# Patient Record
Sex: FEMALE | Race: WHITE | NOT HISPANIC OR LATINO
[De-identification: names, ages, dates, MRNs, and addresses within clinical notes are randomized per-mention and may not be internally consistent; named-entity substitution may affect disease eponyms.]

---

## 2019-03-25 ENCOUNTER — TRANSCRIPTION ENCOUNTER (OUTPATIENT)
Age: 54
End: 2019-03-25

## 2019-04-08 ENCOUNTER — LABORATORY RESULT (OUTPATIENT)
Age: 54
End: 2019-04-08

## 2019-04-08 ENCOUNTER — APPOINTMENT (OUTPATIENT)
Dept: CARDIOLOGY | Facility: CLINIC | Age: 54
End: 2019-04-08
Payer: COMMERCIAL

## 2019-04-08 ENCOUNTER — NON-APPOINTMENT (OUTPATIENT)
Age: 54
End: 2019-04-08

## 2019-04-08 VITALS
DIASTOLIC BLOOD PRESSURE: 78 MMHG | HEIGHT: 68 IN | TEMPERATURE: 98.1 F | HEART RATE: 54 BPM | BODY MASS INDEX: 24.55 KG/M2 | OXYGEN SATURATION: 99 % | SYSTOLIC BLOOD PRESSURE: 124 MMHG | WEIGHT: 162 LBS

## 2019-04-08 VITALS — SYSTOLIC BLOOD PRESSURE: 120 MMHG | DIASTOLIC BLOOD PRESSURE: 76 MMHG

## 2019-04-08 PROCEDURE — 93000 ELECTROCARDIOGRAM COMPLETE: CPT

## 2019-04-08 PROCEDURE — 93306 TTE W/DOPPLER COMPLETE: CPT

## 2019-04-08 PROCEDURE — 99204 OFFICE O/P NEW MOD 45 MIN: CPT

## 2019-04-09 ENCOUNTER — APPOINTMENT (OUTPATIENT)
Dept: CARDIOLOGY | Facility: CLINIC | Age: 54
End: 2019-04-09
Payer: COMMERCIAL

## 2019-04-09 ENCOUNTER — INPATIENT (INPATIENT)
Facility: HOSPITAL | Age: 54
LOS: 0 days | Discharge: HOME | DRG: 948 | End: 2019-04-10
Attending: INTERNAL MEDICINE | Admitting: INTERNAL MEDICINE
Payer: COMMERCIAL

## 2019-04-09 VITALS
HEART RATE: 68 BPM | DIASTOLIC BLOOD PRESSURE: 88 MMHG | OXYGEN SATURATION: 95 % | WEIGHT: 162.04 LBS | SYSTOLIC BLOOD PRESSURE: 132 MMHG | RESPIRATION RATE: 17 BRPM | TEMPERATURE: 98 F | HEIGHT: 68 IN

## 2019-04-09 DIAGNOSIS — R94.39 ABNORMAL RESULT OF OTHER CARDIOVASCULAR FUNCTION STUDY: ICD-10-CM

## 2019-04-09 PROCEDURE — 93015 CV STRESS TEST SUPVJ I&R: CPT

## 2019-04-09 PROCEDURE — A9500: CPT

## 2019-04-09 PROCEDURE — 99285 EMERGENCY DEPT VISIT HI MDM: CPT

## 2019-04-09 PROCEDURE — 93454 CORONARY ARTERY ANGIO S&I: CPT | Mod: 26,GC

## 2019-04-09 PROCEDURE — 78452 HT MUSCLE IMAGE SPECT MULT: CPT

## 2019-04-09 RX ORDER — TICAGRELOR 90 MG/1
90 TABLET ORAL
Qty: 0 | Refills: 0 | Status: DISCONTINUED | OUTPATIENT
Start: 2019-04-09 | End: 2019-04-10

## 2019-04-09 RX ORDER — ATORVASTATIN CALCIUM 80 MG/1
40 TABLET, FILM COATED ORAL AT BEDTIME
Qty: 0 | Refills: 0 | Status: DISCONTINUED | OUTPATIENT
Start: 2019-04-09 | End: 2019-04-10

## 2019-04-09 RX ORDER — TOPIRAMATE 25 MG
50 TABLET ORAL DAILY
Qty: 0 | Refills: 0 | Status: DISCONTINUED | OUTPATIENT
Start: 2019-04-09 | End: 2019-04-10

## 2019-04-09 RX ORDER — SODIUM CHLORIDE 9 MG/ML
500 INJECTION INTRAMUSCULAR; INTRAVENOUS; SUBCUTANEOUS
Qty: 0 | Refills: 0 | Status: DISCONTINUED | OUTPATIENT
Start: 2019-04-09 | End: 2019-04-10

## 2019-04-09 RX ORDER — METOPROLOL TARTRATE 50 MG
12.5 TABLET ORAL
Qty: 0 | Refills: 0 | Status: DISCONTINUED | OUTPATIENT
Start: 2019-04-09 | End: 2019-04-10

## 2019-04-09 RX ORDER — ACETAMINOPHEN 500 MG
650 TABLET ORAL ONCE
Qty: 0 | Refills: 0 | Status: COMPLETED | OUTPATIENT
Start: 2019-04-09 | End: 2019-04-09

## 2019-04-09 RX ORDER — ASPIRIN/CALCIUM CARB/MAGNESIUM 324 MG
81 TABLET ORAL DAILY
Qty: 0 | Refills: 0 | Status: DISCONTINUED | OUTPATIENT
Start: 2019-04-09 | End: 2019-04-10

## 2019-04-09 RX ORDER — ESCITALOPRAM OXALATE 10 MG/1
10 TABLET, FILM COATED ORAL DAILY
Qty: 0 | Refills: 0 | Status: DISCONTINUED | OUTPATIENT
Start: 2019-04-09 | End: 2019-04-10

## 2019-04-09 RX ORDER — NITROGLYCERIN 6.5 MG
0.5 CAPSULE, EXTENDED RELEASE ORAL ONCE
Qty: 0 | Refills: 0 | Status: COMPLETED | OUTPATIENT
Start: 2019-04-09 | End: 2019-04-09

## 2019-04-09 RX ADMIN — Medication 0.5 INCH(S): at 22:25

## 2019-04-09 RX ADMIN — TICAGRELOR 90 MILLIGRAM(S): 90 TABLET ORAL at 22:26

## 2019-04-09 RX ADMIN — SODIUM CHLORIDE 75 MILLILITER(S): 9 INJECTION INTRAMUSCULAR; INTRAVENOUS; SUBCUTANEOUS at 21:19

## 2019-04-09 RX ADMIN — Medication 50 MILLIGRAM(S): at 22:26

## 2019-04-09 RX ADMIN — ATORVASTATIN CALCIUM 40 MILLIGRAM(S): 80 TABLET, FILM COATED ORAL at 22:25

## 2019-04-09 RX ADMIN — Medication 30 MILLILITER(S): at 22:05

## 2019-04-09 RX ADMIN — Medication 650 MILLIGRAM(S): at 23:55

## 2019-04-09 NOTE — ED ADULT NURSE NOTE - OBJECTIVE STATEMENT
53 year old female patient presents ambulatory to ED from cardiologists office c/o intermittent CP and dizziness x 2 weeks s/p angioplasty. Patient states she had nuclear stress test today and was told by cardiologist to go to ED for cath. Patient currently reporting dizziness and lightheadedness, and 4/10 substernal chest pain. Denies current palpitations, SOB, abd pain, n/v/d, fever, chills. Cardiology fellow at bedside states patient is to go to cath lab immediately. IV access obtained to [facility]. Patient taken to Cath lab. Attempted to call cath lab but unable to take report at this time. Charge RN made aware.

## 2019-04-09 NOTE — ED ADULT NURSE REASSESSMENT NOTE - NS ED NURSE REASSESS COMMENT FT1
Cardiology fellow at bedside to take patient to cath lab. MD made aware that patient has all valuables and belongings on her and  is on his way. Offered to secure belongings in ED - MD states he needs to take patient to cath lab now. Attempted to call report, but RN unable to receive report at this time- aware that patient is on her way up

## 2019-04-09 NOTE — ED ADULT NURSE NOTE - NSIMPLEMENTINTERV_GEN_ALL_ED
Implemented All Fall with Harm Risk Interventions:  [place] to call system. Call bell, personal items and telephone within reach. Instruct patient to call for assistance. Room bathroom lighting operational. Non-slip footwear when patient is off stretcher. Physically safe environment: no spills, clutter or unnecessary equipment. Stretcher in lowest position, wheels locked, appropriate side rails in place. Provide visual cue, wrist band, yellow gown, etc. Monitor gait and stability. Monitor for mental status changes and reorient to person, place, and time. Review medications for side effects contributing to fall risk. Reinforce activity limits and safety measures with patient and family. Provide visual clues: red socks.

## 2019-04-09 NOTE — ED CLERICAL - NS ED CLERK NOTE PRE-ARRIVAL INFORMATION; ADDITIONAL PRE-ARRIVAL INFORMATION
CC/Reason For referral: Chest pain and requesting Angiogram with MD Chahal  Preferred Consultant(if applicable):  Who admits for you (if needed):  Do you have documents you would like to fax over?  Would you still like to speak to an ED attending? yes

## 2019-04-09 NOTE — ED PROVIDER NOTE - PMH
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

## 2019-04-09 NOTE — H&P CARDIOLOGY - PMH
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

## 2019-04-09 NOTE — ED PROVIDER NOTE - INTERPRETATION
EKG reviewed for rate, rhythm, axis, intervals and segments, including QRS morphology, P wave appearance T wave appearance, OR interval, and QT interval.  I find the EKG to be unremarkable in all of these regards except as follows: sinus bradycardia

## 2019-04-09 NOTE — H&P CARDIOLOGY - HISTORY OF PRESENT ILLNESS
52 yo  female PMH HTN, HLD, CAD with recent stent placement 2 weeks ago in [facility] to LAD and migraine presents to ER today after being evaluated by Dr. Burleson in the office today. Patient reports intermittent midsternal non radiating exertional and nonexertional CP since stent placement. Patient is new to Dr. Burleson. Patient reports she had a NST test today which was normal, however had ST changes. Patient was instructed to go to ER for further cardiac evaluation. Patient arrives to cardiac cath denies chest pain, palpitations, SOB 52 yo  female PMH HTN, HLD, CAD with recent stent placement 2 weeks ago in [facility] to LAD and migraine presents to ER today after being evaluated by Dr. Burleson in the office today. Patient reports intermittent midsternal non radiating exertional and nonexertional CP since stent placement. Patient is new to Dr. Burleson. Patient reports she had a NST test today which was normal, however had ST changes. Patient was instructed to go to ER for further cardiac evaluation. Patient arrives to cardiac cath denies chest pain, palpitations, SOB    post menopausal- LMP 2 years ago

## 2019-04-09 NOTE — ED PROVIDER NOTE - PHYSICAL EXAMINATION
Gen: Well appearing, NAD  Head: NCAT  HEENT: PERRL, MMM, normal conjunctiva, anicteric, neck supple  Lung: CTAB, no adventitious sounds  CV: RRR, no murmurs  Abd: soft, NTND, no rebound or guarding, no CVAT  MSK: No edema, no visible deformities  Neuro: Moving all extremities grossly  Skin: Warm and dry, no evidence of rash  Psych: normal mood and affect

## 2019-04-09 NOTE — ED PROVIDER NOTE - ATTENDING CONTRIBUTION TO CARE
54 yo female p/s substernal chest pressure x 2 weeks.  Recent cath with LAD stent placement per patient.  Some residual pain now.  EKG without STEMI.  Cards @ bedside -- had abnl pharm stress today, plan for urgent cardiac cath.

## 2019-04-09 NOTE — ED ADULT TRIAGE NOTE - CHIEF COMPLAINT QUOTE
pt sent by cardiologist for sternal cp associated with lightheadedness. s/p nuclear stress test today.  pt has LAD stent with angioplasty x 2 weeks

## 2019-04-10 ENCOUNTER — TRANSCRIPTION ENCOUNTER (OUTPATIENT)
Age: 54
End: 2019-04-10

## 2019-04-10 VITALS
TEMPERATURE: 98 F | SYSTOLIC BLOOD PRESSURE: 95 MMHG | DIASTOLIC BLOOD PRESSURE: 60 MMHG | OXYGEN SATURATION: 98 % | HEART RATE: 60 BPM | RESPIRATION RATE: 17 BRPM

## 2019-04-10 LAB
ANION GAP SERPL CALC-SCNC: 16 MMOL/L — SIGNIFICANT CHANGE UP (ref 5–17)
BUN SERPL-MCNC: 16 MG/DL — SIGNIFICANT CHANGE UP (ref 7–23)
CALCIUM SERPL-MCNC: 9.5 MG/DL — SIGNIFICANT CHANGE UP (ref 8.4–10.5)
CHLORIDE SERPL-SCNC: 108 MMOL/L — SIGNIFICANT CHANGE UP (ref 96–108)
CO2 SERPL-SCNC: 21 MMOL/L — LOW (ref 22–31)
CREAT SERPL-MCNC: 0.87 MG/DL — SIGNIFICANT CHANGE UP (ref 0.5–1.3)
GLUCOSE SERPL-MCNC: 110 MG/DL — HIGH (ref 70–99)
HCT VFR BLD CALC: 37.8 % — SIGNIFICANT CHANGE UP (ref 34.5–45)
HGB BLD-MCNC: 13.4 G/DL — SIGNIFICANT CHANGE UP (ref 11.5–15.5)
MCHC RBC-ENTMCNC: 33.7 PG — SIGNIFICANT CHANGE UP (ref 27–34)
MCHC RBC-ENTMCNC: 35.4 GM/DL — SIGNIFICANT CHANGE UP (ref 32–36)
MCV RBC AUTO: 95.1 FL — SIGNIFICANT CHANGE UP (ref 80–100)
PLATELET # BLD AUTO: 230 K/UL — SIGNIFICANT CHANGE UP (ref 150–400)
POTASSIUM SERPL-MCNC: 3.6 MMOL/L — SIGNIFICANT CHANGE UP (ref 3.5–5.3)
POTASSIUM SERPL-SCNC: 3.6 MMOL/L — SIGNIFICANT CHANGE UP (ref 3.5–5.3)
RBC # BLD: 3.97 M/UL — SIGNIFICANT CHANGE UP (ref 3.8–5.2)
RBC # FLD: 12.3 % — SIGNIFICANT CHANGE UP (ref 10.3–14.5)
SODIUM SERPL-SCNC: 145 MMOL/L — SIGNIFICANT CHANGE UP (ref 135–145)
WBC # BLD: 11.1 K/UL — HIGH (ref 3.8–10.5)
WBC # FLD AUTO: 11.1 K/UL — HIGH (ref 3.8–10.5)

## 2019-04-10 PROCEDURE — 85027 COMPLETE CBC AUTOMATED: CPT

## 2019-04-10 PROCEDURE — C1894: CPT

## 2019-04-10 PROCEDURE — C1769: CPT

## 2019-04-10 PROCEDURE — C1887: CPT

## 2019-04-10 PROCEDURE — C1760: CPT

## 2019-04-10 PROCEDURE — 93454 CORONARY ARTERY ANGIO S&I: CPT

## 2019-04-10 PROCEDURE — 99285 EMERGENCY DEPT VISIT HI MDM: CPT

## 2019-04-10 PROCEDURE — 80048 BASIC METABOLIC PNL TOTAL CA: CPT

## 2019-04-10 RX ORDER — PANTOPRAZOLE SODIUM 20 MG/1
20 TABLET, DELAYED RELEASE ORAL
Qty: 0 | Refills: 0 | Status: DISCONTINUED | OUTPATIENT
Start: 2019-04-10 | End: 2019-04-10

## 2019-04-10 RX ORDER — POTASSIUM CHLORIDE 20 MEQ
20 PACKET (EA) ORAL ONCE
Qty: 0 | Refills: 0 | Status: COMPLETED | OUTPATIENT
Start: 2019-04-10 | End: 2019-04-10

## 2019-04-10 RX ORDER — RANOLAZINE 500 MG/1
500 TABLET, FILM COATED, EXTENDED RELEASE ORAL ONCE
Qty: 0 | Refills: 0 | Status: COMPLETED | OUTPATIENT
Start: 2019-04-10 | End: 2019-04-10

## 2019-04-10 RX ORDER — RANOLAZINE 500 MG/1
1 TABLET, FILM COATED, EXTENDED RELEASE ORAL
Qty: 180 | Refills: 0
Start: 2019-04-10 | End: 2019-07-08

## 2019-04-10 RX ADMIN — Medication 0.5 INCH(S): at 01:01

## 2019-04-10 RX ADMIN — Medication 650 MILLIGRAM(S): at 00:25

## 2019-04-10 RX ADMIN — RANOLAZINE 500 MILLIGRAM(S): 500 TABLET, FILM COATED, EXTENDED RELEASE ORAL at 11:59

## 2019-04-10 RX ADMIN — Medication 12.5 MILLIGRAM(S): at 05:36

## 2019-04-10 RX ADMIN — Medication 20 MILLIEQUIVALENT(S): at 10:04

## 2019-04-10 RX ADMIN — TICAGRELOR 90 MILLIGRAM(S): 90 TABLET ORAL at 10:04

## 2019-04-10 RX ADMIN — Medication 50 MILLIGRAM(S): at 10:04

## 2019-04-10 RX ADMIN — Medication 81 MILLIGRAM(S): at 05:36

## 2019-04-10 RX ADMIN — ESCITALOPRAM OXALATE 10 MILLIGRAM(S): 10 TABLET, FILM COATED ORAL at 10:04

## 2019-04-10 RX ADMIN — PANTOPRAZOLE SODIUM 20 MILLIGRAM(S): 20 TABLET, DELAYED RELEASE ORAL at 10:04

## 2019-04-10 NOTE — DISCHARGE NOTE PROVIDER - CARE PROVIDER_API CALL
Smith Lin)  Cardiology; Internal Medicine  [address] [facility] [address], [address]  [place] [address]  Phone: [phone]  Fax: [phone]  Follow Up Time:

## 2019-04-10 NOTE — DISCHARGE NOTE PROVIDER - HOSPITAL COURSE
52 yo  female PMH HTN, HLD, CAD with recent stent placement 2 weeks ago in [facility] to LAD and migraine presents to ER today after being evaluated by Dr. Burleson in the office today. Patient reports intermittent midsternal non radiating exertional and nonexertional CP since stent placement. Patient is new to Dr. Burleson. Patient reports she had a NST test today which was normal, however had ST changes. Patient was instructed to go to ER for further cardiac evaluation. Patient arrives to cardiac cath denies chest pain, palpitations, SOB. Pt is now s/p diagnostic cath LAD stent patent, RCA 20%. Pt tolerated the procedure well, site benign. Post procedure discharge instructions discussed and questions addressed

## 2019-04-10 NOTE — PROGRESS NOTE ADULT - ASSESSMENT
Patient is a 53y old  Female who presents with a chief complaint of chest pain now s/p diagnostic cath via left femoral artery access. Pt tolerated the procedure well, site benign. Post-procedure discharge instructions discussed and questions addressed

## 2019-04-10 NOTE — DISCHARGE NOTE NURSING/CASE MANAGEMENT/SOCIAL WORK - NSDCDPATPORTLINK_GEN_ALL_CORE
You can access the [facility], offered by [facility], by registering with the following website: [email or web address]

## 2019-04-10 NOTE — DISCHARGE NOTE PROVIDER - NSDCCPCAREPLAN_GEN_ALL_CORE_FT
PRINCIPAL DISCHARGE DIAGNOSIS  Diagnosis: Chest pain  Assessment and Plan of Treatment: Pt remains chest pain free and understands post cath discharge instructions   No heavy lifting, strenuous activity, bending, straining or unnecessary stair climbing  for 2 weeks. No sex for 1 week.  No driving for 2 days. You may shower 24 hours following procedure but avoid baths and swimming for 1 week. Check groin site for bleeding and/or swelling daily following procedure. Call your doctor/cardiologist immediately should it occur or if you have increased/persistent pain at the site. Follow up with your cardiologist in 1- 2 weeks. You may call [facility] at [phone] or [phone] after office hours and weekends  with any questions or concerns following your procedure. Take medications as prescribed.

## 2019-04-10 NOTE — PROVIDER CONTACT NOTE (OTHER) - SITUATION
As pt was getting OOB with staff she c/o pain in left inner thigh. pt unable to walk to bathroom at this time. Cath site is benign. Inner thigh is soft and no swelling or bruising noted.

## 2019-04-10 NOTE — PROVIDER CONTACT NOTE (OTHER) - BACKGROUND
s/p diagnostic cath via leftgroin with angioseal.  pt recently received stent to LAD approx 2 weeks ago.

## 2019-04-10 NOTE — DISCHARGE NOTE PROVIDER - NSDCCPTREATMENT_GEN_ALL_CORE_FT
PRINCIPAL PROCEDURE  Procedure: Left heart cardiac catheterization  Findings and Treatment: diagnostic, via left femoral artery access.

## 2019-04-10 NOTE — PROGRESS NOTE ADULT - SUBJECTIVE AND OBJECTIVE BOX
Patient is a 53y old  Female who presents with a chief complaint of chest pain now s/p diagnostic cath via left femoral artery access.         Allergies    Compazine (Unknown)  morphine (Unknown)  PC Pen VK (Unknown)  shellfish (Unknown)  sulfa drugs (Unknown)    Intolerances        Medications:  aluminum hydroxide/magnesium hydroxide/simethicone Suspension 30 milliLiter(s) Oral every 4 hours PRN  aspirin enteric coated 81 milliGRAM(s) Oral daily  atorvastatin 40 milliGRAM(s) Oral at bedtime  escitalopram 10 milliGRAM(s) Oral daily  metoprolol tartrate 12.5 milliGRAM(s) Oral two times a day  sodium chloride 0.9%. 500 milliLiter(s) IV Continuous <Continuous>  ticagrelor 90 milliGRAM(s) Oral two times a day  topiramate 50 milliGRAM(s) Oral daily      Vitals:  T(C): 36.4 (19 @ 20:35), Max: 36.7 (19 @ 18:15)  HR: 51 (04-10-19 @ 00:50) (51 - 68)  BP: 91/52 (04-10-19 @ 00:50) (91/52 - 139/83)  BP(mean): 97 (19 @ 19:28) (97 - 97)  RR: 15 (04-10-19 @ 00:50) (14 - 18)  SpO2: 100% (04-10-19 @ 00:50) (95% - 100%)  Wt(kg): --  Daily Height in cm: 172 (2019 19:28)    Daily Weight in k.5 (2019 19:28)  I&O's Summary    2019 07:01  -  10 Apr 2019 04:46  --------------------------------------------------------  IN: 150 mL / OUT: 200 mL / NET: -50 mL          Physical Exam:  Appearance: Normal  Eyes: PERRL, EOMI  HENT: Normal oral [place], NC/AT  Cardiovascular: S1S2, RRR, No M/R/G, no JVD, No Lower extremity edema  Procedural Access Site: Left femoral artery access.  No hematoma, Non-tender to palpation, 2+ pulse, No bruit, No Ecchymosis  Respiratory: Clear to auscultation bilaterally  Gastrointestinal: Soft, Non tender, Normal Bowel Sounds  Musculoskeletal: No clubbing, No joint deformity   Neurologic: Non-focal  Psychiatry: AAOx3, Mood & affect appropriate  Skin: No rashes, No ecchymoses, No cyanosis    04-10    145  |  108  |  16  ----------------------------<  110<H>  3.6   |  21<L>  |  0.87    Ca    9.5      10 Apr 2019 00:22      Interpretation of Telemetry:

## 2019-04-11 PROBLEM — Z95.5 PRESENCE OF CORONARY ANGIOPLASTY IMPLANT AND GRAFT: Chronic | Status: ACTIVE | Noted: 2019-04-09

## 2019-04-11 PROBLEM — E78.5 HYPERLIPIDEMIA, UNSPECIFIED: Chronic | Status: ACTIVE | Noted: 2019-04-09

## 2019-04-13 ENCOUNTER — RX CHANGE (OUTPATIENT)
Age: 54
End: 2019-04-13

## 2019-04-14 LAB
ALBUMIN SERPL ELPH-MCNC: 4.3 G/DL
ALP BLD-CCNC: 104 U/L
ALT SERPL-CCNC: 15 U/L
ANION GAP SERPL CALC-SCNC: 12 MMOL/L
AST SERPL-CCNC: 18 U/L
BASOPHILS # BLD AUTO: 0.09 K/UL
BASOPHILS NFR BLD AUTO: 0.9 %
BILIRUB SERPL-MCNC: 0.4 MG/DL
BUN SERPL-MCNC: 20 MG/DL
CALCIUM SERPL-MCNC: 9.6 MG/DL
CHLORIDE SERPL-SCNC: 108 MMOL/L
CHOLEST SERPL-MCNC: 136 MG/DL
CHOLEST/HDLC SERPL: 2.6 RATIO
CK SERPL-CCNC: 42 U/L
CO2 SERPL-SCNC: 25 MMOL/L
CREAT SERPL-MCNC: 1.05 MG/DL
EOSINOPHIL # BLD AUTO: 0.4 K/UL
EOSINOPHIL NFR BLD AUTO: 4 %
ESTIMATED AVERAGE GLUCOSE: 97 MG/DL
GLUCOSE SERPL-MCNC: 86 MG/DL
HBA1C MFR BLD HPLC: 5 %
HCT VFR BLD CALC: 40.1 %
HDLC SERPL-MCNC: 52 MG/DL
HGB BLD-MCNC: 12.8 G/DL
IMM GRANULOCYTES NFR BLD AUTO: 0.4 %
LDLC SERPL CALC-MCNC: 60 MG/DL
LDLC SERPL DIRECT ASSAY-MCNC: 64 MG/DL
LYMPHOCYTES # BLD AUTO: 2.46 K/UL
LYMPHOCYTES NFR BLD AUTO: 24.8 %
MAGNESIUM SERPL-MCNC: 2.1 MG/DL
MAN DIFF?: NORMAL
MCHC RBC-ENTMCNC: 31.9 GM/DL
MCHC RBC-ENTMCNC: 32.1 PG
MCV RBC AUTO: 100.5 FL
MONOCYTES # BLD AUTO: 0.46 K/UL
MONOCYTES NFR BLD AUTO: 4.6 %
NEUTROPHILS # BLD AUTO: 6.48 K/UL
NEUTROPHILS NFR BLD AUTO: 65.3 %
PHOSPHATE SERPL-MCNC: 3.9 MG/DL
PLATELET # BLD AUTO: 246 K/UL
POTASSIUM SERPL-SCNC: 4.4 MMOL/L
PROT SERPL-MCNC: 7 G/DL
RBC # BLD: 3.99 M/UL
RBC # FLD: 12.9 %
SODIUM SERPL-SCNC: 145 MMOL/L
T3RU NFR SERPL: 1.2 TBI
T4 FREE SERPL-MCNC: 1 NG/DL
T4 SERPL-MCNC: 6 UG/DL
TRIGL SERPL-MCNC: 121 MG/DL
TSH SERPL-ACNC: 2.97 UIU/ML
URATE SERPL-MCNC: 5.6 MG/DL
WBC # FLD AUTO: 9.93 K/UL

## 2019-04-14 NOTE — PHYSICAL EXAM
[General Appearance - Well Developed] : well developed [Normal Appearance] : normal appearance [Well Groomed] : well groomed [General Appearance - Well Nourished] : well nourished [No Deformities] : no deformities [General Appearance - In No Acute Distress] : no acute distress [Normal Conjunctiva] : the conjunctiva exhibited no abnormalities [Eyelids - No Xanthelasma] : the eyelids demonstrated no xanthelasmas [Normal Oral Mucosa] : normal oral mucosa [No Oral Pallor] : no oral pallor [No Oral Cyanosis] : no oral cyanosis [Normal Jugular Venous A Waves Present] : normal jugular venous A waves present [Normal Jugular Venous V Waves Present] : normal jugular venous V waves present [No Jugular Venous Grimm A Waves] : no jugular venous grimm A waves [Respiration, Rhythm And Depth] : normal respiratory rhythm and effort [Exaggerated Use Of Accessory Muscles For Inspiration] : no accessory muscle use [Auscultation Breath Sounds / Voice Sounds] : lungs were clear to auscultation bilaterally [Heart Rate And Rhythm] : heart rate and rhythm were normal [Heart Sounds] : normal S1 and S2 [Murmurs] : no murmurs present [Abdomen Soft] : soft [Abdomen Tenderness] : non-tender [] : no hepato-splenomegaly [Abdomen Mass (___ Cm)] : no abdominal mass palpated [Abnormal Walk] : normal gait [Gait - Sufficient For Exercise Testing] : the gait was sufficient for exercise testing [Oriented To Time, Place, And Person] : oriented to person, place, and time [Affect] : the affect was normal [Mood] : the mood was normal [No Anxiety] : not feeling anxious [FreeTextEntry1] : no bruit right groin  mild tenderness

## 2019-04-18 ENCOUNTER — APPOINTMENT (OUTPATIENT)
Dept: CARDIOLOGY | Facility: CLINIC | Age: 54
End: 2019-04-18
Payer: COMMERCIAL

## 2019-04-18 ENCOUNTER — NON-APPOINTMENT (OUTPATIENT)
Age: 54
End: 2019-04-18

## 2019-04-18 VITALS
DIASTOLIC BLOOD PRESSURE: 70 MMHG | WEIGHT: 162 LBS | BODY MASS INDEX: 24.55 KG/M2 | HEART RATE: 63 BPM | TEMPERATURE: 97.7 F | OXYGEN SATURATION: 98 % | SYSTOLIC BLOOD PRESSURE: 100 MMHG | HEIGHT: 68 IN

## 2019-04-18 DIAGNOSIS — R07.89 OTHER CHEST PAIN: ICD-10-CM

## 2019-04-18 PROCEDURE — 99214 OFFICE O/P EST MOD 30 MIN: CPT

## 2019-04-18 PROCEDURE — 93000 ELECTROCARDIOGRAM COMPLETE: CPT

## 2019-04-18 RX ORDER — TICAGRELOR 90 MG/1
90 TABLET ORAL
Qty: 90 | Refills: 3 | Status: DISCONTINUED | COMMUNITY
Start: 1900-01-01 | End: 2019-04-18

## 2019-04-18 RX ORDER — METOPROLOL TARTRATE 25 MG/1
25 TABLET, FILM COATED ORAL
Qty: 45 | Refills: 3 | Status: DISCONTINUED | COMMUNITY
End: 2019-04-18

## 2019-04-19 ENCOUNTER — RX RENEWAL (OUTPATIENT)
Age: 54
End: 2019-04-19

## 2019-04-22 LAB
ANION GAP SERPL CALC-SCNC: 12 MMOL/L
BASOPHILS # BLD AUTO: 0.05 K/UL
BASOPHILS NFR BLD AUTO: 0.9 %
BUN SERPL-MCNC: 21 MG/DL
CALCIUM SERPL-MCNC: 9.8 MG/DL
CHLORIDE SERPL-SCNC: 106 MMOL/L
CO2 SERPL-SCNC: 25 MMOL/L
CREAT SERPL-MCNC: 1.28 MG/DL
EOSINOPHIL # BLD AUTO: 0.28 K/UL
EOSINOPHIL NFR BLD AUTO: 4.9 %
GLUCOSE SERPL-MCNC: 87 MG/DL
HCT VFR BLD CALC: 36.6 %
HGB BLD-MCNC: 11.9 G/DL
IMM GRANULOCYTES NFR BLD AUTO: 0.2 %
LYMPHOCYTES # BLD AUTO: 1.64 K/UL
LYMPHOCYTES NFR BLD AUTO: 28.8 %
MAN DIFF?: NORMAL
MCHC RBC-ENTMCNC: 31.4 PG
MCHC RBC-ENTMCNC: 32.5 GM/DL
MCV RBC AUTO: 96.6 FL
MONOCYTES # BLD AUTO: 0.39 K/UL
MONOCYTES NFR BLD AUTO: 6.9 %
NEUTROPHILS # BLD AUTO: 3.32 K/UL
NEUTROPHILS NFR BLD AUTO: 58.3 %
PLATELET # BLD AUTO: 222 K/UL
POTASSIUM SERPL-SCNC: 4.3 MMOL/L
RBC # BLD: 3.79 M/UL
RBC # FLD: 13.2 %
SODIUM SERPL-SCNC: 143 MMOL/L
WBC # FLD AUTO: 5.69 K/UL

## 2019-04-25 ENCOUNTER — APPOINTMENT (OUTPATIENT)
Dept: CARDIOLOGY | Facility: CLINIC | Age: 54
End: 2019-04-25

## 2019-04-26 ENCOUNTER — MEDICATION RENEWAL (OUTPATIENT)
Age: 54
End: 2019-04-26

## 2019-04-28 NOTE — HISTORY OF PRESENT ILLNESS
[FreeTextEntry1] : This is a 53 year old  female who presents today after having a stent placed on 03/26/2019 in the LAD. She had a second Angiogram done on April 9 which came back unremarkable. She was still having pressure after the second angiogram, she was prescribed Renexa 500 mg twice a day. The pain has gotten better, she does have intermittent chest pain if she exerts herself for too long. The pain comes and goes and she does not know a specific amount of time that the pain lasts for. When she takes deep breaths she feels a pressure, which started after she began taking Brilinta. She does admit to feeling lightheaded when she gets off the bed too quickly. Today the patient denies  palpitations, nausea, vomiting, and dizziness.\par

## 2019-04-28 NOTE — PHYSICAL EXAM
[General Appearance - Well Developed] : well developed [Normal Appearance] : normal appearance [Well Groomed] : well groomed [General Appearance - Well Nourished] : well nourished [General Appearance - In No Acute Distress] : no acute distress [No Deformities] : no deformities [Normal Conjunctiva] : the conjunctiva exhibited no abnormalities [Eyelids - No Xanthelasma] : the eyelids demonstrated no xanthelasmas [Normal Oral Mucosa] : normal oral mucosa [No Oral Pallor] : no oral pallor [Normal Jugular Venous A Waves Present] : normal jugular venous A waves present [No Oral Cyanosis] : no oral cyanosis [Normal Jugular Venous V Waves Present] : normal jugular venous V waves present [No Jugular Venous Grimm A Waves] : no jugular venous grimm A waves [Respiration, Rhythm And Depth] : normal respiratory rhythm and effort [Exaggerated Use Of Accessory Muscles For Inspiration] : no accessory muscle use [Auscultation Breath Sounds / Voice Sounds] : lungs were clear to auscultation bilaterally [Heart Rate And Rhythm] : heart rate and rhythm were normal [Heart Sounds] : normal S1 and S2 [Murmurs] : no murmurs present [Abdomen Soft] : soft [Abdomen Tenderness] : non-tender [Abdomen Mass (___ Cm)] : no abdominal mass palpated [Abnormal Walk] : normal gait [Gait - Sufficient For Exercise Testing] : the gait was sufficient for exercise testing [Cyanosis, Localized] : no localized cyanosis [Nail Clubbing] : no clubbing of the fingernails [Petechial Hemorrhages (___cm)] : no petechial hemorrhages [Skin Color & Pigmentation] : normal skin color and pigmentation [] : no rash [No Venous Stasis] : no venous stasis [Skin Lesions] : no skin lesions [No Skin Ulcers] : no skin ulcer [No Xanthoma] : no  xanthoma was observed [Oriented To Time, Place, And Person] : oriented to person, place, and time [Affect] : the affect was normal [Mood] : the mood was normal [No Anxiety] : not feeling anxious [FreeTextEntry1] : Groin- minimal ecchymosis, no bruit, no hematoma

## 2019-04-29 ENCOUNTER — OTHER (OUTPATIENT)
Age: 54
End: 2019-04-29

## 2019-05-16 ENCOUNTER — NON-APPOINTMENT (OUTPATIENT)
Age: 54
End: 2019-05-16

## 2019-05-16 ENCOUNTER — APPOINTMENT (OUTPATIENT)
Dept: CARDIOLOGY | Facility: CLINIC | Age: 54
End: 2019-05-16
Payer: COMMERCIAL

## 2019-05-16 VITALS
HEIGHT: 68 IN | HEART RATE: 69 BPM | WEIGHT: 162 LBS | BODY MASS INDEX: 24.55 KG/M2 | TEMPERATURE: 98.8 F | OXYGEN SATURATION: 99 % | SYSTOLIC BLOOD PRESSURE: 115 MMHG | DIASTOLIC BLOOD PRESSURE: 70 MMHG

## 2019-05-16 PROCEDURE — 93000 ELECTROCARDIOGRAM COMPLETE: CPT

## 2019-05-16 PROCEDURE — 99213 OFFICE O/P EST LOW 20 MIN: CPT

## 2019-05-16 RX ORDER — RANOLAZINE 500 MG/1
500 TABLET, EXTENDED RELEASE ORAL
Qty: 120 | Refills: 1 | Status: DISCONTINUED | COMMUNITY
Start: 2019-04-18 | End: 2019-05-16

## 2019-05-16 NOTE — HISTORY OF PRESENT ILLNESS
[FreeTextEntry1] : pt presents for f/u overall feels better still with pain in chest area associated with movement and touching .pt s/p labs mild anemia with pmd and notices increased bruising .pt stopped ranexa no change in symptoms with or without pt denies any  dizziness ,lightheadedness ,nausea vomiting diaphoresis\par

## 2019-05-16 NOTE — PHYSICAL EXAM
[Well Groomed] : well groomed [General Appearance - Well Developed] : well developed [Normal Appearance] : normal appearance [General Appearance - Well Nourished] : well nourished [No Deformities] : no deformities [General Appearance - In No Acute Distress] : no acute distress [Normal Conjunctiva] : the conjunctiva exhibited no abnormalities [Eyelids - No Xanthelasma] : the eyelids demonstrated no xanthelasmas [Normal Oral Mucosa] : normal oral mucosa [Normal Jugular Venous A Waves Present] : normal jugular venous A waves present [No Oral Cyanosis] : no oral cyanosis [No Oral Pallor] : no oral pallor [No Jugular Venous Grimm A Waves] : no jugular venous grimm A waves [Normal Jugular Venous V Waves Present] : normal jugular venous V waves present [Respiration, Rhythm And Depth] : normal respiratory rhythm and effort [Exaggerated Use Of Accessory Muscles For Inspiration] : no accessory muscle use [Auscultation Breath Sounds / Voice Sounds] : lungs were clear to auscultation bilaterally [Heart Sounds] : normal S1 and S2 [Heart Rate And Rhythm] : heart rate and rhythm were normal [Murmurs] : no murmurs present [Abdomen Soft] : soft [Abdomen Tenderness] : non-tender [Abdomen Mass (___ Cm)] : no abdominal mass palpated [Abnormal Walk] : normal gait [Gait - Sufficient For Exercise Testing] : the gait was sufficient for exercise testing [Cyanosis, Localized] : no localized cyanosis [Nail Clubbing] : no clubbing of the fingernails [Petechial Hemorrhages (___cm)] : no petechial hemorrhages [Skin Color & Pigmentation] : normal skin color and pigmentation [] : no rash [No Skin Ulcers] : no skin ulcer [No Venous Stasis] : no venous stasis [Skin Lesions] : no skin lesions [Oriented To Time, Place, And Person] : oriented to person, place, and time [No Xanthoma] : no  xanthoma was observed [FreeTextEntry1] : ecchymosis left leg no hematoma  [Affect] : the affect was normal [No Anxiety] : not feeling anxious [Mood] : the mood was normal

## 2019-05-18 LAB
25(OH)D3 SERPL-MCNC: 32.6 NG/ML
ANION GAP SERPL CALC-SCNC: 13 MMOL/L
APPEARANCE: CLEAR
APTT BLD: 32.8 SEC
BACTERIA: NEGATIVE
BASOPHILS # BLD AUTO: 0.09 K/UL
BASOPHILS NFR BLD AUTO: 1.4 %
BILIRUBIN URINE: NEGATIVE
BLOOD URINE: ABNORMAL
BUN SERPL-MCNC: 16 MG/DL
CALCIUM SERPL-MCNC: 9.7 MG/DL
CHLORIDE SERPL-SCNC: 108 MMOL/L
CO2 SERPL-SCNC: 25 MMOL/L
COLOR: NORMAL
CREAT SERPL-MCNC: 0.94 MG/DL
EOSINOPHIL # BLD AUTO: 0.25 K/UL
EOSINOPHIL NFR BLD AUTO: 4 %
ERYTHROCYTE [SEDIMENTATION RATE] IN BLOOD BY WESTERGREN METHOD: 4 MM/HR
FERRITIN SERPL-MCNC: 133 NG/ML
FOLATE SERPL-MCNC: 15.7 NG/ML
GLUCOSE QUALITATIVE U: NEGATIVE
GLUCOSE SERPL-MCNC: 90 MG/DL
HAPTOGLOB SERPL-MCNC: 105 MG/DL
HCT VFR BLD CALC: 39 %
HGB BLD-MCNC: 12.3 G/DL
HYALINE CASTS: 2 /LPF
IMM GRANULOCYTES NFR BLD AUTO: 0.2 %
INR PPP: 1.04 RATIO
IRON SERPL-MCNC: 73 UG/DL
KETONES URINE: NEGATIVE
LDH SERPL-CCNC: 181 U/L
LEUKOCYTE ESTERASE URINE: ABNORMAL
LYMPHOCYTES # BLD AUTO: 2.12 K/UL
LYMPHOCYTES NFR BLD AUTO: 34 %
MAN DIFF?: NORMAL
MCHC RBC-ENTMCNC: 31.5 GM/DL
MCHC RBC-ENTMCNC: 31.5 PG
MCV RBC AUTO: 99.7 FL
MICROSCOPIC-UA: NORMAL
MONOCYTES # BLD AUTO: 0.46 K/UL
MONOCYTES NFR BLD AUTO: 7.4 %
NEUTROPHILS # BLD AUTO: 3.31 K/UL
NEUTROPHILS NFR BLD AUTO: 53 %
NITRITE URINE: NEGATIVE
PH URINE: 6
PLATELET # BLD AUTO: 215 K/UL
POTASSIUM SERPL-SCNC: 4.3 MMOL/L
PROTEIN URINE: NEGATIVE
PT BLD: 11.8 SEC
RBC # BLD: 3.91 M/UL
RBC # FLD: 13.2 %
RED BLOOD CELLS URINE: 4 /HPF
SODIUM SERPL-SCNC: 146 MMOL/L
SPECIFIC GRAVITY URINE: 1.02
SQUAMOUS EPITHELIAL CELLS: 7 /HPF
TRANSFERRIN SERPL-MCNC: 215 MG/DL
UROBILINOGEN URINE: NORMAL
VIT B12 SERPL-MCNC: 422 PG/ML
WBC # FLD AUTO: 6.24 K/UL
WHITE BLOOD CELLS URINE: 8 /HPF

## 2019-05-21 ENCOUNTER — APPOINTMENT (OUTPATIENT)
Dept: CARDIOLOGY | Facility: CLINIC | Age: 54
End: 2019-05-21

## 2019-05-23 ENCOUNTER — APPOINTMENT (OUTPATIENT)
Dept: CARDIOLOGY | Facility: CLINIC | Age: 54
End: 2019-05-23

## 2019-05-29 ENCOUNTER — APPOINTMENT (OUTPATIENT)
Dept: CARDIOLOGY | Facility: CLINIC | Age: 54
End: 2019-05-29

## 2019-05-30 ENCOUNTER — APPOINTMENT (OUTPATIENT)
Dept: CARDIOLOGY | Facility: CLINIC | Age: 54
End: 2019-05-30

## 2019-06-03 ENCOUNTER — APPOINTMENT (OUTPATIENT)
Dept: CARDIOLOGY | Facility: CLINIC | Age: 54
End: 2019-06-03

## 2019-06-05 ENCOUNTER — APPOINTMENT (OUTPATIENT)
Dept: CARDIOLOGY | Facility: CLINIC | Age: 54
End: 2019-06-05

## 2019-06-06 ENCOUNTER — APPOINTMENT (OUTPATIENT)
Dept: CARDIOLOGY | Facility: CLINIC | Age: 54
End: 2019-06-06

## 2019-06-10 ENCOUNTER — APPOINTMENT (OUTPATIENT)
Dept: CARDIOLOGY | Facility: CLINIC | Age: 54
End: 2019-06-10

## 2019-06-12 ENCOUNTER — APPOINTMENT (OUTPATIENT)
Dept: CARDIOLOGY | Facility: CLINIC | Age: 54
End: 2019-06-12

## 2019-06-13 ENCOUNTER — APPOINTMENT (OUTPATIENT)
Dept: CARDIOLOGY | Facility: CLINIC | Age: 54
End: 2019-06-13

## 2019-06-14 ENCOUNTER — NON-APPOINTMENT (OUTPATIENT)
Age: 54
End: 2019-06-14

## 2019-06-14 ENCOUNTER — APPOINTMENT (OUTPATIENT)
Dept: CARDIOLOGY | Facility: CLINIC | Age: 54
End: 2019-06-14
Payer: COMMERCIAL

## 2019-06-14 VITALS
HEART RATE: 64 BPM | DIASTOLIC BLOOD PRESSURE: 70 MMHG | HEIGHT: 68 IN | SYSTOLIC BLOOD PRESSURE: 112 MMHG | BODY MASS INDEX: 24.86 KG/M2 | TEMPERATURE: 98 F | WEIGHT: 164 LBS | OXYGEN SATURATION: 99 %

## 2019-06-14 DIAGNOSIS — R42 DIZZINESS AND GIDDINESS: ICD-10-CM

## 2019-06-14 PROCEDURE — 93000 ELECTROCARDIOGRAM COMPLETE: CPT

## 2019-06-14 PROCEDURE — 99213 OFFICE O/P EST LOW 20 MIN: CPT

## 2019-06-14 NOTE — HISTORY OF PRESENT ILLNESS
[FreeTextEntry1] : f/u medical issues  [de-identified] : pt presents for f/u pt overall much improved .pt still with pinpoint tenderness to anterior chest wall .pt with two episodes  1 .pt with episode of light headedness in shower x 1 .pt with resolved symptoms .2.pt with episode of chest pain after lifting heavy objects in heat .pt with resolving echymosis pt denies any  dizziness ,lightheadedness ,nausea vomiting diaphoresis\par

## 2019-06-14 NOTE — PHYSICAL EXAM
[Well Developed] : well developed [No Acute Distress] : no acute distress [Well Nourished] : well nourished [Normal Sclera/Conjunctiva] : normal sclera/conjunctiva [Well-Appearing] : well-appearing [EOMI] : extraocular movements intact [PERRL] : pupils equal round and reactive to light [Normal Outer Ear/Nose] : the outer ears and nose were normal in appearance [Supple] : supple [Normal Oropharynx] : the oropharynx was normal [No JVD] : no jugular venous distention [No Lymphadenopathy] : no lymphadenopathy [Thyroid Normal, No Nodules] : the thyroid was normal and there were no nodules present [Clear to Auscultation] : lungs were clear to auscultation bilaterally [No Accessory Muscle Use] : no accessory muscle use [No Respiratory Distress] : no respiratory distress  [Regular Rhythm] : with a regular rhythm [Normal S1, S2] : normal S1 and S2 [Normal Rate] : normal rate  [No Murmur] : no murmur heard [No Carotid Bruits] : no carotid bruits [No Abdominal Bruit] : a ~M bruit was not heard ~T in the abdomen [No Varicosities] : no varicosities [No Edema] : there was no peripheral edema [Pedal Pulses Present] : the pedal pulses are present [No Extremity Clubbing/Cyanosis] : no extremity clubbing/cyanosis [No Palpable Aorta] : no palpable aorta [Non-distended] : non-distended [Soft] : abdomen soft [Non Tender] : non-tender [No Masses] : no abdominal mass palpated [No HSM] : no HSM [Normal Bowel Sounds] : normal bowel sounds [Normal Anterior Cervical Nodes] : no anterior cervical lymphadenopathy [Normal Posterior Cervical Nodes] : no posterior cervical lymphadenopathy [No CVA Tenderness] : no CVA  tenderness [No Spinal Tenderness] : no spinal tenderness [No Joint Swelling] : no joint swelling [Grossly Normal Strength/Tone] : grossly normal strength/tone [No Rash] : no rash [Normal Gait] : normal gait [Coordination Grossly Intact] : coordination grossly intact [No Focal Deficits] : no focal deficits [Deep Tendon Reflexes (DTR)] : deep tendon reflexes were 2+ and symmetric [Normal Affect] : the affect was normal [Normal Insight/Judgement] : insight and judgment were intact [de-identified] : reproducible pain chest area

## 2019-06-15 LAB
ANION GAP SERPL CALC-SCNC: 14 MMOL/L
APPEARANCE: CLEAR
BACTERIA UR CULT: NORMAL
BACTERIA: NEGATIVE
BASOPHILS # BLD AUTO: 0.06 K/UL
BASOPHILS NFR BLD AUTO: 1.1 %
BILIRUBIN URINE: NEGATIVE
BLOOD URINE: NORMAL
BUN SERPL-MCNC: 13 MG/DL
CALCIUM SERPL-MCNC: 9.5 MG/DL
CHLORIDE SERPL-SCNC: 110 MMOL/L
CO2 SERPL-SCNC: 22 MMOL/L
COLOR: NORMAL
CREAT SERPL-MCNC: 0.95 MG/DL
EOSINOPHIL # BLD AUTO: 0.33 K/UL
EOSINOPHIL NFR BLD AUTO: 6 %
GLUCOSE QUALITATIVE U: NEGATIVE
GLUCOSE SERPL-MCNC: 78 MG/DL
HCT VFR BLD CALC: 38.6 %
HGB BLD-MCNC: 12.5 G/DL
HYALINE CASTS: 1 /LPF
IMM GRANULOCYTES NFR BLD AUTO: 0.2 %
KETONES URINE: NEGATIVE
LEUKOCYTE ESTERASE URINE: NEGATIVE
LYMPHOCYTES # BLD AUTO: 1.76 K/UL
LYMPHOCYTES NFR BLD AUTO: 32 %
MAN DIFF?: NORMAL
MCHC RBC-ENTMCNC: 31.6 PG
MCHC RBC-ENTMCNC: 32.4 GM/DL
MCV RBC AUTO: 97.7 FL
MICROSCOPIC-UA: NORMAL
MONOCYTES # BLD AUTO: 0.39 K/UL
MONOCYTES NFR BLD AUTO: 7.1 %
NEUTROPHILS # BLD AUTO: 2.95 K/UL
NEUTROPHILS NFR BLD AUTO: 53.6 %
NITRITE URINE: NEGATIVE
PH URINE: 7
PLATELET # BLD AUTO: 187 K/UL
POTASSIUM SERPL-SCNC: 4.8 MMOL/L
PROTEIN URINE: NEGATIVE
RBC # BLD: 3.95 M/UL
RBC # FLD: 12.8 %
RED BLOOD CELLS URINE: 5 /HPF
SODIUM SERPL-SCNC: 146 MMOL/L
SPECIFIC GRAVITY URINE: 1.02
SQUAMOUS EPITHELIAL CELLS: 1 /HPF
UROBILINOGEN URINE: NORMAL
WBC # FLD AUTO: 5.5 K/UL
WHITE BLOOD CELLS URINE: 2 /HPF

## 2019-06-17 ENCOUNTER — APPOINTMENT (OUTPATIENT)
Dept: CARDIOLOGY | Facility: CLINIC | Age: 54
End: 2019-06-17

## 2019-06-19 ENCOUNTER — APPOINTMENT (OUTPATIENT)
Dept: CARDIOLOGY | Facility: CLINIC | Age: 54
End: 2019-06-19

## 2019-06-20 ENCOUNTER — APPOINTMENT (OUTPATIENT)
Dept: CARDIOLOGY | Facility: CLINIC | Age: 54
End: 2019-06-20

## 2019-06-24 ENCOUNTER — APPOINTMENT (OUTPATIENT)
Dept: CARDIOLOGY | Facility: CLINIC | Age: 54
End: 2019-06-24

## 2019-06-26 ENCOUNTER — APPOINTMENT (OUTPATIENT)
Dept: CARDIOLOGY | Facility: CLINIC | Age: 54
End: 2019-06-26

## 2019-06-27 ENCOUNTER — APPOINTMENT (OUTPATIENT)
Dept: CARDIOLOGY | Facility: CLINIC | Age: 54
End: 2019-06-27

## 2019-07-01 ENCOUNTER — APPOINTMENT (OUTPATIENT)
Dept: CARDIOLOGY | Facility: CLINIC | Age: 54
End: 2019-07-01

## 2019-07-03 ENCOUNTER — APPOINTMENT (OUTPATIENT)
Dept: CARDIOLOGY | Facility: CLINIC | Age: 54
End: 2019-07-03

## 2019-07-05 ENCOUNTER — APPOINTMENT (OUTPATIENT)
Dept: UROLOGY | Facility: CLINIC | Age: 54
End: 2019-07-05

## 2019-07-08 ENCOUNTER — APPOINTMENT (OUTPATIENT)
Dept: CARDIOLOGY | Facility: CLINIC | Age: 54
End: 2019-07-08

## 2019-07-10 ENCOUNTER — APPOINTMENT (OUTPATIENT)
Dept: CARDIOLOGY | Facility: CLINIC | Age: 54
End: 2019-07-10

## 2019-07-11 ENCOUNTER — APPOINTMENT (OUTPATIENT)
Dept: CARDIOLOGY | Facility: CLINIC | Age: 54
End: 2019-07-11

## 2019-07-12 ENCOUNTER — APPOINTMENT (OUTPATIENT)
Dept: CARDIOLOGY | Facility: CLINIC | Age: 54
End: 2019-07-12
Payer: COMMERCIAL

## 2019-07-12 ENCOUNTER — NON-APPOINTMENT (OUTPATIENT)
Age: 54
End: 2019-07-12

## 2019-07-12 VITALS
OXYGEN SATURATION: 98 % | HEIGHT: 68 IN | DIASTOLIC BLOOD PRESSURE: 65 MMHG | SYSTOLIC BLOOD PRESSURE: 110 MMHG | BODY MASS INDEX: 24.86 KG/M2 | WEIGHT: 164 LBS | TEMPERATURE: 98.4 F | HEART RATE: 65 BPM

## 2019-07-12 DIAGNOSIS — Z87.898 PERSONAL HISTORY OF OTHER SPECIFIED CONDITIONS: ICD-10-CM

## 2019-07-12 DIAGNOSIS — T14.8XXA OTHER INJURY OF UNSPECIFIED BODY REGION, INITIAL ENCOUNTER: ICD-10-CM

## 2019-07-12 PROCEDURE — 99213 OFFICE O/P EST LOW 20 MIN: CPT

## 2019-07-12 PROCEDURE — 93000 ELECTROCARDIOGRAM COMPLETE: CPT | Mod: 59

## 2019-07-12 PROCEDURE — 93224 XTRNL ECG REC UP TO 48 HRS: CPT

## 2019-07-12 NOTE — HISTORY OF PRESENT ILLNESS
[FreeTextEntry1] : pt presents for acute visit pt s/p ptci lad 4/9 pt with persistent chest discomfort .pt s/p repeat cath ok and pt with chest discomfort that occurs randomly with and without exertion .pt denies sob .pt denies n/v diaphoresis pt denies any  dizziness ,lightheadedness ,nausea vomiting diaphoresis\par pt with no bruising on plavix and pt started baby asa

## 2019-07-12 NOTE — PHYSICAL EXAM
[General Appearance - Well Developed] : well developed [Well Groomed] : well groomed [Normal Appearance] : normal appearance [General Appearance - Well Nourished] : well nourished [No Deformities] : no deformities [General Appearance - In No Acute Distress] : no acute distress [Eyelids - No Xanthelasma] : the eyelids demonstrated no xanthelasmas [Normal Conjunctiva] : the conjunctiva exhibited no abnormalities [No Oral Pallor] : no oral pallor [Normal Oral Mucosa] : normal oral mucosa [Normal Jugular Venous A Waves Present] : normal jugular venous A waves present [No Oral Cyanosis] : no oral cyanosis [Normal Jugular Venous V Waves Present] : normal jugular venous V waves present [No Jugular Venous Grimm A Waves] : no jugular venous grimm A waves [Auscultation Breath Sounds / Voice Sounds] : lungs were clear to auscultation bilaterally [Respiration, Rhythm And Depth] : normal respiratory rhythm and effort [Exaggerated Use Of Accessory Muscles For Inspiration] : no accessory muscle use [Murmurs] : no murmurs present [Heart Rate And Rhythm] : heart rate and rhythm were normal [Heart Sounds] : normal S1 and S2 [Abdomen Tenderness] : non-tender [Abdomen Soft] : soft [Abdomen Mass (___ Cm)] : no abdominal mass palpated [Abnormal Walk] : normal gait [Gait - Sufficient For Exercise Testing] : the gait was sufficient for exercise testing [Petechial Hemorrhages (___cm)] : no petechial hemorrhages [Cyanosis, Localized] : no localized cyanosis [Nail Clubbing] : no clubbing of the fingernails [] : no rash [Skin Color & Pigmentation] : normal skin color and pigmentation [Skin Lesions] : no skin lesions [No Skin Ulcers] : no skin ulcer [No Venous Stasis] : no venous stasis [No Xanthoma] : no  xanthoma was observed [Oriented To Time, Place, And Person] : oriented to person, place, and time [Affect] : the affect was normal [Mood] : the mood was normal [No Anxiety] : not feeling anxious

## 2019-07-15 ENCOUNTER — APPOINTMENT (OUTPATIENT)
Dept: CARDIOLOGY | Facility: CLINIC | Age: 54
End: 2019-07-15

## 2019-07-16 ENCOUNTER — NON-APPOINTMENT (OUTPATIENT)
Age: 54
End: 2019-07-16

## 2019-07-16 ENCOUNTER — RX RENEWAL (OUTPATIENT)
Age: 54
End: 2019-07-16

## 2019-07-17 ENCOUNTER — APPOINTMENT (OUTPATIENT)
Dept: CARDIOLOGY | Facility: CLINIC | Age: 54
End: 2019-07-17

## 2019-07-18 ENCOUNTER — APPOINTMENT (OUTPATIENT)
Dept: CARDIOLOGY | Facility: CLINIC | Age: 54
End: 2019-07-18

## 2019-07-22 ENCOUNTER — APPOINTMENT (OUTPATIENT)
Dept: CARDIOLOGY | Facility: CLINIC | Age: 54
End: 2019-07-22

## 2019-07-24 ENCOUNTER — APPOINTMENT (OUTPATIENT)
Dept: CARDIOLOGY | Facility: CLINIC | Age: 54
End: 2019-07-24

## 2019-07-25 ENCOUNTER — APPOINTMENT (OUTPATIENT)
Dept: CARDIOLOGY | Facility: CLINIC | Age: 54
End: 2019-07-25

## 2019-07-29 ENCOUNTER — APPOINTMENT (OUTPATIENT)
Dept: CARDIOLOGY | Facility: CLINIC | Age: 54
End: 2019-07-29

## 2019-07-31 ENCOUNTER — APPOINTMENT (OUTPATIENT)
Dept: CARDIOLOGY | Facility: CLINIC | Age: 54
End: 2019-07-31

## 2019-08-01 ENCOUNTER — APPOINTMENT (OUTPATIENT)
Dept: CARDIOLOGY | Facility: CLINIC | Age: 54
End: 2019-08-01

## 2019-08-02 ENCOUNTER — RX RENEWAL (OUTPATIENT)
Age: 54
End: 2019-08-02

## 2019-08-05 ENCOUNTER — RESULT CHARGE (OUTPATIENT)
Age: 54
End: 2019-08-05

## 2019-08-05 ENCOUNTER — APPOINTMENT (OUTPATIENT)
Dept: CARDIOLOGY | Facility: CLINIC | Age: 54
End: 2019-08-05

## 2019-08-06 ENCOUNTER — NON-APPOINTMENT (OUTPATIENT)
Age: 54
End: 2019-08-06

## 2019-08-06 ENCOUNTER — APPOINTMENT (OUTPATIENT)
Dept: CARDIOLOGY | Facility: CLINIC | Age: 54
End: 2019-08-06
Payer: COMMERCIAL

## 2019-08-06 VITALS
TEMPERATURE: 98.4 F | WEIGHT: 165 LBS | OXYGEN SATURATION: 97 % | HEIGHT: 68 IN | DIASTOLIC BLOOD PRESSURE: 70 MMHG | BODY MASS INDEX: 25.01 KG/M2 | HEART RATE: 62 BPM | SYSTOLIC BLOOD PRESSURE: 118 MMHG

## 2019-08-06 PROCEDURE — 93000 ELECTROCARDIOGRAM COMPLETE: CPT

## 2019-08-06 PROCEDURE — 99213 OFFICE O/P EST LOW 20 MIN: CPT

## 2019-08-06 NOTE — REVIEW OF SYSTEMS
[Palpitations] : palpitations [Negative] : Endocrine [see HPI] : see HPI [Shortness Of Breath] : no shortness of breath [Dyspnea on exertion] : not dyspnea during exertion [Chest  Pressure] : no chest pressure [Chest Pain] : no chest pain [Lower Ext Edema] : no extremity edema [Leg Claudication] : no intermittent leg claudication [FreeTextEntry1] : Right Flank Pain

## 2019-08-06 NOTE — PHYSICAL EXAM
[Normal Appearance] : normal appearance [General Appearance - Well Developed] : well developed [No Deformities] : no deformities [Well Groomed] : well groomed [General Appearance - Well Nourished] : well nourished [Normal Conjunctiva] : the conjunctiva exhibited no abnormalities [General Appearance - In No Acute Distress] : no acute distress [Eyelids - No Xanthelasma] : the eyelids demonstrated no xanthelasmas [Normal Oral Mucosa] : normal oral mucosa [No Oral Cyanosis] : no oral cyanosis [No Oral Pallor] : no oral pallor [Normal Jugular Venous A Waves Present] : normal jugular venous A waves present [Normal Jugular Venous V Waves Present] : normal jugular venous V waves present [No Jugular Venous Grimm A Waves] : no jugular venous grimm A waves [Exaggerated Use Of Accessory Muscles For Inspiration] : no accessory muscle use [Respiration, Rhythm And Depth] : normal respiratory rhythm and effort [Heart Rate And Rhythm] : heart rate and rhythm were normal [Auscultation Breath Sounds / Voice Sounds] : lungs were clear to auscultation bilaterally [Murmurs] : no murmurs present [Heart Sounds] : normal S1 and S2 [Abdomen Soft] : soft [Abdomen Tenderness] : non-tender [Abnormal Walk] : normal gait [Abdomen Mass (___ Cm)] : no abdominal mass palpated [Gait - Sufficient For Exercise Testing] : the gait was sufficient for exercise testing [Nail Clubbing] : no clubbing of the fingernails [Petechial Hemorrhages (___cm)] : no petechial hemorrhages [Cyanosis, Localized] : no localized cyanosis [] : no rash [Skin Color & Pigmentation] : normal skin color and pigmentation [No Venous Stasis] : no venous stasis [Skin Lesions] : no skin lesions [No Xanthoma] : no  xanthoma was observed [No Skin Ulcers] : no skin ulcer [Oriented To Time, Place, And Person] : oriented to person, place, and time [Mood] : the mood was normal [Affect] : the affect was normal [No Anxiety] : not feeling anxious

## 2019-08-06 NOTE — HISTORY OF PRESENT ILLNESS
[FreeTextEntry1] : This is a 53 year old lady with a PMH of CAD and Palpitations presents today for a follow up. Patient states that she had passed a kidney stone a month ago, and was found to have another kidney stone on a CT scan that measured 4 mm. Patient following with Kevin (urology) and she was told to leave the kidney stone alone for now and will monitor in the next 6 months. Patient states that she still has a small amount of Right Flank Pain. Patient was started on the Nitroglycerin patch at the last visit, and states that her chest pain has improved. Palpitations are still present and come and go. Patient taking Toprol 12.5 mg.  Patient denies dyspnea, chest pain, nausea, vomiting, dizziness and lightheadedness.\par

## 2019-08-07 ENCOUNTER — APPOINTMENT (OUTPATIENT)
Dept: CARDIOLOGY | Facility: CLINIC | Age: 54
End: 2019-08-07

## 2019-08-08 ENCOUNTER — APPOINTMENT (OUTPATIENT)
Dept: CARDIOLOGY | Facility: CLINIC | Age: 54
End: 2019-08-08

## 2019-08-11 LAB
APPEARANCE: ABNORMAL
BACTERIA UR CULT: NORMAL
BACTERIA: NEGATIVE
BILIRUBIN URINE: NEGATIVE
BLOOD URINE: ABNORMAL
CALCIUM OXALATE CRYSTALS: ABNORMAL
COLOR: YELLOW
GLUCOSE QUALITATIVE U: NEGATIVE
HYALINE CASTS: 0 /LPF
KETONES URINE: NEGATIVE
LEUKOCYTE ESTERASE URINE: ABNORMAL
MICROSCOPIC-UA: NORMAL
NITRITE URINE: NEGATIVE
PH URINE: 5.5
PROTEIN URINE: NORMAL
RED BLOOD CELLS URINE: 5 /HPF
SPECIFIC GRAVITY URINE: 1.03
SQUAMOUS EPITHELIAL CELLS: 9 /HPF
UROBILINOGEN URINE: NORMAL
WHITE BLOOD CELLS URINE: 32 /HPF

## 2019-08-12 ENCOUNTER — APPOINTMENT (OUTPATIENT)
Dept: CARDIOLOGY | Facility: CLINIC | Age: 54
End: 2019-08-12

## 2019-08-14 ENCOUNTER — APPOINTMENT (OUTPATIENT)
Dept: CARDIOLOGY | Facility: CLINIC | Age: 54
End: 2019-08-14

## 2019-08-15 ENCOUNTER — APPOINTMENT (OUTPATIENT)
Dept: CARDIOLOGY | Facility: CLINIC | Age: 54
End: 2019-08-15

## 2019-09-01 ENCOUNTER — TRANSCRIPTION ENCOUNTER (OUTPATIENT)
Age: 54
End: 2019-09-01

## 2019-10-12 NOTE — PATIENT PROFILE ADULT - NSPRONUTRITIONRISK_GEN_A_NUR
[id number] - IN REPORT:    Verbal report received from [facility] ALETA QUINTANA RN(name) on Asterion.  being received from PACU (unit) for routine progression of care      Report consisted of patients Situation, Background, Assessment and   Recommendations(SBAR). Information from the following report(s) SBAR, Kardex, Intake/Output, MAR and Cardiac Rhythm nsr was reviewed with the receiving nurse. Opportunity for questions and clarification was provided. Assessment completed upon patients arrival to unit and care assumed. 0693- Primary Nurse Sohail Rodriguez RN and CAR Kennedy performed a dual skin assessment on this patient Impairment noted- see wound doc flow sheet  Oswald score is 16    Patient awake, alert, pleasant. N/G tube to low wall suction. Vitals stable. Abdominal honey comb dressing dry and intact with scant amount bloody drainage Patient oriented to room, call bell in reach. Bed alarm on. Family at bedside. Questions answered. Will continue to monitor. 1900-Bedside shift change report given to [address] [place] [address] (oncoming nurse) by César Du RN (offgoing nurse). Report included the following information SBAR, Kardex, Intake/Output and MAR. No indicators present

## 2019-12-05 ENCOUNTER — APPOINTMENT (OUTPATIENT)
Dept: CARDIOLOGY | Facility: CLINIC | Age: 54
End: 2019-12-05
Payer: COMMERCIAL

## 2019-12-05 ENCOUNTER — LABORATORY RESULT (OUTPATIENT)
Age: 54
End: 2019-12-05

## 2019-12-05 ENCOUNTER — NON-APPOINTMENT (OUTPATIENT)
Age: 54
End: 2019-12-05

## 2019-12-05 VITALS
WEIGHT: 165 LBS | BODY MASS INDEX: 25.01 KG/M2 | OXYGEN SATURATION: 99 % | HEART RATE: 71 BPM | TEMPERATURE: 98.2 F | DIASTOLIC BLOOD PRESSURE: 70 MMHG | HEIGHT: 68 IN | SYSTOLIC BLOOD PRESSURE: 114 MMHG

## 2019-12-05 PROCEDURE — 99214 OFFICE O/P EST MOD 30 MIN: CPT

## 2019-12-05 PROCEDURE — 93000 ELECTROCARDIOGRAM COMPLETE: CPT

## 2019-12-05 NOTE — HISTORY OF PRESENT ILLNESS
[FreeTextEntry1] : pt presents for f/u pt s/p ptci .pt had episode of  palpitations 2 weeks ago occurred with eating resolved .pt with c.p associated with palpitations .symptoms resolved .pt then 1 week ago had episode of chest discomfort squeezing occurred randomly .pt with periodic discomfort .pt also with fatigue associated with above pt denies any chest  pain dizziness ,lightheadedness ,nausea vomiting diaphoresis\par pt has not been exercising or doing cardiac rehab

## 2019-12-10 ENCOUNTER — OUTPATIENT (OUTPATIENT)
Dept: OUTPATIENT SERVICES | Facility: HOSPITAL | Age: 54
LOS: 1 days | End: 2019-12-10
Payer: COMMERCIAL

## 2019-12-10 ENCOUNTER — APPOINTMENT (OUTPATIENT)
Dept: CARDIOLOGY | Facility: CLINIC | Age: 54
End: 2019-12-10
Payer: COMMERCIAL

## 2019-12-10 VITALS
HEIGHT: 68 IN | DIASTOLIC BLOOD PRESSURE: 77 MMHG | OXYGEN SATURATION: 99 % | SYSTOLIC BLOOD PRESSURE: 144 MMHG | WEIGHT: 164.91 LBS | HEART RATE: 67 BPM | TEMPERATURE: 98 F

## 2019-12-10 DIAGNOSIS — Z98.890 OTHER SPECIFIED POSTPROCEDURAL STATES: Chronic | ICD-10-CM

## 2019-12-10 DIAGNOSIS — R94.39 ABNORMAL RESULT OF OTHER CARDIOVASCULAR FUNCTION STUDY: ICD-10-CM

## 2019-12-10 LAB
ANION GAP SERPL CALC-SCNC: 12 MMOL/L — SIGNIFICANT CHANGE UP (ref 5–17)
BUN SERPL-MCNC: 14 MG/DL — SIGNIFICANT CHANGE UP (ref 7–23)
CALCIUM SERPL-MCNC: 9.2 MG/DL — SIGNIFICANT CHANGE UP (ref 8.4–10.5)
CHLORIDE SERPL-SCNC: 108 MMOL/L — SIGNIFICANT CHANGE UP (ref 96–108)
CO2 SERPL-SCNC: 20 MMOL/L — LOW (ref 22–31)
CREAT SERPL-MCNC: 0.9 MG/DL — SIGNIFICANT CHANGE UP (ref 0.5–1.3)
D DIMER BLD IA.RAPID-MCNC: <150 NG/ML DDU — SIGNIFICANT CHANGE UP
GLUCOSE SERPL-MCNC: 88 MG/DL — SIGNIFICANT CHANGE UP (ref 70–99)
HCT VFR BLD CALC: 36.5 % — SIGNIFICANT CHANGE UP (ref 34.5–45)
HGB BLD-MCNC: 12.2 G/DL — SIGNIFICANT CHANGE UP (ref 11.5–15.5)
MCHC RBC-ENTMCNC: 31.4 PG — SIGNIFICANT CHANGE UP (ref 27–34)
MCHC RBC-ENTMCNC: 33.4 GM/DL — SIGNIFICANT CHANGE UP (ref 32–36)
MCV RBC AUTO: 93.8 FL — SIGNIFICANT CHANGE UP (ref 80–100)
NRBC # BLD: 0 /100 WBCS — SIGNIFICANT CHANGE UP (ref 0–0)
PLATELET # BLD AUTO: 203 K/UL — SIGNIFICANT CHANGE UP (ref 150–400)
POTASSIUM SERPL-MCNC: 4 MMOL/L — SIGNIFICANT CHANGE UP (ref 3.5–5.3)
POTASSIUM SERPL-SCNC: 4 MMOL/L — SIGNIFICANT CHANGE UP (ref 3.5–5.3)
RBC # BLD: 3.89 M/UL — SIGNIFICANT CHANGE UP (ref 3.8–5.2)
RBC # FLD: 12.7 % — SIGNIFICANT CHANGE UP (ref 10.3–14.5)
SODIUM SERPL-SCNC: 140 MMOL/L — SIGNIFICANT CHANGE UP (ref 135–145)
WBC # BLD: 6.91 K/UL — SIGNIFICANT CHANGE UP (ref 3.8–10.5)
WBC # FLD AUTO: 6.91 K/UL — SIGNIFICANT CHANGE UP (ref 3.8–10.5)

## 2019-12-10 PROCEDURE — 85379 FIBRIN DEGRADATION QUANT: CPT

## 2019-12-10 PROCEDURE — 85027 COMPLETE CBC AUTOMATED: CPT

## 2019-12-10 PROCEDURE — 93454 CORONARY ARTERY ANGIO S&I: CPT | Mod: 26

## 2019-12-10 PROCEDURE — C1894: CPT

## 2019-12-10 PROCEDURE — 93005 ELECTROCARDIOGRAM TRACING: CPT

## 2019-12-10 PROCEDURE — 93015 CV STRESS TEST SUPVJ I&R: CPT

## 2019-12-10 PROCEDURE — 99152 MOD SED SAME PHYS/QHP 5/>YRS: CPT

## 2019-12-10 PROCEDURE — 78452 HT MUSCLE IMAGE SPECT MULT: CPT

## 2019-12-10 PROCEDURE — 93454 CORONARY ARTERY ANGIO S&I: CPT

## 2019-12-10 PROCEDURE — C1769: CPT

## 2019-12-10 PROCEDURE — A9500: CPT

## 2019-12-10 PROCEDURE — C1887: CPT

## 2019-12-10 PROCEDURE — 93010 ELECTROCARDIOGRAM REPORT: CPT

## 2019-12-10 PROCEDURE — 80048 BASIC METABOLIC PNL TOTAL CA: CPT

## 2019-12-10 RX ORDER — ACETAMINOPHEN 500 MG
650 TABLET ORAL ONCE
Refills: 0 | Status: COMPLETED | OUTPATIENT
Start: 2019-12-10 | End: 2019-12-10

## 2019-12-10 RX ORDER — TOPIRAMATE 25 MG
1 TABLET ORAL
Qty: 0 | Refills: 0 | DISCHARGE

## 2019-12-10 RX ORDER — TICAGRELOR 90 MG/1
1 TABLET ORAL
Qty: 0 | Refills: 0 | DISCHARGE

## 2019-12-10 RX ORDER — METOPROLOL TARTRATE 50 MG
0.5 TABLET ORAL
Qty: 0 | Refills: 0 | DISCHARGE

## 2019-12-10 RX ORDER — PANTOPRAZOLE SODIUM 20 MG/1
1 TABLET, DELAYED RELEASE ORAL
Qty: 0 | Refills: 0 | DISCHARGE

## 2019-12-10 RX ORDER — ASPIRIN/CALCIUM CARB/MAGNESIUM 324 MG
1 TABLET ORAL
Qty: 0 | Refills: 0 | DISCHARGE

## 2019-12-10 RX ADMIN — Medication 650 MILLIGRAM(S): at 18:12

## 2019-12-10 RX ADMIN — Medication 650 MILLIGRAM(S): at 17:51

## 2019-12-10 NOTE — H&P CARDIOLOGY - HISTORY OF PRESENT ILLNESS
53 yo  female PMH HTN, HLD, CAD/PCI LAD [place] 3/26/19    and migraine presents today as outpatient for cardiac cath to evaluate chest pain and palpitations.   Pt went for nuclear stress test today in Dr. Lin stated she had chest pain on exercise and was referred to come directly to hospital for cardiac cath.   Pt denies implanted cardiac monitors.       Dr. Lin- cardiologist 55 yo  female PMH HTN, HLD, CAD/PCI LAD [place] 3/26/19   and migraine presents today as outpatient for cardiac cath to evaluate chest pain and palpitations.   Pt went for nuclear stress test today in Dr. Lin stated she had chest pain on exercise and was referred to come directly to hospital for cardiac cath.   Pt denies implanted cardiac monitors.       Dr. Lin- cardiologist

## 2019-12-15 LAB
ALBUMIN SERPL ELPH-MCNC: 4.6 G/DL
ALP BLD-CCNC: 110 U/L
ALT SERPL-CCNC: 17 U/L
ANION GAP SERPL CALC-SCNC: 12 MMOL/L
APPEARANCE: CLEAR
APTT BLD: 33.3 SEC
AST SERPL-CCNC: 20 U/L
BACTERIA UR CULT: NORMAL
BACTERIA: ABNORMAL
BASOPHILS # BLD AUTO: 0.06 K/UL
BASOPHILS NFR BLD AUTO: 1.1 %
BILIRUB SERPL-MCNC: 0.7 MG/DL
BILIRUBIN URINE: NEGATIVE
BLOOD URINE: ABNORMAL
BUN SERPL-MCNC: 17 MG/DL
CALCIUM OXALATE CRYSTALS: ABNORMAL
CALCIUM SERPL-MCNC: 9.9 MG/DL
CHLORIDE SERPL-SCNC: 109 MMOL/L
CHOLEST SERPL-MCNC: 130 MG/DL
CHOLEST/HDLC SERPL: 2.1 RATIO
CK SERPL-CCNC: 116 U/L
CO2 SERPL-SCNC: 24 MMOL/L
COLOR: YELLOW
CREAT SERPL-MCNC: 1.06 MG/DL
EOSINOPHIL # BLD AUTO: 0.21 K/UL
EOSINOPHIL NFR BLD AUTO: 3.8 %
ESTIMATED AVERAGE GLUCOSE: 100 MG/DL
FERRITIN SERPL-MCNC: 121 NG/ML
FOLATE SERPL-MCNC: 16.5 NG/ML
GLUCOSE QUALITATIVE U: NEGATIVE
GLUCOSE SERPL-MCNC: 95 MG/DL
HAPTOGLOB SERPL-MCNC: 90 MG/DL
HBA1C MFR BLD HPLC: 5.1 %
HCT VFR BLD CALC: 39.9 %
HDLC SERPL-MCNC: 61 MG/DL
HGB BLD-MCNC: 12.8 G/DL
HYALINE CASTS: 4 /LPF
IMM GRANULOCYTES NFR BLD AUTO: 0.2 %
INR PPP: 1.02 RATIO
IRON SERPL-MCNC: 95 UG/DL
KETONES URINE: NEGATIVE
LDH SERPL-CCNC: 167 U/L
LDLC SERPL CALC-MCNC: 55 MG/DL
LDLC SERPL DIRECT ASSAY-MCNC: 67 MG/DL
LEUKOCYTE ESTERASE URINE: ABNORMAL
LYMPHOCYTES # BLD AUTO: 1.55 K/UL
LYMPHOCYTES NFR BLD AUTO: 28.3 %
MAN DIFF?: NORMAL
MCHC RBC-ENTMCNC: 31.1 PG
MCHC RBC-ENTMCNC: 32.1 GM/DL
MCV RBC AUTO: 97.1 FL
MICROSCOPIC-UA: NORMAL
MONOCYTES # BLD AUTO: 0.43 K/UL
MONOCYTES NFR BLD AUTO: 7.9 %
NEUTROPHILS # BLD AUTO: 3.21 K/UL
NEUTROPHILS NFR BLD AUTO: 58.7 %
NITRITE URINE: NEGATIVE
PH URINE: 6
PLATELET # BLD AUTO: 217 K/UL
POTASSIUM SERPL-SCNC: 4.1 MMOL/L
PROT SERPL-MCNC: 7.1 G/DL
PROTEIN URINE: NORMAL
PT BLD: 11.6 SEC
RBC # BLD: 4.11 M/UL
RBC # FLD: 12.9 %
RED BLOOD CELLS URINE: 4 /HPF
SODIUM SERPL-SCNC: 145 MMOL/L
SPECIFIC GRAVITY URINE: 1.03
SQUAMOUS EPITHELIAL CELLS: 4 /HPF
TRANSFERRIN SERPL-MCNC: 221 MG/DL
TRIGL SERPL-MCNC: 70 MG/DL
TROPONIN I SERPL-MCNC: 0.02 NG/ML
UROBILINOGEN URINE: NORMAL
VIT B12 SERPL-MCNC: 361 PG/ML
WBC # FLD AUTO: 5.47 K/UL
WHITE BLOOD CELLS URINE: 10 /HPF

## 2019-12-19 ENCOUNTER — CLINICAL ADVICE (OUTPATIENT)
Age: 54
End: 2019-12-19

## 2019-12-24 ENCOUNTER — APPOINTMENT (OUTPATIENT)
Dept: CARDIOLOGY | Facility: CLINIC | Age: 54
End: 2019-12-24

## 2019-12-29 ENCOUNTER — TRANSCRIPTION ENCOUNTER (OUTPATIENT)
Age: 54
End: 2019-12-29

## 2020-01-13 NOTE — ED ADULT NURSE NOTE - NSFALLRSKINDICATORS_ED_ALL_ED
CRITICAL CARE NOTE  There was a high probability of clinical significant / life threatening deterioration in the patient's condition which required my urgent intervention. The patient's course was complicated by hypertension, tachycardia and tremulousness likely due to alcohol withdrawal which required treatment with IV fluids, IV antiemetics and IV Ativan. Total critical care time was at least 32  minutes excluding any separately reported procedures. TRIAGE CHIEF COMPLAINT:   Chief Complaint   Patient presents with    Dizziness       HPI: Jayant Fisher is a 75 y.o. male who presents to the emergency department with complaint of waking up this morning around 10 AM with nausea and dizziness without vertigo. No headache or neck pain. No numbness or weakness. No speech or vision complaint. No diplopia. He took his blood pressure and it was elevated at 177/110. His heart rate was 130. He denies chest pain or shortness of breath. No fever or chills. No UTI symptoms. Denies URI symptoms. No abdominal pain vomiting or diarrhea. He did eat a little toast this morning. Patient states he has not felt like this in the past.  He drinks alcohol daily stating he drinks 4 glasses of whiskey a day along with beer. He last drank around 12 midnight last night. He states he has never had alcohol withdrawal.  He has a history of coronary disease status post stent placement about 10 years ago, hyperlipidemia and hypertension. He does not smoke. REVIEW OF SYSTEMS:   10 systems reviewed. Pertinent positives per HPI. Otherwise noted to be negative. I have reviewed the triage/nursing documentation and agree unless otherwise noted below.     PAST MEDICAL HISTORY:   Past Medical History:   Diagnosis Date    CAD (coronary artery disease)     Coronary stent occlusion     Hyperlipidemia     Hypertension         CURRENT MEDICATIONS:   Patient's Medications   New Prescriptions    No medications on file   Previous Medications    CETIRIZINE (ZYRTEC) 10 MG TABLET    Take 10 mg by mouth daily    DICLOFENAC (VOLTAREN) 75 MG EC TABLET    Take 1 tablet by mouth 2 times daily    FENOFIBRATE (TRICOR) 145 MG TABLET    Take 145 mg by mouth daily    LISINOPRIL (PRINIVIL;ZESTRIL) 5 MG TABLET    Take 5 mg by mouth daily    METOPROLOL (TOPROL-XL) 100 MG XL TABLET    Take 50 mg by mouth daily    PANTOPRAZOLE (PROTONIX) 20 MG TABLET    Take 20 mg by mouth daily    SIMVASTATIN (ZOCOR) 80 MG TABLET    Take 80 mg by mouth nightly   Modified Medications    No medications on file   Discontinued Medications    EZETIMIBE-SIMVASTATIN (VYTORIN) 10-80 MG PER TABLET    Take 1 tablet by mouth nightly        SURGICAL HISTORY:       Procedure Laterality Date    CARDIAC SURGERY      stent placement.  CLEFT LIP REPAIR      HERNIA REPAIR      INNER EAR SURGERY      KNEE SURGERY      right knee    TYMPANOSTOMY TUBE PLACEMENT          FAMILY HISTORY:   History reviewed. No pertinent family history. SOCIAL HISTORY:    reports that he has never smoked. He has never used smokeless tobacco. He reports current alcohol use. He reports that he does not use drugs. ALLERGIES: No Known Allergies    PHYSICAL EXAM:  VITAL SIGNS: BP (!) 178/119   Pulse 133   Temp 98.8 °F (37.1 °C) (Oral)   Resp 20   Wt 85.2 kg (187 lb 14.4 oz)   SpO2 95%   BMI 25.48 kg/m²   Constitutional:  No acute distress, Non-toxic appearance. Not pale or diaphoretic. No respiratory distress. Patient is mildly tremulous. HENT: Normocephalic, Atraumatic Oropharynx moist, No oral exudates. TMs are normal.  Eyes:  PERRL, EOMI, Conjunctiva normal, No discharge. Neck: No tenderness, Supple, No lymphadenopathy, No stridor. Cardiovascular: Giller rhythm with heart rate around 116., No murmurs, No rubs, No gallops.   Pulmonary/Chest:  Normal breath sounds, No respiratory distress, No wheezing,  Abdomen:   Soft, No tenderness, No masses, No pulsatile masses  Back:  No tenderness, No CVA tenderness  Extremities:  Normal range of motion, Intact distal pulses, No edema, No tenderness  Neurologic:  Alert & oriented x 3, Speech is clear and appropriate, No upper extremity drift or lower extremity weakness,  Normal sensory function, No facial asymmetry, no truncal or extremity ataxia. Normal gait. Skin:  Warm, Dry, No erythema, No rash  Psychiatric:  Affect normal, Mood normal      EKG:    EKG interpreted by myself. Sinus tachycardia at a rate of 116. Occasional PACs noted. Axis is -12. There is no ischemia. No ectopy noted. QTC is 433. Radiology:  CT Head WO Contrast   Final Result   1. Mild atrophy. 2.  No evidence of an acute intracranial process. XR CHEST STANDARD (2 VW)   Final Result   1. Mild left base airspace disease. This may reflect some atelectasis or early pneumonic infiltrate. 2. Large retrocardiac hiatal hernia.            LAB  Labs Reviewed   COMPREHENSIVE METABOLIC PANEL W/ REFLEX TO MG FOR LOW K - Abnormal; Notable for the following components:       Result Value    CO2 19 (*)     Anion Gap 21 (*)     Glucose 275 (*)     AST 38 (*)     All other components within normal limits    Narrative:     Performed at:  [facility]  [place]   Phone [phone]   LACTIC ACID, PLASMA - Abnormal; Notable for the following components:    Lactic Acid 3.1 (*)     All other components within normal limits    Narrative:     Performed at:  [facility]  [facility]   Phone [phone] LEVEL - Abnormal; Notable for the following components:    Acetaminophen Level <5 (*)     All other components within normal limits    Narrative:     Performed at:  [facility]  [place]  [place]   Phone [phone]   SALICYLATE LEVEL - Abnormal; Notable for the following components: Salicylate, Serum <3.3 (*)     All other components within normal limits    Narrative:     Performed at:  [address] [place] [address]   Phone [phone] - Abnormal; Notable for the following components:    Glucose, Ur 250 (*)     Ketones, Urine 15 (*)     Protein, UA TRACE (*)     Leukocyte Esterase, Urine TRACE (*)     All other components within normal limits    Narrative:     [place]  [address] [address]   Phone [phone]   MICROSCOPIC URINALYSIS - Abnormal; Notable for the following components:    Casts UA 3-5 Hyaline (*)     Mucus, UA 3+ (*)     Bacteria, UA 1+ (*)     Amorphous, UA 1+ (*)     All other components within normal limits    Narrative:     Performed at:  [place] [address]   Phone [phone]   MAGNESIUM - Abnormal; Notable for the following components:    Magnesium 1.70 (*)     All other components within normal limits    Narrative:     [place]formed [place]:  [address] [place] [address]   Phone [phone]   POCT GLUCOSE - Abnormal; Notable for the following components:    POC Glucose 272 (*)     All other components within normal limits    Narrative:     Performed at:  [address] [place] [address]   Phone [phone]   CBC WITH AUTO DIFFERENTIAL    Narrative:     Performed at:  [place] [address]   Phone [phone]   LIPASE    Narrative:     Performed at:  [facility]  [address] [place] [address]   Phone [phone]   ETHANOL    Narrative:     Performed at:  [facility] tremulous. I suspect alcohol withdrawal.  Repeat lactic acid is pending. The patient is interested in stopping his excessive alcohol use. He requests admission to the hospital and request admission to [facility] since his doctor is at [place] [address]. I paged the transfer center to talk to the on-duty hospitalist at [facility]. The patient's family member and a friend came to the hospital and requested the patient be taken by private car to [facility]. I explained to them that we did not have a bed for him yet and that I was uncomfortable with him going by private car since he has received IV Ativan and has alcohol withdrawal symptoms. They stated that they did not want to wait for an ambulance or a bed and instead wanted to take him directly to the ER at [facility]. I discouraged them against doing this. I explained that the patient would not be taken to an inpatient bed but would have to wait in the ED and go through the ED process again. The patient's friend was adamant that they were going to take him from here by car to [facility] emergency department. I was unable to reason with him. The patient signed out [address] He was competent to refuse my recommended course of treatment. I explained to the patient and his friend that they were putting the patient at risk having received IV Ativan and going by private car which could result in aspiration, respiratory distress, seizure and respiratory arrest.  They stated they understood but were still going to take him.       (Please note that portions of this note may have been completed with a voice recognition program.  Efforts were made to edit the dictation but occasionally words are mis-transcribed)      FINAL IMPRESSION:  1 --alcohol withdrawal  2 --alcohol abuse  3 --dizziness  4 --hypo-magnesemia                Bonny Zuleta MD  01/14/20 6695 yes

## 2020-01-21 ENCOUNTER — APPOINTMENT (OUTPATIENT)
Dept: ORTHOPEDIC SURGERY | Facility: CLINIC | Age: 55
End: 2020-01-21
Payer: COMMERCIAL

## 2020-01-21 VITALS
WEIGHT: 160 LBS | HEART RATE: 69 BPM | SYSTOLIC BLOOD PRESSURE: 125 MMHG | HEIGHT: 68 IN | DIASTOLIC BLOOD PRESSURE: 77 MMHG | BODY MASS INDEX: 24.25 KG/M2

## 2020-01-21 PROCEDURE — 99203 OFFICE O/P NEW LOW 30 MIN: CPT

## 2020-01-21 PROCEDURE — 73610 X-RAY EXAM OF ANKLE: CPT | Mod: LT

## 2020-01-21 NOTE — HISTORY OF PRESENT ILLNESS
[de-identified] : Patient status post twisting injury to her left ankle a few days ago. Patient reports that she felt like her leg went numb and then when she put it down on the ground, it could not support weight, she felt her ankle twist and she was unable to get up. She was seen in an emergency department where she was diagnosed with the ankle fracture and splinted. She presents today for evaluation. Of note patient has history of left-sided migraines and left-sided weakness. She has been under the care of a neurologist previously.

## 2020-01-21 NOTE — DISCUSSION/SUMMARY
[de-identified] : This patient has a stable ankle fracture with history of possible neurologic issues with the left leg. We will treat this fracture conservatively. She'll be allowed weightbearing as tolerated in the boot. Patient is instructed on icing, elevation and to check with her cardiologist regarding anti-inflammatories. She'll followup with her neurologist regarding the numbness to distal part of the left leg. We'll see her back in 3 weeks

## 2020-01-21 NOTE — PHYSICAL EXAM
[Antalgic] : antalgic [UE/LE] : Sensory: Intact in bilateral upper & lower extremities [ALL] : dorsalis pedis, posterior tibial, femoral, popliteal, and radial 2+ and symmetric bilaterally [Poor Appearance] : well-appearing [Acute Distress] : not in acute distress [Normal] : Oriented to person, place, and time, insight and judgement were intact and the affect was normal [de-identified] : Examination of the left leg. Splint is removed. Swelling, ecchymosis and tenderness at the lateral malleolus. Mild swelling medially with no ecchymosis. Mild tenderness posterior medially. Tenderness over the lateral malleolus. No tenderness over the medial malleolus. Mild deltoid tenderness. No proximal leg tenderness. 5° of ankle dorsiflexion and plantarflexion. She reports subjective numbness globally over the foot. Normal strength on straight leg raise and abduction compared to the contralateral side. Mild weakness of toe extension compared to the other side. Ankle strength not tested due to fracture [de-identified] : Use of the left ankle taken today and reviewed by me show a nondisplaced Morrell A. ankle fracture with an anatomically aligned ankle mortise

## 2020-02-04 ENCOUNTER — TRANSCRIPTION ENCOUNTER (OUTPATIENT)
Age: 55
End: 2020-02-04

## 2020-02-12 ENCOUNTER — APPOINTMENT (OUTPATIENT)
Dept: ORTHOPEDIC SURGERY | Facility: CLINIC | Age: 55
End: 2020-02-12
Payer: COMMERCIAL

## 2020-02-12 PROCEDURE — 99213 OFFICE O/P EST LOW 20 MIN: CPT

## 2020-02-12 PROCEDURE — 73610 X-RAY EXAM OF ANKLE: CPT | Mod: LT

## 2020-03-14 ENCOUNTER — TRANSCRIPTION ENCOUNTER (OUTPATIENT)
Age: 55
End: 2020-03-14

## 2020-03-24 ENCOUNTER — APPOINTMENT (OUTPATIENT)
Dept: ORTHOPEDIC SURGERY | Facility: CLINIC | Age: 55
End: 2020-03-24

## 2020-04-17 ENCOUNTER — APPOINTMENT (OUTPATIENT)
Dept: CARDIOLOGY | Facility: CLINIC | Age: 55
End: 2020-04-17
Payer: COMMERCIAL

## 2020-04-17 ENCOUNTER — LABORATORY RESULT (OUTPATIENT)
Age: 55
End: 2020-04-17

## 2020-04-17 ENCOUNTER — NON-APPOINTMENT (OUTPATIENT)
Age: 55
End: 2020-04-17

## 2020-04-17 VITALS
OXYGEN SATURATION: 99 % | TEMPERATURE: 98.1 F | WEIGHT: 161 LBS | HEART RATE: 75 BPM | SYSTOLIC BLOOD PRESSURE: 120 MMHG | HEIGHT: 68 IN | BODY MASS INDEX: 24.4 KG/M2 | DIASTOLIC BLOOD PRESSURE: 70 MMHG

## 2020-04-17 DIAGNOSIS — R06.09 OTHER FORMS OF DYSPNEA: ICD-10-CM

## 2020-04-17 LAB — TROPONIN-T, HIGH SENSITIVITY: <6 NG/L

## 2020-04-17 PROCEDURE — 93970 EXTREMITY STUDY: CPT

## 2020-04-17 PROCEDURE — 99214 OFFICE O/P EST MOD 30 MIN: CPT

## 2020-04-17 PROCEDURE — 93306 TTE W/DOPPLER COMPLETE: CPT

## 2020-04-17 PROCEDURE — 93000 ELECTROCARDIOGRAM COMPLETE: CPT

## 2020-04-17 RX ORDER — PRASUGREL 10 MG/1
10 TABLET, FILM COATED ORAL DAILY
Qty: 60 | Refills: 1 | Status: DISCONTINUED | COMMUNITY
Start: 2019-04-18 | End: 2020-04-17

## 2020-04-17 NOTE — PHYSICAL EXAM
[General Appearance - Well Developed] : well developed [Normal Appearance] : normal appearance [Well Groomed] : well groomed [General Appearance - Well Nourished] : well nourished [No Deformities] : no deformities [General Appearance - In No Acute Distress] : no acute distress [Normal Conjunctiva] : the conjunctiva exhibited no abnormalities [Eyelids - No Xanthelasma] : the eyelids demonstrated no xanthelasmas [Normal Oral Mucosa] : normal oral mucosa [No Oral Pallor] : no oral pallor [No Oral Cyanosis] : no oral cyanosis [Normal Jugular Venous A Waves Present] : normal jugular venous A waves present [Normal Jugular Venous V Waves Present] : normal jugular venous V waves present [No Jugular Venous Grimm A Waves] : no jugular venous grimm A waves [Respiration, Rhythm And Depth] : normal respiratory rhythm and effort [Exaggerated Use Of Accessory Muscles For Inspiration] : no accessory muscle use [Auscultation Breath Sounds / Voice Sounds] : lungs were clear to auscultation bilaterally [Heart Rate And Rhythm] : heart rate and rhythm were normal [Heart Sounds] : normal S1 and S2 [Murmurs] : no murmurs present [Abdomen Soft] : soft [Abdomen Tenderness] : non-tender [Abdomen Mass (___ Cm)] : no abdominal mass palpated [Abnormal Walk] : normal gait [Gait - Sufficient For Exercise Testing] : the gait was sufficient for exercise testing [Nail Clubbing] : no clubbing of the fingernails [Cyanosis, Localized] : no localized cyanosis [Petechial Hemorrhages (___cm)] : no petechial hemorrhages [Skin Color & Pigmentation] : normal skin color and pigmentation [] : no rash [No Venous Stasis] : no venous stasis [Skin Lesions] : no skin lesions [No Skin Ulcers] : no skin ulcer [No Xanthoma] : no  xanthoma was observed [Oriented To Time, Place, And Person] : oriented to person, place, and time [Affect] : the affect was normal [Mood] : the mood was normal [No Anxiety] : not feeling anxious

## 2020-04-17 NOTE — HISTORY OF PRESENT ILLNESS
[FreeTextEntry1] : pt presents for f/u pt s/p presumptive covid infection 1 mth ago 3/10/20 .pt with fevers x 2 weeks .pt with diahhrea .pt with dyspnea at that time pt with ocassional coughing .pt with 1 week hx of chest discomfort with breathing .pt still with fatigue pt denies any chest  pain dizziness ,lightheadedness ,nausea vomiting diaphoresis pt also with fx of left ankle   and casted  and boot also occurred 6 weeks ago \par pt with dyspnea on ocassion

## 2020-04-19 LAB
25(OH)D3 SERPL-MCNC: 30.1 NG/ML
ALBUMIN SERPL ELPH-MCNC: 4.2 G/DL
ALP BLD-CCNC: 92 U/L
ALT SERPL-CCNC: 20 U/L
ANION GAP SERPL CALC-SCNC: 13 MMOL/L
APPEARANCE: CLEAR
AST SERPL-CCNC: 17 U/L
BACTERIA UR CULT: NORMAL
BACTERIA: NEGATIVE
BASOPHILS # BLD AUTO: 0.05 K/UL
BASOPHILS NFR BLD AUTO: 0.6 %
BILIRUB SERPL-MCNC: 0.5 MG/DL
BILIRUBIN URINE: NEGATIVE
BLOOD URINE: ABNORMAL
BUN SERPL-MCNC: 14 MG/DL
CALCIUM SERPL-MCNC: 9.4 MG/DL
CHLORIDE SERPL-SCNC: 109 MMOL/L
CHOLEST SERPL-MCNC: 144 MG/DL
CHOLEST/HDLC SERPL: 2.7 RATIO
CK SERPL-CCNC: 36 U/L
CO2 SERPL-SCNC: 23 MMOL/L
COLOR: YELLOW
CREAT SERPL-MCNC: 1.05 MG/DL
EOSINOPHIL # BLD AUTO: 0.43 K/UL
EOSINOPHIL NFR BLD AUTO: 5.5 %
ESTIMATED AVERAGE GLUCOSE: 103 MG/DL
FERRITIN SERPL-MCNC: 194 NG/ML
FOLATE SERPL-MCNC: 5.9 NG/ML
GLUCOSE QUALITATIVE U: NEGATIVE
GLUCOSE SERPL-MCNC: 81 MG/DL
HAPTOGLOB SERPL-MCNC: 119 MG/DL
HBA1C MFR BLD HPLC: 5.2 %
HCT VFR BLD CALC: 39.5 %
HDLC SERPL-MCNC: 54 MG/DL
HGB BLD-MCNC: 12.4 G/DL
HYALINE CASTS: 3 /LPF
IMM GRANULOCYTES NFR BLD AUTO: 0.8 %
IRON SERPL-MCNC: 108 UG/DL
KETONES URINE: NEGATIVE
LDH SERPL-CCNC: 165 U/L
LDLC SERPL CALC-MCNC: 64 MG/DL
LDLC SERPL DIRECT ASSAY-MCNC: 72 MG/DL
LEUKOCYTE ESTERASE URINE: ABNORMAL
LYMPHOCYTES # BLD AUTO: 2.49 K/UL
LYMPHOCYTES NFR BLD AUTO: 32.1 %
MAN DIFF?: NORMAL
MCHC RBC-ENTMCNC: 31.4 GM/DL
MCHC RBC-ENTMCNC: 31.6 PG
MCV RBC AUTO: 100.8 FL
MICROSCOPIC-UA: NORMAL
MONOCYTES # BLD AUTO: 0.63 K/UL
MONOCYTES NFR BLD AUTO: 8.1 %
NEUTROPHILS # BLD AUTO: 4.1 K/UL
NEUTROPHILS NFR BLD AUTO: 52.9 %
NITRITE URINE: NEGATIVE
PH URINE: 5.5
PLATELET # BLD AUTO: 244 K/UL
POTASSIUM SERPL-SCNC: 4.5 MMOL/L
PROT SERPL-MCNC: 6.9 G/DL
PROTEIN URINE: NORMAL
RBC # BLD: 3.92 M/UL
RBC # FLD: 14.1 %
RED BLOOD CELLS URINE: 89 /HPF
SODIUM SERPL-SCNC: 145 MMOL/L
SPECIFIC GRAVITY URINE: 1.03
SQUAMOUS EPITHELIAL CELLS: 6 /HPF
T4 FREE SERPL-MCNC: 1.1 NG/DL
TRANSFERRIN SERPL-MCNC: 193 MG/DL
TRIGL SERPL-MCNC: 129 MG/DL
TSH SERPL-ACNC: 2.77 UIU/ML
UROBILINOGEN URINE: NORMAL
VIT B12 SERPL-MCNC: 378 PG/ML
WBC # FLD AUTO: 7.76 K/UL
WHITE BLOOD CELLS URINE: 8 /HPF

## 2020-04-19 RX ORDER — CLOPIDOGREL BISULFATE 75 MG/1
75 TABLET, FILM COATED ORAL DAILY
Qty: 90 | Refills: 2 | Status: DISCONTINUED | COMMUNITY
Start: 2019-06-25 | End: 2020-04-19

## 2020-05-01 ENCOUNTER — APPOINTMENT (OUTPATIENT)
Dept: CARDIOLOGY | Facility: CLINIC | Age: 55
End: 2020-05-01
Payer: COMMERCIAL

## 2020-05-01 PROCEDURE — 99214 OFFICE O/P EST MOD 30 MIN: CPT | Mod: 95

## 2020-05-02 NOTE — HISTORY OF PRESENT ILLNESS
[Home] : at home, [unfilled] , at the time of the visit. [Medical Office: ([facility])___] : at the medical office located in  [Patient] : the patient [FreeTextEntry2] : Brigette Rutherford [FreeTextEntry1] : This is a 54 year old lady with a PMH of CAD and Palpitations presents today for follow up re: CTCA results and COVID+ resolution of symptoms. Patient states that she has spoken to her urologist Dr. Brown and is going to have sonnogram ordered by Urologist to evaluate the Kidney Stone. Patient has stopped taking the Plavix at this time as it has been greater than one year since she had the stents placed. Patient to make an appointment for US Thyroid and Neck. Patient to follow up with Dr. Blackwell regarding pulmonary nodules seen on CTCA. Patient states that she is feeling much better at this time.

## 2020-05-02 NOTE — REVIEW OF SYSTEMS
[Shortness Of Breath] : shortness of breath [Chest Pain] : chest pain [Palpitations] : palpitations [Negative] : Heme/Lymph [Dyspnea on exertion] : not dyspnea during exertion [Chest  Pressure] : no chest pressure [Lower Ext Edema] : no extremity edema [Leg Claudication] : no intermittent leg claudication

## 2020-05-26 DIAGNOSIS — Z01.818 ENCOUNTER FOR OTHER PREPROCEDURAL EXAMINATION: ICD-10-CM

## 2020-05-27 ENCOUNTER — RX RENEWAL (OUTPATIENT)
Age: 55
End: 2020-05-27

## 2020-05-31 ENCOUNTER — APPOINTMENT (OUTPATIENT)
Dept: DISASTER EMERGENCY | Facility: CLINIC | Age: 55
End: 2020-05-31

## 2020-05-31 LAB — SARS-COV-2 N GENE NPH QL NAA+PROBE: NOT DETECTED

## 2020-06-02 ENCOUNTER — APPOINTMENT (OUTPATIENT)
Dept: PULMONOLOGY | Facility: CLINIC | Age: 55
End: 2020-06-02
Payer: COMMERCIAL

## 2020-06-02 VITALS
HEIGHT: 68 IN | DIASTOLIC BLOOD PRESSURE: 85 MMHG | WEIGHT: 161 LBS | TEMPERATURE: 97.7 F | SYSTOLIC BLOOD PRESSURE: 127 MMHG | BODY MASS INDEX: 24.4 KG/M2 | OXYGEN SATURATION: 99 % | HEART RATE: 62 BPM

## 2020-06-02 DIAGNOSIS — R06.02 SHORTNESS OF BREATH: ICD-10-CM

## 2020-06-02 LAB — POCT - HEMOGLOBIN (HGB), QUANTITATIVE, TRANSCUTANEOUS: 11.5

## 2020-06-02 PROCEDURE — ZZZZZ: CPT

## 2020-06-02 PROCEDURE — 99204 OFFICE O/P NEW MOD 45 MIN: CPT | Mod: 25

## 2020-06-02 PROCEDURE — 94727 GAS DIL/WSHOT DETER LNG VOL: CPT

## 2020-06-02 PROCEDURE — 94729 DIFFUSING CAPACITY: CPT

## 2020-06-02 PROCEDURE — 94060 EVALUATION OF WHEEZING: CPT

## 2020-06-02 PROCEDURE — 88738 HGB QUANT TRANSCUTANEOUS: CPT

## 2020-06-02 RX ORDER — METHYLPREDNISOLONE 4 MG/1
4 TABLET ORAL
Qty: 1 | Refills: 0 | Status: DISCONTINUED | COMMUNITY
Start: 2019-05-16 | End: 2020-06-02

## 2020-06-02 NOTE — PROCEDURE
[FreeTextEntry1] : 06/02/2020\par Pulmonary function testing\par These data demonstrate a very mild obstructive ventilatory deficit. There is no significant bronchodilator response. There is a mild reduction in lung volume. There is a mild diffusion impairment. \par \par CT reviewed.

## 2020-06-02 NOTE — PHYSICAL EXAM
[No Acute Distress] : no acute distress [Normal Oropharynx] : normal oropharynx [Normal Appearance] : normal appearance [Normal S1, S2] : normal s1, s2 [Clear to Auscultation Bilaterally] : clear to auscultation bilaterally

## 2020-06-02 NOTE — HISTORY OF PRESENT ILLNESS
[TextBox_4] : ASHWIN JACOB is a 54 year old  F referred for pulmonary evaluation for COVID and abnormal CT.\par \par Dx COVID 3/9 clinical. Subsequently has antibodies. \par \par In bed for 5-6 weeks. Fever 102-104, No cough but some chest pain and SOB. Never went to hospital.\par ASHWIN JACOB is a 54 year old  F referred for pulmonary evaluation for\par Significant GI involvement. Loss 18 pounds. Very weak. Some confusion.\par Much better but still some mild CNS issues.\par Family had COVID but not as ill.\par \par Had CXR then CT told nodules.\par Had US thyroid. For endocrine evaluation.\par \par Presently feels abnormal sensation in chest.\par ? tightness.\par Feels difficulty with deep breaths.\par \par O2 sats lowest 94.\par \par Past pulmonary history. ? mild asthma\par Occupational Exposure. N\par Family history of pulmonary disease. Mother significant asthma\par Recent travel [place]\par Pets Gerbils

## 2020-06-02 NOTE — ASSESSMENT
[FreeTextEntry1] : CT 3-4 months\par Continue Arnuity until resolution of symptoms.\par PRN beta agonist.\par Follow-up after CAT scan or sooner on a as needed basis.\par \par \par Thank you for allowing me to partake in her care.

## 2020-06-02 NOTE — DISCUSSION/SUMMARY
[FreeTextEntry1] : Status post significant infection with COVID virus.\par Chest symptoms predominantly chest tightness likely related to above.\par Not exclude component of airways disease secondary to COVID.\par Component of the medication may be related to deconditioning also from COVID virus infection.\par \par Abnormalities on CAT scan are likely related to COVID infection.

## 2020-06-09 ENCOUNTER — APPOINTMENT (OUTPATIENT)
Dept: ENDOCRINOLOGY | Facility: CLINIC | Age: 55
End: 2020-06-09
Payer: COMMERCIAL

## 2020-06-12 ENCOUNTER — APPOINTMENT (OUTPATIENT)
Dept: CARDIOLOGY | Facility: CLINIC | Age: 55
End: 2020-06-12
Payer: COMMERCIAL

## 2020-06-12 ENCOUNTER — APPOINTMENT (OUTPATIENT)
Dept: ENDOCRINOLOGY | Facility: CLINIC | Age: 55
End: 2020-06-12
Payer: COMMERCIAL

## 2020-06-12 ENCOUNTER — NON-APPOINTMENT (OUTPATIENT)
Age: 55
End: 2020-06-12

## 2020-06-12 VITALS
BODY MASS INDEX: 24.4 KG/M2 | HEIGHT: 68 IN | HEART RATE: 77 BPM | DIASTOLIC BLOOD PRESSURE: 66 MMHG | SYSTOLIC BLOOD PRESSURE: 110 MMHG | TEMPERATURE: 98.5 F | WEIGHT: 161 LBS | OXYGEN SATURATION: 97 %

## 2020-06-12 VITALS
WEIGHT: 161 LBS | HEART RATE: 77 BPM | OXYGEN SATURATION: 97 % | SYSTOLIC BLOOD PRESSURE: 108 MMHG | TEMPERATURE: 98.5 F | BODY MASS INDEX: 24.4 KG/M2 | HEIGHT: 68 IN | DIASTOLIC BLOOD PRESSURE: 70 MMHG

## 2020-06-12 DIAGNOSIS — E04.1 NONTOXIC SINGLE THYROID NODULE: ICD-10-CM

## 2020-06-12 PROCEDURE — 93000 ELECTROCARDIOGRAM COMPLETE: CPT

## 2020-06-12 PROCEDURE — 99204 OFFICE O/P NEW MOD 45 MIN: CPT

## 2020-06-12 PROCEDURE — 99214 OFFICE O/P EST MOD 30 MIN: CPT

## 2020-06-12 NOTE — PHYSICAL EXAM
[Normal Appearance] : normal appearance [General Appearance - Well Developed] : well developed [Well Groomed] : well groomed [General Appearance - Well Nourished] : well nourished [General Appearance - In No Acute Distress] : no acute distress [No Deformities] : no deformities [Normal Conjunctiva] : the conjunctiva exhibited no abnormalities [Normal Oral Mucosa] : normal oral mucosa [Eyelids - No Xanthelasma] : the eyelids demonstrated no xanthelasmas [No Oral Pallor] : no oral pallor [Normal Jugular Venous A Waves Present] : normal jugular venous A waves present [No Oral Cyanosis] : no oral cyanosis [Normal Jugular Venous V Waves Present] : normal jugular venous V waves present [No Jugular Venous Grimm A Waves] : no jugular venous grimm A waves [Respiration, Rhythm And Depth] : normal respiratory rhythm and effort [Exaggerated Use Of Accessory Muscles For Inspiration] : no accessory muscle use [Auscultation Breath Sounds / Voice Sounds] : lungs were clear to auscultation bilaterally [Heart Rate And Rhythm] : heart rate and rhythm were normal [Heart Sounds] : normal S1 and S2 [Murmurs] : no murmurs present [Abdomen Soft] : soft [Abdomen Tenderness] : non-tender [Abdomen Mass (___ Cm)] : no abdominal mass palpated [Abnormal Walk] : normal gait [Gait - Sufficient For Exercise Testing] : the gait was sufficient for exercise testing [Nail Clubbing] : no clubbing of the fingernails [Cyanosis, Localized] : no localized cyanosis [Petechial Hemorrhages (___cm)] : no petechial hemorrhages [Skin Color & Pigmentation] : normal skin color and pigmentation [No Venous Stasis] : no venous stasis [] : no rash [Skin Lesions] : no skin lesions [No Skin Ulcers] : no skin ulcer [No Xanthoma] : no  xanthoma was observed [Oriented To Time, Place, And Person] : oriented to person, place, and time [Affect] : the affect was normal [Mood] : the mood was normal [No Anxiety] : not feeling anxious

## 2020-06-12 NOTE — HISTORY OF PRESENT ILLNESS
[Preoperative Visit] : for a medical evaluation prior to surgery [Scheduled Procedure ___] : a [unfilled] [Date of Surgery ___] : on [unfilled] [Surgeon Name ___] : surgeon: [unfilled] [FreeTextEntry1] : Brigette is a 53yo female with PMH of CAD, Covid-19 infection, anemia and kidney stones who presents today for follow-up and pre-op clearance. Patient reports that this Monday she had an emergent right urethral stent placed. She reports that next week she will have removal of kidney stone and replacement of the right urethral stent. Patient reports that she is in pain currently after having the stent placed, she has burning and pain on urination. She reports that she is still have bloody in urine as well. She has stopped her ASA due to this. Patient also reporting she needs to f/u with Dr. Graff in regards to thyroid nodule and she has also seen Dr. Reed for pulm nodules. patient is also very concerned as she has begun to lose copious amounts of hair in the past few months but this has become increasingly worse in the last month.

## 2020-06-30 NOTE — PHYSICAL EXAM
[Well Nourished] : well nourished [Alert] : alert [No Acute Distress] : no acute distress [Well Developed] : well developed [Normal Sclera/Conjunctiva] : normal sclera/conjunctiva [No Proptosis] : no proptosis [EOMI] : extra ocular movement intact [Normal Oropharynx] : the oropharynx was normal [Thyroid Not Enlarged] : the thyroid was not enlarged [No Thyroid Nodules] : no palpable thyroid nodules [No Accessory Muscle Use] : no accessory muscle use [No Respiratory Distress] : no respiratory distress [Normal S1, S2] : normal S1 and S2 [Clear to Auscultation] : lungs were clear to auscultation bilaterally [Normal Rate] : heart rate was normal [Regular Rhythm] : with a regular rhythm [No Edema] : no peripheral edema [Pedal Pulses Normal] : the pedal pulses are present [Normal Bowel Sounds] : normal bowel sounds [Soft] : abdomen soft [Not Tender] : non-tender [Not Distended] : not distended [Normal Anterior Cervical Nodes] : no anterior cervical lymphadenopathy [Normal Posterior Cervical Nodes] : no posterior cervical lymphadenopathy [No Spinal Tenderness] : no spinal tenderness [Spine Straight] : spine straight [Normal Gait] : normal gait [No Stigmata of Cushings Syndrome] : no stigmata of Cushings Syndrome [Normal Strength/Tone] : muscle strength and tone were normal [Acanthosis Nigricans] : no acanthosis nigricans [Normal Reflexes] : deep tendon reflexes were 2+ and symmetric [No Rash] : no rash [Oriented x3] : oriented to person, place, and time [No Tremors] : no tremors

## 2020-06-30 NOTE — HISTORY OF PRESENT ILLNESS
[FreeTextEntry1] : Ms. JACOB  is a 54 year year old female  who presents for endocrine consultation. She presents via the kind courtesy of Dr. Lin  . She  presents with regard to a history of  recently noted thyroid nodularity on Ct of chest. Does have hjx of cad with hx of coronary stent along with hx of kidney stones and recent covid-19 infection Now feeling well.\par \par Son with hyperthyroidism and father with hypothyroidism\par Too, has notd significant hair thinning over past few months\par Vit d at 29-was on d3 2,000 iu off x few weeks.\par Has 3 kidney stones-This week had emergency stent placed on left. to have procedure next week to remove the stones and put another stent in place. \par Stones? calc oxalate from u/a.\par had coronary stent one year ago.\par +FH of  cad. \par Too hx of migraines on  Topamax down to 50 mg from 100 mg.

## 2020-08-15 LAB
ALBUMIN SERPL ELPH-MCNC: 4.7 G/DL
ALP BLD-CCNC: 94 U/L
ALT SERPL-CCNC: 12 U/L
ANA SER IF-ACNC: NEGATIVE
ANION GAP SERPL CALC-SCNC: 14 MMOL/L
ANION GAP SERPL CALC-SCNC: 15 MMOL/L
APPEARANCE: CLEAR
APTT BLD: 33.5 SEC
AST SERPL-CCNC: 14 U/L
BACTERIA UR CULT: NORMAL
BACTERIA: ABNORMAL
BASOPHILS # BLD AUTO: 0.05 K/UL
BASOPHILS NFR BLD AUTO: 0.7 %
BILIRUB SERPL-MCNC: 0.7 MG/DL
BILIRUBIN URINE: ABNORMAL
BLOOD URINE: ABNORMAL
BUN SERPL-MCNC: 14 MG/DL
BUN SERPL-MCNC: 14 MG/DL
CALCIUM SERPL-MCNC: 9.4 MG/DL
CALCIUM SERPL-MCNC: 9.6 MG/DL
CCP AB SER IA-ACNC: <8 UNITS
CHLORIDE SERPL-SCNC: 107 MMOL/L
CHLORIDE SERPL-SCNC: 107 MMOL/L
CO2 SERPL-SCNC: 21 MMOL/L
CO2 SERPL-SCNC: 23 MMOL/L
COLOR: ABNORMAL
CREAT SERPL-MCNC: 0.96 MG/DL
CREAT SERPL-MCNC: 1.07 MG/DL
CRP SERPL-MCNC: 0.19 MG/DL
DSDNA AB SER-ACNC: <12 IU/ML
EOSINOPHIL # BLD AUTO: 0.18 K/UL
EOSINOPHIL NFR BLD AUTO: 2.5 %
ERYTHROCYTE [SEDIMENTATION RATE] IN BLOOD BY WESTERGREN METHOD: 12 MM/HR
FERRITIN SERPL-MCNC: 124 NG/ML
FOLATE SERPL-MCNC: 10.8 NG/ML
GLUCOSE QUALITATIVE U: NEGATIVE
GLUCOSE SERPL-MCNC: 110 MG/DL
GLUCOSE SERPL-MCNC: 98 MG/DL
HAPTOGLOB SERPL-MCNC: 127 MG/DL
HCG SERPL-MCNC: 2 MIU/ML
HCT VFR BLD CALC: 37.6 %
HGB BLD-MCNC: 12.2 G/DL
HYALINE CASTS: 2 /LPF
IMM GRANULOCYTES NFR BLD AUTO: 0.3 %
INR PPP: 1.06 RATIO
IRON SERPL-MCNC: 54 UG/DL
KETONES URINE: NORMAL
LDH SERPL-CCNC: 169 U/L
LEUKOCYTE ESTERASE URINE: ABNORMAL
LYMPHOCYTES # BLD AUTO: 1.29 K/UL
LYMPHOCYTES NFR BLD AUTO: 18.1 %
MAN DIFF?: NORMAL
MCHC RBC-ENTMCNC: 31.7 PG
MCHC RBC-ENTMCNC: 32.4 GM/DL
MCV RBC AUTO: 97.7 FL
MICROSCOPIC-UA: NORMAL
MONOCYTES # BLD AUTO: 0.52 K/UL
MONOCYTES NFR BLD AUTO: 7.3 %
NEUTROPHILS # BLD AUTO: 5.06 K/UL
NEUTROPHILS NFR BLD AUTO: 71.1 %
NITRITE URINE: POSITIVE
PH URINE: 6.5
PLATELET # BLD AUTO: 192 K/UL
POTASSIUM SERPL-SCNC: 4 MMOL/L
POTASSIUM SERPL-SCNC: 4.4 MMOL/L
PROT SERPL-MCNC: 6.8 G/DL
PROTEIN URINE: ABNORMAL
PT BLD: 12 SEC
RBC # BLD: 3.85 M/UL
RBC # FLD: 12.8 %
RED BLOOD CELLS URINE: >720 /HPF
RF+CCP IGG SER-IMP: NEGATIVE
RHEUMATOID FACT SER QL: <10 IU/ML
SODIUM SERPL-SCNC: 143 MMOL/L
SODIUM SERPL-SCNC: 144 MMOL/L
SPECIFIC GRAVITY URINE: 1.01
SQUAMOUS EPITHELIAL CELLS: 4 /HPF
T4 FREE SERPL-MCNC: 1.1 NG/DL
TRANSFERRIN SERPL-MCNC: 204 MG/DL
TSH SERPL-ACNC: 1.87 UIU/ML
UROBILINOGEN URINE: NORMAL
VIT B12 SERPL-MCNC: 306 PG/ML
WBC # FLD AUTO: 7.12 K/UL
WHITE BLOOD CELLS URINE: 26 /HPF

## 2020-08-20 ENCOUNTER — RX RENEWAL (OUTPATIENT)
Age: 55
End: 2020-08-20

## 2020-08-27 ENCOUNTER — TRANSCRIPTION ENCOUNTER (OUTPATIENT)
Age: 55
End: 2020-08-27

## 2020-09-15 ENCOUNTER — NON-APPOINTMENT (OUTPATIENT)
Age: 55
End: 2020-09-15

## 2020-09-15 ENCOUNTER — APPOINTMENT (OUTPATIENT)
Dept: CARDIOLOGY | Facility: CLINIC | Age: 55
End: 2020-09-15
Payer: COMMERCIAL

## 2020-09-15 VITALS
HEART RATE: 59 BPM | WEIGHT: 170 LBS | SYSTOLIC BLOOD PRESSURE: 120 MMHG | DIASTOLIC BLOOD PRESSURE: 76 MMHG | TEMPERATURE: 98.2 F | BODY MASS INDEX: 25.76 KG/M2 | OXYGEN SATURATION: 99 % | HEIGHT: 68 IN

## 2020-09-15 DIAGNOSIS — L98.9 DISORDER OF THE SKIN AND SUBCUTANEOUS TISSUE, UNSPECIFIED: ICD-10-CM

## 2020-09-15 DIAGNOSIS — Z87.09 PERSONAL HISTORY OF OTHER DISEASES OF THE RESPIRATORY SYSTEM: ICD-10-CM

## 2020-09-15 DIAGNOSIS — L65.9 NONSCARRING HAIR LOSS, UNSPECIFIED: ICD-10-CM

## 2020-09-15 DIAGNOSIS — R51 HEADACHE: ICD-10-CM

## 2020-09-15 PROCEDURE — 99214 OFFICE O/P EST MOD 30 MIN: CPT

## 2020-09-15 PROCEDURE — 93000 ELECTROCARDIOGRAM COMPLETE: CPT

## 2020-09-15 RX ORDER — CEFUROXIME AXETIL 500 MG/1
500 TABLET ORAL
Qty: 20 | Refills: 0 | Status: DISCONTINUED | COMMUNITY
Start: 2020-08-30 | End: 2020-09-15

## 2020-09-15 NOTE — HISTORY OF PRESENT ILLNESS
[FreeTextEntry1] : Brigette is a 53yo female who presents today for evaluation of palpitations. She has PMH of kidney stones, anemia, CAD s/p PCI, and thyroid nodule. Patient reports that approx 1 month ago she started developing palpitations. Patient was initially on Toprol Xl 12.5mg daily but once she started developing palpitations she called the office and was advised to increase to 25mg at bedtime. patient continued to feel palpitations and was then advised to increase to 12.5mg in the a.m and 25mg qhs and she has been feeling less palpitations on this dose regimen. She also recently saw Dr. Aguilar for hair loss and biopsy was done for 2 suspicious moles. No other issues or concerns for today.

## 2020-09-15 NOTE — PHYSICAL EXAM
[Normal Appearance] : normal appearance [General Appearance - Well Developed] : well developed [Well Groomed] : well groomed [General Appearance - Well Nourished] : well nourished [No Deformities] : no deformities [General Appearance - In No Acute Distress] : no acute distress [Normal Conjunctiva] : the conjunctiva exhibited no abnormalities [Eyelids - No Xanthelasma] : the eyelids demonstrated no xanthelasmas [Normal Oral Mucosa] : normal oral mucosa [No Oral Pallor] : no oral pallor [No Oral Cyanosis] : no oral cyanosis [Normal Jugular Venous A Waves Present] : normal jugular venous A waves present [Normal Jugular Venous V Waves Present] : normal jugular venous V waves present [No Jugular Venous Grimm A Waves] : no jugular venous grimm A waves [Exaggerated Use Of Accessory Muscles For Inspiration] : no accessory muscle use [Respiration, Rhythm And Depth] : normal respiratory rhythm and effort [Heart Rate And Rhythm] : heart rate and rhythm were normal [Auscultation Breath Sounds / Voice Sounds] : lungs were clear to auscultation bilaterally [Murmurs] : no murmurs present [Heart Sounds] : normal S1 and S2 [Abdomen Soft] : soft [Abdomen Tenderness] : non-tender [Abnormal Walk] : normal gait [Abdomen Mass (___ Cm)] : no abdominal mass palpated [Gait - Sufficient For Exercise Testing] : the gait was sufficient for exercise testing [Petechial Hemorrhages (___cm)] : no petechial hemorrhages [Nail Clubbing] : no clubbing of the fingernails [Cyanosis, Localized] : no localized cyanosis [Skin Color & Pigmentation] : normal skin color and pigmentation [] : no rash [No Venous Stasis] : no venous stasis [Skin Lesions] : no skin lesions [No Skin Ulcers] : no skin ulcer [No Xanthoma] : no  xanthoma was observed [FreeTextEntry1] : skin biopsy site left leg and face noted  [Oriented To Time, Place, And Person] : oriented to person, place, and time [Affect] : the affect was normal [No Anxiety] : not feeling anxious [Mood] : the mood was normal

## 2020-10-01 ENCOUNTER — NON-APPOINTMENT (OUTPATIENT)
Age: 55
End: 2020-10-01

## 2020-10-01 PROCEDURE — 93224 XTRNL ECG REC UP TO 48 HRS: CPT

## 2020-10-11 ENCOUNTER — RX RENEWAL (OUTPATIENT)
Age: 55
End: 2020-10-11

## 2020-10-14 ENCOUNTER — EMERGENCY (EMERGENCY)
Facility: HOSPITAL | Age: 55
LOS: 1 days | Discharge: HOME | End: 2020-10-14
Attending: EMERGENCY MEDICINE
Payer: COMMERCIAL

## 2020-10-14 VITALS
TEMPERATURE: 98 F | OXYGEN SATURATION: 100 % | DIASTOLIC BLOOD PRESSURE: 81 MMHG | HEIGHT: 68 IN | RESPIRATION RATE: 18 BRPM | SYSTOLIC BLOOD PRESSURE: 154 MMHG | HEART RATE: 61 BPM | WEIGHT: 169.98 LBS

## 2020-10-14 VITALS
HEART RATE: 54 BPM | OXYGEN SATURATION: 100 % | DIASTOLIC BLOOD PRESSURE: 67 MMHG | RESPIRATION RATE: 15 BRPM | TEMPERATURE: 98 F | SYSTOLIC BLOOD PRESSURE: 102 MMHG

## 2020-10-14 DIAGNOSIS — Z98.890 OTHER SPECIFIED POSTPROCEDURAL STATES: Chronic | ICD-10-CM

## 2020-10-14 LAB
ALBUMIN SERPL ELPH-MCNC: 4.7 G/DL — SIGNIFICANT CHANGE UP (ref 3.3–5)
ALP SERPL-CCNC: 114 U/L — SIGNIFICANT CHANGE UP (ref 40–120)
ALT FLD-CCNC: 21 U/L — SIGNIFICANT CHANGE UP (ref 10–45)
ANION GAP SERPL CALC-SCNC: 11 MMOL/L — SIGNIFICANT CHANGE UP (ref 5–17)
APPEARANCE UR: ABNORMAL
APTT BLD: 27.7 SEC — SIGNIFICANT CHANGE UP (ref 27.5–35.5)
AST SERPL-CCNC: 34 U/L — SIGNIFICANT CHANGE UP (ref 10–40)
BACTERIA # UR AUTO: ABNORMAL
BASOPHILS # BLD AUTO: 0.06 K/UL — SIGNIFICANT CHANGE UP (ref 0–0.2)
BASOPHILS NFR BLD AUTO: 0.8 % — SIGNIFICANT CHANGE UP (ref 0–2)
BILIRUB SERPL-MCNC: 0.6 MG/DL — SIGNIFICANT CHANGE UP (ref 0.2–1.2)
BILIRUB UR-MCNC: NEGATIVE — SIGNIFICANT CHANGE UP
BUN SERPL-MCNC: 16 MG/DL — SIGNIFICANT CHANGE UP (ref 7–23)
CALCIUM SERPL-MCNC: 9.8 MG/DL — SIGNIFICANT CHANGE UP (ref 8.4–10.5)
CHLORIDE SERPL-SCNC: 107 MMOL/L — SIGNIFICANT CHANGE UP (ref 96–108)
CO2 SERPL-SCNC: 24 MMOL/L — SIGNIFICANT CHANGE UP (ref 22–31)
COLOR SPEC: COLORLESS — SIGNIFICANT CHANGE UP
CREAT SERPL-MCNC: 0.9 MG/DL — SIGNIFICANT CHANGE UP (ref 0.5–1.3)
DIFF PNL FLD: ABNORMAL
EOSINOPHIL # BLD AUTO: 0.37 K/UL — SIGNIFICANT CHANGE UP (ref 0–0.5)
EOSINOPHIL NFR BLD AUTO: 4.8 % — SIGNIFICANT CHANGE UP (ref 0–6)
EPI CELLS # UR: 11 /HPF — HIGH
GLUCOSE SERPL-MCNC: 90 MG/DL — SIGNIFICANT CHANGE UP (ref 70–99)
GLUCOSE UR QL: NEGATIVE — SIGNIFICANT CHANGE UP
HCT VFR BLD CALC: 40.9 % — SIGNIFICANT CHANGE UP (ref 34.5–45)
HGB BLD-MCNC: 14.1 G/DL — SIGNIFICANT CHANGE UP (ref 11.5–15.5)
HYALINE CASTS # UR AUTO: 1 /LPF — SIGNIFICANT CHANGE UP (ref 0–2)
IMM GRANULOCYTES NFR BLD AUTO: 0.3 % — SIGNIFICANT CHANGE UP (ref 0–1.5)
INR BLD: 0.99 RATIO — SIGNIFICANT CHANGE UP (ref 0.88–1.16)
KETONES UR-MCNC: NEGATIVE — SIGNIFICANT CHANGE UP
LEUKOCYTE ESTERASE UR-ACNC: ABNORMAL
LYMPHOCYTES # BLD AUTO: 2.49 K/UL — SIGNIFICANT CHANGE UP (ref 1–3.3)
LYMPHOCYTES # BLD AUTO: 32 % — SIGNIFICANT CHANGE UP (ref 13–44)
MCHC RBC-ENTMCNC: 32 PG — SIGNIFICANT CHANGE UP (ref 27–34)
MCHC RBC-ENTMCNC: 34.5 GM/DL — SIGNIFICANT CHANGE UP (ref 32–36)
MCV RBC AUTO: 92.7 FL — SIGNIFICANT CHANGE UP (ref 80–100)
MONOCYTES # BLD AUTO: 0.59 K/UL — SIGNIFICANT CHANGE UP (ref 0–0.9)
MONOCYTES NFR BLD AUTO: 7.6 % — SIGNIFICANT CHANGE UP (ref 2–14)
NEUTROPHILS # BLD AUTO: 4.24 K/UL — SIGNIFICANT CHANGE UP (ref 1.8–7.4)
NEUTROPHILS NFR BLD AUTO: 54.5 % — SIGNIFICANT CHANGE UP (ref 43–77)
NITRITE UR-MCNC: NEGATIVE — SIGNIFICANT CHANGE UP
NRBC # BLD: 0 /100 WBCS — SIGNIFICANT CHANGE UP (ref 0–0)
PH UR: 7 — SIGNIFICANT CHANGE UP (ref 5–8)
PLATELET # BLD AUTO: 217 K/UL — SIGNIFICANT CHANGE UP (ref 150–400)
POTASSIUM SERPL-MCNC: 4.2 MMOL/L — SIGNIFICANT CHANGE UP (ref 3.5–5.3)
POTASSIUM SERPL-SCNC: 4.2 MMOL/L — SIGNIFICANT CHANGE UP (ref 3.5–5.3)
PROT SERPL-MCNC: 7.5 G/DL — SIGNIFICANT CHANGE UP (ref 6–8.3)
PROT UR-MCNC: NEGATIVE — SIGNIFICANT CHANGE UP
PROTHROM AB SERPL-ACNC: 11.9 SEC — SIGNIFICANT CHANGE UP (ref 10.6–13.6)
RBC # BLD: 4.41 M/UL — SIGNIFICANT CHANGE UP (ref 3.8–5.2)
RBC # FLD: 12.6 % — SIGNIFICANT CHANGE UP (ref 10.3–14.5)
RBC CASTS # UR COMP ASSIST: 4 /HPF — SIGNIFICANT CHANGE UP (ref 0–4)
SODIUM SERPL-SCNC: 142 MMOL/L — SIGNIFICANT CHANGE UP (ref 135–145)
SP GR SPEC: 1.01 — LOW (ref 1.01–1.02)
UROBILINOGEN FLD QL: NEGATIVE — SIGNIFICANT CHANGE UP
WBC # BLD: 7.77 K/UL — SIGNIFICANT CHANGE UP (ref 3.8–10.5)
WBC # FLD AUTO: 7.77 K/UL — SIGNIFICANT CHANGE UP (ref 3.8–10.5)
WBC UR QL: 14 /HPF — HIGH (ref 0–5)

## 2020-10-14 PROCEDURE — 93010 ELECTROCARDIOGRAM REPORT: CPT

## 2020-10-14 PROCEDURE — 87086 URINE CULTURE/COLONY COUNT: CPT

## 2020-10-14 PROCEDURE — 70450 CT HEAD/BRAIN W/O DYE: CPT | Mod: 26

## 2020-10-14 PROCEDURE — 70450 CT HEAD/BRAIN W/O DYE: CPT

## 2020-10-14 PROCEDURE — 96374 THER/PROPH/DIAG INJ IV PUSH: CPT

## 2020-10-14 PROCEDURE — 85025 COMPLETE CBC W/AUTO DIFF WBC: CPT

## 2020-10-14 PROCEDURE — 80053 COMPREHEN METABOLIC PANEL: CPT

## 2020-10-14 PROCEDURE — 99285 EMERGENCY DEPT VISIT HI MDM: CPT | Mod: 25

## 2020-10-14 PROCEDURE — 96375 TX/PRO/DX INJ NEW DRUG ADDON: CPT

## 2020-10-14 PROCEDURE — 81001 URINALYSIS AUTO W/SCOPE: CPT

## 2020-10-14 PROCEDURE — 99285 EMERGENCY DEPT VISIT HI MDM: CPT

## 2020-10-14 PROCEDURE — 85730 THROMBOPLASTIN TIME PARTIAL: CPT

## 2020-10-14 PROCEDURE — 85610 PROTHROMBIN TIME: CPT

## 2020-10-14 PROCEDURE — 93005 ELECTROCARDIOGRAM TRACING: CPT

## 2020-10-14 RX ORDER — KETOROLAC TROMETHAMINE 30 MG/ML
15 SYRINGE (ML) INJECTION ONCE
Refills: 0 | Status: DISCONTINUED | OUTPATIENT
Start: 2020-10-14 | End: 2020-10-14

## 2020-10-14 RX ORDER — MAGNESIUM SULFATE 500 MG/ML
2 VIAL (ML) INJECTION ONCE
Refills: 0 | Status: COMPLETED | OUTPATIENT
Start: 2020-10-14 | End: 2020-10-14

## 2020-10-14 RX ORDER — SODIUM CHLORIDE 9 MG/ML
1000 INJECTION INTRAMUSCULAR; INTRAVENOUS; SUBCUTANEOUS ONCE
Refills: 0 | Status: COMPLETED | OUTPATIENT
Start: 2020-10-14 | End: 2020-10-14

## 2020-10-14 RX ORDER — ACETAMINOPHEN 500 MG
975 TABLET ORAL ONCE
Refills: 0 | Status: DISCONTINUED | OUTPATIENT
Start: 2020-10-14 | End: 2020-10-18

## 2020-10-14 RX ORDER — SODIUM CHLORIDE 9 MG/ML
500 INJECTION INTRAMUSCULAR; INTRAVENOUS; SUBCUTANEOUS ONCE
Refills: 0 | Status: DISCONTINUED | OUTPATIENT
Start: 2020-10-14 | End: 2020-10-14

## 2020-10-14 RX ORDER — METOCLOPRAMIDE HCL 10 MG
10 TABLET ORAL ONCE
Refills: 0 | Status: COMPLETED | OUTPATIENT
Start: 2020-10-14 | End: 2020-10-14

## 2020-10-14 RX ORDER — DEXAMETHASONE 0.5 MG/5ML
6 ELIXIR ORAL ONCE
Refills: 0 | Status: COMPLETED | OUTPATIENT
Start: 2020-10-14 | End: 2020-10-14

## 2020-10-14 RX ORDER — DIPHENHYDRAMINE HCL 50 MG
25 CAPSULE ORAL ONCE
Refills: 0 | Status: COMPLETED | OUTPATIENT
Start: 2020-10-14 | End: 2020-10-14

## 2020-10-14 RX ORDER — SODIUM CHLORIDE 9 MG/ML
500 INJECTION INTRAMUSCULAR; INTRAVENOUS; SUBCUTANEOUS ONCE
Refills: 0 | Status: COMPLETED | OUTPATIENT
Start: 2020-10-14 | End: 2020-10-14

## 2020-10-14 RX ORDER — METOCLOPRAMIDE HCL 10 MG
1 TABLET ORAL
Qty: 21 | Refills: 0
Start: 2020-10-14 | End: 2020-10-20

## 2020-10-14 RX ADMIN — Medication 6 MILLIGRAM(S): at 21:59

## 2020-10-14 RX ADMIN — SODIUM CHLORIDE 1000 MILLILITER(S): 9 INJECTION INTRAMUSCULAR; INTRAVENOUS; SUBCUTANEOUS at 22:15

## 2020-10-14 RX ADMIN — Medication 10 MILLIGRAM(S): at 18:56

## 2020-10-14 RX ADMIN — SODIUM CHLORIDE 500 MILLILITER(S): 9 INJECTION INTRAMUSCULAR; INTRAVENOUS; SUBCUTANEOUS at 18:56

## 2020-10-14 RX ADMIN — Medication 25 MILLIGRAM(S): at 18:56

## 2020-10-14 RX ADMIN — Medication 15 MILLIGRAM(S): at 20:58

## 2020-10-14 RX ADMIN — Medication 100 GRAM(S): at 21:59

## 2020-10-14 NOTE — ED PROVIDER NOTE - NSFOLLOWUPCLINICS_GEN_ALL_ED_FT
[facility]  Neurology  [address] [place] [address] - [address] [place] [address]  Phone: [phone]  Fax:   Follow Up Time: 4-6 Days

## 2020-10-14 NOTE — ED ADULT NURSE NOTE - OBJECTIVE STATEMENT
54 y/o female coming in from home complaining of migraines. AOx4, ambulatory, PMH migraines, HTN, HLD, cardiac stents. Pt. states her last migraine flare up was approx. 3 years ago. Pt. takes medications for this but was unable to control this new migraine that began yesterday. Also reports her blood pressure is elevated for her in the 150s systolic, taken at home. Denies blurry vision, but reports the whole day feels blurry. Denies weakness, reports some tingling in her mouth over the last few days. took 1000 mg Tylenol at around 10:00 this morning. Denies any falls. Reports Plavix stopped at the beginning of the summer. Denies chest pain, SOB, recent fever, chills, cough, N/V/D. Also states she had been gettting palpations intermittently over the last month and her metoprolol was increased by her cardiologist. Reports some palpitations yesterday. VSS. NSR on cardiac monitor. Will continue to reassess.

## 2020-10-14 NOTE — ED PROVIDER NOTE - NSFOLLOWUPINSTRUCTIONS_ED_ALL_ED_FT
Thank you for visiting our Emergency Department, it has been a pleasure taking part in your healthcare.    Please follow up with your Primary Doctor in 2-3 days.      General Headache:  A headache is pain or discomfort felt around the head or neck area. The specific cause of a headache may not be found. There are many causes and types of headaches. A few common ones are:  Tension headaches. Migraine headaches. Cluster headaches. Chronic daily headaches. Sinus headaches.     Follow these instructions at home: Watch your condition for any changes. Let your health care provider know about them. Take these steps to help with your condition:  Managing pain: Take over-the-counter and prescription medicines only as told by your health care provider. Lie down in a dark, quiet room when you have a headache. If directed, put ice on your head and neck area: Put ice in a plastic bag. Place a towel between your skin and the bag. Leave the ice on for 20 minutes, 2–3 times per day. If directed, apply heat to the affected area. Use the heat source that your health care provider recommends, such as a moist heat pack or a heating pad. Place a towel between your skin and the heat source. Leave the heat on for 20–30 minutes. Remove the heat if your skin turns bright red. This is especially important if you are unable to feel pain, heat, or cold. You may have a greater risk of getting burned. Keep lights dim if bright lights bother you or make your headaches worse. Eat meals on a regular schedule. If you drink alcohol: Limit how much you use to: 0–1 drink a day for women. 0–2 drinks a day for men. Be aware of how much alcohol is in your drink. In the [place] one drink equals one 12 oz bottle of beer (355 mL), one 5 oz glass of wine (148 mL), or one 1½ oz glass of hard liquor (44 mL).     General instructions:   Keep a headache journal to help find out what may trigger your headaches. For example, write down:  What you eat and drink. How much sleep you get. Any change to your diet or medicines. Try massage or other relaxation techniques. Limit stress. Sit up straight, and do not tense your muscles. Do not use any products that contain nicotine or tobacco, such as cigarettes, e-cigarettes, and chewing tobacco. If you need help quitting, ask your health care provider. Exercise regularly as told by your health care provider. Sleep on a regular schedule. Get 7–9 hours of sleep each night, or the amount recommended by your health care provider. Keep all follow-up visits as told by your health care provider. This is important.     Contact a health care provider if:  Your symptoms are not helped by medicine. You have a headache that is different from the usual headache. You have nausea or you vomit. You have a fever.  Get help right away if:  Your headache becomes severe quickly. Your headache gets worse after moderate to intense physical activity. You have repeated vomiting. You have a stiff neck. You have a loss of vision. You have problems with speech. You have pain in the eye or ear. You have muscular weakness or loss of muscle control. You lose your balance or have trouble walking. You feel faint or pass out. You have confusion. You have a seizure.    Summary  A headache is pain or discomfort felt around the head or neck area. There are many causes and types of headaches. In some cases, the cause may not be found. Keep a headache journal to help find out what may trigger your headaches. Watch your condition for any changes. Let your health care provider know about them. Contact a health care provider if you have a headache that is different from the usual headache, or if your symptoms are not helped by medicine. Get help right away if your headache becomes severe, you vomit, you have a loss of vision, you lose your balance, or you have a seizure.    This information is not intended to replace advice given to you by your health care provider. Make sure you discuss any questions you have with your health care provider Thank you for visiting our Emergency Department, it has been a pleasure taking part in your healthcare.    Please follow up with your Primary Doctor and neurology in 2-3 days.  Information for the neurology clinic is above.      General Headache:  A headache is pain or discomfort felt around the head or neck area. The specific cause of a headache may not be found. There are many causes and types of headaches. A few common ones are:  Tension headaches. Migraine headaches. Cluster headaches. Chronic daily headaches. Sinus headaches.     Follow these instructions at home: Watch your condition for any changes. Let your health care provider know about them. Take these steps to help with your condition:  Managing pain: Take over-the-counter and prescription medicines only as told by your health care provider. Lie down in a dark, quiet room when you have a headache. If directed, put ice on your head and neck area: Put ice in a plastic bag. Place a towel between your skin and the bag. Leave the ice on for 20 minutes, 2–3 times per day. If directed, apply heat to the affected area. Use the heat source that your health care provider recommends, such as a moist heat pack or a heating pad. Place a towel between your skin and the heat source. Leave the heat on for 20–30 minutes. Remove the heat if your skin turns bright red. This is especially important if you are unable to feel pain, heat, or cold. You may have a greater risk of getting burned. Keep lights dim if bright lights bother you or make your headaches worse. Eat meals on a regular schedule. If you drink alcohol: Limit how much you use to: 0–1 drink a day for women. 0–2 drinks a day for men. Be aware of how much alcohol is in your drink. In the [place] one drink equals one 12 oz bottle of beer (355 mL), one 5 oz glass of wine (148 mL), or one 1½ oz glass of hard liquor (44 mL).     General instructions:   Keep a headache journal to help find out what may trigger your headaches. For example, write down:  What you eat and drink. How much sleep you get. Any change to your diet or medicines. Try massage or other relaxation techniques. Limit stress. Sit up straight, and do not tense your muscles. Do not use any products that contain nicotine or tobacco, such as cigarettes, e-cigarettes, and chewing tobacco. If you need help quitting, ask your health care provider. Exercise regularly as told by your health care provider. Sleep on a regular schedule. Get 7–9 hours of sleep each night, or the amount recommended by your health care provider. Keep all follow-up visits as told by your health care provider. This is important.     Contact a health care provider if:  Your symptoms are not helped by medicine. You have a headache that is different from the usual headache. You have nausea or you vomit. You have a fever.  Get help right away if:  Your headache becomes severe quickly. Your headache gets worse after moderate to intense physical activity. You have repeated vomiting. You have a stiff neck. You have a loss of vision. You have problems with speech. You have pain in the eye or ear. You have muscular weakness or loss of muscle control. You lose your balance or have trouble walking. You feel faint or pass out. You have confusion. You have a seizure.    Summary  A headache is pain or discomfort felt around the head or neck area. There are many causes and types of headaches. In some cases, the cause may not be found. Keep a headache journal to help find out what may trigger your headaches. Watch your condition for any changes. Let your health care provider know about them. Contact a health care provider if you have a headache that is different from the usual headache, or if your symptoms are not helped by medicine. Get help right away if your headache becomes severe, you vomit, you have a loss of vision, you lose your balance, or you have a seizure.    This information is not intended to replace advice given to you by your health care provider. Make sure you discuss any questions you have with your health care provider

## 2020-10-14 NOTE — ED PROVIDER NOTE - CLINICAL SUMMARY MEDICAL DECISION MAKING FREE TEXT BOX
Maylin Castro MD - Attending Physician: Pt here with headache since yesterday, similar to prior migraines (last 3 yrs ago) but location atypical. Neuro intact, no concerning exam findings. C/w likely migraine - CT given PMD preference, pain control

## 2020-10-14 NOTE — ED PROVIDER NOTE - PATIENT PORTAL LINK FT
You can access the FollowMyHealth Patient Portal offered by [facility] by registering at the following website: [email or web address]. By joining [employer]’s FollowMyHealth portal, you will also be able to view your health information using other applications (apps) compatible with our system.

## 2020-10-14 NOTE — ED PROVIDER NOTE - OBJECTIVE STATEMENT
56 yo female with a pmh migraines on topamax, hx CAD s/p stent 2019 presenting for concern of left sided headache x 2 days. Pt states HA started yesterday suddenly while she was working, was sharp, left occipital/parietal, non radiating and associated with nausea. Pt went to bed and awoke with worsening same HA, took tylenol 1g today without relief and was concerned so came to the ED. Pt states this feels somewhat similar to her previous migraines however her usual migraines are left frontal and preceded by an aura which this one was not. Pt endorses photophobia, no phonophobia, no changes in vision, dizziness, lightheadedness, no change sin hearing, ringing in the ears or ear discharge, no recent fevers, chills, no vomiting. Pt also c/o heart palpitations x 1 month for which she had a holter monitor for and revealed occasional PVCs, has noticed heart palps were more  frequent while she has been having her headaches. Pt denies chest pain, shortness of breath, difficulty breathing. No numbness, tingling, paresthesias, weakness, difficulty ambulating. 54 yo female with a pmh migraines on topamax, hx CAD s/p stent 2019 presenting for concern of left sided headache x 2 days. Pt states HA started yesterday suddenly while she was working, was sharp, left occipital/parietal, non radiating and associated with nausea. Pt went to bed and awoke with worsening same HA, took tylenol 1g today without relief and was concerned so came to the ED. Pt states this feels somewhat similar to her previous migraines however her usual migraines are left frontal and preceded by an aura which this one was not. Pt endorses photophobia, no phonophobia, no changes in vision, dizziness, lightheadedness, no change sin hearing, ringing in the ears or ear discharge, no recent fevers, chills, no vomiting. Pt also c/o heart palpitations x 1 month for which she had a holter monitor for and revealed occasional PVCs, has noticed heart palps were more  frequent while she has been having her headaches. Pt denies chest pain, shortness of breath, difficulty breathing. No numbness, tingling, paresthesias, weakness, difficulty ambulating. pt also c.o frequent urination x 2 days, no dysuria or hematuria or abdominal pain.

## 2020-10-14 NOTE — ED ADULT TRIAGE NOTE - CHIEF COMPLAINT QUOTE
palpitations, migraine that began yesterday intermittently. Was seen by PMD and had halter monitor placed.

## 2020-10-14 NOTE — ED PROVIDER NOTE - CARE PROVIDER_API CALL
Smith Lin)  Cardiology; Internal Medicine  [address] [facility] [address]  [place] [address]  Phone: [phone]  Fax: [phone]  Follow Up Time:

## 2020-10-14 NOTE — ED PROVIDER NOTE - PHYSICAL EXAMINATION
A&Ox3, NAD. NCAT. PERRL, EOMI. Neck supple, no LAD. Lungs CTAB. +S1S2, RRR, No m/r/g. Abd soft, NT/ND, +BS, no rebound or guarding. Extremities: cap refill <2, pulses in distal extremities 4+, no edema. Skin without rash. CN II-XII intact. Strength 5/5 UE/LE. Sensations intact throughout. Gait steady. no pronator drift.

## 2020-10-14 NOTE — ED PROVIDER NOTE - PROGRESS NOTE DETAILS
Zhen PGY2 - Pt. signed out to me by CIRO Lamar  PtIsak here for migraines and palpitations for 1 month.  Pending labs, CT, dispo. Spoke with Dr Lin. Requesting CT head. Will eval patient and will callback with results Feeling better. Aware of neg results. Still with headache but improved. Will give Toradol and re-assess for likely dc Zhen PGY2 - Pt. feels improved w/ headache.  Will d/c w/ reglan and neuro f/u.  Pt. aware and agrees w/ plan.  All other questions and concerns have been addressed.

## 2020-10-16 LAB
CULTURE RESULTS: SIGNIFICANT CHANGE UP
SPECIMEN SOURCE: SIGNIFICANT CHANGE UP

## 2020-10-25 ENCOUNTER — OUTPATIENT (OUTPATIENT)
Dept: OUTPATIENT SERVICES | Facility: HOSPITAL | Age: 55
LOS: 1 days | End: 2020-10-25
Payer: COMMERCIAL

## 2020-10-25 ENCOUNTER — APPOINTMENT (OUTPATIENT)
Dept: MRI IMAGING | Facility: CLINIC | Age: 55
End: 2020-10-25
Payer: COMMERCIAL

## 2020-10-25 ENCOUNTER — RESULT REVIEW (OUTPATIENT)
Age: 55
End: 2020-10-25

## 2020-10-25 DIAGNOSIS — Z98.890 OTHER SPECIFIED POSTPROCEDURAL STATES: Chronic | ICD-10-CM

## 2020-10-25 DIAGNOSIS — Z00.8 ENCOUNTER FOR OTHER GENERAL EXAMINATION: ICD-10-CM

## 2020-10-25 PROCEDURE — 70551 MRI BRAIN STEM W/O DYE: CPT

## 2020-10-25 PROCEDURE — 70551 MRI BRAIN STEM W/O DYE: CPT | Mod: 26

## 2020-10-28 ENCOUNTER — NON-APPOINTMENT (OUTPATIENT)
Age: 55
End: 2020-10-28

## 2020-10-28 ENCOUNTER — APPOINTMENT (OUTPATIENT)
Dept: CARDIOLOGY | Facility: CLINIC | Age: 55
End: 2020-10-28
Payer: COMMERCIAL

## 2020-10-28 VITALS
HEART RATE: 56 BPM | SYSTOLIC BLOOD PRESSURE: 108 MMHG | BODY MASS INDEX: 25.76 KG/M2 | TEMPERATURE: 98.3 F | OXYGEN SATURATION: 99 % | HEIGHT: 68 IN | DIASTOLIC BLOOD PRESSURE: 68 MMHG | WEIGHT: 170 LBS

## 2020-10-28 DIAGNOSIS — Z87.898 PERSONAL HISTORY OF OTHER SPECIFIED CONDITIONS: ICD-10-CM

## 2020-10-28 DIAGNOSIS — R51.9 HEADACHE, UNSPECIFIED: ICD-10-CM

## 2020-10-28 PROCEDURE — 99072 ADDL SUPL MATRL&STAF TM PHE: CPT

## 2020-10-28 PROCEDURE — 99214 OFFICE O/P EST MOD 30 MIN: CPT

## 2020-10-28 PROCEDURE — 93000 ELECTROCARDIOGRAM COMPLETE: CPT

## 2020-10-28 RX ORDER — METOPROLOL SUCCINATE 25 MG/1
25 TABLET, EXTENDED RELEASE ORAL
Qty: 135 | Refills: 3 | Status: DISCONTINUED | COMMUNITY
Start: 2019-05-02 | End: 2020-10-28

## 2020-10-28 NOTE — HISTORY OF PRESENT ILLNESS
[FreeTextEntry1] : pt presents for acute visit secondary to palpittaions .pt with sob only with above .pt denies c.p no sob independent indepent of above .pt also s/p reecnt eval with neurology for headaches s/p mri brain ok pt denies any chest  pain dizziness ,lightheadedness ,nausea vomiting diaphoresis pt headaches improved \par

## 2020-11-15 LAB
ALBUMIN SERPL ELPH-MCNC: 4.6 G/DL
ALP BLD-CCNC: 107 U/L
ALT SERPL-CCNC: 15 U/L
ANION GAP SERPL CALC-SCNC: 11 MMOL/L
AST SERPL-CCNC: 18 U/L
BASOPHILS # BLD AUTO: 0.05 K/UL
BASOPHILS NFR BLD AUTO: 0.5 %
BILIRUB SERPL-MCNC: 0.6 MG/DL
BUN SERPL-MCNC: 17 MG/DL
CALCIUM SERPL-MCNC: 9.8 MG/DL
CHLORIDE SERPL-SCNC: 107 MMOL/L
CO2 SERPL-SCNC: 25 MMOL/L
CREAT SERPL-MCNC: 0.97 MG/DL
EOSINOPHIL # BLD AUTO: 0.05 K/UL
EOSINOPHIL NFR BLD AUTO: 0.5 %
GLUCOSE SERPL-MCNC: 99 MG/DL
HCT VFR BLD CALC: 42.3 %
HGB BLD-MCNC: 13.3 G/DL
IMM GRANULOCYTES NFR BLD AUTO: 0.2 %
LYMPHOCYTES # BLD AUTO: 1.46 K/UL
LYMPHOCYTES NFR BLD AUTO: 14.5 %
MAN DIFF?: NORMAL
MCHC RBC-ENTMCNC: 31.4 GM/DL
MCHC RBC-ENTMCNC: 31.5 PG
MCV RBC AUTO: 100.2 FL
MONOCYTES # BLD AUTO: 0.48 K/UL
MONOCYTES NFR BLD AUTO: 4.8 %
NEUTROPHILS # BLD AUTO: 8 K/UL
NEUTROPHILS NFR BLD AUTO: 79.5 %
PLATELET # BLD AUTO: 229 K/UL
POTASSIUM SERPL-SCNC: 4.7 MMOL/L
PROT SERPL-MCNC: 7.2 G/DL
RBC # BLD: 4.22 M/UL
RBC # FLD: 12.9 %
SODIUM SERPL-SCNC: 143 MMOL/L
TSH SERPL-ACNC: 1.23 UIU/ML
WBC # FLD AUTO: 10.06 K/UL

## 2020-11-22 ENCOUNTER — RX RENEWAL (OUTPATIENT)
Age: 55
End: 2020-11-22

## 2020-12-01 ENCOUNTER — NON-APPOINTMENT (OUTPATIENT)
Age: 55
End: 2020-12-01

## 2020-12-04 ENCOUNTER — NON-APPOINTMENT (OUTPATIENT)
Age: 55
End: 2020-12-04

## 2020-12-18 NOTE — PROVIDER CONTACT NOTE (OTHER) - ASSESSMENT
Requested Prescriptions     Pending Prescriptions Disp Refills   • PANTOPRAZOLE SODIUM 40 MG Oral Tab EC [Pharmacy Med Name: Pantoprazole Sodium 40 MG Oral Tablet Delayed Release] 90 tablet 0     Sig: TAKE 1 TABLET BY MOUTH IN THE MORNING BEFORE BREAKFAST
Axox4. VSS. Left groin post procedural site clean dry and intact with dressing in place. No bleeding or hematoma noted. +pedal pulses via palpation. Feet warm, toes cool but pink + mobile. Denies numbness/tingling in b/l legs/feet. Pt still c/o pressure in chest that was relieved by nitro then "came back." 1/2 inch of nitropaste still intact. Pt complains of 8/10 sudden pain in inner left thigh: cath site is soft no swelling or bleeding. pt unable to stand and put weight on left leg d/t discomfort described by pt as a "sharp pulling."

## 2020-12-23 PROBLEM — Z87.09 HISTORY OF SORE THROAT: Status: RESOLVED | Noted: 2020-08-30 | Resolved: 2020-12-23

## 2020-12-31 ENCOUNTER — APPOINTMENT (OUTPATIENT)
Dept: PAIN MANAGEMENT | Facility: CLINIC | Age: 55
End: 2020-12-31

## 2021-01-14 ENCOUNTER — NON-APPOINTMENT (OUTPATIENT)
Age: 56
End: 2021-01-14

## 2021-01-14 DIAGNOSIS — G43.909 MIGRAINE, UNSPECIFIED, NOT INTRACTABLE, W/OUT STATUS MIGRAINOSUS: ICD-10-CM

## 2021-01-15 ENCOUNTER — APPOINTMENT (OUTPATIENT)
Dept: CARDIOLOGY | Facility: CLINIC | Age: 56
End: 2021-01-15

## 2021-02-19 NOTE — ED ADULT TRIAGE NOTE - ESI TRIAGE ACUITY LEVEL, MLM
ED Time Seen By Provider Entered On:  8/3/2017 11:48     Performed On:  8/3/2017 11:48  by Indu Angel      Time Seen By Provider   Time Seen by Provider :   8/3/2017 11:48    Indu Angel - 8/3/2017 11:48            2

## 2021-03-05 ENCOUNTER — LABORATORY RESULT (OUTPATIENT)
Age: 56
End: 2021-03-05

## 2021-03-05 ENCOUNTER — APPOINTMENT (OUTPATIENT)
Dept: CARDIOLOGY | Facility: CLINIC | Age: 56
End: 2021-03-05
Payer: COMMERCIAL

## 2021-03-05 ENCOUNTER — NON-APPOINTMENT (OUTPATIENT)
Age: 56
End: 2021-03-05

## 2021-03-05 VITALS
TEMPERATURE: 98.2 F | SYSTOLIC BLOOD PRESSURE: 110 MMHG | HEART RATE: 60 BPM | DIASTOLIC BLOOD PRESSURE: 70 MMHG | BODY MASS INDEX: 26.83 KG/M2 | HEIGHT: 68 IN | OXYGEN SATURATION: 99 % | WEIGHT: 177 LBS

## 2021-03-05 PROCEDURE — 99072 ADDL SUPL MATRL&STAF TM PHE: CPT

## 2021-03-05 PROCEDURE — 93000 ELECTROCARDIOGRAM COMPLETE: CPT

## 2021-03-05 PROCEDURE — 99214 OFFICE O/P EST MOD 30 MIN: CPT

## 2021-03-05 NOTE — HISTORY OF PRESENT ILLNESS
[FreeTextEntry1] : pt presents for f/u .pt with hx of cad .pt with intermittent of chest discomfort occurs randomly .pt with rare palpitations pt denies any   dizziness ,lightheadedness ,nausea vomiting diaphoresis\par

## 2021-03-11 ENCOUNTER — APPOINTMENT (OUTPATIENT)
Dept: CARDIOLOGY | Facility: CLINIC | Age: 56
End: 2021-03-11

## 2021-03-24 ENCOUNTER — APPOINTMENT (OUTPATIENT)
Dept: PULMONOLOGY | Facility: CLINIC | Age: 56
End: 2021-03-24
Payer: COMMERCIAL

## 2021-03-24 DIAGNOSIS — U07.1 COVID-19: ICD-10-CM

## 2021-03-24 DIAGNOSIS — J45.909 UNSPECIFIED ASTHMA, UNCOMPLICATED: ICD-10-CM

## 2021-03-24 PROCEDURE — 99214 OFFICE O/P EST MOD 30 MIN: CPT | Mod: 95

## 2021-03-24 RX ORDER — ALBUTEROL SULFATE 90 UG/1
108 (90 BASE) INHALANT RESPIRATORY (INHALATION)
Qty: 1 | Refills: 5 | Status: ACTIVE | COMMUNITY
Start: 2020-06-02 | End: 1900-01-01

## 2021-03-24 RX ORDER — FLUTICASONE FUROATE 200 UG/1
200 POWDER RESPIRATORY (INHALATION)
Qty: 1 | Refills: 3 | Status: ACTIVE | COMMUNITY
Start: 2020-06-02 | End: 1900-01-01

## 2021-03-25 NOTE — HISTORY OF PRESENT ILLNESS
[Never] : never [Home] : at home, [unfilled] , at the time of the visit. [Medical Office: ([facility])___] : at the medical office located in  [TextBox_4] : This visit was provided via telehealth using real-time 2-way audio visual technology. The patient, ASHWIN JACOB , was located at home, [address] [place] [address]\[place] [address]  at the time of the visit.\par The provider, Johnny Reed, was located at office [address] [place] [address] at the time of the visit. \par \par  The patient, Ms. ASHWIN JACOB  and Physician Johnny Mancia participated in the telehealth encounter.\par \par Verbal consent obtained by  from patient\par \par Doing relatively well\par Some increase in asthma symptoms in spring. \par Feeling generally well. \par Occasional cough and wheeze.  Mild decrease in beta agonist use.\par Had follow-up CAT scan of the chest.\par \par Concerned with getting vaccine.

## 2021-03-25 NOTE — ASSESSMENT
[FreeTextEntry1] : No indication for f/u CT unless there is a change in clinical status.\par Restart Arnuity\par PRN Beta Agonist.\par Discussed and recommend Covid vaccine.

## 2021-03-25 NOTE — DISCUSSION/SUMMARY
[FreeTextEntry1] : Status post significant infection improved.\par \par Pulmonary nodules stable to decreased.\par Low risk patient.

## 2021-03-26 LAB
ALBUMIN SERPL ELPH-MCNC: 4 G/DL
ALP BLD-CCNC: 102 U/L
ALT SERPL-CCNC: 13 U/L
ANION GAP SERPL CALC-SCNC: 7 MMOL/L
AST SERPL-CCNC: 21 U/L
BASOPHILS # BLD AUTO: 0.08 K/UL
BASOPHILS NFR BLD AUTO: 1 %
BILIRUB SERPL-MCNC: 0.5 MG/DL
BUN SERPL-MCNC: 17 MG/DL
CALCIUM SERPL-MCNC: 9.6 MG/DL
CHLORIDE SERPL-SCNC: 109 MMOL/L
CHOLEST SERPL-MCNC: 114 MG/DL
CK SERPL-CCNC: 50 U/L
CO2 SERPL-SCNC: 28 MMOL/L
CREAT SERPL-MCNC: 1.13 MG/DL
EOSINOPHIL # BLD AUTO: 0.27 K/UL
EOSINOPHIL NFR BLD AUTO: 3.5 %
ESTIMATED AVERAGE GLUCOSE: 94 MG/DL
GLUCOSE SERPL-MCNC: 83 MG/DL
HBA1C MFR BLD HPLC: 4.9 %
HCT VFR BLD CALC: 39.2 %
HDLC SERPL-MCNC: 53 MG/DL
HGB BLD-MCNC: 12.5 G/DL
IMM GRANULOCYTES NFR BLD AUTO: 0.3 %
LDLC SERPL CALC-MCNC: 53 MG/DL
LDLC SERPL DIRECT ASSAY-MCNC: 57 MG/DL
LYMPHOCYTES # BLD AUTO: 2.42 K/UL
LYMPHOCYTES NFR BLD AUTO: 31.1 %
MAN DIFF?: NORMAL
MCHC RBC-ENTMCNC: 31.9 GM/DL
MCHC RBC-ENTMCNC: 32.4 PG
MCV RBC AUTO: 101.6 FL
MONOCYTES # BLD AUTO: 0.6 K/UL
MONOCYTES NFR BLD AUTO: 7.7 %
NEUTROPHILS # BLD AUTO: 4.38 K/UL
NEUTROPHILS NFR BLD AUTO: 56.4 %
NONHDLC SERPL-MCNC: 61 MG/DL
PLATELET # BLD AUTO: 223 K/UL
POTASSIUM SERPL-SCNC: 4.3 MMOL/L
PROT SERPL-MCNC: 6.8 G/DL
RBC # BLD: 3.86 M/UL
RBC # FLD: 13 %
SODIUM SERPL-SCNC: 144 MMOL/L
T4 FREE SERPL-MCNC: 1 NG/DL
TRIGL SERPL-MCNC: 39 MG/DL
TSH SERPL-ACNC: 2.94 UIU/ML
WBC # FLD AUTO: 7.77 K/UL

## 2021-04-08 ENCOUNTER — APPOINTMENT (OUTPATIENT)
Dept: CARDIOLOGY | Facility: CLINIC | Age: 56
End: 2021-04-08

## 2021-04-23 ENCOUNTER — NON-APPOINTMENT (OUTPATIENT)
Age: 56
End: 2021-04-23

## 2021-04-26 ENCOUNTER — APPOINTMENT (OUTPATIENT)
Dept: CARDIOLOGY | Facility: CLINIC | Age: 56
End: 2021-04-26

## 2021-04-27 ENCOUNTER — NON-APPOINTMENT (OUTPATIENT)
Age: 56
End: 2021-04-27

## 2021-04-28 ENCOUNTER — INPATIENT (INPATIENT)
Facility: HOSPITAL | Age: 56
LOS: 14 days | Discharge: NURSING, REHAB OR LONG-TERM CARE | DRG: 516 | End: 2021-05-13
Attending: INTERNAL MEDICINE | Admitting: ORTHOPAEDIC SURGERY
Payer: COMMERCIAL

## 2021-04-28 ENCOUNTER — APPOINTMENT (OUTPATIENT)
Dept: ORTHOPEDIC SURGERY | Facility: CLINIC | Age: 56
End: 2021-04-28
Payer: COMMERCIAL

## 2021-04-28 VITALS
WEIGHT: 175.05 LBS | HEIGHT: 68 IN | DIASTOLIC BLOOD PRESSURE: 60 MMHG | TEMPERATURE: 98 F | HEART RATE: 100 BPM | OXYGEN SATURATION: 97 % | RESPIRATION RATE: 18 BRPM | SYSTOLIC BLOOD PRESSURE: 110 MMHG

## 2021-04-28 VITALS — BODY MASS INDEX: 25.76 KG/M2 | WEIGHT: 170 LBS | HEIGHT: 68 IN

## 2021-04-28 DIAGNOSIS — S82.009A UNSPECIFIED FRACTURE OF UNSPECIFIED PATELLA, INITIAL ENCOUNTER FOR CLOSED FRACTURE: ICD-10-CM

## 2021-04-28 DIAGNOSIS — N20.0 CALCULUS OF KIDNEY: ICD-10-CM

## 2021-04-28 DIAGNOSIS — E78.5 HYPERLIPIDEMIA, UNSPECIFIED: ICD-10-CM

## 2021-04-28 DIAGNOSIS — R33.9 RETENTION OF URINE, UNSPECIFIED: ICD-10-CM

## 2021-04-28 DIAGNOSIS — I25.10 ATHEROSCLEROTIC HEART DISEASE OF NATIVE CORONARY ARTERY WITHOUT ANGINA PECTORIS: ICD-10-CM

## 2021-04-28 DIAGNOSIS — Z98.890 OTHER SPECIFIED POSTPROCEDURAL STATES: Chronic | ICD-10-CM

## 2021-04-28 DIAGNOSIS — Z29.9 ENCOUNTER FOR PROPHYLACTIC MEASURES, UNSPECIFIED: ICD-10-CM

## 2021-04-28 LAB
ALBUMIN SERPL ELPH-MCNC: 3.8 G/DL — SIGNIFICANT CHANGE UP (ref 3.3–5)
ALP SERPL-CCNC: 116 U/L — SIGNIFICANT CHANGE UP (ref 40–120)
ALT FLD-CCNC: 7 U/L — LOW (ref 10–45)
ANION GAP SERPL CALC-SCNC: 15 MMOL/L — SIGNIFICANT CHANGE UP (ref 5–17)
APTT BLD: 31.8 SEC — SIGNIFICANT CHANGE UP (ref 27.5–35.5)
AST SERPL-CCNC: 14 U/L — SIGNIFICANT CHANGE UP (ref 10–40)
BASOPHILS # BLD AUTO: 0.06 K/UL — SIGNIFICANT CHANGE UP (ref 0–0.2)
BASOPHILS NFR BLD AUTO: 0.7 % — SIGNIFICANT CHANGE UP (ref 0–2)
BILIRUB SERPL-MCNC: 0.9 MG/DL — SIGNIFICANT CHANGE UP (ref 0.2–1.2)
BLD GP AB SCN SERPL QL: NEGATIVE — SIGNIFICANT CHANGE UP
BUN SERPL-MCNC: 17 MG/DL — SIGNIFICANT CHANGE UP (ref 7–23)
CALCIUM SERPL-MCNC: 9.5 MG/DL — SIGNIFICANT CHANGE UP (ref 8.4–10.5)
CHLORIDE SERPL-SCNC: 104 MMOL/L — SIGNIFICANT CHANGE UP (ref 96–108)
CO2 SERPL-SCNC: 22 MMOL/L — SIGNIFICANT CHANGE UP (ref 22–31)
CREAT SERPL-MCNC: 0.9 MG/DL — SIGNIFICANT CHANGE UP (ref 0.5–1.3)
EOSINOPHIL # BLD AUTO: 0.27 K/UL — SIGNIFICANT CHANGE UP (ref 0–0.5)
EOSINOPHIL NFR BLD AUTO: 3.3 % — SIGNIFICANT CHANGE UP (ref 0–6)
GLUCOSE SERPL-MCNC: 95 MG/DL — SIGNIFICANT CHANGE UP (ref 70–99)
HCG SERPL-ACNC: <2 MIU/ML — SIGNIFICANT CHANGE UP
HCT VFR BLD CALC: 34.2 % — LOW (ref 34.5–45)
HCV AB S/CO SERPL IA: 0.11 S/CO — SIGNIFICANT CHANGE UP (ref 0–0.99)
HCV AB SERPL-IMP: SIGNIFICANT CHANGE UP
HGB BLD-MCNC: 11.2 G/DL — LOW (ref 11.5–15.5)
IMM GRANULOCYTES NFR BLD AUTO: 0.4 % — SIGNIFICANT CHANGE UP (ref 0–1.5)
INR BLD: 1.17 RATIO — HIGH (ref 0.88–1.16)
LYMPHOCYTES # BLD AUTO: 1.94 K/UL — SIGNIFICANT CHANGE UP (ref 1–3.3)
LYMPHOCYTES # BLD AUTO: 24 % — SIGNIFICANT CHANGE UP (ref 13–44)
MCHC RBC-ENTMCNC: 31.7 PG — SIGNIFICANT CHANGE UP (ref 27–34)
MCHC RBC-ENTMCNC: 32.7 GM/DL — SIGNIFICANT CHANGE UP (ref 32–36)
MCV RBC AUTO: 96.9 FL — SIGNIFICANT CHANGE UP (ref 80–100)
MONOCYTES # BLD AUTO: 0.63 K/UL — SIGNIFICANT CHANGE UP (ref 0–0.9)
MONOCYTES NFR BLD AUTO: 7.8 % — SIGNIFICANT CHANGE UP (ref 2–14)
NEUTROPHILS # BLD AUTO: 5.17 K/UL — SIGNIFICANT CHANGE UP (ref 1.8–7.4)
NEUTROPHILS NFR BLD AUTO: 63.8 % — SIGNIFICANT CHANGE UP (ref 43–77)
NRBC # BLD: 0 /100 WBCS — SIGNIFICANT CHANGE UP (ref 0–0)
PLATELET # BLD AUTO: 281 K/UL — SIGNIFICANT CHANGE UP (ref 150–400)
POTASSIUM SERPL-MCNC: 4.2 MMOL/L — SIGNIFICANT CHANGE UP (ref 3.5–5.3)
POTASSIUM SERPL-SCNC: 4.2 MMOL/L — SIGNIFICANT CHANGE UP (ref 3.5–5.3)
PROT SERPL-MCNC: 7.3 G/DL — SIGNIFICANT CHANGE UP (ref 6–8.3)
PROTHROM AB SERPL-ACNC: 13.9 SEC — HIGH (ref 10.6–13.6)
RBC # BLD: 3.53 M/UL — LOW (ref 3.8–5.2)
RBC # FLD: 12.8 % — SIGNIFICANT CHANGE UP (ref 10.3–14.5)
RH IG SCN BLD-IMP: POSITIVE — SIGNIFICANT CHANGE UP
SARS-COV-2 RNA SPEC QL NAA+PROBE: SIGNIFICANT CHANGE UP
SODIUM SERPL-SCNC: 141 MMOL/L — SIGNIFICANT CHANGE UP (ref 135–145)
WBC # BLD: 8.1 K/UL — SIGNIFICANT CHANGE UP (ref 3.8–10.5)
WBC # FLD AUTO: 8.1 K/UL — SIGNIFICANT CHANGE UP (ref 3.8–10.5)

## 2021-04-28 PROCEDURE — 93971 EXTREMITY STUDY: CPT | Mod: 26,LT

## 2021-04-28 PROCEDURE — 93010 ELECTROCARDIOGRAM REPORT: CPT

## 2021-04-28 PROCEDURE — 71250 CT THORAX DX C-: CPT | Mod: 26,MG

## 2021-04-28 PROCEDURE — 99072 ADDL SUPL MATRL&STAF TM PHE: CPT

## 2021-04-28 PROCEDURE — 99214 OFFICE O/P EST MOD 30 MIN: CPT

## 2021-04-28 PROCEDURE — 73564 X-RAY EXAM KNEE 4 OR MORE: CPT | Mod: 26,LT

## 2021-04-28 PROCEDURE — G1004: CPT

## 2021-04-28 PROCEDURE — ZZZZZ: CPT

## 2021-04-28 PROCEDURE — 73700 CT LOWER EXTREMITY W/O DYE: CPT | Mod: 26,LT,MG

## 2021-04-28 PROCEDURE — 71045 X-RAY EXAM CHEST 1 VIEW: CPT | Mod: 26

## 2021-04-28 PROCEDURE — 99285 EMERGENCY DEPT VISIT HI MDM: CPT

## 2021-04-28 PROCEDURE — 76377 3D RENDER W/INTRP POSTPROCES: CPT | Mod: 26

## 2021-04-28 RX ORDER — ASPIRIN/CALCIUM CARB/MAGNESIUM 324 MG
81 TABLET ORAL DAILY
Refills: 0 | Status: DISCONTINUED | OUTPATIENT
Start: 2021-04-28 | End: 2021-05-03

## 2021-04-28 RX ORDER — ASPIRIN/CALCIUM CARB/MAGNESIUM 324 MG
1 TABLET ORAL
Qty: 0 | Refills: 0 | DISCHARGE

## 2021-04-28 RX ORDER — ACETAMINOPHEN 500 MG
650 TABLET ORAL EVERY 6 HOURS
Refills: 0 | Status: DISCONTINUED | OUTPATIENT
Start: 2021-04-28 | End: 2021-05-03

## 2021-04-28 RX ORDER — METOPROLOL TARTRATE 50 MG
25 TABLET ORAL AT BEDTIME
Refills: 0 | Status: DISCONTINUED | OUTPATIENT
Start: 2021-04-28 | End: 2021-05-03

## 2021-04-28 RX ORDER — ATORVASTATIN CALCIUM 80 MG/1
40 TABLET, FILM COATED ORAL AT BEDTIME
Refills: 0 | Status: DISCONTINUED | OUTPATIENT
Start: 2021-04-28 | End: 2021-05-03

## 2021-04-28 RX ORDER — MAGNESIUM HYDROXIDE 400 MG/1
30 TABLET, CHEWABLE ORAL DAILY
Refills: 0 | Status: DISCONTINUED | OUTPATIENT
Start: 2021-04-28 | End: 2021-05-03

## 2021-04-28 RX ORDER — OXYCODONE HYDROCHLORIDE 5 MG/1
5 TABLET ORAL ONCE
Refills: 0 | Status: DISCONTINUED | OUTPATIENT
Start: 2021-04-28 | End: 2021-04-28

## 2021-04-28 RX ORDER — ACETAMINOPHEN 500 MG
650 TABLET ORAL ONCE
Refills: 0 | Status: COMPLETED | OUTPATIENT
Start: 2021-04-28 | End: 2021-04-28

## 2021-04-28 RX ORDER — TOPIRAMATE 25 MG
25 TABLET ORAL DAILY
Refills: 0 | Status: DISCONTINUED | OUTPATIENT
Start: 2021-04-28 | End: 2021-05-03

## 2021-04-28 RX ORDER — ATORVASTATIN CALCIUM 80 MG/1
40 TABLET, FILM COATED ORAL AT BEDTIME
Refills: 0 | Status: DISCONTINUED | OUTPATIENT
Start: 2021-04-28 | End: 2021-04-28

## 2021-04-28 RX ORDER — METOCLOPRAMIDE HCL 10 MG
10 TABLET ORAL EVERY 6 HOURS
Refills: 0 | Status: DISCONTINUED | OUTPATIENT
Start: 2021-04-28 | End: 2021-05-03

## 2021-04-28 RX ORDER — METOPROLOL TARTRATE 50 MG
0.5 TABLET ORAL
Qty: 0 | Refills: 0 | DISCHARGE

## 2021-04-28 RX ORDER — PANTOPRAZOLE SODIUM 20 MG/1
40 TABLET, DELAYED RELEASE ORAL
Refills: 0 | Status: DISCONTINUED | OUTPATIENT
Start: 2021-04-28 | End: 2021-05-03

## 2021-04-28 RX ORDER — CLOPIDOGREL BISULFATE 75 MG/1
1 TABLET, FILM COATED ORAL
Qty: 0 | Refills: 0 | DISCHARGE

## 2021-04-28 RX ORDER — ESCITALOPRAM OXALATE 10 MG/1
10 TABLET, FILM COATED ORAL AT BEDTIME
Refills: 0 | Status: DISCONTINUED | OUTPATIENT
Start: 2021-04-28 | End: 2021-05-03

## 2021-04-28 RX ORDER — OXYCODONE HYDROCHLORIDE 5 MG/1
10 TABLET ORAL EVERY 4 HOURS
Refills: 0 | Status: DISCONTINUED | OUTPATIENT
Start: 2021-04-28 | End: 2021-05-03

## 2021-04-28 RX ORDER — ENOXAPARIN SODIUM 100 MG/ML
40 INJECTION SUBCUTANEOUS DAILY
Refills: 0 | Status: DISCONTINUED | OUTPATIENT
Start: 2021-04-28 | End: 2021-04-30

## 2021-04-28 RX ORDER — ONDANSETRON 8 MG/1
4 TABLET, FILM COATED ORAL ONCE
Refills: 0 | Status: COMPLETED | OUTPATIENT
Start: 2021-04-28 | End: 2021-04-28

## 2021-04-28 RX ORDER — LIDOCAINE 4 G/100G
1 CREAM TOPICAL ONCE
Refills: 0 | Status: COMPLETED | OUTPATIENT
Start: 2021-04-28 | End: 2021-04-28

## 2021-04-28 RX ORDER — OXYCODONE HYDROCHLORIDE 5 MG/1
5 TABLET ORAL EVERY 4 HOURS
Refills: 0 | Status: DISCONTINUED | OUTPATIENT
Start: 2021-04-28 | End: 2021-05-03

## 2021-04-28 RX ORDER — TOPIRAMATE 25 MG
1 TABLET ORAL
Qty: 0 | Refills: 0 | DISCHARGE

## 2021-04-28 RX ADMIN — LIDOCAINE 1 PATCH: 4 CREAM TOPICAL at 20:35

## 2021-04-28 RX ADMIN — OXYCODONE HYDROCHLORIDE 5 MILLIGRAM(S): 5 TABLET ORAL at 14:08

## 2021-04-28 RX ADMIN — Medication 25 MILLIGRAM(S): at 21:12

## 2021-04-28 RX ADMIN — ENOXAPARIN SODIUM 40 MILLIGRAM(S): 100 INJECTION SUBCUTANEOUS at 19:15

## 2021-04-28 RX ADMIN — Medication 650 MILLIGRAM(S): at 21:42

## 2021-04-28 RX ADMIN — Medication 650 MILLIGRAM(S): at 21:12

## 2021-04-28 RX ADMIN — Medication 650 MILLIGRAM(S): at 14:08

## 2021-04-28 RX ADMIN — ESCITALOPRAM OXALATE 10 MILLIGRAM(S): 10 TABLET, FILM COATED ORAL at 23:28

## 2021-04-28 RX ADMIN — LIDOCAINE 1 PATCH: 4 CREAM TOPICAL at 12:39

## 2021-04-28 RX ADMIN — ATORVASTATIN CALCIUM 40 MILLIGRAM(S): 80 TABLET, FILM COATED ORAL at 21:12

## 2021-04-28 RX ADMIN — OXYCODONE HYDROCHLORIDE 5 MILLIGRAM(S): 5 TABLET ORAL at 12:35

## 2021-04-28 RX ADMIN — Medication 10 MILLIGRAM(S): at 16:25

## 2021-04-28 RX ADMIN — ONDANSETRON 4 MILLIGRAM(S): 8 TABLET, FILM COATED ORAL at 12:54

## 2021-04-28 NOTE — H&P ADULT - ATTENDING COMMENTS
Pt w L comminuted patella fracture and multiple rib fractures.  Unable to take care of herself at home.  Sent to ED due to inability to take care of herself as well as the likely need for cardiac w/u for pre op optimization as per her cardiologist, Dr. Powers.  Pt is too immobile to attend multiple appointments as an outpt to obtain cardiac clearance.  Therefore, she was admitted prior to her surgery for clearance and comfort care Pt w L comminuted patella fracture and multiple rib fractures.  Unable to take care of herself at home.  Sent to ED due to inability to take care of herself as well as the likely need for cardiac w/u for pre op optimization as per her cardiologist, Dr. Lin.  Pt is too immobile to attend multiple appointments as an outpt to obtain cardiac clearance.  Therefore, she was admitted prior to her surgery for clearance and comfort care

## 2021-04-28 NOTE — PATIENT PROFILE ADULT - NSTRANSFERBELONGINGSDISPO_GEN_A_NUR
Subjective:       Patient ID: Kolton Nielsen is a 33 y.o. male.    Chief Complaint: Gastroesophageal Reflux    This is a 33-year-old male who presents for evaluation of suspected reflux symptoms.  His a history of intermittent heartburn which has been present since college occurring intermittently and at least several times a month.  This has always been relieved by over-the-counter remedies such as Tums.  Recently, over the past few months, he has noted some changes in his voice and weakness of his voice.  This prompted a appropriate visit to the otolaryngologist and a nasopharyngoscopy was done revealing a vocal cord hemorrhage and evidence of LPR per patient report.  This was suspected as a result of LPR and he was placed on PPI therapy for which she has been on omeprazole for the past 4 weeks.  He has noted some improvement, though incomplete, in omeprazole twice daily.  He is very busy with 2 young children under the age of 3 and a busy travel schedule working for a [employer] company.  His lifestyle overalls very healthy including diet and activity.  He does not eat within several hours of lying down.  His family history is significant for pancreatic cancer.  A CT scan done within the last 2 years for abdominal pain was unremarkable.  No unintentional weight loss, night sweats, dysphagia, melena or odynophagia is noted. He has been on prilosec and nexium.    The following portions of the patient's history were reviewed and updated as appropriate: allergies, current medications, past family history, past medical history, past social history, past surgical history and problem list.    (Portions of this note were dictated using voice recognition software and may contain dictation related errors in spelling/grammar/syntax not found on text review)    Gastroesophageal Reflux   He reports no abdominal pain, no chest pain, no choking, no coughing or no nausea.     Review of Systems   Constitutional: Negative for  chills and fever.   HENT: Positive for voice change. Negative for postnasal drip and trouble swallowing.    Eyes: Negative for pain and visual disturbance.   Respiratory: Negative for cough, choking and shortness of breath.    Cardiovascular: Negative for chest pain and leg swelling.   Gastrointestinal: Negative for abdominal distention, abdominal pain, anal bleeding, blood in stool, constipation, diarrhea, nausea, rectal pain and vomiting.        +gerd   Endocrine: Negative for cold intolerance and heat intolerance.   Genitourinary: Negative for difficulty urinating and hematuria.   Neurological: Negative for dizziness and numbness.   Hematological: Negative for adenopathy. Does not bruise/bleed easily.   Psychiatric/Behavioral: Negative for agitation and confusion.       Objective:      Physical Exam   Constitutional: He is oriented to person, place, and time. He appears well-developed and well-nourished. No distress.   HENT:   Head: Normocephalic.   Eyes: Conjunctivae are normal. No scleral icterus.   Neck: No tracheal deviation present. No thyromegaly present.   Cardiovascular: Normal rate, regular rhythm and normal heart sounds.  Exam reveals no gallop and no friction rub.    No murmur heard.  Pulmonary/Chest: Effort normal and breath sounds normal. He has no wheezes. He has no rales.   Abdominal: Soft. Bowel sounds are normal. He exhibits no distension. There is no tenderness. There is no rebound and no guarding.   Musculoskeletal: Normal range of motion. He exhibits no edema or tenderness.   Neurological: He is alert and oriented to person, place, and time.   Skin: He is not diaphoretic.   Psychiatric: He has a normal mood and affect. His behavior is normal.   Nursing note and vitals reviewed.      Imaging: CT images/report reviewed, no acute abn  Labs: hct normal 47  Assessment:       1. LPRD (laryngopharyngeal reflux disease)    2. Heartburn        Plan:   1. Trial of changing to daily Dexilant  2. He is  doing well with reflux lifestyle modifications  3. Discussed warning signs in which we would directly proceed with endoscopy  4. He will continue PPI for the next one month, then d/c and monitor symptoms  5. RTC in 2 months   with patient

## 2021-04-28 NOTE — H&P ADULT - NSICDXPASTMEDICALHX_GEN_ALL_CORE_FT
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

## 2021-04-28 NOTE — ED ADULT TRIAGE NOTE - CHIEF COMPLAINT QUOTE
"I fell last Monday and I have a fracture, I am felling a lot of pain - the doctor told me I need surgery"

## 2021-04-28 NOTE — CONSULT NOTE ADULT - PROBLEM SELECTOR RECOMMENDATION 9
Ortho follow up   plan for OR on monday  card and med optimization   pain control   PT eval as tolerated, fall precautions

## 2021-04-28 NOTE — ED PROVIDER NOTE - PMH
2019 novel coronavirus disease (COVID-19)  March 2020  Cyst of Pineal Gland  incidental on CT head after concussion years ago  History of coronary artery stent placement  LAD  HLD (hyperlipidemia)    HTN (hypertension)  started 4 months ago  Nephrolithiasis    Vertigo

## 2021-04-28 NOTE — ED ADULT NURSE NOTE - OBJECTIVE STATEMENT
Pt brought in by ambulance from orthopedist's office TBA for knee surgery.  Pt had mechanical slip and fall 4/19 in lobby of [facility] with +L patellar fracture, has been trying to manage symptoms at home but is unable to maintain ADLs due to pain.  Pt arrives with left knee brace in place, brace removed by ortho and to be replaced after repeat scans.  L knee with pain, ecchymosis and swelling Pt brought in by ambulance from orthopedist's office TBA for knee surgery.  Pt had mechanical slip and fall 4/19 in lobby of [facility] with +L patellar fracture, has been trying to manage symptoms at home but is unable to maintain ADLs due to pain.  Pt arrives with left knee brace in place, brace removed by ortho and to be replaced after repeat scans.  L knee with pain, ecchymosis and swelling  Fitted with primafit at her request. Pt brought in by ambulance from orthopedist's office TBA for knee surgery.  Pt had mechanical slip and fall 4/19 in lobby of [facility] with +L patellar fracture, has been trying to manage symptoms at home but is unable to maintain ADLs due to pain.  Pt arrives with left knee brace in place, brace removed by ortho and to be replaced after repeat scans.  L knee with pain, ecchymosis and swelling, also with complaint of severe left rib pain under breast, worse with palpation, deep breath and movement.  Pt conversive, abdomen soft/non-distended/non-tender, denies chest pain, shortness of breath, cough, abdominal pain, nausea, vomiting, diarrhea, fevers, chills, urinary symptoms.  Fitted with primafit at her request.

## 2021-04-28 NOTE — CONSULT NOTE ADULT - PROBLEM SELECTOR RECOMMENDATION 3
S/P stent x 2 2 years ago   cont ASA and lipitor 40 daily   toprol 25 daily   plan for stress test for card optimization

## 2021-04-28 NOTE — CONSULT NOTE ADULT - SUBJECTIVE AND OBJECTIVE BOX
55y Female presents with LEFT comminuted patella fracture s/p mechanical fall on . Pt seen by Dr. Trejo in office. Instructed to come to [facility] ED for med/cards clearance and plan for ORIF of LEFT patella fx. Denies numbness/tingling in the affected extremity. Patient has been minimally ambulating with walker.    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 650 milliGRAM(s) Oral every 6 hours  aspirin  chewable 81 milliGRAM(s) Oral daily  atorvastatin 40 milliGRAM(s) Oral at bedtime  enoxaparin Injectable 40 milliGRAM(s) SubCutaneous daily  escitalopram 10 milliGRAM(s) Oral at bedtime  metoprolol tartrate 25 milliGRAM(s) Oral at bedtime  pantoprazole    Tablet 40 milliGRAM(s) Oral before breakfast  topiramate 25 milliGRAM(s) Oral daily    MEDICATIONS  (PRN):  magnesium hydroxide Suspension 30 milliLiter(s) Oral daily PRN Constipation  metoclopramide Injectable 10 milliGRAM(s) IV Push every 6 hours PRN Nausea and/or Vomiting  oxyCODONE    IR 10 milliGRAM(s) Oral every 4 hours PRN Severe Pain (7 - 10)  oxyCODONE    IR 5 milliGRAM(s) Oral every 4 hours PRN Moderate Pain (4 - 6)      Home Medications:  aspirin 81 mg oral tablet: 1 tab(s) orally once a day (2021 15:46)  atorvastatin 40 mg oral tablet: 1 tab(s) orally once a day (10 Dec 2019 13:40)  CoQ10: 200 milligram(s) orally 2 times a day (2021 15:46)  Lexapro 10 mg oral tablet: 1 tab(s) orally once a day (10 Dec 2019 13:40)  Metoprolol Tartrate 25 mg oral tablet: 1 tab(s) orally once a day (at bedtime) (2021 15:46)  Protonix 40 mg oral delayed release tablet: 1 tab(s) orally 2 times a day (10 Dec 2019 13:40)  Topamax 25 mg oral tablet: 1 tab(s) orally once a day (2021 15:46)      Allergies    apple (Hives)  Compazine (Unknown)  morphine (Unknown)  PC Pen VK (Unknown)  shellfish (Unknown)  sulfa drugs (Unknown)    Intolerances      Vital Signs Last 24 Hrs  T(C): 37.1 (2021 18:45), Max: 37.1 (2021 18:45)  T(F): 98.8 (2021 18:45), Max: 98.8 (2021 18:45)  HR: 66 (2021 18:45) (66 - 100)  BP: 109/74 (2021 18:45) (107/70 - 134/74)  BP(mean): --  RR: 18 (2021 18:45) (16 - 18)  SpO2: 98% (2021 18:45) (97% - 100%) Patient is a 54 Y/O Female with PMhx of CAD S/P PCI, HLD, kidney stone, migraine presents with LEFT comminuted patella fracture s/p mechanical fall on . Pt seen by Dr. Trejo in office. Instructed to come to [facility] ED for med/cards clearance and plan for ORIF of LEFT patella fx. Denies numbness/tingling in the affected extremity. Patient has been minimally ambulating with walker. in the ED she started having bladder pressure with decrease urine output.     REVIEW OF SYSTEMS:    CONSTITUTIONAL: No weakness, fevers or chills  EYES/ENT: No visual changes;  No vertigo or throat pain   NECK: No pain or stiffness  RESPIRATORY: No cough, wheezing, hemoptysis; No shortness of breath  CARDIOVASCULAR: No chest pain or palpitations  GASTROINTESTINAL: No abdominal or epigastric pain. No nausea,  GENITOURINARY: bladder pressure   NEUROLOGICAL: No numbness or weakness  SKIN: No itching, burning, rashes, or lesions   All other review of systems is negative unless indicated above.    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 650 milliGRAM(s) Oral every 6 hours  aspirin  chewable 81 milliGRAM(s) Oral daily  atorvastatin 40 milliGRAM(s) Oral at bedtime  enoxaparin Injectable 40 milliGRAM(s) SubCutaneous daily  escitalopram 10 milliGRAM(s) Oral at bedtime  metoprolol tartrate 25 milliGRAM(s) Oral at bedtime  pantoprazole    Tablet 40 milliGRAM(s) Oral before breakfast  topiramate 25 milliGRAM(s) Oral daily    MEDICATIONS  (PRN):  magnesium hydroxide Suspension 30 milliLiter(s) Oral daily PRN Constipation  metoclopramide Injectable 10 milliGRAM(s) IV Push every 6 hours PRN Nausea and/or Vomiting  oxyCODONE    IR 10 milliGRAM(s) Oral every 4 hours PRN Severe Pain (7 - 10)  oxyCODONE    IR 5 milliGRAM(s) Oral every 4 hours PRN Moderate Pain (4 - 6)      Home Medications:  aspirin 81 mg oral tablet: 1 tab(s) orally once a day (2021 15:46)  atorvastatin 40 mg oral tablet: 1 tab(s) orally once a day (10 Dec 2019 13:40)  CoQ10: 200 milligram(s) orally 2 times a day (2021 15:46)  Lexapro 10 mg oral tablet: 1 tab(s) orally once a day (10 Dec 2019 13:40)  Metoprolol Tartrate 25 mg oral tablet: 1 tab(s) orally once a day (at bedtime) (2021 15:46)  Protonix 40 mg oral delayed release tablet: 1 tab(s) orally 2 times a day (10 Dec 2019 13:40)  Topamax 25 mg oral tablet: 1 tab(s) orally once a day (2021 15:46)      Allergies  apple (Hives)  Compazine (Unknown)  morphine (Unknown)  PC Pen VK (Unknown)  shellfish (Unknown)  sulfa drugs (Unknown)    Intolerances      Vital Signs Last 24 Hrs  T(C): 37.1 (2021 18:45), Max: 37.1 (2021 18:45)  T(F): 98.8 (2021 18:45), Max: 98.8 (2021 18:45)  HR: 66 (2021 18:45) (66 - 100)  BP: 109/74 (2021 18:45) (107/70 - 134/74)  BP(mean): --  RR: 18 (2021 18:45) (16 - 18)  SpO2: 98% (2021 18:45) (97% - 100%)    Appearance: Normal	  HEENT:   Normal oral mucosa, PERRL, EOMI	  Lymphatic: No lymphadenopathy , no edema  Cardiovascular: Normal S1 S2, No JVD, No murmurs , Peripheral pulses palpable 2+ bilaterally  Respiratory: Lungs clear to auscultation, normal effort 	  Gastrointestinal:  Soft, lower abdomen pain on palpation   Skin: No rashes, No ecchymoses, No cyanosis, warm to touch  Musculoskeletal: L knee pain ,   Psychiatry:  Mood & affect appropriate  Ext: No edema                          11.2   8.10  )-----------( 281      ( 2021 12:50 )             34.2               04-28    141  |  104  |  17  ----------------------------<  95  4.2   |  22  |  0.90    Ca    9.5      2021 12:50    TPro  7.3  /  Alb  3.8  /  TBili  0.9  /  DBili  x   /  AST  14  /  ALT  7<L>  /  AlkPhos  116      PT/INR - ( 2021 12:50 )   PT: 13.9 sec;   INR: 1.17 ratio         PTT - ( 2021 12:50 )  PTT:31.8 sec                     < from: CT 3D Reconstruct w/ Workstation (21 @ 14:33) >  IMPRESSION:    Comminuted and distracted midpatellar fracture  Moderate hemorrhage in the prepatellar region and surrounding the fracture fragments of the patella      < from: CT Chest No Cont (21 @ 14:33) >  IMPRESSION:  Coronary artery calcification.    No rib fracture.

## 2021-04-28 NOTE — H&P ADULT - NSHPPHYSICALEXAM_GEN_ALL_CORE
PHYSICAL EXAM  General: NAD, Awake and Alert    RIGHT Lower Extremity:   Skin intact  + Ecchymosis of the knee  + Swelling of the knee   TTP over the knee  + Muscular soreness with mild ecchymosis ~2cm over the lateral hip  NTTP over the bony prominences of the hip/ankle/foot/toes  L2-S1 SILT  +EHL/FHL/TA/GSC  +DP pulses  Calf nontender  Compartments soft and compressible    Secondary Exam: Benign, Skin intact, SILT throughout, motor grossly intact throughout, no other orthopedic injuries at this time, compartments soft and compressible Detail Level: Zone Hide Additional Notes?: No

## 2021-04-28 NOTE — CONSULT NOTE ADULT - ASSESSMENT
Patient is a 56 Y/O Female with PMhx of CAD S/P PCI, HLD, kidney stone, migraine presents with LEFT comminuted patella fracture s/p mechanical fall on 4/19. Pt seen by Dr. Trejo in office. Instructed to come to [facility] ED for med/cards clearance and plan for ORIF of LEFT patella fx. Denies numbness/tingling in the affected extremity. Patient has been minimally ambulating with walker. in the ED she started having bladder pressure with decrease urine output.

## 2021-04-28 NOTE — ED PROVIDER NOTE - CLINICAL SUMMARY MEDICAL DECISION MAKING FREE TEXT BOX
L patella fx 10 days ago with worsening L anterior chest pain a/w mechanical fall from standing height.  Known comminuted patella fx requiring ORIF.  Sent from Dr. Trejo's (ortho) office for admission.  Pre-op labs, CXR rpt, ortho c/s, pain Rx, admit.  --BMM

## 2021-04-28 NOTE — CONSULT NOTE ADULT - SUBJECTIVE AND OBJECTIVE BOX
Date of Service: 21      · Subjective and Objective:   Patient is a 54 Y/O Female with PMhx of CAD S/P PCI, HLD, kidney stone, migraine presents with LEFT comminuted patella fracture s/p mechanical fall on . Pt seen by Dr. Trejo in office. Instructed to come to [facility] ED for med/cards clearance and plan for ORIF of LEFT patella fx. Denies numbness/tingling in the affected extremity. Patient has been minimally ambulating with walker. in the ED she started having bladder pressure with decrease urine output.     REVIEW OF SYSTEMS:    CONSTITUTIONAL: No weakness, fevers or chills  EYES/ENT: No visual changes;  No vertigo or throat pain   NECK: No pain or stiffness  RESPIRATORY: No cough, wheezing, hemoptysis; No shortness of breath  CARDIOVASCULAR: No chest pain or palpitations  GASTROINTESTINAL: No abdominal or epigastric pain. No nausea,  GENITOURINARY: bladder pressure   NEUROLOGICAL: No numbness or weakness  SKIN: No itching, burning, rashes, or lesions   All other review of systems is negative unless indicated above.    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 650 milliGRAM(s) Oral every 6 hours  aspirin  chewable 81 milliGRAM(s) Oral daily  atorvastatin 40 milliGRAM(s) Oral at bedtime  enoxaparin Injectable 40 milliGRAM(s) SubCutaneous daily  escitalopram 10 milliGRAM(s) Oral at bedtime  metoprolol tartrate 25 milliGRAM(s) Oral at bedtime  pantoprazole    Tablet 40 milliGRAM(s) Oral before breakfast  topiramate 25 milliGRAM(s) Oral daily    MEDICATIONS  (PRN):  magnesium hydroxide Suspension 30 milliLiter(s) Oral daily PRN Constipation  metoclopramide Injectable 10 milliGRAM(s) IV Push every 6 hours PRN Nausea and/or Vomiting  oxyCODONE    IR 10 milliGRAM(s) Oral every 4 hours PRN Severe Pain (7 - 10)  oxyCODONE    IR 5 milliGRAM(s) Oral every 4 hours PRN Moderate Pain (4 - 6)      Home Medications:  aspirin 81 mg oral tablet: 1 tab(s) orally once a day (2021 15:46)  atorvastatin 40 mg oral tablet: 1 tab(s) orally once a day (10 Dec 2019 13:40)  CoQ10: 200 milligram(s) orally 2 times a day (2021 15:46)  Lexapro 10 mg oral tablet: 1 tab(s) orally once a day (10 Dec 2019 13:40)  Metoprolol Tartrate 25 mg oral tablet: 1 tab(s) orally once a day (at bedtime) (2021 15:46)  Protonix 40 mg oral delayed release tablet: 1 tab(s) orally 2 times a day (10 Dec 2019 13:40)  Topamax 25 mg oral tablet: 1 tab(s) orally once a day (2021 15:46)      Allergies  apple (Hives)  Compazine (Unknown)  morphine (Unknown)  PC Pen VK (Unknown)  shellfish (Unknown)  sulfa drugs (Unknown)    Intolerances      Vital Signs Last 24 Hrs  T(C): 37.1 (2021 18:45), Max: 37.1 (2021 18:45)  T(F): 98.8 (2021 18:45), Max: 98.8 (2021 18:45)  HR: 66 (2021 18:45) (66 - 100)  BP: 109/74 (2021 18:45) (107/70 - 134/74)  BP(mean): --  RR: 18 (2021 18:45) (16 - 18)  SpO2: 98% (2021 18:45) (97% - 100%)    Appearance: Normal	  HEENT:   Normal oral mucosa, PERRL, EOMI	  Lymphatic: No lymphadenopathy , no edema  Cardiovascular: Normal S1 S2, No JVD, No murmurs , Peripheral pulses palpable 2+ bilaterally  Respiratory: Lungs clear to auscultation, normal effort 	  Gastrointestinal:  Soft, lower abdomen pain on palpation   Skin: No rashes, No ecchymoses, No cyanosis, warm to touch  Musculoskeletal: L knee pain ,   Psychiatry:  Mood & affect appropriate  Ext: No edema                          11.2   8.10  )-----------( 281      ( 2021 12:50 )             34.2                   141  |  104  |  17  ----------------------------<  95  4.2   |  22  |  0.90    Ca    9.5      2021 12:50    TPro  7.3  /  Alb  3.8  /  TBili  0.9  /  DBili  x   /  AST  14  /  ALT  7<L>  /  AlkPhos  116      PT/INR - ( 2021 12:50 )   PT: 13.9 sec;   INR: 1.17 ratio         PTT - ( 2021 12:50 )  PTT:31.8 sec                     < from: CT 3D Reconstruct w/ Workstation (21 @ 14:33) >  IMPRESSION:    Comminuted and distracted midpatellar fracture  Moderate hemorrhage in the prepatellar region and surrounding the fracture fragments of the patella       CT Chest No Cont personally reviewed  IMPRESSION:  Coronary artery calcification.  No rib fracture.  Clear lungs    EKG: NSR with nonspecific changes          Assessment and Recommendation:   · Assessment	  Patient is a 54 Y/O Female with PMhx of CAD S/P PCI, HLD, kidney stone, migraine presents with LEFT comminuted patella fracture s/p mechanical fall on . Pt seen by Dr. Trejo in office. Instructed to come to [facility] ED for med/cards clearance and plan for ORIF of LEFT patella fx. Denies numbness/tingling in the affected extremity. Patient has been minimally ambulating with walker. in the ED she started having bladder pressure with decrease urine output.       Assessment and Recommendation:   · Assessment	  Patient is a 54 Y/O Female with PMhx of CAD S/P PCI, HLD, kidney stone, migraine presents with LEFT comminuted patella fracture s/p mechanical fall on . Pt seen by Dr. Trejo in office. Instructed to come to [facility] ED for med/cards clearance and plan for ORIF of LEFT patella fx. Denies numbness/tingling in the affected extremity. Patient has been minimally ambulating with walker. in the ED she started having bladder pressure with decrease urine output.       Problem/Recommendation - 1:  Problem: Patella fracture. Recommendation: Ortho follow up   Plan for OR on Monday if cardiac optimized       Problem/Recommendation - 2:  ·  Problem: Palps/Exertional SOB  Recommendation: NM stress test done today with large areas of infarct with leonor-infarct ischemia   Risks and benefits of cardiac NM stress test discussed with pt. Discussed with Outpt cardio Dr Lin as well  Agreeable to proceed with NM stress in AM    Problem/Recommendation - 3:  ·  Problem: CAD (coronary artery disease).  Recommendation: S/P stent x 2 to LAD about two years ago   cont ASA and lipitor 40 daily   toprol 25 daily   NM stress as noted above     Problem/Recommendation - 4:  ·  Problem: HLD (hyperlipidemia).  Recommendation: statin for secondary prevention       Problem/Recommendation - 5:  ·  Problem: Urinary retention.  Recommendation: acute onset o admission  Was retaining about 300cc of urine with suprapubic pain, will straight cath    Problem/Recommendation - 6:  Problem: Prophylactic measure. Recommendation: DVT and gI PPX       I had a  conversation with the patient regarding hospital course and Plan of care. Patient expresses clear understanding and satisfaction with the plan of care. One hundred ten minutes spent on total encounter, of which more than fifty percent of the encounter was spent on counseling and/or coordinating care by the attending physician.

## 2021-04-28 NOTE — H&P ADULT - HISTORY OF PRESENT ILLNESS
55y Female presents with LEFT comminuted patella fracture s/p mechanical fall on 4/19. Pt seen by Dr. Trejo in office. Instructed to come to [facility] ED for med/cards clearance and plan for ORIF of LEFT patella fx. Denies numbness/tingling in the affected extremity. Patient has been minimally ambulating with walker. Sees cardiologist Dr. Lin outpatient.

## 2021-04-28 NOTE — H&P ADULT - ASSESSMENT
Assessment/Plan:  55y Female with LEFT comminuted patella fracture    -Pain control as needed  -WBAT LEFT lower extremity in bulky zamora knee immobilizer  -No ROM of the left knee  -Plan for ORIF with possible partial patellectomy of LEFT patella   -FU preop workup   -FU L Knee XR, L Knee CT  -FU CT Chest  -FU LLE doppler  -DVT ppx: A81  -Needs medical optimization for OR  -Needs documentation of medical clearance  -Medical management appreciated   -FU Cardiology workup  -Will discuss with attending, and advise if plan changes

## 2021-04-28 NOTE — H&P ADULT - NSICDXFAMILYHX_GEN_ALL_CORE_FT
FAMILY HISTORY:  Father  Still living? Yes, Estimated age: Age Unknown  Family history of early CAD, Age at diagnosis: Age Unknown    Mother  Still living? Unknown  Family history of early CAD, Age at diagnosis: Age Unknown

## 2021-04-28 NOTE — ED PROVIDER NOTE - MUSCULOSKELETAL, MLM
Left rib TTP. Left knee TTP, ecchymosis and swelling at patella. Extension mechanism not intact. 2+ DP/PT pulses. Sensation normal.

## 2021-04-28 NOTE — ED ADULT NURSE NOTE - NSIMPLEMENTINTERV_GEN_ALL_ED
Implemented All Fall Risk Interventions:  [place] to call system. Call bell, personal items and telephone within reach. Instruct patient to call for assistance. Room bathroom lighting operational. Non-slip footwear when patient is off stretcher. Physically safe environment: no spills, clutter or unnecessary equipment. Stretcher in lowest position, wheels locked, appropriate side rails in place. Provide visual cue, wrist band, yellow gown, etc. Monitor gait and stability. Monitor for mental status changes and reorient to person, place, and time. Review medications for side effects contributing to fall risk. Reinforce activity limits and safety measures with patient and family.

## 2021-04-29 LAB
APPEARANCE UR: CLEAR — SIGNIFICANT CHANGE UP
BILIRUB UR-MCNC: NEGATIVE — SIGNIFICANT CHANGE UP
COLOR SPEC: SIGNIFICANT CHANGE UP
COVID-19 SPIKE DOMAIN AB INTERP: POSITIVE
COVID-19 SPIKE DOMAIN ANTIBODY RESULT: >250 U/ML — HIGH
DIFF PNL FLD: ABNORMAL
GLUCOSE UR QL: NEGATIVE — SIGNIFICANT CHANGE UP
KETONES UR-MCNC: NEGATIVE — SIGNIFICANT CHANGE UP
LEUKOCYTE ESTERASE UR-ACNC: NEGATIVE — SIGNIFICANT CHANGE UP
NITRITE UR-MCNC: NEGATIVE — SIGNIFICANT CHANGE UP
PH UR: 6 — SIGNIFICANT CHANGE UP (ref 5–8)
PROT UR-MCNC: NEGATIVE — SIGNIFICANT CHANGE UP
SARS-COV-2 IGG+IGM SERPL QL IA: >250 U/ML — HIGH
SARS-COV-2 IGG+IGM SERPL QL IA: POSITIVE
SP GR SPEC: 1.02 — SIGNIFICANT CHANGE UP (ref 1.01–1.02)
UROBILINOGEN FLD QL: NEGATIVE — SIGNIFICANT CHANGE UP

## 2021-04-29 PROCEDURE — 99231 SBSQ HOSP IP/OBS SF/LOW 25: CPT | Mod: GC

## 2021-04-29 PROCEDURE — 93016 CV STRESS TEST SUPVJ ONLY: CPT

## 2021-04-29 PROCEDURE — 78452 HT MUSCLE IMAGE SPECT MULT: CPT | Mod: 26

## 2021-04-29 PROCEDURE — 93018 CV STRESS TEST I&R ONLY: CPT

## 2021-04-29 PROCEDURE — 93306 TTE W/DOPPLER COMPLETE: CPT | Mod: 26

## 2021-04-29 RX ORDER — ONDANSETRON 8 MG/1
4 TABLET, FILM COATED ORAL ONCE
Refills: 0 | Status: COMPLETED | OUTPATIENT
Start: 2021-04-29 | End: 2021-04-29

## 2021-04-29 RX ORDER — TRAMADOL HYDROCHLORIDE 50 MG/1
25 TABLET ORAL EVERY 6 HOURS
Refills: 0 | Status: DISCONTINUED | OUTPATIENT
Start: 2021-04-29 | End: 2021-05-03

## 2021-04-29 RX ORDER — POLYETHYLENE GLYCOL 3350 17 G/17G
17 POWDER, FOR SOLUTION ORAL DAILY
Refills: 0 | Status: DISCONTINUED | OUTPATIENT
Start: 2021-04-29 | End: 2021-05-03

## 2021-04-29 RX ORDER — CYCLOBENZAPRINE HYDROCHLORIDE 10 MG/1
5 TABLET, FILM COATED ORAL THREE TIMES A DAY
Refills: 0 | Status: DISCONTINUED | OUTPATIENT
Start: 2021-04-29 | End: 2021-05-03

## 2021-04-29 RX ORDER — KETOROLAC TROMETHAMINE 30 MG/ML
15 SYRINGE (ML) INJECTION EVERY 6 HOURS
Refills: 0 | Status: DISCONTINUED | OUTPATIENT
Start: 2021-04-29 | End: 2021-05-01

## 2021-04-29 RX ADMIN — Medication 650 MILLIGRAM(S): at 06:30

## 2021-04-29 RX ADMIN — TRAMADOL HYDROCHLORIDE 25 MILLIGRAM(S): 50 TABLET ORAL at 17:00

## 2021-04-29 RX ADMIN — ESCITALOPRAM OXALATE 10 MILLIGRAM(S): 10 TABLET, FILM COATED ORAL at 21:21

## 2021-04-29 RX ADMIN — POLYETHYLENE GLYCOL 3350 17 GRAM(S): 17 POWDER, FOR SOLUTION ORAL at 21:32

## 2021-04-29 RX ADMIN — Medication 15 MILLIGRAM(S): at 12:53

## 2021-04-29 RX ADMIN — PANTOPRAZOLE SODIUM 40 MILLIGRAM(S): 20 TABLET, DELAYED RELEASE ORAL at 07:01

## 2021-04-29 RX ADMIN — Medication 15 MILLIGRAM(S): at 13:20

## 2021-04-29 RX ADMIN — ENOXAPARIN SODIUM 40 MILLIGRAM(S): 100 INJECTION SUBCUTANEOUS at 14:28

## 2021-04-29 RX ADMIN — Medication 25 MILLIGRAM(S): at 21:21

## 2021-04-29 RX ADMIN — OXYCODONE HYDROCHLORIDE 5 MILLIGRAM(S): 5 TABLET ORAL at 21:22

## 2021-04-29 RX ADMIN — Medication 25 MILLIGRAM(S): at 12:53

## 2021-04-29 RX ADMIN — ATORVASTATIN CALCIUM 40 MILLIGRAM(S): 80 TABLET, FILM COATED ORAL at 21:21

## 2021-04-29 RX ADMIN — Medication 81 MILLIGRAM(S): at 12:53

## 2021-04-29 RX ADMIN — ONDANSETRON 4 MILLIGRAM(S): 8 TABLET, FILM COATED ORAL at 21:17

## 2021-04-29 RX ADMIN — LIDOCAINE 1 PATCH: 4 CREAM TOPICAL at 00:38

## 2021-04-29 RX ADMIN — Medication 650 MILLIGRAM(S): at 17:39

## 2021-04-29 RX ADMIN — OXYCODONE HYDROCHLORIDE 5 MILLIGRAM(S): 5 TABLET ORAL at 21:52

## 2021-04-29 RX ADMIN — Medication 650 MILLIGRAM(S): at 18:10

## 2021-04-29 RX ADMIN — Medication 15 MILLIGRAM(S): at 18:47

## 2021-04-29 RX ADMIN — Medication 650 MILLIGRAM(S): at 05:57

## 2021-04-29 RX ADMIN — Medication 25 MILLIGRAM(S): at 07:01

## 2021-04-29 RX ADMIN — TRAMADOL HYDROCHLORIDE 25 MILLIGRAM(S): 50 TABLET ORAL at 16:22

## 2021-04-29 NOTE — PROGRESS NOTE ADULT - ASSESSMENT
Patient is a 56 Y/O Female with PMhx of CAD S/P PCI, HLD, kidney stone, migraine presents with LEFT comminuted patella fracture s/p mechanical fall on 4/19. Pt seen by Dr. Trejo in office. Instructed to come to [facility] ED for med/cards clearance and plan for ORIF of LEFT patella fx. Denies numbness/tingling in the affected extremity. Patient has been minimally ambulating with walker. in the ED she started having bladder pressure with decrease urine output.       Problem/Recommendation - 1:  Problem: Patella fracture. Recommendation: Ortho follow up   plan for OR on monday  card and med optimization   pain control   PT eval as tolerated, fall precautions.  plan for stress test today     Problem/Recommendation - 2:  ·  Problem: Urinary retention.  Recommendation: acute onset  resolved, had urine output, no retention   send UA and urine culture  monitor BUN/Cr  bladder US. PRN     Problem/Recommendation - 3:  ·  Problem: CAD (coronary artery disease).  Recommendation: S/P stent x 2 2 years ago   cont ASA and lipitor 40 daily   toprol 25 daily   plan for stress test for card optimization.     Problem/Recommendation - 4:  ·  Problem: HLD (hyperlipidemia).  Recommendation: statin  lipdi panel  diet control.     Problem/Recommendation - 5:  ·  Problem: Nephrolithiasis.     Problem/Recommendation - 6:  Problem: Prophylactic measure. Recommendation: DVT and gI PPX     Differential diagnosis and plan of care discussed with patient after the evaluation.   Advanced care planning/advanced directives discussed with patient/family. DNR status including forceful chest compressions to attempt to restart the heart, ventilator support/artificial breathing, electric shock, artificial nutrition, health care proxy, Molst form all discussed with pt. Pt wishes to consider.   OMT on six regions for acute somatic dysfunctions done at the bedside   Importance of Fall prevention discussed.

## 2021-04-29 NOTE — PROGRESS NOTE ADULT - SUBJECTIVE AND OBJECTIVE BOX
Name of Patient : ASHWIN JACOB  MRN: [id number]  DATE OF SERVICE: 21     Subjective: Patient seen and examined. No new events except as noted.   doing okay  had aurine output  bladder scan noted, no retention after emptying     REVIEW OF SYSTEMS:    CONSTITUTIONAL: No weakness, fevers or chills  EYES/ENT: No visual changes;  No vertigo or throat pain   NECK: No pain or stiffness  RESPIRATORY: No cough, wheezing, hemoptysis; No shortness of breath  CARDIOVASCULAR: No chest pain or palpitations  GASTROINTESTINAL: No abdominal or epigastric pain. No nausea, vomiting, or hematemesis; No diarrhea or constipation. No melena or hematochezia.  GENITOURINARY: No dysuria, frequency or hematuria  NEUROLOGICAL: No numbness or weakness  SKIN: No itching, burning, rashes, or lesions   All other review of systems is negative unless indicated above.    MEDICATIONS:  MEDICATIONS  (STANDING):  acetaminophen   Tablet .. 650 milliGRAM(s) Oral every 6 hours  aspirin  chewable 81 milliGRAM(s) Oral daily  atorvastatin 40 milliGRAM(s) Oral at bedtime  enoxaparin Injectable 40 milliGRAM(s) SubCutaneous daily  escitalopram 10 milliGRAM(s) Oral at bedtime  ketorolac   Injectable 15 milliGRAM(s) IV Push every 6 hours  metoprolol tartrate 25 milliGRAM(s) Oral at bedtime  pantoprazole    Tablet 40 milliGRAM(s) Oral before breakfast  polyethylene glycol 3350 17 Gram(s) Oral daily  topiramate 25 milliGRAM(s) Oral daily  traMADol 25 milliGRAM(s) Oral every 6 hours      PHYSICAL EXAM:  T(C): 37.1 (21 @ 20:05), Max: 37.1 (21 @ 20:05)  HR: 79 (21 @ 20:05) (62 - 79)  BP: 110/65 (21 @ 20:05) (92/58 - 126/72)  RR: 18 (21 @ 20:05) (18 - 18)  SpO2: 96% (21 @ 20:05) (96% - 98%)  Wt(kg): --  I&O's Summary    2021 07:01  -  2021 07:00  --------------------------------------------------------  IN: 300 mL / OUT: 750 mL / NET: -450 mL    2021 07:  -  2021 22:00  --------------------------------------------------------  IN: 1200 mL / OUT: 800 mL / NET: 400 mL          Appearance: Normal	  HEENT:  PERRLA   Lymphatic: No lymphadenopathy   Cardiovascular: Normal S1 S2, no JVD  Respiratory: normal effort , clear  Gastrointestinal:  Soft, Non-tender  Skin: No rashes,  warm to touch  Psychiatry:  Mood & affect appropriate  Musculuskeletal: No edema      All labs, Imaging and EKGs personally reviewed       21 @ 07:  -  21 @ 07:00  --------------------------------------------------------  IN: 300 mL / OUT: 750 mL / NET: -450 mL    21 @ 07:01  -  21 @ 22:00  --------------------------------------------------------  IN: 1200 mL / OUT: 800 mL / NET: 400 mL                              11.2   8.10  )-----------( 281      ( 2021 12:50 )             34.2                   141  |  104  |  17  ----------------------------<  95  4.2   |  22  |  0.90    Ca    9.5      2021 12:50    TPro  7.3  /  Alb  3.8  /  TBili  0.9  /  DBili  x   /  AST  14  /  ALT  7<L>  /  AlkPhos  116  04-28    PT/INR - ( 2021 12:50 )   PT: 13.9 sec;   INR: 1.17 ratio         PTT - ( 2021 12:50 )  PTT:31.8 sec                   Urinalysis Basic - ( 2021 23:54 )    Color: Light Yellow / Appearance: Clear / S.020 / pH: x  Gluc: x / Ketone: Negative  / Bili: Negative / Urobili: Negative   Blood: x / Protein: Negative / Nitrite: Negative   Leuk Esterase: Negative / RBC: 6 /hpf / WBC 1 /HPF   Sq Epi: x / Non Sq Epi: 1 /hpf / Bacteria: Negative

## 2021-04-29 NOTE — PROGRESS NOTE ADULT - SUBJECTIVE AND OBJECTIVE BOX
Ortho Progress Note    S: Patient seen and examined. No acute events overnight. Pain well controlled with current regimen. Denies lightheadedness/dizziness, CP/SOB. Tolerating diet.       O:  Physical Exam:  Gen: Laying in bed, NAD, alert and oriented.   Resp: Unlabored breathing  Ext: EHL/FHL/TA/Sol intact          + SILT DP/SP/MOREL/Sa/Tib, complains of change of sensation globally in the left foot compared to right which is stable. Compartments soft.           +DP, extremity WWP      Vital Signs Last 24 Hrs  T(C): 36.6 (29 Apr 2021 05:37), Max: 37.1 (28 Apr 2021 18:45)  T(F): 97.9 (29 Apr 2021 05:37), Max: 98.8 (28 Apr 2021 18:45)  HR: 67 (29 Apr 2021 05:37) (62 - 100)  BP: 101/70 (29 Apr 2021 05:37) (101/70 - 134/74)  BP(mean): --  RR: 18 (29 Apr 2021 05:37) (16 - 18)  SpO2: 98% (29 Apr 2021 05:37) (97% - 100%)                          11.2   8.10  )-----------( 281      ( 28 Apr 2021 12:50 )             34.2       04-28    141  |  104  |  17  ----------------------------<  95  4.2   |  22  |  0.90        PT/INR - ( 28 Apr 2021 12:50 )   PT: 13.9 sec;   INR: 1.17 ratio         PTT - ( 28 Apr 2021 12:50 )  PTT:31.8 sec

## 2021-04-29 NOTE — PROGRESS NOTE ADULT - SUBJECTIVE AND OBJECTIVE BOX
DATE OF SERVICE: 04-29-21      Patient is a 55y old  Female who presents with a chief complaint of L patella fracture, for surgery (29 Apr 2021 10:59)      INTERVAL HISTORY: feels ok    TELEMETRY Personally reviewed:  	  MEDICATIONS:  metoprolol tartrate 25 milliGRAM(s) Oral at bedtime        PHYSICAL EXAM:  T(C): 37.1 (04-29-21 @ 20:05), Max: 37.1 (04-29-21 @ 20:05)  HR: 79 (04-29-21 @ 20:05) (62 - 79)  BP: 110/65 (04-29-21 @ 20:05) (92/58 - 126/72)  RR: 18 (04-29-21 @ 20:05) (18 - 18)  SpO2: 96% (04-29-21 @ 20:05) (96% - 98%)  Wt(kg): --  I&O's Summary    28 Apr 2021 07:01  -  29 Apr 2021 07:00  --------------------------------------------------------  IN: 300 mL / OUT: 750 mL / NET: -450 mL    29 Apr 2021 07:01  -  29 Apr 2021 23:02  --------------------------------------------------------  IN: 1200 mL / OUT: 800 mL / NET: 400 mL          Appearance: In no distress	  HEENT:    PERRL, EOMI	  Cardiovascular:  S1 S2, No JVD  Respiratory: Lungs clear to auscultation	  Gastrointestinal:  Soft, Non-tender, + BS	  Vascularature:  No edema of LE  Psychiatric: Appropriate affect   Neuro: no acute focal deficits                               11.2   8.10  )-----------( 281      ( 28 Apr 2021 12:50 )             34.2     04-28    141  |  104  |  17  ----------------------------<  95  4.2   |  22  |  0.90    Ca    9.5      28 Apr 2021 12:50    TPro  7.3  /  Alb  3.8  /  TBili  0.9  /  DBili  x   /  AST  14  /  ALT  7<L>  /  AlkPhos  116  04-28        Labs personally reviewed    < from: Nuclear Stress Test-Pharmacologic (Nuclear Stress Test-Pharmacologic .) (04.29.21 @ 12:12) >  IMPRESSIONS:Abnormal Study  * Chest Pain: Patient complained of 8/10 chest pain  starting at 3:46 min following regadenoson infusion at 100  bpm and persisting 9:31 min in recovery.  * Symptom: SOB, nausea, abd discomfort, left arm pain,  chest pain, epigastric pain.  * HR Response: Appropriate.  * BP Response: Hypotensive Response, excessive decrease in  systolic/diastolic BP.  * Heart Rhythm: Sinus Rhythm - 66 BPM.  * Baseline ECG: Nonspecific ST-T wave abnormality.  * ECG Changes: There were 1 mm horizontal ST segment  depressions and T wave inversions in leads II, III, aVF,  and V3-V6 starting at 1:08 min following regadenoson  infusion at 113 bpm and persisting 8:00 min in recovery.  * Arrhythmia: None.  * The left ventricle was small in size.  * There are large, moderate defects in the inferior,  distal inferolateral, and basal septal walls that are  partially reversible suggestive of infarct with  significant leonor-infarct ischemia.  * There are large, mild defects in the anterior, distal  anterolateral, and apical lateral walls that are  reversible suggestive of ischemia.  * The patient was unable to perform prone imaging.  * Post-stress gated wall motion analysis was performed  (LVEF = 64 %;LVEDV = 48 ml.) revealing hypokinesis of the  basal septal wall and reduced systolic thickening of the  inferior and distal lateral walls.  *** No previous Nuclear/Stress exam.         Assessment and Recommendation:   · Assessment	  Patient is a 56 Y/O Female with PMhx of CAD S/P PCI, HLD, kidney stone, migraine presents with LEFT comminuted patella fracture s/p mechanical fall on 4/19. Pt seen by Dr. Trejo in office. Instructed to come to [facility] ED for med/cards clearance and plan for ORIF of LEFT patella fx. Denies numbness/tingling in the affected extremity. Patient has been minimally ambulating with walker. in the ED she started having bladder pressure with decrease urine output.       Problem/Recommendation - 1:  Problem: Patella fracture. Recommendation: Ortho follow up   Plan for OR on Monday if cardiac optimized       Problem/Recommendation - 2:  ·  Problem: Palps/Exertional SOB  Recommendation: NM stress test done today with large areas of infarct with leonor-infarct ischemia   Risks and benefits of cardiac cath discussed with pt. Discussed with Outpt cardio Dr Lin as well  Agreeable to proceed     Problem/Recommendation - 3:  ·  Problem: CAD (coronary artery disease).  Recommendation: S/P stent x 2 to LAD about two years ago   cont ASA and lipitor 40 daily   toprol 25 daily   Cardiac cath as noted above    Problem/Recommendation - 4:  ·  Problem: HLD (hyperlipidemia).  Recommendation: statin for secondary prevention       Problem/Recommendation - 5:  ·  Problem: Urinary retention.  Recommendation: acute onset o admission  Was retaining about 300cc of urine s/p straight cath  Improved today    Problem/Recommendation - 6:  Problem: Prophylactic measure. Recommendation: DVT and gI PPX       I had a prolonged conversation with the patient during 2 separate visits today, one at 11am and one at 7pm. We discussed her hospital course, differential diagnosis and results of diagnostic tests.   Plan of care discussed with patient and her outpt cardio Dr Lin after the evaluation. Patient expresses clear understanding and satisfaction with the plan of care. Sixty five minutes spent on total encounter, of which more than fifty percent of the encounter was spent on counseling and/or coordinating care by the attending physician.  Plan also discussed with orthopedics team        Maximo Main DO [facility]  Cardiovascular Medicine  [address] [place] [address], [address]  Office: [phone]  Cell: [phone]

## 2021-04-29 NOTE — PROGRESS NOTE ADULT - ASSESSMENT
55F p/w L patella fracture for OR monday    Neuro: Pain control  Resp: IS  CV: Follow up cardiology re clearance  GI: Regular diet, bowel reg  MSK: NWB in BJKI, PT/OT  Heme: DVT PPX    Ortho [id number]

## 2021-04-30 LAB
ALBUMIN SERPL ELPH-MCNC: 3.3 G/DL — SIGNIFICANT CHANGE UP (ref 3.3–5)
ALP SERPL-CCNC: 106 U/L — SIGNIFICANT CHANGE UP (ref 40–120)
ALT FLD-CCNC: 8 U/L — LOW (ref 10–45)
ANION GAP SERPL CALC-SCNC: 11 MMOL/L — SIGNIFICANT CHANGE UP (ref 5–17)
AST SERPL-CCNC: 12 U/L — SIGNIFICANT CHANGE UP (ref 10–40)
BASOPHILS # BLD AUTO: 0.05 K/UL — SIGNIFICANT CHANGE UP (ref 0–0.2)
BASOPHILS NFR BLD AUTO: 0.9 % — SIGNIFICANT CHANGE UP (ref 0–2)
BILIRUB SERPL-MCNC: 0.7 MG/DL — SIGNIFICANT CHANGE UP (ref 0.2–1.2)
BUN SERPL-MCNC: 23 MG/DL — SIGNIFICANT CHANGE UP (ref 7–23)
CALCIUM SERPL-MCNC: 9.1 MG/DL — SIGNIFICANT CHANGE UP (ref 8.4–10.5)
CHLORIDE SERPL-SCNC: 108 MMOL/L — SIGNIFICANT CHANGE UP (ref 96–108)
CO2 SERPL-SCNC: 22 MMOL/L — SIGNIFICANT CHANGE UP (ref 22–31)
CREAT SERPL-MCNC: 1.03 MG/DL — SIGNIFICANT CHANGE UP (ref 0.5–1.3)
CULTURE RESULTS: SIGNIFICANT CHANGE UP
EOSINOPHIL # BLD AUTO: 0.31 K/UL — SIGNIFICANT CHANGE UP (ref 0–0.5)
EOSINOPHIL NFR BLD AUTO: 5.4 % — SIGNIFICANT CHANGE UP (ref 0–6)
GLUCOSE SERPL-MCNC: 90 MG/DL — SIGNIFICANT CHANGE UP (ref 70–99)
HCT VFR BLD CALC: 29.9 % — LOW (ref 34.5–45)
HGB BLD-MCNC: 9.7 G/DL — LOW (ref 11.5–15.5)
IMM GRANULOCYTES NFR BLD AUTO: 0.2 % — SIGNIFICANT CHANGE UP (ref 0–1.5)
LYMPHOCYTES # BLD AUTO: 1.65 K/UL — SIGNIFICANT CHANGE UP (ref 1–3.3)
LYMPHOCYTES # BLD AUTO: 28.5 % — SIGNIFICANT CHANGE UP (ref 13–44)
MCHC RBC-ENTMCNC: 30.9 PG — SIGNIFICANT CHANGE UP (ref 27–34)
MCHC RBC-ENTMCNC: 32.4 GM/DL — SIGNIFICANT CHANGE UP (ref 32–36)
MCV RBC AUTO: 95.2 FL — SIGNIFICANT CHANGE UP (ref 80–100)
MONOCYTES # BLD AUTO: 0.39 K/UL — SIGNIFICANT CHANGE UP (ref 0–0.9)
MONOCYTES NFR BLD AUTO: 6.7 % — SIGNIFICANT CHANGE UP (ref 2–14)
NEUTROPHILS # BLD AUTO: 3.38 K/UL — SIGNIFICANT CHANGE UP (ref 1.8–7.4)
NEUTROPHILS NFR BLD AUTO: 58.3 % — SIGNIFICANT CHANGE UP (ref 43–77)
NRBC # BLD: 0 /100 WBCS — SIGNIFICANT CHANGE UP (ref 0–0)
PLATELET # BLD AUTO: 242 K/UL — SIGNIFICANT CHANGE UP (ref 150–400)
POTASSIUM SERPL-MCNC: 4.4 MMOL/L — SIGNIFICANT CHANGE UP (ref 3.5–5.3)
POTASSIUM SERPL-SCNC: 4.4 MMOL/L — SIGNIFICANT CHANGE UP (ref 3.5–5.3)
PROT SERPL-MCNC: 6.4 G/DL — SIGNIFICANT CHANGE UP (ref 6–8.3)
RBC # BLD: 3.14 M/UL — LOW (ref 3.8–5.2)
RBC # FLD: 12.6 % — SIGNIFICANT CHANGE UP (ref 10.3–14.5)
SODIUM SERPL-SCNC: 141 MMOL/L — SIGNIFICANT CHANGE UP (ref 135–145)
SPECIMEN SOURCE: SIGNIFICANT CHANGE UP
WBC # BLD: 5.79 K/UL — SIGNIFICANT CHANGE UP (ref 3.8–10.5)
WBC # FLD AUTO: 5.79 K/UL — SIGNIFICANT CHANGE UP (ref 3.8–10.5)

## 2021-04-30 PROCEDURE — 93458 L HRT ARTERY/VENTRICLE ANGIO: CPT | Mod: 26

## 2021-04-30 PROCEDURE — 99231 SBSQ HOSP IP/OBS SF/LOW 25: CPT

## 2021-04-30 RX ORDER — LIDOCAINE 4 G/100G
1 CREAM TOPICAL ONCE
Refills: 0 | Status: COMPLETED | OUTPATIENT
Start: 2021-04-30 | End: 2021-04-30

## 2021-04-30 RX ADMIN — Medication 650 MILLIGRAM(S): at 12:13

## 2021-04-30 RX ADMIN — Medication 650 MILLIGRAM(S): at 13:00

## 2021-04-30 RX ADMIN — Medication 15 MILLIGRAM(S): at 06:55

## 2021-04-30 RX ADMIN — Medication 15 MILLIGRAM(S): at 22:21

## 2021-04-30 RX ADMIN — ESCITALOPRAM OXALATE 10 MILLIGRAM(S): 10 TABLET, FILM COATED ORAL at 21:52

## 2021-04-30 RX ADMIN — TRAMADOL HYDROCHLORIDE 25 MILLIGRAM(S): 50 TABLET ORAL at 21:53

## 2021-04-30 RX ADMIN — Medication 15 MILLIGRAM(S): at 00:17

## 2021-04-30 RX ADMIN — Medication 650 MILLIGRAM(S): at 00:47

## 2021-04-30 RX ADMIN — Medication 25 MILLIGRAM(S): at 21:53

## 2021-04-30 RX ADMIN — TRAMADOL HYDROCHLORIDE 25 MILLIGRAM(S): 50 TABLET ORAL at 22:21

## 2021-04-30 RX ADMIN — TRAMADOL HYDROCHLORIDE 25 MILLIGRAM(S): 50 TABLET ORAL at 07:23

## 2021-04-30 RX ADMIN — Medication 650 MILLIGRAM(S): at 00:17

## 2021-04-30 RX ADMIN — TRAMADOL HYDROCHLORIDE 25 MILLIGRAM(S): 50 TABLET ORAL at 13:00

## 2021-04-30 RX ADMIN — ATORVASTATIN CALCIUM 40 MILLIGRAM(S): 80 TABLET, FILM COATED ORAL at 21:52

## 2021-04-30 RX ADMIN — TRAMADOL HYDROCHLORIDE 25 MILLIGRAM(S): 50 TABLET ORAL at 12:13

## 2021-04-30 RX ADMIN — TRAMADOL HYDROCHLORIDE 25 MILLIGRAM(S): 50 TABLET ORAL at 00:17

## 2021-04-30 RX ADMIN — Medication 650 MILLIGRAM(S): at 07:23

## 2021-04-30 RX ADMIN — Medication 15 MILLIGRAM(S): at 21:50

## 2021-04-30 RX ADMIN — TRAMADOL HYDROCHLORIDE 25 MILLIGRAM(S): 50 TABLET ORAL at 06:54

## 2021-04-30 RX ADMIN — Medication 15 MILLIGRAM(S): at 07:23

## 2021-04-30 RX ADMIN — Medication 15 MILLIGRAM(S): at 00:47

## 2021-04-30 RX ADMIN — POLYETHYLENE GLYCOL 3350 17 GRAM(S): 17 POWDER, FOR SOLUTION ORAL at 21:54

## 2021-04-30 RX ADMIN — Medication 650 MILLIGRAM(S): at 21:51

## 2021-04-30 RX ADMIN — TRAMADOL HYDROCHLORIDE 25 MILLIGRAM(S): 50 TABLET ORAL at 00:47

## 2021-04-30 RX ADMIN — Medication 650 MILLIGRAM(S): at 22:21

## 2021-04-30 RX ADMIN — Medication 25 MILLIGRAM(S): at 13:24

## 2021-04-30 RX ADMIN — PANTOPRAZOLE SODIUM 40 MILLIGRAM(S): 20 TABLET, DELAYED RELEASE ORAL at 06:53

## 2021-04-30 RX ADMIN — Medication 650 MILLIGRAM(S): at 06:53

## 2021-04-30 NOTE — PROGRESS NOTE ADULT - SUBJECTIVE AND OBJECTIVE BOX
Patient resting without complaints.  No chest pain, SOB, N/V.    T(C): 36.3 (04-30-21 @ 05:46), Max: 37.1 (04-29-21 @ 20:05)  HR: 61 (04-30-21 @ 05:46) (61 - 79)  BP: 107/71 (04-30-21 @ 05:46) (92/58 - 110/65)  RR: 18 (04-30-21 @ 05:46) (18 - 18)  SpO2: 99% (04-30-21 @ 05:46) (93% - 99%)  Wt(kg): --    Exam:  Alert and Oriented, No Acute Distress  LLE Knee Immobilizer brace adjusted/leg repositioned, BJKI now intact.   Soft/compressible compartments  Calves Soft, Non-tender bilaterally  +PF/DF/EHL/FHL  Patient reports some dull sensation compared to R otherwise SILT  +DP Pulse  Reports some mild R ankle discomfort from fall.                          11.2   8.10  )-----------( 281      ( 28 Apr 2021 12:50 )             34.2    04-28    141  |  104  |  17  ----------------------------<  95  4.2   |  22  |  0.90    Ca    9.5      28 Apr 2021 12:50    TPro  7.3  /  Alb  3.8  /  TBili  0.9  /  DBili  x   /  AST  14  /  ALT  7<L>  /  AlkPhos  116  04-28

## 2021-04-30 NOTE — PROGRESS NOTE ADULT - ASSESSMENT
Patient is a 56 Y/O Female with PMhx of CAD S/P PCI, HLD, kidney stone, migraine presents with LEFT comminuted patella fracture s/p mechanical fall on 4/19. Pt seen by Dr. Trejo in office. Instructed to come to [facility] ED for med/cards clearance and plan for ORIF of LEFT patella fx. Denies numbness/tingling in the affected extremity. Patient has been minimally ambulating with walker. in the ED she started having bladder pressure with decrease urine output.       Problem/Recommendation - 1:  Problem: Patella fracture. Recommendation: Ortho follow up   plan for OR on monday  card and med optimization   pain control   PT eval as tolerated, fall precautions.  S/P stress test   cath today     Problem/Recommendation - 2:  ·  Problem: Urinary retention.  Recommendation: acute onset  resolved, had urine output, no retention   send UA and urine culture  monitor BUN/Cr  bladder US. PRN     Problem/Recommendation - 3:  ·  Problem: CAD (coronary artery disease).  Recommendation: S/P stent x 2 2 years ago   cont ASA and lipitor 40 daily   toprol 25 daily   ischemic W/U per cardiology     Problem/Recommendation - 4:  ·  Problem: HLD (hyperlipidemia).  Recommendation: statin  lipdi panel  diet control.     Problem/Recommendation - 5:  ·  Problem: Nephrolithiasis.     Problem/Recommendation - 6:  Problem: Prophylactic measure. Recommendation: DVT and gI PPX     Differential diagnosis and plan of care discussed with patient after the evaluation.   Advanced care planning/advanced directives discussed with patient/family. DNR status including forceful chest compressions to attempt to restart the heart, ventilator support/artificial breathing, electric shock, artificial nutrition, health care proxy, Molst form all discussed with pt. Pt wishes to consider.   OMT on six regions for acute somatic dysfunctions done at the bedside   Importance of Fall prevention discussed.

## 2021-04-30 NOTE — PROGRESS NOTE ADULT - ASSESSMENT
A/p: 55yFemale s/p L fractured patella, awaiting OR 5/3 for ORIF.  +Stress test, awaiting cath today.  VSS. NAD.

## 2021-04-30 NOTE — PROGRESS NOTE ADULT - SUBJECTIVE AND OBJECTIVE BOX
Name of Patient : ASHWIN JACOB  MRN: [id number]  DATE OF SERVICE: 04-30-21     Subjective: Patient seen and examined. No new events except as noted.   feeling okay  positive stress test   plan for cath prior to surg next week     REVIEW OF SYSTEMS:    CONSTITUTIONAL: No weakness, fevers or chills  EYES/ENT: No visual changes;  No vertigo or throat pain   NECK: No pain or stiffness  RESPIRATORY: No cough, wheezing, hemoptysis; No shortness of breath  CARDIOVASCULAR: No chest pain or palpitations  GASTROINTESTINAL: No abdominal or epigastric pain. No nausea, vomiting, or hematemesis; No diarrhea or constipation. No melena or hematochezia.  GENITOURINARY: No dysuria, frequency or hematuria  NEUROLOGICAL: No numbness or weakness  SKIN: No itching, burning, rashes, or lesions   All other review of systems is negative unless indicated above.    MEDICATIONS:  MEDICATIONS  (STANDING):  acetaminophen   Tablet .. 650 milliGRAM(s) Oral every 6 hours  aspirin  chewable 81 milliGRAM(s) Oral daily  atorvastatin 40 milliGRAM(s) Oral at bedtime  escitalopram 10 milliGRAM(s) Oral at bedtime  ketorolac   Injectable 15 milliGRAM(s) IV Push every 6 hours  lidocaine   Patch 1 Patch Transdermal Once  metoprolol tartrate 25 milliGRAM(s) Oral at bedtime  pantoprazole    Tablet 40 milliGRAM(s) Oral before breakfast  polyethylene glycol 3350 17 Gram(s) Oral daily  topiramate 25 milliGRAM(s) Oral daily  traMADol 25 milliGRAM(s) Oral every 6 hours      PHYSICAL EXAM:  T(C): 36.7 (04-30-21 @ 22:45), Max: 36.8 (04-30-21 @ 09:27)  HR: 65 (04-30-21 @ 22:45) (60 - 72)  BP: 99/54 (04-30-21 @ 22:45) (88/54 - 121/71)  RR: 18 (04-30-21 @ 22:45) (16 - 18)  SpO2: 95% (04-30-21 @ 22:45) (93% - 100%)  Wt(kg): --  I&O's Summary    29 Apr 2021 07:01  -  30 Apr 2021 07:00  --------------------------------------------------------  IN: 1200 mL / OUT: 1000 mL / NET: 200 mL    30 Apr 2021 07:01  -  30 Apr 2021 23:58  --------------------------------------------------------  IN: 480 mL / OUT: 350 mL / NET: 130 mL          Appearance: Normal	  HEENT:  PERRLA   Lymphatic: No lymphadenopathy   Cardiovascular: Normal S1 S2, no JVD  Respiratory: normal effort , clear  Gastrointestinal:  Soft, Non-tender  Skin: No rashes,  warm to touch  Psychiatry:  Mood & affect appropriate  Musculuskeletal: No edema      All labs, Imaging and EKGs personally reviewed         04-29-21 @ 07:01  -  04-30-21 @ 07:00  --------------------------------------------------------  IN: 1200 mL / OUT: 1000 mL / NET: 200 mL    04-30-21 @ 07:01  -  04-30-21 @ 23:58  --------------------------------------------------------  IN: 480 mL / OUT: 350 mL / NET: 130 mL                          9.7    5.79  )-----------( 242      ( 30 Apr 2021 12:50 )             29.9               04-30    141  |  108  |  23  ----------------------------<  90  4.4   |  22  |  1.03    Ca    9.1      30 Apr 2021 12:50    TPro  6.4  /  Alb  3.3  /  TBili  0.7  /  DBili  x   /  AST  12  /  ALT  8<L>  /  AlkPhos  106  04-30           < from: Nuclear Stress Test-Pharmacologic (Nuclear Stress Test-Pharmacologic .) (04.29.21 @ 12:12) >  IMPRESSIONS:Abnormal Study  * Chest Pain: Patient complained of 8/10 chest pain  starting at 3:46 min following regadenoson infusion at 100  bpm and persisting 9:31 min in recovery.  * Symptom: SOB, nausea, abd discomfort, left arm pain,  chest pain, epigastric pain.  * HR Response: Appropriate.  * BP Response: Hypotensive Response, excessive decrease in  systolic/diastolic BP.  * Heart Rhythm: Sinus Rhythm - 66 BPM.  * Baseline ECG: Nonspecific ST-T wave abnormality.  * ECG Changes: There were 1 mm horizontal ST segment  depressions and T wave inversions in leads II, III, aVF,  and V3-V6 starting at 1:08 min following regadenoson  infusion at 113 bpm and persisting 8:00 min in recovery.  * Arrhythmia: None.  * The left ventricle was small in size.  * There are large, moderate defects in the inferior,  distal inferolateral, and basal septal walls that are  partially reversible suggestive of infarct with  significant leonor-infarct ischemia.  * There are large, mild defects in the anterior, distal  anterolateral, and apical lateral walls that are  reversible suggestive of ischemia.  * The patient was unable to perform prone imaging.  * Post-stress gated wall motion analysis was performed  (LVEF = 64 %;LVEDV = 48 ml.) revealing hypokinesis of the  basal septal wall and reduced systolic thickening of the  inferior and distal lateral walls.                  < from: Cardiac Cath Lab - Adult (04.30.21 @ 17:10) >  CORONARY VESSELS: The coronary circulation is right dominant.  LM:   --  LM: Normal.  LAD:   --  Mid LAD: There was a diffuse 10 % stenosis at the site of a  prior angioplasty with stent placement.  CX:   --  Circumflex: Angiography showed minor luminal irregularities with  no flow limiting lesions.  RCA:   --  Mid RCA: There was a 30 % stenosis.  COMPLICATIONS: There were no complications.    < end of copied text >

## 2021-04-30 NOTE — PROGRESS NOTE ADULT - SUBJECTIVE AND OBJECTIVE BOX
DATE OF SERVICE: 04-30-21 @ 22:43    Patient is a 55y old  Female who presents with a chief complaint of L patella fracture, for surgery (30 Apr 2021 06:33)      INTERVAL HISTORY: feels ok, anxious about results of cath later today    REVIEW OF SYSTEMS:  CONSTITUTIONAL: No weakness  EYES/ENT: No visual changes;  No throat pain   NECK: No pain or stiffness  RESPIRATORY: No cough, wheezing; No shortness of breath  CARDIOVASCULAR: No chest pain or palpitations  GASTROINTESTINAL: No abdominal  pain. No nausea, vomiting, or hematemesis  GENITOURINARY: No dysuria, frequency or hematuria  NEUROLOGICAL: No stroke like symptoms  SKIN: No rashes    	  MEDICATIONS:  metoprolol tartrate 25 milliGRAM(s) Oral at bedtime        PHYSICAL EXAM:  T(C): 36.8 (04-30-21 @ 21:42), Max: 36.8 (04-30-21 @ 09:27)  HR: 67 (04-30-21 @ 21:42) (60 - 72)  BP: 118/66 (04-30-21 @ 21:42) (88/54 - 121/71)  RR: 18 (04-30-21 @ 21:42) (16 - 18)  SpO2: 99% (04-30-21 @ 21:42) (93% - 100%)  Wt(kg): --  I&O's Summary    29 Apr 2021 07:01  -  30 Apr 2021 07:00  --------------------------------------------------------  IN: 1200 mL / OUT: 1000 mL / NET: 200 mL    30 Apr 2021 07:01  -  30 Apr 2021 22:43  --------------------------------------------------------  IN: 480 mL / OUT: 350 mL / NET: 130 mL          Appearance: In no distress	  HEENT:    PERRL, EOMI	  Cardiovascular:  S1 S2, No JVD  Respiratory: Lungs clear to auscultation	  Gastrointestinal:  Soft, Non-tender, + BS	  Vascularature:  No edema of LE  Psychiatric: Appropriate affect   Neuro: no acute focal deficits                               9.7    5.79  )-----------( 242      ( 30 Apr 2021 12:50 )             29.9     04-30    141  |  108  |  23  ----------------------------<  90  4.4   |  22  |  1.03    Ca    9.1      30 Apr 2021 12:50    TPro  6.4  /  Alb  3.3  /  TBili  0.7  /  DBili  x   /  AST  12  /  ALT  8<L>  /  AlkPhos  106  04-30        Labs personally reviewed      Assessment and Recommendation:   · Assessment	  Patient is a 54 Y/O Female with PMhx of CAD S/P PCI, HLD, kidney stone, migraine presents with LEFT comminuted patella fracture s/p mechanical fall on 4/19. Pt seen by Dr. Trejo in office. Instructed to come to [facility] ED for med/cards clearance and plan for ORIF of LEFT patella fx. Denies numbness/tingling in the affected extremity. Patient has been minimally ambulating with walker. in the ED she started having bladder pressure with decrease urine output.       Problem/Recommendation - 1:  Problem: Patella fracture. Recommendation: Ortho follow up   Plan for OR on Monday if cardiac optimized       Problem/Recommendation - 2:  ·  Problem: Palps/Exertional SOB  Recommendation: NM stress test done today with large areas of infarct with leonor-infarct ischemia   Risks and benefits of cardiac cath discussed with pt. Discussed with Outpt cardio Dr Lin as well  Agreeable to proceed with cath today    Problem/Recommendation - 3:  ·  Problem: CAD (coronary artery disease).  Recommendation: S/P stent x 2 to LAD about two years ago   cont ASA and lipitor 40 daily   toprol 25 daily   Cardiac cath as noted above    Problem/Recommendation - 4:  ·  Problem: HLD (hyperlipidemia).  Recommendation: statin for secondary prevention       Problem/Recommendation - 5:  ·  Problem: Urinary retention.  Recommendation: acute onset o admission  Was retaining about 300cc of urine s/p straight cath  Improved today    Problem/Recommendation - 6:  Problem: Prophylactic measure. Recommendation: DVT and gI PPX       Thirty five minutes spent on total encounter, of which more than fifty percent of the encounter was spent on counseling and/or coordinating care by the attending physician.    Maximo Main DO [facility]  Cardiovascular Medicine  [address], [address]  Office: [phone]  Cell: [phone]

## 2021-05-01 PROCEDURE — 99231 SBSQ HOSP IP/OBS SF/LOW 25: CPT

## 2021-05-01 RX ADMIN — OXYCODONE HYDROCHLORIDE 5 MILLIGRAM(S): 5 TABLET ORAL at 09:17

## 2021-05-01 RX ADMIN — Medication 650 MILLIGRAM(S): at 12:19

## 2021-05-01 RX ADMIN — Medication 15 MILLIGRAM(S): at 06:27

## 2021-05-01 RX ADMIN — TRAMADOL HYDROCHLORIDE 25 MILLIGRAM(S): 50 TABLET ORAL at 06:27

## 2021-05-01 RX ADMIN — Medication 81 MILLIGRAM(S): at 12:20

## 2021-05-01 RX ADMIN — Medication 25 MILLIGRAM(S): at 12:22

## 2021-05-01 RX ADMIN — CYCLOBENZAPRINE HYDROCHLORIDE 5 MILLIGRAM(S): 10 TABLET, FILM COATED ORAL at 14:22

## 2021-05-01 RX ADMIN — TRAMADOL HYDROCHLORIDE 25 MILLIGRAM(S): 50 TABLET ORAL at 06:33

## 2021-05-01 RX ADMIN — PANTOPRAZOLE SODIUM 40 MILLIGRAM(S): 20 TABLET, DELAYED RELEASE ORAL at 06:27

## 2021-05-01 RX ADMIN — TRAMADOL HYDROCHLORIDE 25 MILLIGRAM(S): 50 TABLET ORAL at 12:50

## 2021-05-01 RX ADMIN — TRAMADOL HYDROCHLORIDE 25 MILLIGRAM(S): 50 TABLET ORAL at 12:19

## 2021-05-01 RX ADMIN — TRAMADOL HYDROCHLORIDE 25 MILLIGRAM(S): 50 TABLET ORAL at 17:55

## 2021-05-01 RX ADMIN — ATORVASTATIN CALCIUM 40 MILLIGRAM(S): 80 TABLET, FILM COATED ORAL at 21:49

## 2021-05-01 RX ADMIN — ESCITALOPRAM OXALATE 10 MILLIGRAM(S): 10 TABLET, FILM COATED ORAL at 21:49

## 2021-05-01 RX ADMIN — Medication 650 MILLIGRAM(S): at 06:27

## 2021-05-01 RX ADMIN — Medication 650 MILLIGRAM(S): at 18:25

## 2021-05-01 RX ADMIN — OXYCODONE HYDROCHLORIDE 5 MILLIGRAM(S): 5 TABLET ORAL at 09:47

## 2021-05-01 RX ADMIN — Medication 650 MILLIGRAM(S): at 06:33

## 2021-05-01 RX ADMIN — Medication 650 MILLIGRAM(S): at 12:50

## 2021-05-01 RX ADMIN — POLYETHYLENE GLYCOL 3350 17 GRAM(S): 17 POWDER, FOR SOLUTION ORAL at 12:20

## 2021-05-01 RX ADMIN — LIDOCAINE 1 PATCH: 4 CREAM TOPICAL at 07:28

## 2021-05-01 RX ADMIN — Medication 650 MILLIGRAM(S): at 17:55

## 2021-05-01 RX ADMIN — Medication 15 MILLIGRAM(S): at 06:33

## 2021-05-01 RX ADMIN — OXYCODONE HYDROCHLORIDE 5 MILLIGRAM(S): 5 TABLET ORAL at 14:47

## 2021-05-01 RX ADMIN — Medication 25 MILLIGRAM(S): at 22:09

## 2021-05-01 RX ADMIN — LIDOCAINE 1 PATCH: 4 CREAM TOPICAL at 12:00

## 2021-05-01 RX ADMIN — LIDOCAINE 1 PATCH: 4 CREAM TOPICAL at 00:00

## 2021-05-01 RX ADMIN — OXYCODONE HYDROCHLORIDE 5 MILLIGRAM(S): 5 TABLET ORAL at 14:17

## 2021-05-01 RX ADMIN — Medication 10 MILLIGRAM(S): at 18:33

## 2021-05-01 NOTE — PROGRESS NOTE ADULT - ASSESSMENT
Patient is a 56 Y/O Female with PMhx of CAD S/P PCI, HLD, kidney stone, migraine presents with LEFT comminuted patella fracture s/p mechanical fall on 4/19. Pt seen by Dr. Trejo in office. Instructed to come to [facility] ED for med/cards clearance and plan for ORIF of LEFT patella fx. Denies numbness/tingling in the affected extremity. Patient has been minimally ambulating with walker. in the ED she started having bladder pressure with decrease urine output.       Problem/Recommendation - 1:  Problem: Patella fracture. Recommendation: Ortho follow up   plan for OR on monday  card and med optimization   pain control   PT eval as tolerated, fall precautions.  S/P stress test   S/P cath, no stent placed medical management     Problem/Recommendation - 2:  ·  Problem: Urinary retention.  Recommendation: acute onset  resolved, had urine output, no retention   send UA and urine culture  monitor BUN/Cr  bladder US. PRN     Problem/Recommendation - 3:  ·  Problem: CAD (coronary artery disease).  Recommendation: S/P stent x 2 2 years ago   cont ASA and lipitor 40 daily   toprol 25 daily   ischemic W/U per cardiology     Problem/Recommendation - 4:  ·  Problem: HLD (hyperlipidemia).  Recommendation: statin  lipdi panel  diet control.     Problem/Recommendation - 5:  ·  Problem: Nephrolithiasis.     Problem/Recommendation - 6:  Problem: Prophylactic measure. Recommendation: DVT and gI PPX     Differential diagnosis and plan of care discussed with patient after the evaluation.   Advanced care planning/advanced directives discussed with patient/family. DNR status including forceful chest compressions to attempt to restart the heart, ventilator support/artificial breathing, electric shock, artificial nutrition, health care proxy, Molst form all discussed with pt. Pt wishes to consider.   OMT on six regions for acute somatic dysfunctions done at the bedside   Importance of Fall prevention discussed.

## 2021-05-01 NOTE — PROGRESS NOTE ADULT - SUBJECTIVE AND OBJECTIVE BOX
Name of Patient : ASHWIN JACOB  MRN: [id number]  DATE OF SERVICE: 05-01-21 @ 17:34    Subjective: Patient seen and examined. No new events except as noted.   doing okay     REVIEW OF SYSTEMS:    CONSTITUTIONAL: No weakness, fevers or chills  EYES/ENT: No visual changes;  No vertigo or throat pain   NECK: No pain or stiffness  RESPIRATORY: No cough, wheezing, hemoptysis; No shortness of breath  CARDIOVASCULAR: No chest pain or palpitations  GASTROINTESTINAL: No abdominal or epigastric pain.  GENITOURINARY: No dysuria, frequency or hematuria  NEUROLOGICAL: No numbness or weakness  SKIN: No itching, burning, rashes, or lesions   All other review of systems is negative unless indicated above.    MEDICATIONS:  MEDICATIONS  (STANDING):  acetaminophen   Tablet .. 650 milliGRAM(s) Oral every 6 hours  aspirin  chewable 81 milliGRAM(s) Oral daily  atorvastatin 40 milliGRAM(s) Oral at bedtime  escitalopram 10 milliGRAM(s) Oral at bedtime  metoprolol tartrate 25 milliGRAM(s) Oral at bedtime  pantoprazole    Tablet 40 milliGRAM(s) Oral before breakfast  polyethylene glycol 3350 17 Gram(s) Oral daily  topiramate 25 milliGRAM(s) Oral daily  traMADol 25 milliGRAM(s) Oral every 6 hours      PHYSICAL EXAM:  T(C): 36.8 (05-01-21 @ 16:29), Max: 37.1 (05-01-21 @ 09:04)  HR: 71 (05-01-21 @ 16:29) (58 - 72)  BP: 95/57 (05-01-21 @ 16:29) (91/56 - 121/71)  RR: 18 (05-01-21 @ 16:29) (16 - 18)  SpO2: 96% (05-01-21 @ 16:29) (95% - 100%)  Wt(kg): --  I&O's Summary    30 Apr 2021 07:01  -  01 May 2021 07:00  --------------------------------------------------------  IN: 480 mL / OUT: 650 mL / NET: -170 mL    01 May 2021 07:01  -  01 May 2021 17:34  --------------------------------------------------------  IN: 240 mL / OUT: 250 mL / NET: -10 mL          Appearance: Normal	  HEENT:  PERRLA   Lymphatic: No lymphadenopathy   Cardiovascular: Normal S1 S2, no JVD  Respiratory: normal effort , clear  Gastrointestinal:  Soft, Non-tender  Skin: No rashes,  warm to touch  Psychiatry:  Mood & affect appropriate  Musculuskeletal: No edema      All labs, Imaging and EKGs personally reviewed         04-30-21 @ 07:01  -  05-01-21 @ 07:00  --------------------------------------------------------  IN: 480 mL / OUT: 650 mL / NET: -170 mL    05-01-21 @ 07:01  -  05-01-21 @ 17:34  --------------------------------------------------------  IN: 240 mL / OUT: 250 mL / NET: -10 mL                            9.7    5.79  )-----------( 242      ( 30 Apr 2021 12:50 )             29.9               04-30    141  |  108  |  23  ----------------------------<  90  4.4   |  22  |  1.03    Ca    9.1      30 Apr 2021 12:50    TPro  6.4  /  Alb  3.3  /  TBili  0.7  /  DBili  x   /  AST  12  /  ALT  8<L>  /  AlkPhos  106  04-30

## 2021-05-01 NOTE — PROGRESS NOTE ADULT - SUBJECTIVE AND OBJECTIVE BOX
Patient is a 55y old  Female who presents with a chief complaint of L patella fracture, for surgery (30 Apr 2021 12:58)       Patient comfortable       T(C): 36.6 (05-01-21 @ 05:28), Max: 36.8 (04-30-21 @ 09:27)  HR: 71 (05-01-21 @ 05:28) (60 - 72)  BP: 106/64 (05-01-21 @ 06:30) (88/54 - 121/71)  RR: 18 (05-01-21 @ 05:28) (16 - 18)  SpO2: 98% (05-01-21 @ 05:28) (95% - 100%)  Wt(kg): --    PHYSICAL EXAM:  NAD, Alert  EXT:  LLE: Dressing and knee immobilizer on  (+) DF/PF;  EHL FHL:  No gross Sensation deficits noted   (+) Distal Pulses;       LABS:                        9.7<L>  5.79  )-----------( 242      ( 30 Apr 2021 12:50 )             29.9<L>    04-30    141  |  108  |  23  ----------------------------<  90  4.4   |  22  |  1.03            RADIOLOGY & ADDITIONAL TESTS:

## 2021-05-01 NOTE — PROGRESS NOTE ADULT - ASSESSMENT
Dx: Left patella fracture    Plan    Elevation LLE  Bedrest  Awaiting med/cardiac clearance  Anticipate surgery Monday 5/3       Mary Kate Calzada PA-C   Beeper    [id number]

## 2021-05-01 NOTE — PROGRESS NOTE ADULT - SUBJECTIVE AND OBJECTIVE BOX
DATE OF SERVICE: 05-01-21 @ 22:37    Patient is a 55y old  Female who presents with a chief complaint of L patella fracture, for surgery (01 May 2021 17:33)      INTERVAL HISTORY: feels ok    REVIEW OF SYSTEMS:  CONSTITUTIONAL: No weakness  EYES/ENT: No visual changes;  No throat pain   NECK: No pain or stiffness  RESPIRATORY: No cough, wheezing; No shortness of breath  CARDIOVASCULAR: No chest pain or palpitations  GASTROINTESTINAL: No abdominal  pain. No nausea, vomiting, or hematemesis  GENITOURINARY: No dysuria, frequency or hematuria  NEUROLOGICAL: No stroke like symptoms  SKIN: No rashes      TELEMETRY Personally reviewed:  	  MEDICATIONS:  metoprolol tartrate 25 milliGRAM(s) Oral at bedtime        PHYSICAL EXAM:  T(C): 36.7 (05-01-21 @ 21:50), Max: 37.1 (05-01-21 @ 09:04)  HR: 71 (05-01-21 @ 21:50) (58 - 72)  BP: 104/68 (05-01-21 @ 22:07) (91/56 - 112/71)  RR: 18 (05-01-21 @ 21:50) (18 - 18)  SpO2: 95% (05-01-21 @ 21:50) (95% - 98%)  Wt(kg): --  I&O's Summary    30 Apr 2021 07:01  -  01 May 2021 07:00  --------------------------------------------------------  IN: 480 mL / OUT: 650 mL / NET: -170 mL    01 May 2021 07:01  -  01 May 2021 22:37  --------------------------------------------------------  IN: 240 mL / OUT: 250 mL / NET: -10 mL          Appearance: In no distress	  HEENT:    PERRL, EOMI	  Cardiovascular:  S1 S2, No JVD  Respiratory: Lungs clear to auscultation	  Gastrointestinal:  Soft, Non-tender, + BS	  Vascularature:  No edema of LE  Psychiatric: Appropriate affect   Neuro: no acute focal deficits                               9.7    5.79  )-----------( 242      ( 30 Apr 2021 12:50 )             29.9     04-30    141  |  108  |  23  ----------------------------<  90  4.4   |  22  |  1.03    Ca    9.1      30 Apr 2021 12:50    TPro  6.4  /  Alb  3.3  /  TBili  0.7  /  DBili  x   /  AST  12  /  ALT  8<L>  /  AlkPhos  106  04-30        Labs personally reviewed      Assessment and Recommendation:   · Assessment	  Patient is a 56 Y/O Female with PMhx of CAD S/P PCI, HLD, kidney stone, migraine presents with LEFT comminuted patella fracture s/p mechanical fall on 4/19. Pt seen by Dr. Trejo in office. Instructed to come to [facility] ED for med/cards clearance and plan for ORIF of LEFT patella fx. Denies numbness/tingling in the affected extremity. Patient has been minimally ambulating with walker. in the ED she started having bladder pressure with decrease urine output.       Problem/Recommendation - 1:  Problem: Patella fracture. Recommendation: Ortho follow up   Plan for OR on Monday  Mod risk for mod risk surgery  No contraindication to proceed, optimized from cardiac standpoint       Problem/Recommendation - 2:  ·  Problem: Palps/Exertional SOB  Recommendation: NM stress test done today with large areas of infarct with leonor-infarct ischemia   Risks and benefits of cardiac cath discussed with pt. Discussed with Outpt cardio Dr Lin as well  Cath with no obstructive CAD, LAD 10% LAD, RCA 30% stenosis     Problem/Recommendation - 3:  ·  Problem: CAD (coronary artery disease).  Recommendation: S/P stent x 2 to LAD about two years ago   cont ASA and lipitor 40 daily   Toprol 25mg daily   Cath with no obstructive CAD, LAD 10% LAD, RCA 30% stenosis     Problem/Recommendation - 4:  ·  Problem: HLD (hyperlipidemia).  Recommendation: statin for secondary prevention       Problem/Recommendation - 5:  ·  Problem: Urinary retention.  Recommendation: acute onset o admission  Was retaining about 300cc of urine s/p straight cath  Improved/resolved    Problem/Recommendation - 6:  Problem: Constipation  Recommendation: Miralax and suppository, yet to have a bowel movement        Thirty five minutes spent on total encounter, of which more than fifty percent of the encounter was spent on counseling and/or coordinating care by the attending physician.        Maximo Main DO [facility]  Cardiovascular Medicine  [address] [place] [address], [address]  Office: [phone]  Cell: [phone]

## 2021-05-02 ENCOUNTER — TRANSCRIPTION ENCOUNTER (OUTPATIENT)
Age: 56
End: 2021-05-02

## 2021-05-02 LAB
BLD GP AB SCN SERPL QL: NEGATIVE — SIGNIFICANT CHANGE UP
RAPID RVP RESULT: SIGNIFICANT CHANGE UP
RH IG SCN BLD-IMP: POSITIVE — SIGNIFICANT CHANGE UP
SARS-COV-2 RNA SPEC QL NAA+PROBE: SIGNIFICANT CHANGE UP

## 2021-05-02 PROCEDURE — 74018 RADEX ABDOMEN 1 VIEW: CPT | Mod: 26

## 2021-05-02 RX ORDER — MINERAL OIL
133 OIL (ML) MISCELLANEOUS ONCE
Refills: 0 | Status: COMPLETED | OUTPATIENT
Start: 2021-05-03 | End: 2021-05-03

## 2021-05-02 RX ORDER — SODIUM CHLORIDE 9 MG/ML
1000 INJECTION INTRAMUSCULAR; INTRAVENOUS; SUBCUTANEOUS
Refills: 0 | Status: DISCONTINUED | OUTPATIENT
Start: 2021-05-02 | End: 2021-05-03

## 2021-05-02 RX ADMIN — TRAMADOL HYDROCHLORIDE 25 MILLIGRAM(S): 50 TABLET ORAL at 07:10

## 2021-05-02 RX ADMIN — OXYCODONE HYDROCHLORIDE 10 MILLIGRAM(S): 5 TABLET ORAL at 03:57

## 2021-05-02 RX ADMIN — ESCITALOPRAM OXALATE 10 MILLIGRAM(S): 10 TABLET, FILM COATED ORAL at 21:34

## 2021-05-02 RX ADMIN — TRAMADOL HYDROCHLORIDE 25 MILLIGRAM(S): 50 TABLET ORAL at 11:10

## 2021-05-02 RX ADMIN — ATORVASTATIN CALCIUM 40 MILLIGRAM(S): 80 TABLET, FILM COATED ORAL at 21:34

## 2021-05-02 RX ADMIN — TRAMADOL HYDROCHLORIDE 25 MILLIGRAM(S): 50 TABLET ORAL at 11:35

## 2021-05-02 RX ADMIN — OXYCODONE HYDROCHLORIDE 10 MILLIGRAM(S): 5 TABLET ORAL at 11:35

## 2021-05-02 RX ADMIN — PANTOPRAZOLE SODIUM 40 MILLIGRAM(S): 20 TABLET, DELAYED RELEASE ORAL at 06:40

## 2021-05-02 RX ADMIN — Medication 25 MILLIGRAM(S): at 21:34

## 2021-05-02 RX ADMIN — Medication 650 MILLIGRAM(S): at 07:10

## 2021-05-02 RX ADMIN — Medication 650 MILLIGRAM(S): at 11:35

## 2021-05-02 RX ADMIN — Medication 650 MILLIGRAM(S): at 18:33

## 2021-05-02 RX ADMIN — OXYCODONE HYDROCHLORIDE 10 MILLIGRAM(S): 5 TABLET ORAL at 03:17

## 2021-05-02 RX ADMIN — Medication 10 MILLIGRAM(S): at 03:18

## 2021-05-02 RX ADMIN — OXYCODONE HYDROCHLORIDE 10 MILLIGRAM(S): 5 TABLET ORAL at 11:06

## 2021-05-02 RX ADMIN — TRAMADOL HYDROCHLORIDE 25 MILLIGRAM(S): 50 TABLET ORAL at 00:27

## 2021-05-02 RX ADMIN — Medication 650 MILLIGRAM(S): at 00:27

## 2021-05-02 RX ADMIN — Medication 650 MILLIGRAM(S): at 01:30

## 2021-05-02 RX ADMIN — TRAMADOL HYDROCHLORIDE 25 MILLIGRAM(S): 50 TABLET ORAL at 18:33

## 2021-05-02 RX ADMIN — POLYETHYLENE GLYCOL 3350 17 GRAM(S): 17 POWDER, FOR SOLUTION ORAL at 11:12

## 2021-05-02 RX ADMIN — Medication 25 MILLIGRAM(S): at 11:12

## 2021-05-02 RX ADMIN — Medication 10 MILLIGRAM(S): at 11:05

## 2021-05-02 RX ADMIN — TRAMADOL HYDROCHLORIDE 25 MILLIGRAM(S): 50 TABLET ORAL at 01:30

## 2021-05-02 RX ADMIN — TRAMADOL HYDROCHLORIDE 25 MILLIGRAM(S): 50 TABLET ORAL at 18:03

## 2021-05-02 RX ADMIN — Medication 81 MILLIGRAM(S): at 11:05

## 2021-05-02 RX ADMIN — TRAMADOL HYDROCHLORIDE 25 MILLIGRAM(S): 50 TABLET ORAL at 06:41

## 2021-05-02 RX ADMIN — SODIUM CHLORIDE 75 MILLILITER(S): 9 INJECTION INTRAMUSCULAR; INTRAVENOUS; SUBCUTANEOUS at 21:33

## 2021-05-02 RX ADMIN — Medication 650 MILLIGRAM(S): at 06:40

## 2021-05-02 RX ADMIN — Medication 650 MILLIGRAM(S): at 11:05

## 2021-05-02 RX ADMIN — Medication 650 MILLIGRAM(S): at 18:03

## 2021-05-02 NOTE — CONSULT NOTE ADULT - SUBJECTIVE AND OBJECTIVE BOX
55yFemale with pmhx of nephrolithiasis requiring litho in the past (states she has 2 stones remaining in left kidney/ureter) admitted with a LEFT comminuted patella fracture s/p mechanical fall on . Pt instructed to come to [facility] ED for med/cards clearance and plan for ORIF of LEFT patella fx scheduled for tomorrow at 4pm. Patient has been minimally ambulating with walker. Consulted for urinary retention, requiring 2 straight catheterizations with PVR's >200 and patient drained out 500cc and 700cc. Pt states she started having some difficulty urinating at home last week tuesday, endorsed feeling of incomplete emptying and no urge to urinate with an episode of dysuria which resolved with hydration. Patient also endorses constipation since tuesday, was given a suppository here yesterday and has been having loose bowel movements since. Pt states she only took tylenol and advil at home but since shes been here has been receiving oxycodone, toradol, tramadol and reglan. States she had retention once before from anesthesia when she was in labor. Follows a urologist for kidney stones last intervention was 1 year ago in office, cant recall urologist name. Denies fever, chills, flank pain, hematuria.     Cr 1.03, UA with small blood, UCx with Aerococcus susceptible to penicillin, pt states she had hives and difficulty breathing with penicillin, does not recall if she has taken doxy in past, states she avoids doxy since her daughter got minimal change disease with question of doxy being a trigger.     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 650 milliGRAM(s) Oral every 6 hours  aspirin  chewable 81 milliGRAM(s) Oral daily  atorvastatin 40 milliGRAM(s) Oral at bedtime  escitalopram 10 milliGRAM(s) Oral at bedtime  metoprolol tartrate 25 milliGRAM(s) Oral at bedtime  pantoprazole    Tablet 40 milliGRAM(s) Oral before breakfast  polyethylene glycol 3350 17 Gram(s) Oral daily  topiramate 25 milliGRAM(s) Oral daily  traMADol 25 milliGRAM(s) Oral every 6 hours    MEDICATIONS  (PRN):  bisacodyl Suppository 10 milliGRAM(s) Rectal daily PRN If no bowel movement by POD#2  cyclobenzaprine 5 milliGRAM(s) Oral three times a day PRN Muscle Spasm  magnesium hydroxide Suspension 30 milliLiter(s) Oral daily PRN Constipation  metoclopramide Injectable 10 milliGRAM(s) IV Push every 6 hours PRN Nausea and/or Vomiting  oxyCODONE    IR 10 milliGRAM(s) Oral every 4 hours PRN Severe Pain (7 - 10)  oxyCODONE    IR 5 milliGRAM(s) Oral every 4 hours PRN Moderate Pain (4 - 6)      FAMILY HISTORY:  Family history of early CAD (Father, Mother)        Allergies    apple (Hives)  Compazine (Unknown)  morphine (Unknown)  PC Pen VK (Unknown)  shellfish (Unknown)  sulfa drugs (Unknown)    Intolerances        SOCIAL HISTORY:    REVIEW OF SYSTEMS: Otherwise negative as stated in HPI    Physical Exam  Vital signs  T(C): 36.8 (21 @ 16:12), Max: 36.8 (21 @ 00:41)  HR: 86 (21 @ 16:12)  BP: 92/58 (21 @ 16:12)  SpO2: 97% (21 @ 16:12)  Wt(kg): --    Gen:  AWAKE ALERT NAD AXOX3    Pulm:  No respiratory distress   	  GI:  SOFT, mild TTP in mid abdomen, ND    Back: left sided tenderness     :  Primafit in place                          	      LABS: none               Culture - Urine (21 @ 04:33)    Specimen Source: .Urine Catheterized    Culture Results:   50,000 - 99,000 CFU/mL Aerococcus species  "Aerococcus spp. are predictably susceptible to penicillin,  ampicillin, tetracycline, and vancomycin.  All isolates are  resistant to sulfonamides"

## 2021-05-02 NOTE — PROGRESS NOTE ADULT - SUBJECTIVE AND OBJECTIVE BOX
DATE OF SERVICE: 05-02-21 @ 21:42    Patient is a 55y old  Female who presents with a chief complaint of L patella fracture, for surgery (02 May 2021 18:49)      INTERVAL HISTORY: feels ok, distended bladder, trouble urinating, still constipated    REVIEW OF SYSTEMS:  CONSTITUTIONAL: No weakness  EYES/ENT: No visual changes;  No throat pain   NECK: No pain or stiffness  RESPIRATORY: No cough, wheezing; No shortness of breath  CARDIOVASCULAR: No chest pain or palpitations  GASTROINTESTINAL: + abdominal pain, constipation. No nausea, vomiting, or hematemesis  GENITOURINARY: No dysuria, frequency or hematuria, +bladder distension, trouble urinating  NEUROLOGICAL: No stroke like symptoms  SKIN: No rashes    	  MEDICATIONS:  metoprolol tartrate 25 milliGRAM(s) Oral at bedtime        PHYSICAL EXAM:  T(C): 36.7 (05-02-21 @ 20:51), Max: 36.8 (05-02-21 @ 00:41)  HR: 81 (05-02-21 @ 20:51) (62 - 91)  BP: 101/61 (05-02-21 @ 20:51) (91/60 - 104/68)  RR: 18 (05-02-21 @ 20:51) (17 - 18)  SpO2: 97% (05-02-21 @ 20:51) (93% - 99%)  Wt(kg): --  I&O's Summary    01 May 2021 07:01  -  02 May 2021 07:00  --------------------------------------------------------  IN: 540 mL / OUT: 250 mL / NET: 290 mL    02 May 2021 07:01  -  02 May 2021 21:42  --------------------------------------------------------  IN: 240 mL / OUT: 1200 mL / NET: -960 mL          Appearance: In no distress	  HEENT:    PERRL, EOMI	  Cardiovascular:  S1 S2, No JVD  Respiratory: Lungs clear to auscultation	  Gastrointestinal:  Soft, Non-tender, + BS	  Vascularature:  No edema of LE  Psychiatric: Appropriate affect   Neuro: no acute focal deficits         Labs personally reviewed      Assessment and Recommendation:   · Assessment	  Patient is a 54 Y/O Female with PMhx of CAD S/P PCI, HLD, kidney stone, migraine presents with LEFT comminuted patella fracture s/p mechanical fall on 4/19. Pt seen by Dr. Trejo in office. Instructed to come to [facility] ED for med/cards clearance and plan for ORIF of LEFT patella fx. Denies numbness/tingling in the affected extremity. Patient has been minimally ambulating with walker. in the ED she started having bladder pressure with decrease urine output.       Problem/Recommendation - 1:  Problem: Patella fracture. Recommendation: Ortho follow up   Plan for OR on Monday  Mod risk for mod risk surgery  No contraindication to proceed, optimized from cardiac standpoint       Problem/Recommendation - 2:  ·  Problem: Palps/Exertional SOB  Recommendation: NM stress test done today with large areas of infarct with leonor-infarct ischemia   Risks and benefits of cardiac cath discussed with pt. Discussed with Outpt cardio Dr Lin as well  Cath with no obstructive CAD, LAD 10% LAD, RCA 30% stenosis     Problem/Recommendation - 3:  ·  Problem: CAD (coronary artery disease).  Recommendation: S/P stent x 2 to LAD about two years ago   cont ASA and lipitor 40 daily   Toprol 25mg daily   Cath with no obstructive CAD, LAD 10% LAD, RCA 30% stenosis     Problem/Recommendation - 4:  ·  Problem: HLD (hyperlipidemia).  Recommendation: statin for secondary prevention       Problem/Recommendation - 5:  ·  Problem: Urinary retention.  Recommendation: acute onset o admission  Was retaining about 300cc of urine s/p straight cath  Again 5/2 with urinary retention, straight cath with >700cc of urine  Urology consult called, rec johansen placement  Will consult ID for ?Tx of urine culture    Problem/Recommendation - 6:  Problem: Constipation  Recommendation: Miralax and suppository, yet to have a bowel movement  -?impacted, will monitor        I had a prolonged conversation with the patient regarding hospital course, differential diagnosis and results of diagnostic tests thus far.  Plan of care discussed with patient after the evaluation. Patient expresses clear understanding and satisfaction with the plan of care. Sixty five minutes spent on total encounter, of which more than fifty percent of the encounter was spent on counseling and/or coordinating care by the attending physician.      Maximo Main DO [facility]  Cardiovascular Medicine  [address] [place] [address], [address]  Office: [phone]  Cell: [phone]

## 2021-05-02 NOTE — PROGRESS NOTE ADULT - SUBJECTIVE AND OBJECTIVE BOX
Name of Patient : ASHWIN JACOB  MRN: [id number]  DATE OF SERVICE: 05-02-21     Subjective: Patient seen and examined. No new events except as noted.   discomfort in abdomen       REVIEW OF SYSTEMS:    CONSTITUTIONAL: No weakness, fevers or chills  EYES/ENT: No visual changes;  No vertigo or throat pain   NECK: No pain or stiffness  RESPIRATORY: No cough, wheezing, hemoptysis; No shortness of breath  CARDIOVASCULAR: No chest pain or palpitations  GASTROINTESTINAL: abdominal discomfort   GENITOURINARY: No dysuria, frequency or hematuria  NEUROLOGICAL: No numbness or weakness  SKIN: No itching, burning, rashes, or lesions   All other review of systems is negative unless indicated above.    MEDICATIONS:  MEDICATIONS  (STANDING):  acetaminophen   Tablet .. 650 milliGRAM(s) Oral every 6 hours  aspirin  chewable 81 milliGRAM(s) Oral daily  atorvastatin 40 milliGRAM(s) Oral at bedtime  escitalopram 10 milliGRAM(s) Oral at bedtime  metoprolol tartrate 25 milliGRAM(s) Oral at bedtime  pantoprazole    Tablet 40 milliGRAM(s) Oral before breakfast  polyethylene glycol 3350 17 Gram(s) Oral daily  sodium chloride 0.9%. 1000 milliLiter(s) (75 mL/Hr) IV Continuous <Continuous>  topiramate 25 milliGRAM(s) Oral daily  traMADol 25 milliGRAM(s) Oral every 6 hours      PHYSICAL EXAM:  T(C): 36.7 (05-02-21 @ 20:51), Max: 36.8 (05-02-21 @ 00:41)  HR: 81 (05-02-21 @ 20:51) (62 - 91)  BP: 101/61 (05-02-21 @ 20:51) (91/60 - 101/61)  RR: 18 (05-02-21 @ 20:51) (17 - 18)  SpO2: 97% (05-02-21 @ 20:51) (93% - 99%)  Wt(kg): --  I&O's Summary    01 May 2021 07:01  -  02 May 2021 07:00  --------------------------------------------------------  IN: 540 mL / OUT: 250 mL / NET: 290 mL    02 May 2021 07:01  -  02 May 2021 22:27  --------------------------------------------------------  IN: 240 mL / OUT: 1200 mL / NET: -960 mL          Appearance: Normal	  HEENT:  PERRLA   Lymphatic: No lymphadenopathy   Cardiovascular: Normal S1 S2, no JVD  Respiratory: normal effort , clear  Gastrointestinal:  Soft, Non-tender  Skin: No rashes,  warm to touch  Psychiatry:  Mood & affect appropriate  Musculuskeletal: No edema      All labs, Imaging and EKGs personally reviewed           05-01-21 @ 07:01  -  05-02-21 @ 07:00  --------------------------------------------------------  IN: 540 mL / OUT: 250 mL / NET: 290 mL    05-02-21 @ 07:01  -  05-02-21 @ 22:27  --------------------------------------------------------  IN: 240 mL / OUT: 1200 mL / NET: -960 mL

## 2021-05-02 NOTE — PROGRESS NOTE ADULT - ASSESSMENT
Assessment: s/p L Patella Fx    Plan:   Bedrest  Anticipate OR 5/3  NPO p midnight  Med note appreciated Assessment: s/p L Patella Fx    Plan:   Bedrest  Anticipate OR 5/3  NPO p midnight  Med/Cards  notes appreciated

## 2021-05-02 NOTE — CHART NOTE - NSCHARTNOTEFT_GEN_A_CORE
notified by RN patient bladder scan 273 and straight 700   Urology to consult   continue to monitor   Discussed with  notified by RN patient bladder scan 273 and straight 700   Urology to consult CALLED   continue to monitor   Discussed with

## 2021-05-02 NOTE — CONSULT NOTE ADULT - ASSESSMENT
55yFemale with pmhx of nephrolithiasis requiring litho in the past (states she has 2 stones remaining in left kidney/ureter) admitted with a LEFT comminuted patella fracture s/p mechanical fall on 4/19. Consulted for urinary retention, requiring 2 straight catheterizations with PVR's >200 and patient drained out 500cc and 700cc. Pt endorses constipation, admits to receiving increased pain medications and has been immobile since tuesday.     Reccs:  -Bowel regimen, senna, colace, miralax  -Hydration  -Place Guo  -May benefit from treating + urine culture   -Follow up with own urologist or Dr. Smalls in clinic [phone]    case discussed with on call resident anna ervin  55yFemale with pmhx of nephrolithiasis requiring litho in the past (states she has 2 stones remaining in left kidney/ureter) admitted with a LEFT comminuted patella fracture s/p mechanical fall on 4/19. Consulted for urinary retention, requiring 2 straight catheterizations with PVR's >200 and patient drained out 500cc and 700cc. Pt endorses constipation, admits to receiving increased pain medications and has been immobile since tuesday.     Reccs:  -Bowel regimen, senna, colace, miralax  -Hydration  -minimize narcotics and antihistimines  -Place Guo  -trial of void 1-3 days post-op with post void residual  -Given retention would treat + urine culture   -Follow up with own urologist or Dr. Smalls in clinic [phone]    discussed with Dr. Smalls 55yFemale with pmhx of nephrolithiasis requiring litho in the past (states she has 2 stones remaining in left kidney/ureter) admitted with a LEFT comminuted patella fracture s/p mechanical fall on 4/19. Consulted for urinary retention, requiring 2 straight catheterizations with PVR's >200 and patient drained out 500cc and 700cc. Pt endorses constipation, admits to receiving increased pain medications and has been immobile since tuesday.     Reccs:  -Bowel regimen, senna, colace, miralax  -Hydration  -minimize narcotics and antihistimines  -Place Guo  -trial of void 1-3 days post-op with post void residual  -Given retention would treat + urine culture   -Follow up with own urologist or the [facility] for urology    discussed with Dr. Smalls

## 2021-05-02 NOTE — PROGRESS NOTE ADULT - SUBJECTIVE AND OBJECTIVE BOX
Ortho HD #5    Patient is a 55y old  Female who presents with a chief complaint of L patella fracture, for surgery (30 Apr 2021 12:58)       Patient comfortable, resting     ICU Vital Signs Last 24 Hrs  T(C): 36.6 (02 May 2021 04:40), Max: 37.1 (01 May 2021 09:04)  T(F): 97.8 (02 May 2021 04:40), Max: 98.7 (01 May 2021 09:04)  HR: 71 (02 May 2021 04:40) (58 - 71)  BP: 91/60 (02 May 2021 04:40) (91/56 - 104/68)  BP(mean): --  ABP: --  ABP(mean): --  RR: 18 (02 May 2021 04:40) (18 - 18)  SpO2: 93% (02 May 2021 04:40) (93% - 98%)      PHYSICAL EXAM:  NAD, Alert  EXT:  LLE: Dressing and knee immobilizer on  (+) DF/PF;  EHL FHL: 5/5  No gross Sensation deficits noted   2 (+) Distal Pulses;       LABS:                          9.7    5.79  )-----------( 242      ( 30 Apr 2021 12:50 )             29.9

## 2021-05-02 NOTE — PROGRESS NOTE ADULT - ASSESSMENT
Patient is a 54 Y/O Female with PMhx of CAD S/P PCI, HLD, kidney stone, migraine presents with LEFT comminuted patella fracture s/p mechanical fall on 4/19. Pt seen by Dr. Trejo in office. Instructed to come to [facility] ED for med/cards clearance and plan for ORIF of LEFT patella fx. Denies numbness/tingling in the affected extremity. Patient has been minimally ambulating with walker. in the ED she started having bladder pressure with decrease urine output.       Problem/Recommendation - 1:  Problem: Patella fracture. Recommendation: Ortho follow up   plan for OR on monday, patient is medically optimized for OR tomorrow   card and med optimization   pain control   PT eval as tolerated, fall precautions.  S/P stress test   S/P cath, no stent placed medical management   constipation, XRay abdomen,  rectal enema tomorrow AM   IV hydration , NP after midnight     Problem/Recommendation - 2:  ·  Problem: Urinary retention.  Recommendation: acute onset  resolved, had urine output, no retention   send UA and urine culture  monitor BUN/Cr  bladder US. PRN     Problem/Recommendation - 3:  ·  Problem: CAD (coronary artery disease).  Recommendation: S/P stent x 2 2 years ago   cont ASA and lipitor 40 daily   toprol 25 daily   ischemic W/U per cardiology     Problem/Recommendation - 4:  ·  Problem: HLD (hyperlipidemia).  Recommendation: statin  lipdi panel  diet control.     Problem/Recommendation - 5:  ·  Problem: Nephrolithiasis.     Problem/Recommendation - 6:  Problem: Prophylactic measure. Recommendation: DVT and gI PPX     Differential diagnosis and plan of care discussed with patient after the evaluation.   Advanced care planning/advanced directives discussed with patient/family. DNR status including forceful chest compressions to attempt to restart the heart, ventilator support/artificial breathing, electric shock, artificial nutrition, health care proxy, Molst form all discussed with pt. Pt wishes to consider.   OMT on six regions for acute somatic dysfunctions done at the bedside   Importance of Fall prevention discussed.

## 2021-05-03 DIAGNOSIS — Z88.9 ALLERGY STATUS TO UNSPECIFIED DRUGS, MEDICAMENTS AND BIOLOGICAL SUBSTANCES: ICD-10-CM

## 2021-05-03 DIAGNOSIS — N39.0 URINARY TRACT INFECTION, SITE NOT SPECIFIED: ICD-10-CM

## 2021-05-03 LAB
ANION GAP SERPL CALC-SCNC: 10 MMOL/L — SIGNIFICANT CHANGE UP (ref 5–17)
APTT BLD: 31.1 SEC — SIGNIFICANT CHANGE UP (ref 27.5–35.5)
BUN SERPL-MCNC: 14 MG/DL — SIGNIFICANT CHANGE UP (ref 7–23)
CALCIUM SERPL-MCNC: 9 MG/DL — SIGNIFICANT CHANGE UP (ref 8.4–10.5)
CHLORIDE SERPL-SCNC: 108 MMOL/L — SIGNIFICANT CHANGE UP (ref 96–108)
CO2 SERPL-SCNC: 23 MMOL/L — SIGNIFICANT CHANGE UP (ref 22–31)
CREAT SERPL-MCNC: 1.01 MG/DL — SIGNIFICANT CHANGE UP (ref 0.5–1.3)
GLUCOSE SERPL-MCNC: 113 MG/DL — HIGH (ref 70–99)
HCG SERPL-ACNC: <2 MIU/ML — SIGNIFICANT CHANGE UP
HCT VFR BLD CALC: 29.4 % — LOW (ref 34.5–45)
HGB BLD-MCNC: 9.4 G/DL — LOW (ref 11.5–15.5)
INR BLD: 1.17 RATIO — HIGH (ref 0.88–1.16)
MCHC RBC-ENTMCNC: 31.1 PG — SIGNIFICANT CHANGE UP (ref 27–34)
MCHC RBC-ENTMCNC: 32 GM/DL — SIGNIFICANT CHANGE UP (ref 32–36)
MCV RBC AUTO: 97.4 FL — SIGNIFICANT CHANGE UP (ref 80–100)
NRBC # BLD: 0 /100 WBCS — SIGNIFICANT CHANGE UP (ref 0–0)
PLATELET # BLD AUTO: 247 K/UL — SIGNIFICANT CHANGE UP (ref 150–400)
POTASSIUM SERPL-MCNC: 4.1 MMOL/L — SIGNIFICANT CHANGE UP (ref 3.5–5.3)
POTASSIUM SERPL-SCNC: 4.1 MMOL/L — SIGNIFICANT CHANGE UP (ref 3.5–5.3)
PROTHROM AB SERPL-ACNC: 13.9 SEC — HIGH (ref 10.6–13.6)
RBC # BLD: 3.02 M/UL — LOW (ref 3.8–5.2)
RBC # FLD: 12.5 % — SIGNIFICANT CHANGE UP (ref 10.3–14.5)
SODIUM SERPL-SCNC: 141 MMOL/L — SIGNIFICANT CHANGE UP (ref 135–145)
WBC # BLD: 8.34 K/UL — SIGNIFICANT CHANGE UP (ref 3.8–10.5)
WBC # FLD AUTO: 8.34 K/UL — SIGNIFICANT CHANGE UP (ref 3.8–10.5)

## 2021-05-03 PROCEDURE — 27524 TREAT KNEECAP FRACTURE: CPT | Mod: LT

## 2021-05-03 PROCEDURE — 99221 1ST HOSP IP/OBS SF/LOW 40: CPT

## 2021-05-03 RX ORDER — SODIUM CHLORIDE 9 MG/ML
1000 INJECTION INTRAMUSCULAR; INTRAVENOUS; SUBCUTANEOUS
Refills: 0 | Status: DISCONTINUED | OUTPATIENT
Start: 2021-05-03 | End: 2021-05-03

## 2021-05-03 RX ORDER — PANTOPRAZOLE SODIUM 20 MG/1
40 TABLET, DELAYED RELEASE ORAL
Refills: 0 | Status: DISCONTINUED | OUTPATIENT
Start: 2021-05-03 | End: 2021-05-13

## 2021-05-03 RX ORDER — VANCOMYCIN HCL 1 G
1000 VIAL (EA) INTRAVENOUS EVERY 12 HOURS
Refills: 0 | Status: COMPLETED | OUTPATIENT
Start: 2021-05-04 | End: 2021-05-06

## 2021-05-03 RX ORDER — OXYCODONE HYDROCHLORIDE 5 MG/1
5 TABLET ORAL EVERY 4 HOURS
Refills: 0 | Status: DISCONTINUED | OUTPATIENT
Start: 2021-05-03 | End: 2021-05-10

## 2021-05-03 RX ORDER — ACETAMINOPHEN 500 MG
975 TABLET ORAL EVERY 8 HOURS
Refills: 0 | Status: DISCONTINUED | OUTPATIENT
Start: 2021-05-03 | End: 2021-05-03

## 2021-05-03 RX ORDER — SODIUM CHLORIDE 9 MG/ML
500 INJECTION INTRAMUSCULAR; INTRAVENOUS; SUBCUTANEOUS ONCE
Refills: 0 | Status: COMPLETED | OUTPATIENT
Start: 2021-05-03 | End: 2021-05-03

## 2021-05-03 RX ORDER — MAGNESIUM HYDROXIDE 400 MG/1
30 TABLET, CHEWABLE ORAL DAILY
Refills: 0 | Status: DISCONTINUED | OUTPATIENT
Start: 2021-05-03 | End: 2021-05-13

## 2021-05-03 RX ORDER — KETOROLAC TROMETHAMINE 30 MG/ML
30 SYRINGE (ML) INJECTION ONCE
Refills: 0 | Status: DISCONTINUED | OUTPATIENT
Start: 2021-05-03 | End: 2021-05-03

## 2021-05-03 RX ORDER — SODIUM CHLORIDE 9 MG/ML
500 INJECTION INTRAMUSCULAR; INTRAVENOUS; SUBCUTANEOUS ONCE
Refills: 0 | Status: COMPLETED | OUTPATIENT
Start: 2021-05-04 | End: 2021-05-04

## 2021-05-03 RX ORDER — TOPIRAMATE 25 MG
25 TABLET ORAL DAILY
Refills: 0 | Status: DISCONTINUED | OUTPATIENT
Start: 2021-05-03 | End: 2021-05-13

## 2021-05-03 RX ORDER — ESCITALOPRAM OXALATE 10 MG/1
10 TABLET, FILM COATED ORAL AT BEDTIME
Refills: 0 | Status: DISCONTINUED | OUTPATIENT
Start: 2021-05-03 | End: 2021-05-13

## 2021-05-03 RX ORDER — ACETAMINOPHEN 500 MG
1000 TABLET ORAL ONCE
Refills: 0 | Status: COMPLETED | OUTPATIENT
Start: 2021-05-03 | End: 2021-05-03

## 2021-05-03 RX ORDER — OXYCODONE HYDROCHLORIDE 5 MG/1
10 TABLET ORAL EVERY 4 HOURS
Refills: 0 | Status: DISCONTINUED | OUTPATIENT
Start: 2021-05-03 | End: 2021-05-10

## 2021-05-03 RX ORDER — VANCOMYCIN HCL 1 G
1000 VIAL (EA) INTRAVENOUS EVERY 12 HOURS
Refills: 0 | Status: DISCONTINUED | OUTPATIENT
Start: 2021-05-03 | End: 2021-05-03

## 2021-05-03 RX ORDER — SODIUM CHLORIDE 9 MG/ML
1000 INJECTION, SOLUTION INTRAVENOUS
Refills: 0 | Status: DISCONTINUED | OUTPATIENT
Start: 2021-05-03 | End: 2021-05-03

## 2021-05-03 RX ORDER — POLYETHYLENE GLYCOL 3350 17 G/17G
17 POWDER, FOR SOLUTION ORAL DAILY
Refills: 0 | Status: DISCONTINUED | OUTPATIENT
Start: 2021-05-03 | End: 2021-05-10

## 2021-05-03 RX ORDER — ASPIRIN/CALCIUM CARB/MAGNESIUM 324 MG
325 TABLET ORAL
Refills: 0 | Status: DISCONTINUED | OUTPATIENT
Start: 2021-05-04 | End: 2021-05-13

## 2021-05-03 RX ORDER — SODIUM CHLORIDE 9 MG/ML
1000 INJECTION INTRAMUSCULAR; INTRAVENOUS; SUBCUTANEOUS
Refills: 0 | Status: DISCONTINUED | OUTPATIENT
Start: 2021-05-03 | End: 2021-05-06

## 2021-05-03 RX ORDER — ONDANSETRON 8 MG/1
4 TABLET, FILM COATED ORAL ONCE
Refills: 0 | Status: COMPLETED | OUTPATIENT
Start: 2021-05-03 | End: 2021-05-03

## 2021-05-03 RX ORDER — SENNA PLUS 8.6 MG/1
2 TABLET ORAL AT BEDTIME
Refills: 0 | Status: DISCONTINUED | OUTPATIENT
Start: 2021-05-03 | End: 2021-05-10

## 2021-05-03 RX ORDER — FENTANYL CITRATE 50 UG/ML
25 INJECTION INTRAVENOUS
Refills: 0 | Status: DISCONTINUED | OUTPATIENT
Start: 2021-05-03 | End: 2021-05-03

## 2021-05-03 RX ORDER — SIMETHICONE 80 MG/1
80 TABLET, CHEWABLE ORAL ONCE
Refills: 0 | Status: COMPLETED | OUTPATIENT
Start: 2021-05-03 | End: 2021-05-03

## 2021-05-03 RX ORDER — CYCLOBENZAPRINE HYDROCHLORIDE 10 MG/1
5 TABLET, FILM COATED ORAL THREE TIMES A DAY
Refills: 0 | Status: DISCONTINUED | OUTPATIENT
Start: 2021-05-03 | End: 2021-05-13

## 2021-05-03 RX ORDER — ATORVASTATIN CALCIUM 80 MG/1
40 TABLET, FILM COATED ORAL AT BEDTIME
Refills: 0 | Status: DISCONTINUED | OUTPATIENT
Start: 2021-05-03 | End: 2021-05-13

## 2021-05-03 RX ORDER — ACETAMINOPHEN 500 MG
975 TABLET ORAL EVERY 8 HOURS
Refills: 0 | Status: COMPLETED | OUTPATIENT
Start: 2021-05-04 | End: 2021-05-06

## 2021-05-03 RX ADMIN — SODIUM CHLORIDE 75 MILLILITER(S): 9 INJECTION INTRAMUSCULAR; INTRAVENOUS; SUBCUTANEOUS at 05:51

## 2021-05-03 RX ADMIN — ONDANSETRON 4 MILLIGRAM(S): 8 TABLET, FILM COATED ORAL at 20:19

## 2021-05-03 RX ADMIN — SODIUM CHLORIDE 125 MILLILITER(S): 9 INJECTION INTRAMUSCULAR; INTRAVENOUS; SUBCUTANEOUS at 23:39

## 2021-05-03 RX ADMIN — SODIUM CHLORIDE 125 MILLILITER(S): 9 INJECTION INTRAMUSCULAR; INTRAVENOUS; SUBCUTANEOUS at 20:31

## 2021-05-03 RX ADMIN — Medication 650 MILLIGRAM(S): at 00:55

## 2021-05-03 RX ADMIN — FENTANYL CITRATE 25 MICROGRAM(S): 50 INJECTION INTRAVENOUS at 19:35

## 2021-05-03 RX ADMIN — PANTOPRAZOLE SODIUM 40 MILLIGRAM(S): 20 TABLET, DELAYED RELEASE ORAL at 05:41

## 2021-05-03 RX ADMIN — Medication 30 MILLIGRAM(S): at 20:45

## 2021-05-03 RX ADMIN — FENTANYL CITRATE 25 MICROGRAM(S): 50 INJECTION INTRAVENOUS at 19:42

## 2021-05-03 RX ADMIN — SODIUM CHLORIDE 1000 MILLILITER(S): 9 INJECTION INTRAMUSCULAR; INTRAVENOUS; SUBCUTANEOUS at 20:45

## 2021-05-03 RX ADMIN — FENTANYL CITRATE 25 MICROGRAM(S): 50 INJECTION INTRAVENOUS at 19:55

## 2021-05-03 RX ADMIN — SODIUM CHLORIDE 1000 MILLILITER(S): 9 INJECTION INTRAMUSCULAR; INTRAVENOUS; SUBCUTANEOUS at 23:39

## 2021-05-03 RX ADMIN — Medication 25 MILLIGRAM(S): at 12:13

## 2021-05-03 RX ADMIN — OXYCODONE HYDROCHLORIDE 10 MILLIGRAM(S): 5 TABLET ORAL at 21:09

## 2021-05-03 RX ADMIN — SIMETHICONE 80 MILLIGRAM(S): 80 TABLET, CHEWABLE ORAL at 06:31

## 2021-05-03 RX ADMIN — Medication 650 MILLIGRAM(S): at 12:13

## 2021-05-03 RX ADMIN — SODIUM CHLORIDE 500 MILLILITER(S): 9 INJECTION INTRAMUSCULAR; INTRAVENOUS; SUBCUTANEOUS at 05:53

## 2021-05-03 RX ADMIN — FENTANYL CITRATE 25 MICROGRAM(S): 50 INJECTION INTRAVENOUS at 19:45

## 2021-05-03 RX ADMIN — TRAMADOL HYDROCHLORIDE 25 MILLIGRAM(S): 50 TABLET ORAL at 00:55

## 2021-05-03 RX ADMIN — SENNA PLUS 2 TABLET(S): 8.6 TABLET ORAL at 21:59

## 2021-05-03 RX ADMIN — SODIUM CHLORIDE 100 MILLILITER(S): 9 INJECTION INTRAMUSCULAR; INTRAVENOUS; SUBCUTANEOUS at 10:26

## 2021-05-03 RX ADMIN — ATORVASTATIN CALCIUM 40 MILLIGRAM(S): 80 TABLET, FILM COATED ORAL at 21:59

## 2021-05-03 RX ADMIN — TRAMADOL HYDROCHLORIDE 25 MILLIGRAM(S): 50 TABLET ORAL at 00:25

## 2021-05-03 RX ADMIN — Medication 1000 MILLIGRAM(S): at 21:00

## 2021-05-03 RX ADMIN — TRAMADOL HYDROCHLORIDE 25 MILLIGRAM(S): 50 TABLET ORAL at 12:52

## 2021-05-03 RX ADMIN — FENTANYL CITRATE 25 MICROGRAM(S): 50 INJECTION INTRAVENOUS at 19:50

## 2021-05-03 RX ADMIN — Medication 650 MILLIGRAM(S): at 12:52

## 2021-05-03 RX ADMIN — Medication 400 MILLIGRAM(S): at 20:20

## 2021-05-03 RX ADMIN — SODIUM CHLORIDE 125 MILLILITER(S): 9 INJECTION INTRAMUSCULAR; INTRAVENOUS; SUBCUTANEOUS at 19:30

## 2021-05-03 RX ADMIN — FENTANYL CITRATE 25 MICROGRAM(S): 50 INJECTION INTRAVENOUS at 19:29

## 2021-05-03 RX ADMIN — FENTANYL CITRATE 25 MICROGRAM(S): 50 INJECTION INTRAVENOUS at 20:00

## 2021-05-03 RX ADMIN — Medication 30 MILLIGRAM(S): at 21:00

## 2021-05-03 RX ADMIN — TRAMADOL HYDROCHLORIDE 25 MILLIGRAM(S): 50 TABLET ORAL at 12:13

## 2021-05-03 RX ADMIN — SODIUM CHLORIDE 125 MILLILITER(S): 9 INJECTION INTRAMUSCULAR; INTRAVENOUS; SUBCUTANEOUS at 23:33

## 2021-05-03 RX ADMIN — Medication 650 MILLIGRAM(S): at 00:25

## 2021-05-03 RX ADMIN — Medication 650 MILLIGRAM(S): at 05:40

## 2021-05-03 RX ADMIN — OXYCODONE HYDROCHLORIDE 10 MILLIGRAM(S): 5 TABLET ORAL at 22:00

## 2021-05-03 RX ADMIN — Medication 133 MILLILITER(S): at 10:56

## 2021-05-03 RX ADMIN — ESCITALOPRAM OXALATE 10 MILLIGRAM(S): 10 TABLET, FILM COATED ORAL at 23:33

## 2021-05-03 RX ADMIN — FENTANYL CITRATE 25 MICROGRAM(S): 50 INJECTION INTRAVENOUS at 19:37

## 2021-05-03 NOTE — PROGRESS NOTE ADULT - ASSESSMENT
55yFemale with pmhx of nephrolithiasis requiring litho in the past (states she has 2 stones remaining in left kidney/ureter) admitted with a LEFT comminuted patella fracture s/p mechanical fall on 4/19. Consulted for urinary retention, requiring 2 straight catheterizations with PVR's >200 and patient drained out 500cc and 700cc. Pt endorses constipation, admits to receiving increased pain medications and has been immobile since tuesday.     Reccs:  -Bowel regimen, senna, colace, miralax  -Hydration  -minimize narcotics and antihistimines  -continue Guo  -trial of void 1-2 days post-op with post void residual  -Given retention would treat + urine culture   -Follow up with own urologist or the [facility] for urology

## 2021-05-03 NOTE — PROGRESS NOTE ADULT - SUBJECTIVE AND OBJECTIVE BOX
Ortho Progress Note    S: Patient seen and examined. No acute events overnight. Pain well controlled with current regimen. Denies lightheadedness/dizziness, CP/SOB.       O:  Physical Exam:  Gen: Laying in bed, NAD, alert and oriented.   Resp: Unlabored breathing  Ext: EHL/FHL/TA/Sol intact          + SILT DP/SP/MOREL/Sa/Tib          +DP, extremity WWP  In bulky zamora KI    Vital Signs Last 24 Hrs  T(C): 36.8 (03 May 2021 05:33), Max: 36.9 (03 May 2021 00:52)  T(F): 98.2 (03 May 2021 05:33), Max: 98.5 (03 May 2021 00:52)  HR: 76 (03 May 2021 05:33) (76 - 91)  BP: 82/52 (03 May 2021 05:54) (82/52 - 101/61)  BP(mean): --  RR: 18 (03 May 2021 05:33) (17 - 18)  SpO2: 97% (03 May 2021 05:33) (95% - 99%)                          9.4    8.34  )-----------( 247      ( 03 May 2021 04:35 )             29.4       05-03    141  |  108  |  14  ----------------------------<  113<H>  4.1   |  23  |  1.01        PT/INR - ( 03 May 2021 04:35 )   PT: 13.9 sec;   INR: 1.17 ratio         PTT - ( 03 May 2021 04:35 )  PTT:31.1 sec

## 2021-05-03 NOTE — PROGRESS NOTE ADULT - ASSESSMENT
Patient is a 56 Y/O Female with PMhx of CAD S/P PCI, HLD, kidney stone, migraine presents with LEFT comminuted patella fracture s/p mechanical fall on 4/19. Pt seen by Dr. Trejo in office. Instructed to come to [facility] ED for med/cards clearance and plan for ORIF of LEFT patella fx. Denies numbness/tingling in the affected extremity. Patient has been minimally ambulating with walker. in the ED she started having bladder pressure with decrease urine output.       Problem/Recommendation - 1:  Problem: Patella fracture. Recommendation: Ortho follow up   plan for OR on monday, patient is medically optimized for OR tomorrow   card and med optimization   pain control   PT eval as tolerated, fall precautions.  S/P stress test   S/P cath, no stent placed medical management   constipation, XRay abdomen noted, obstructive diarrhea   rectal enema for constipation today   IV hydration     Problem/Recommendation - 2:  ·  Problem: Urinary retention.  Recommendation: acute onset  resolved, had urine output, no retention   send UA and urine culture  monitor BUN/Cr  bladder US. PRN   ID eval for Urine Cx, patient has multiple allergies to different antibiotics     Problem/Recommendation - 3:  ·  Problem: CAD (coronary artery disease).  Recommendation: S/P stent x 2 2 years ago   cont ASA and lipitor 40 daily   toprol 25 daily   ischemic W/U per cardiology     Problem/Recommendation - 4:  ·  Problem: HLD (hyperlipidemia).  Recommendation: statin  lipdi panel  diet control.     Problem/Recommendation - 5:  ·  Problem: Nephrolithiasis.     Problem/Recommendation - 6:  Problem: Prophylactic measure. Recommendation: DVT and gI PPX     Differential diagnosis and plan of care discussed with patient after the evaluation.   Advanced care planning/advanced directives discussed with patient/family. DNR status including forceful chest compressions to attempt to restart the heart, ventilator support/artificial breathing, electric shock, artificial nutrition, health care proxy, Molst form all discussed with pt. Pt wishes to consider.   OMT on six regions for acute somatic dysfunctions done at the bedside   Importance of Fall prevention discussed.

## 2021-05-03 NOTE — CONSULT NOTE ADULT - ASSESSMENT
55y Female presents with LEFT comminuted patella fracture s/p mechanical fall on 4/19 with dysuria, urine retention, Aerococcus uti, multiple drug allergies     Arash Duarte  Attending Physician   Division of Infectious Disease  Pager #[phone]  Available on Microsoft Teams also  After 5pm/weekend or no response, call #[phone]    D/w Dr. Duckworth  D/w covering RN

## 2021-05-03 NOTE — CONSULT NOTE ADULT - PROBLEM SELECTOR RECOMMENDATION 4
-PCN - SOB  -sulfa- rash  -cipro - GI issues  -nitrofurantoin - GI issues   -will try vanco as above - pt agreeable

## 2021-05-03 NOTE — CHART NOTE - NSCHARTNOTEFT_GEN_A_CORE
Pt was hypotensive overnight s/p IVF bolus   Seen and evaluated ,  stated Dizziness better , R groin  site no hematoma ,  repeat BP 98/50( post IV bolus )  IVF increased to 100cc/hr   will follow up with DR Main  and orthopedics   Leida quiñones NP-C [id number]

## 2021-05-03 NOTE — PROGRESS NOTE ADULT - SUBJECTIVE AND OBJECTIVE BOX
Name of Patient : ASHWIN JACOB  MRN: [id number]  DATE OF SERVICE: 05-03-21 @ 09:56    Subjective: Patient seen and examined. No new events except as noted.   hypotensive overnight, IV fluid       REVIEW OF SYSTEMS:    CONSTITUTIONAL: No weakness, fevers or chills  EYES/ENT: No visual changes;  No vertigo or throat pain   NECK: No pain or stiffness  RESPIRATORY: No cough, wheezing, hemoptysis; No shortness of breath  CARDIOVASCULAR: No chest pain or palpitations  GASTROINTESTINAL: + abdominal discomfort   GENITOURINARY: No dysuria, frequency or hematuria  NEUROLOGICAL: No numbness or weakness  SKIN: No itching, burning, rashes, or lesions   All other review of systems is negative unless indicated above.    MEDICATIONS:  MEDICATIONS  (STANDING):  acetaminophen   Tablet .. 650 milliGRAM(s) Oral every 6 hours  aspirin  chewable 81 milliGRAM(s) Oral daily  atorvastatin 40 milliGRAM(s) Oral at bedtime  escitalopram 10 milliGRAM(s) Oral at bedtime  metoprolol tartrate 25 milliGRAM(s) Oral at bedtime  mineral oil enema 133 milliLiter(s) Rectal once  pantoprazole    Tablet 40 milliGRAM(s) Oral before breakfast  polyethylene glycol 3350 17 Gram(s) Oral daily  sodium chloride 0.9%. 1000 milliLiter(s) (100 mL/Hr) IV Continuous <Continuous>  topiramate 25 milliGRAM(s) Oral daily  traMADol 25 milliGRAM(s) Oral every 6 hours      PHYSICAL EXAM:  T(C): 36.8 (05-03-21 @ 05:33), Max: 36.9 (05-03-21 @ 00:52)  HR: 74 (05-03-21 @ 08:06) (74 - 91)  BP: 98/60 (05-03-21 @ 08:06) (82/52 - 101/61)  RR: 18 (05-03-21 @ 08:06) (17 - 18)  SpO2: 97% (05-03-21 @ 08:06) (95% - 99%)  Wt(kg): --  I&O's Summary    02 May 2021 07:01  -  03 May 2021 07:00  --------------------------------------------------------  IN: 440 mL / OUT: 1900 mL / NET: -1460 mL          Appearance: Normal	  HEENT:  PERRLA   Lymphatic: No lymphadenopathy   Cardiovascular: Normal S1 S2, no JVD  Respiratory: normal effort , clear  Gastrointestinal:  Soft, Non-tender  Skin: No rashes,  warm to touch  Psychiatry:  Mood & affect appropriate  Musculuskeletal: No edema      All labs, Imaging and EKGs personally reviewed                           9.4    8.34  )-----------( 247      ( 03 May 2021 04:35 )             29.4               05-03    141  |  108  |  14  ----------------------------<  113<H>  4.1   |  23  |  1.01    Ca    9.0      03 May 2021 04:35      PT/INR - ( 03 May 2021 04:35 )   PT: 13.9 sec;   INR: 1.17 ratio         PTT - ( 03 May 2021 04:35 )  PTT:31.1 sec                                 05-02-21 @ 07:01  -  05-03-21 @ 07:00  --------------------------------------------------------  IN: 440 mL / OUT: 1900 mL / NET: -1460 mL

## 2021-05-03 NOTE — PROGRESS NOTE ADULT - SUBJECTIVE AND OBJECTIVE BOX
ORTHO ATTENDING POST OP    s/p ORIF  L  patella   Frances Dressing  Knee immobilizer locked in extension  WBAT  L  LE  elevation  ice  ASA  BID  percocet to  f/u 2 weeks  keep brace and dressing, on, clean and dry  until office visit ORTHO ATTENDING POST OP    s/p ORIF  L  patella   Frances Dressing  Knee immobilizer locked in extension  WBAT  L  LE in immobilizer  elevation  ice  ASA  BID  venodynes  PT- OOB in AM  Vanco 1g x 24h  f/u 2 weeks  keep brace and dressing, on, clean and dry  until office visit

## 2021-05-03 NOTE — PROGRESS NOTE ADULT - SUBJECTIVE AND OBJECTIVE BOX
Patient is a 55y old  Female who presents with a chief complaint of L patella fracture, for surgery (03 May 2021 11:25)      INTERVAL HISTORY: feels ok    REVIEW OF SYSTEMS:   CONSTITUTIONAL: No weakness  EYES/ENT: No visual changes; No throat pain  Neck: No pain or stiffness  Respiratory: No cough, wheezing, No shortness of breath  CARDIOVASCULAR: no chest pain or palpitations  GASTROINTESTINAL: No abdominal pain, no nausea, vomiting or hematemesis  GENITOURINARY: No dysuria, frequency or hematuria  NEUROLOGICAL: No stroke like symptoms  SKIN: No rashes    	  MEDICATIONS:  metoprolol tartrate 25 milliGRAM(s) Oral at bedtime        PHYSICAL EXAM:  T(C): 37 (05-03-21 @ 15:34), Max: 37.4 (05-03-21 @ 13:15)  HR: 76 (05-03-21 @ 15:34) (74 - 81)  BP: 117/75 (05-03-21 @ 15:34) (82/52 - 117/75)  RR: 14 (05-03-21 @ 15:34) (14 - 18)  SpO2: 98% (05-03-21 @ 15:34) (97% - 99%)  Wt(kg): --  I&O's Summary    02 May 2021 07:01  -  03 May 2021 07:00  --------------------------------------------------------  IN: 440 mL / OUT: 1900 mL / NET: -1460 mL    03 May 2021 07:01  -  03 May 2021 16:39  --------------------------------------------------------  IN: 0 mL / OUT: 700 mL / NET: -700 mL      Height (cm): 172.7 (05-03 @ 15:34)  Weight (kg): 77.1 (05-03 @ 15:34)  BMI (kg/m2): 25.9 (05-03 @ 15:34)  BSA (m2): 1.91 (05-03 @ 15:34)    Appearance: In no distress	  HEENT:    PERRL, EOMI	  Cardiovascular:  S1 S2, No JVD  Respiratory: Lungs clear to auscultation	  Gastrointestinal:  Soft, Non-tender, + BS	  Vascularature:  No edema of LE  Psychiatric: Appropriate affect   Neuro: no acute focal deficits                         9.4    8.34  )-----------( 247      ( 03 May 2021 04:35 )             29.4     05-03    141  |  108  |  14  ----------------------------<  113<H>  4.1   |  23  |  1.01    Ca    9.0      03 May 2021 04:35    Labs personally reviewed    Patient is a 54 Y/O Female with PMhx of CAD S/P PCI, HLD, kidney stone, migraine presents with LEFT comminuted patella fracture s/p mechanical fall on 4/19. Pt seen by Dr. Trejo in office. Instructed to come to [facility] ED for med/cards clearance and plan for ORIF of LEFT patella fx. Denies numbness/tingling in the affected extremity. Patient has been minimally ambulating with walker. in the ED she started having bladder pressure with decrease urine output.       Problem/Recommendation - 1:  Problem: Patella fracture. Recommendation: Ortho follow up   Plan for OR on Monday  Mod risk for mod risk surgery  No contraindication to proceed, optimized from cardiac standpoint       Problem/Recommendation - 2:  ·  Problem: Palps/Exertional SOB  Recommendation: NM stress test done today with large areas of infarct with leonor-infarct ischemia   Risks and benefits of cardiac cath discussed with pt. Discussed with Outpt cardio Dr Lin as well  Cath with no obstructive CAD, LAD 10% LAD, RCA 30% stenosis     Problem/Recommendation - 3:  ·  Problem: CAD (coronary artery disease).  Recommendation: S/P stent x 2 to LAD about two years ago   cont ASA and lipitor 40 daily   Toprol 25mg daily   Cath with no obstructive CAD, LAD 10% LAD, RCA 30% stenosis     Problem/Recommendation - 4:  ·  Problem: HLD (hyperlipidemia).  Recommendation: statin for secondary prevention       Problem/Recommendation - 5:  ·  Problem: Urinary retention.  Recommendation: acute onset o admission  Was retaining about 300cc of urine s/p straight cath  Again 5/2 with urinary retention, straight cath with >700cc of urine  Urology consult called, rec johansen placement  Will consult ID for ?Tx of urine culture    Problem/Recommendation - 6:  Problem: Constipation  Recommendation: Miralax and suppository, yet to have a bowel movement  -?impacted, will monitor        OR today      Patient is a 55y old  Female who presents with a chief complaint of L patella fracture, for surgery (03 May 2021 11:25)      INTERVAL HISTORY: feels ok    REVIEW OF SYSTEMS:   CONSTITUTIONAL: No weakness  EYES/ENT: No visual changes; No throat pain  Neck: No pain or stiffness  Respiratory: No cough, wheezing, No shortness of breath  CARDIOVASCULAR: no chest pain or palpitations  GASTROINTESTINAL: No abdominal pain, no nausea, vomiting or hematemesis  GENITOURINARY: No dysuria, frequency or hematuria  NEUROLOGICAL: No stroke like symptoms  SKIN: No rashes    	  MEDICATIONS:  metoprolol tartrate 25 milliGRAM(s) Oral at bedtime        PHYSICAL EXAM:  T(C): 37 (05-03-21 @ 15:34), Max: 37.4 (05-03-21 @ 13:15)  HR: 76 (05-03-21 @ 15:34) (74 - 81)  BP: 117/75 (05-03-21 @ 15:34) (82/52 - 117/75)  RR: 14 (05-03-21 @ 15:34) (14 - 18)  SpO2: 98% (05-03-21 @ 15:34) (97% - 99%)  Wt(kg): --  I&O's Summary    02 May 2021 07:01  -  03 May 2021 07:00  --------------------------------------------------------  IN: 440 mL / OUT: 1900 mL / NET: -1460 mL    03 May 2021 07:01  -  03 May 2021 16:39  --------------------------------------------------------  IN: 0 mL / OUT: 700 mL / NET: -700 mL      Height (cm): 172.7 (05-03 @ 15:34)  Weight (kg): 77.1 (05-03 @ 15:34)  BMI (kg/m2): 25.9 (05-03 @ 15:34)  BSA (m2): 1.91 (05-03 @ 15:34)    Appearance: In no distress	  HEENT:    PERRL, EOMI	  Cardiovascular:  S1 S2, No JVD  Respiratory: Lungs clear to auscultation	  Gastrointestinal:  Soft, Non-tender, + BS	  Vascularature:  No edema of LE  Psychiatric: Appropriate affect   Neuro: no acute focal deficits                         9.4    8.34  )-----------( 247      ( 03 May 2021 04:35 )             29.4     05-03    141  |  108  |  14  ----------------------------<  113<H>  4.1   |  23  |  1.01    Ca    9.0      03 May 2021 04:35    Labs personally reviewed    Patient is a 56 Y/O Female with PMhx of CAD S/P PCI, HLD, kidney stone, migraine presents with LEFT comminuted patella fracture s/p mechanical fall on 4/19. Pt seen by Dr. Trejo in office. Instructed to come to [facility] ED for med/cards clearance and plan for ORIF of LEFT patella fx. Denies numbness/tingling in the affected extremity. Patient has been minimally ambulating with walker. in the ED she started having bladder pressure with decrease urine output.       Problem/Recommendation - 1:  Problem: Patella fracture. Recommendation: Ortho follow up   Plan for OR today   Mod risk for mod risk surgery  No contraindication to proceed, optimized from cardiac standpoint       Problem/Recommendation - 2:  ·  Problem: Palps/Exertional SOB  Recommendation: NM stress test done today with large areas of infarct with leonor-infarct ischemia   Risks and benefits of cardiac cath discussed with pt. Discussed with Outpt cardio Dr Lin as well  Cath with no obstructive CAD, LAD 10% LAD, RCA 30% stenosis     Problem/Recommendation - 3:  ·  Problem: CAD (coronary artery disease).  Recommendation: S/P stent x 2 to LAD about two years ago   cont ASA and lipitor 40 daily   Toprol 25mg daily   Cath with no obstructive CAD, LAD 10% LAD, RCA 30% stenosis     Problem/Recommendation - 4:  ·  Problem: HLD (hyperlipidemia).  Recommendation: statin for secondary prevention     Problem/Recommendation - 5:  ·  Problem: Urinary retention.  Recommendation: acute onset o admission  Was retaining about 300cc of urine s/p straight cath  Again 5/2 with urinary retention, straight cath with >700cc of urine  Urology consult called, rec johansen placement  Will consult ID for ?Tx of urine culture    Problem/Recommendation - 6:  Problem: Constipation  Recommendation: Miralax and suppository, yet to have a bowel movement  -?impacted, will monitor        OR today

## 2021-05-03 NOTE — PROGRESS NOTE ADULT - ASSESSMENT
55F presents with left patella fracture for ORIF today    - Pain control  - NPO except medications/IVF  - Hold anticoagulation for OR today  - CBC/BMP/Coags/UA/T+S x2  - Appreciate med/cards cleared  - Plan for OR    Ortho [id number]

## 2021-05-03 NOTE — CONSULT NOTE ADULT - SUBJECTIVE AND OBJECTIVE BOX
ASHWIN JACOB 55y Female  MRN-[id number]    Patient is a 55y old  Female who presents with a chief complaint of L patella fracture, for surgery (03 May 2021 09:56)      HPI:  55y Female presents with LEFT comminuted patella fracture s/p mechanical fall on . Pt seen by Dr. Trejo in office. Instructed to come to [facility] ED for med/cards clearance and plan for ORIF of LEFT patella fx. Denies numbness/tingling in the affected extremity. Patient has been minimally ambulating with walker. Sees cardiologist Dr. Lin outpatient.    She had some dysuria and rentention on admission. Urine cx positive. Guo in place.     No fever. Planned for left patella surgery today.     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 650 milliGRAM(s) Oral every 6 hours  aspirin  chewable 81 milliGRAM(s) Oral daily  atorvastatin 40 milliGRAM(s) Oral at bedtime  escitalopram 10 milliGRAM(s) Oral at bedtime  metoprolol tartrate 25 milliGRAM(s) Oral at bedtime  pantoprazole    Tablet 40 milliGRAM(s) Oral before breakfast  polyethylene glycol 3350 17 Gram(s) Oral daily  sodium chloride 0.9%. 1000 milliLiter(s) (100 mL/Hr) IV Continuous <Continuous>  topiramate 25 milliGRAM(s) Oral daily  traMADol 25 milliGRAM(s) Oral every 6 hours  vancomycin  IVPB 1000 milliGRAM(s) IV Intermittent every 12 hours      Social History  Smoking: no  Etoh: no  Drug use: no      FAMILY HISTORY:  Family history of early CAD (Father, Mother)        Vital Signs Last 24 Hrs  T(C): 36.8 (03 May 2021 05:33), Max: 36.9 (03 May 2021 00:52)  T(F): 98.2 (03 May 2021 05:33), Max: 98.5 (03 May 2021 00:52)  HR: 74 (03 May 2021 08:06) (74 - 91)  BP: 98/60 (03 May 2021 08:06) (82/52 - 101/61)  BP(mean): --  RR: 18 (03 May 2021 08:06) (17 - 18)  SpO2: 97% (03 May 2021 08:06) (95% - 97%)    CBC Full  -  ( 03 May 2021 04:35 )  WBC Count : 8.34 K/uL  RBC Count : 3.02 M/uL  Hemoglobin : 9.4 g/dL  Hematocrit : 29.4 %  Platelet Count - Automated : 247 K/uL  Mean Cell Volume : 97.4 fl  Mean Cell Hemoglobin : 31.1 pg  Mean Cell Hemoglobin Concentration : 32.0 gm/dL  Auto Neutrophil # : x  Auto Lymphocyte # : x  Auto Monocyte # : x  Auto Eosinophil # : x  Auto Basophil # : x  Auto Neutrophil % : x  Auto Lymphocyte % : x  Auto Monocyte % : x  Auto Eosinophil % : x  Auto Basophil % : x        141  |  108  |  14  ----------------------------<  113<H>  4.1   |  23  |  1.01    Ca    9.0      03 May 2021 04:35            MICROBIOLOGY:  .Urine Catheterized  21   50,000 - 99,000 CFU/mL Aerococcus species  "Aerococcus spp. are predictably susceptible to penicillin,  ampicillin, tetracycline, and vancomycin.  All isolates are  resistant to sulfonamides"  --  --        Rapid RVP Result: NotDetec ( @ 01:38)        RADIOLOGY  < from: Xray Abdomen 1 View PORTABLE -Urgent (Xray Abdomen 1 View PORTABLE -Urgent .) (21 @ 19:44) >  Mildly distended bowel loops with moderate stool burden.    < end of copied text >  < from: [facility] Duplex Lower Ext Vein Scan, Left (21 @ 15:14) >  No evidence of left lower extremity deep venous thrombosis.    < end of copied text >  < from: CT 3D Reconstruct w/ Workstation (21 @ 14:33) >  Comminuted and distracted midpatellar fracture  Moderate hemorrhage in the prepatellar region and surrounding the fracture fragments of the patella        < from: CT Chest No Cont (21 @ 14:33) >  Coronary artery calcification.    No rib fracture.

## 2021-05-03 NOTE — PROGRESS NOTE ADULT - SUBJECTIVE AND OBJECTIVE BOX
asleep, awaiting procedure today    T(F): 98.2, Max: 98.5 (05-03-21 @ 00:52)  HR: 76  BP: 82/52  SpO2: 97%    OUT:    Indwelling Catheter - Urethral (mL): 700 mL    Intermittent Catheterization - Urethral (mL): 700 mL    Voided (mL): 500 mL  Total OUT: 1900 mL       johansen clear yellow      05-03 @ 04:35    WBC 8.34  / Hct 29.4  / SCr 1.01     .Urine Catheterized  04-29  50,000 - 99,000 CFU/mL Aerococcus species  "Aerococcus spp. are predictably susceptible to penicillin,  ampicillin, tetracycline, and vancomycin.  All isolates are  resistant to sulfonamides"

## 2021-05-03 NOTE — CHART NOTE - NSCHARTNOTEFT_GEN_A_CORE
POC    Seen in RR c/o severe incisional pain. block may be partially effective now  No Chest Pain, SOB, N/V.    T(C): 36.4 (05-03-21 @ 19:45), Max: 37.4 (05-03-21 @ 13:15)  HR: 75 (05-03-21 @ 20:00) (73 - 84)  BP: 100/55 (05-03-21 @ 20:00) (82/52 - 117/75)  RR: 15 (05-03-21 @ 20:00) (14 - 18)  SpO2: 100% (05-03-21 @ 20:00) (94% - 100%)    Culture - Urine (04.29.21 @ 04:33)    Specimen Source: .Urine Catheterized    Culture Results:   50,000 - 99,000 CFU/mL Aerococcus species      Exam:  Alert and [place], No Acute Distress  Pulm: CTAB  Abdomen soft / benign  Guo  [y ]   EXT   LLE       Immobilizer in place        BJ Dressing (s) C/D  [ x]            Calves soft       (+) DF  PF;  EHL/FHL  5/5       mildly decreased sensation most likely 2/2 block        2+ pulses    A/P: S/p ORIF, patella, left    - IV Tylenol & Toradol x 1  - VANCO q 12hrs x 3 days 2/2 UTI [as per ID]  -PT/OT-WBAT- in Immobilizer      NO ROM L Knee  -Chk AM Labs  -DVT PPx: Ecotrin BID  -Pain Control PO/IV Pain Rx  -Continue Current Tx       Laxatives reordered  -Dispo planning: anticipate home      ***See Above  Ramez HOWE  Orthopedics  B: [phone]

## 2021-05-04 LAB
ANION GAP SERPL CALC-SCNC: 13 MMOL/L — SIGNIFICANT CHANGE UP (ref 5–17)
BASOPHILS # BLD AUTO: 0.02 K/UL — SIGNIFICANT CHANGE UP (ref 0–0.2)
BASOPHILS NFR BLD AUTO: 0.2 % — SIGNIFICANT CHANGE UP (ref 0–2)
BUN SERPL-MCNC: 12 MG/DL — SIGNIFICANT CHANGE UP (ref 7–23)
CALCIUM SERPL-MCNC: 8.9 MG/DL — SIGNIFICANT CHANGE UP (ref 8.4–10.5)
CHLORIDE SERPL-SCNC: 108 MMOL/L — SIGNIFICANT CHANGE UP (ref 96–108)
CK MB CFR SERPL CALC: 1 NG/ML — SIGNIFICANT CHANGE UP (ref 0–3.8)
CK MB CFR SERPL CALC: 1 NG/ML — SIGNIFICANT CHANGE UP (ref 0–3.8)
CK SERPL-CCNC: 24 U/L — LOW (ref 25–170)
CK SERPL-CCNC: 34 U/L — SIGNIFICANT CHANGE UP (ref 25–170)
CO2 SERPL-SCNC: 19 MMOL/L — LOW (ref 22–31)
CREAT SERPL-MCNC: 0.8 MG/DL — SIGNIFICANT CHANGE UP (ref 0.5–1.3)
EOSINOPHIL # BLD AUTO: 0.09 K/UL — SIGNIFICANT CHANGE UP (ref 0–0.5)
EOSINOPHIL NFR BLD AUTO: 1 % — SIGNIFICANT CHANGE UP (ref 0–6)
GLUCOSE SERPL-MCNC: 124 MG/DL — HIGH (ref 70–99)
HCT VFR BLD CALC: 26.3 % — LOW (ref 34.5–45)
HGB BLD-MCNC: 8.5 G/DL — LOW (ref 11.5–15.5)
IMM GRANULOCYTES NFR BLD AUTO: 0.7 % — SIGNIFICANT CHANGE UP (ref 0–1.5)
LYMPHOCYTES # BLD AUTO: 0.81 K/UL — LOW (ref 1–3.3)
LYMPHOCYTES # BLD AUTO: 9 % — LOW (ref 13–44)
MCHC RBC-ENTMCNC: 30.9 PG — SIGNIFICANT CHANGE UP (ref 27–34)
MCHC RBC-ENTMCNC: 32.3 GM/DL — SIGNIFICANT CHANGE UP (ref 32–36)
MCV RBC AUTO: 95.6 FL — SIGNIFICANT CHANGE UP (ref 80–100)
MONOCYTES # BLD AUTO: 0.57 K/UL — SIGNIFICANT CHANGE UP (ref 0–0.9)
MONOCYTES NFR BLD AUTO: 6.3 % — SIGNIFICANT CHANGE UP (ref 2–14)
NEUTROPHILS # BLD AUTO: 7.5 K/UL — HIGH (ref 1.8–7.4)
NEUTROPHILS NFR BLD AUTO: 82.8 % — HIGH (ref 43–77)
NRBC # BLD: 0 /100 WBCS — SIGNIFICANT CHANGE UP (ref 0–0)
PLATELET # BLD AUTO: 253 K/UL — SIGNIFICANT CHANGE UP (ref 150–400)
POTASSIUM SERPL-MCNC: 4.6 MMOL/L — SIGNIFICANT CHANGE UP (ref 3.5–5.3)
POTASSIUM SERPL-SCNC: 4.6 MMOL/L — SIGNIFICANT CHANGE UP (ref 3.5–5.3)
RBC # BLD: 2.75 M/UL — LOW (ref 3.8–5.2)
RBC # FLD: 12.6 % — SIGNIFICANT CHANGE UP (ref 10.3–14.5)
SODIUM SERPL-SCNC: 140 MMOL/L — SIGNIFICANT CHANGE UP (ref 135–145)
TROPONIN T, HIGH SENSITIVITY RESULT: <6 NG/L — SIGNIFICANT CHANGE UP (ref 0–51)
TROPONIN T, HIGH SENSITIVITY RESULT: <6 NG/L — SIGNIFICANT CHANGE UP (ref 0–51)
WBC # BLD: 9.05 K/UL — SIGNIFICANT CHANGE UP (ref 3.8–10.5)
WBC # FLD AUTO: 9.05 K/UL — SIGNIFICANT CHANGE UP (ref 3.8–10.5)

## 2021-05-04 PROCEDURE — 93010 ELECTROCARDIOGRAM REPORT: CPT | Mod: 76

## 2021-05-04 PROCEDURE — 99232 SBSQ HOSP IP/OBS MODERATE 35: CPT

## 2021-05-04 RX ORDER — ONDANSETRON 8 MG/1
4 TABLET, FILM COATED ORAL EVERY 6 HOURS
Refills: 0 | Status: DISCONTINUED | OUTPATIENT
Start: 2021-05-04 | End: 2021-05-13

## 2021-05-04 RX ORDER — ONDANSETRON 8 MG/1
4 TABLET, FILM COATED ORAL ONCE
Refills: 0 | Status: COMPLETED | OUTPATIENT
Start: 2021-05-04 | End: 2021-05-04

## 2021-05-04 RX ORDER — MINERAL OIL
133 OIL (ML) MISCELLANEOUS ONCE
Refills: 0 | Status: COMPLETED | OUTPATIENT
Start: 2021-05-04 | End: 2021-05-04

## 2021-05-04 RX ORDER — LACTULOSE 10 G/15ML
20 SOLUTION ORAL ONCE
Refills: 0 | Status: COMPLETED | OUTPATIENT
Start: 2021-05-04 | End: 2021-05-04

## 2021-05-04 RX ORDER — ACETAMINOPHEN 500 MG
1000 TABLET ORAL ONCE
Refills: 0 | Status: COMPLETED | OUTPATIENT
Start: 2021-05-04 | End: 2021-05-04

## 2021-05-04 RX ADMIN — CYCLOBENZAPRINE HYDROCHLORIDE 5 MILLIGRAM(S): 10 TABLET, FILM COATED ORAL at 13:21

## 2021-05-04 RX ADMIN — Medication 250 MILLIGRAM(S): at 01:26

## 2021-05-04 RX ADMIN — Medication 400 MILLIGRAM(S): at 23:40

## 2021-05-04 RX ADMIN — Medication 975 MILLIGRAM(S): at 06:45

## 2021-05-04 RX ADMIN — OXYCODONE HYDROCHLORIDE 10 MILLIGRAM(S): 5 TABLET ORAL at 17:56

## 2021-05-04 RX ADMIN — Medication 133 MILLILITER(S): at 11:22

## 2021-05-04 RX ADMIN — LACTULOSE 20 GRAM(S): 10 SOLUTION ORAL at 17:25

## 2021-05-04 RX ADMIN — OXYCODONE HYDROCHLORIDE 10 MILLIGRAM(S): 5 TABLET ORAL at 06:45

## 2021-05-04 RX ADMIN — Medication 1 TABLET(S): at 11:27

## 2021-05-04 RX ADMIN — ONDANSETRON 4 MILLIGRAM(S): 8 TABLET, FILM COATED ORAL at 13:30

## 2021-05-04 RX ADMIN — Medication 325 MILLIGRAM(S): at 17:25

## 2021-05-04 RX ADMIN — OXYCODONE HYDROCHLORIDE 10 MILLIGRAM(S): 5 TABLET ORAL at 10:26

## 2021-05-04 RX ADMIN — Medication 975 MILLIGRAM(S): at 13:23

## 2021-05-04 RX ADMIN — Medication 25 MILLIGRAM(S): at 11:27

## 2021-05-04 RX ADMIN — ONDANSETRON 4 MILLIGRAM(S): 8 TABLET, FILM COATED ORAL at 23:47

## 2021-05-04 RX ADMIN — CYCLOBENZAPRINE HYDROCHLORIDE 5 MILLIGRAM(S): 10 TABLET, FILM COATED ORAL at 17:54

## 2021-05-04 RX ADMIN — OXYCODONE HYDROCHLORIDE 10 MILLIGRAM(S): 5 TABLET ORAL at 06:09

## 2021-05-04 RX ADMIN — POLYETHYLENE GLYCOL 3350 17 GRAM(S): 17 POWDER, FOR SOLUTION ORAL at 11:27

## 2021-05-04 RX ADMIN — SENNA PLUS 2 TABLET(S): 8.6 TABLET ORAL at 21:43

## 2021-05-04 RX ADMIN — PANTOPRAZOLE SODIUM 40 MILLIGRAM(S): 20 TABLET, DELAYED RELEASE ORAL at 06:09

## 2021-05-04 RX ADMIN — CYCLOBENZAPRINE HYDROCHLORIDE 5 MILLIGRAM(S): 10 TABLET, FILM COATED ORAL at 03:29

## 2021-05-04 RX ADMIN — Medication 975 MILLIGRAM(S): at 13:53

## 2021-05-04 RX ADMIN — Medication 975 MILLIGRAM(S): at 06:09

## 2021-05-04 RX ADMIN — OXYCODONE HYDROCHLORIDE 10 MILLIGRAM(S): 5 TABLET ORAL at 10:53

## 2021-05-04 RX ADMIN — OXYCODONE HYDROCHLORIDE 10 MILLIGRAM(S): 5 TABLET ORAL at 02:00

## 2021-05-04 RX ADMIN — Medication 325 MILLIGRAM(S): at 06:09

## 2021-05-04 RX ADMIN — ESCITALOPRAM OXALATE 10 MILLIGRAM(S): 10 TABLET, FILM COATED ORAL at 21:44

## 2021-05-04 RX ADMIN — SODIUM CHLORIDE 1000 MILLILITER(S): 9 INJECTION INTRAMUSCULAR; INTRAVENOUS; SUBCUTANEOUS at 08:04

## 2021-05-04 RX ADMIN — Medication 250 MILLIGRAM(S): at 12:26

## 2021-05-04 RX ADMIN — ATORVASTATIN CALCIUM 40 MILLIGRAM(S): 80 TABLET, FILM COATED ORAL at 21:42

## 2021-05-04 RX ADMIN — OXYCODONE HYDROCHLORIDE 10 MILLIGRAM(S): 5 TABLET ORAL at 17:26

## 2021-05-04 RX ADMIN — OXYCODONE HYDROCHLORIDE 10 MILLIGRAM(S): 5 TABLET ORAL at 01:27

## 2021-05-04 RX ADMIN — OXYCODONE HYDROCHLORIDE 10 MILLIGRAM(S): 5 TABLET ORAL at 21:43

## 2021-05-04 RX ADMIN — OXYCODONE HYDROCHLORIDE 10 MILLIGRAM(S): 5 TABLET ORAL at 22:15

## 2021-05-04 NOTE — PROGRESS NOTE ADULT - SUBJECTIVE AND OBJECTIVE BOX
Name of Patient : ASHWIN JACOB  MRN: [id number]  DATE OF SERVICE: 05-04-21 @ 16:41    Subjective: Patient seen and examined. No new events except as noted.   constipated with no bowel movement  S/P ORIF on L patella     REVIEW OF SYSTEMS:    CONSTITUTIONAL: No weakness, fevers or chills  EYES/ENT: No visual changes;  No vertigo or throat pain   NECK: No pain or stiffness  RESPIRATORY: No cough, wheezing, hemoptysis; No shortness of breath  CARDIOVASCULAR: No chest pain or palpitations  GASTROINTESTINAL: + fullnes, constipated   GENITOURINARY: No dysuria, frequency or hematuria  NEUROLOGICAL: No numbness or weakness  SKIN: No itching, burning, rashes, or lesions   All other review of systems is negative unless indicated above.    MEDICATIONS:  MEDICATIONS  (STANDING):  acetaminophen   Tablet .. 975 milliGRAM(s) Oral every 8 hours  aspirin enteric coated 325 milliGRAM(s) Oral two times a day  atorvastatin 40 milliGRAM(s) Oral at bedtime  escitalopram 10 milliGRAM(s) Oral at bedtime  multivitamin 1 Tablet(s) Oral daily  pantoprazole    Tablet 40 milliGRAM(s) Oral before breakfast  polyethylene glycol 3350 17 Gram(s) Oral daily  senna 2 Tablet(s) Oral at bedtime  sodium chloride 0.9%. 1000 milliLiter(s) (125 mL/Hr) IV Continuous <Continuous>  topiramate 25 milliGRAM(s) Oral daily  vancomycin  IVPB 1000 milliGRAM(s) IV Intermittent every 12 hours      PHYSICAL EXAM:  T(C): 37.3 (05-04-21 @ 13:26), Max: 37.3 (05-04-21 @ 13:26)  HR: 77 (05-04-21 @ 13:26) (70 - 89)  BP: 115/77 (05-04-21 @ 13:26) (92/58 - 122/61)  RR: 18 (05-04-21 @ 13:26) (14 - 18)  SpO2: 98% (05-04-21 @ 13:26) (94% - 100%)  Wt(kg): --  I&O's Summary    03 May 2021 07:01  -  04 May 2021 07:00  --------------------------------------------------------  IN: 3040 mL / OUT: 1805 mL / NET: 1235 mL    04 May 2021 07:01  -  04 May 2021 16:41  --------------------------------------------------------  IN: 480 mL / OUT: 1000 mL / NET: -520 mL      Height (cm): 172.7 (05-03 @ 17:37)  Weight (kg): 77.1 (05-03 @ 17:37)  BMI (kg/m2): 25.9 (05-03 @ 17:37)  BSA (m2): 1.91 (05-03 @ 17:37)    Appearance: Normal	  HEENT:  PERRLA   Lymphatic: No lymphadenopathy   Cardiovascular: Normal S1 S2, no JVD  Respiratory: normal effort , clear  Gastrointestinal:  Soft, Non-tender  Skin: No rashes,  warm to touch  Psychiatry:  Mood & affect appropriate  Musculuskeletal: L LE dressing       All labs, Imaging and EKGs personally reviewed                             8.5    9.05  )-----------( 253      ( 04 May 2021 07:15 )             26.3               05-04    140  |  108  |  12  ----------------------------<  124<H>  4.6   |  19<L>  |  0.80    Ca    8.9      04 May 2021 07:12      PT/INR - ( 03 May 2021 04:35 )   PT: 13.9 sec;   INR: 1.17 ratio         PTT - ( 03 May 2021 04:35 )  PTT:31.1 sec       CARDIAC MARKERS ( 04 May 2021 10:12 )  x     / x     / 34 U/L / x     / 1.0 ng/mL  CARDIAC MARKERS ( 03 May 2021 04:35 )  x     / x     / 24 U/L / x     / 1.0 ng/mL                          05-03-21 @ 07:01  -  05-04-21 @ 07:00  --------------------------------------------------------  IN: 3040 mL / OUT: 1805 mL / NET: 1235 mL    05-04-21 @ 07:01  -  05-04-21 @ 16:41  --------------------------------------------------------  IN: 480 mL / OUT: 1000 mL / NET: -520 mL

## 2021-05-04 NOTE — CHART NOTE - NSCHARTNOTEFT_GEN_A_CORE
Medicine PA Note     ASHWIN JACOB  MRN-[id number]       Notified by RN for patient c/o CP. Patient seen and examined at bedside A&Ox3, lying in bed, patient stating she awoke an hour ago with midsternal CP, radiating to the left chest wall, initially 8/10, stabbing in nature, precipitated by palpation. with no further symptoms or radiation, patient informed hospital staff later, at 6:40AM. Patient states she has felt this pain before, and has had treatment with nitro patch / ranexa outpatient with Dr. Lin though these medications were discontinued due to her blood pressures. At time of interview patient states pain has essentially subsided. No worsening pain with inspiration. Patient denies any SOB/N/V/D/headache/dizziness/weakness/abdominal pain/diaphoresis.     Vital Signs Last 24 Hrs  T(C): 36.7 (05-04-21 @ 05:30), Max: 37.4 (05-03-21 @ 13:15)  T(F): 98.1 (05-04-21 @ 05:30), Max: 99.4 (05-03-21 @ 13:15)  HR: 70 (05-04-21 @ 05:30) (70 - 89)  BP: 101/63 (05-04-21 @ 05:30) (92/58 - 122/61)  BP(mean): 78 (05-03-21 @ 22:53) (71 - 85)  RR: 18 (05-04-21 @ 05:30) (14 - 18)  SpO2: 96% (05-04-21 @ 05:30) (94% - 100%)  Daily Height in cm: 172.72 (03 May 2021 17:37)    Daily   I&O's Summary    02 May 2021 07:01  -  03 May 2021 07:00  --------------------------------------------------------  IN: 440 mL / OUT: 1900 mL / NET: -1460 mL    03 May 2021 07:01  -  04 May 2021 06:42  --------------------------------------------------------  IN: 2165 mL / OUT: 1805 mL / NET: 360 mL      CAPILLARY BLOOD GLUCOSE                          9.4    8.34  )-----------( 247      ( 03 May 2021 04:35 )             29.4     05-03    141  |  108  |  14  ----------------------------<  113<H>  4.1   |  23  |  1.01    Ca    9.0      03 May 2021 04:35      PT/INR - ( 03 May 2021 04:35 )   PT: 13.9 sec;   INR: 1.17 ratio         PTT - ( 03 May 2021 04:35 )  PTT:31.1 sec            PHYSICAL EXAM:  GENERAL: NAD,   CHEST/LUNG: Clear to auscultation bilaterally; + pain to palpation   HEART: Regular rate and rhythm;   ABDOMEN: Soft, Nontender, Nondistended; Bowel sounds present  PSYCH: AAOx3  NEUROLOGY: non-focal    Assessment/Plan: HPI:  55y Female presents with LEFT comminuted patella fracture s/p mechanical fall on 4/19. Pt seen by Dr. Trejo in office. Instructed to come to [facility] ED for med/cards clearance and plan for ORIF of LEFT patella fx. Denies numbness/tingling in the affected extremity. Patient has been minimally ambulating with walker. Sees cardiologist Dr. Lin outpatient. Now with midsternal CP     - Midsternal CP R/O ACS + pain to palpation / ? costochondritis / ? post cath spasms   > VSS, patient stating pain has subsided   > EKG stat   > CE with 3hr f/u   > patient with recent cardiac cath 4/30 : Cath with no obstructive CAD, LAD 10% LAD, RCA 30% stenosis   > will monitor patient closely   > VS q 4hr   > Dr. Main called, awaiting call back, will follow recommendations   > f/u cardiology   > will endorse to AM team        Ross Diaz PA-C,   Department of Medicine Medicine PA Note     ASHWIN JACOB  MRN-[id number]       Notified by RN for patient c/o CP. Patient seen and examined at bedside A&Ox3, lying in bed, patient stating she awoke an hour ago with midsternal CP, radiating to the left chest wall, initially 8/10, stabbing in nature, precipitated by palpation. with no further symptoms or radiation, patient informed hospital staff later, at 6:40AM. Patient states she has felt this pain before, and has had treatment with nitro patch / ranexa outpatient with Dr. Lin though these medications were discontinued due to her blood pressures. At time of interview patient states pain has essentially subsided. No worsening pain with inspiration. Patient denies any SOB/N/V/D/headache/dizziness/weakness/abdominal pain/diaphoresis.     Vital Signs Last 24 Hrs  T(C): 36.7 (05-04-21 @ 05:30), Max: 37.4 (05-03-21 @ 13:15)  T(F): 98.1 (05-04-21 @ 05:30), Max: 99.4 (05-03-21 @ 13:15)  HR: 70 (05-04-21 @ 05:30) (70 - 89)  BP: 101/63 (05-04-21 @ 05:30) (92/58 - 122/61)  BP(mean): 78 (05-03-21 @ 22:53) (71 - 85)  RR: 18 (05-04-21 @ 05:30) (14 - 18)  SpO2: 96% (05-04-21 @ 05:30) (94% - 100%)  Daily Height in cm: 172.72 (03 May 2021 17:37)    Daily   I&O's Summary    02 May 2021 07:01  -  03 May 2021 07:00  --------------------------------------------------------  IN: 440 mL / OUT: 1900 mL / NET: -1460 mL    03 May 2021 07:01  -  04 May 2021 06:42  --------------------------------------------------------  IN: 2165 mL / OUT: 1805 mL / NET: 360 mL      CAPILLARY BLOOD GLUCOSE                          9.4    8.34  )-----------( 247      ( 03 May 2021 04:35 )             29.4     05-03    141  |  108  |  14  ----------------------------<  113<H>  4.1   |  23  |  1.01    Ca    9.0      03 May 2021 04:35      PT/INR - ( 03 May 2021 04:35 )   PT: 13.9 sec;   INR: 1.17 ratio         PTT - ( 03 May 2021 04:35 )  PTT:31.1 sec            PHYSICAL EXAM:  GENERAL: NAD,   CHEST/LUNG: Clear to auscultation bilaterally; + pain to palpation   HEART: Regular rate and rhythm;   ABDOMEN: Soft, Nontender, Nondistended; Bowel sounds present  PSYCH: AAOx3  NEUROLOGY: non-focal    Assessment/Plan: HPI:  55y Female presents with LEFT comminuted patella fracture s/p mechanical fall on 4/19. Pt seen by Dr. Trejo in office. Instructed to come to [facility] ED for med/cards clearance and plan for ORIF of LEFT patella fx. Denies numbness/tingling in the affected extremity. Patient has been minimally ambulating with walker. Sees cardiologist Dr. Lin outpatient. Now with midsternal CP     - Midsternal CP R/O ACS + pain to palpation / ? costochondritis / ? post cath spasms   > VSS, patient stating pain has subsided ( patient without pain, will hold off given patient endorsing hx of hypotension with treatment)   > EKG stat   > CE with 3hr f/u   > patient with recent cardiac cath 4/30 : Cath with no obstructive CAD, LAD 10% LAD, RCA 30% stenosis   > will monitor patient closely   > VS q 4hr   > Dr. Main called, awaiting call back, will follow recommendations ( ? tele transfer, based on findings, will d/w attending)  > f/u cardiology   > will endorse to AM team        Ross Diaz PA-C,   Department of Medicine Medicine PA Note     ASHWIN JACOB  MRN-[id number]       Notified by RN for patient c/o CP. Patient seen and examined at bedside A&Ox3, lying in bed, patient stating she awoke an hour ago with midsternal CP, radiating to the left chest wall, initially 8/10, stabbing in nature, precipitated by palpation. with no further symptoms or radiation, patient informed hospital staff later, at 6:40AM. Patient states she has felt this pain before, and has had treatment with nitro patch / ranexa outpatient with Dr. Lin though these medications were discontinued due to her blood pressures. At time of interview patient states pain has essentially subsided. No worsening pain with inspiration. Patient denies any SOB/N/V/D/headache/dizziness/weakness/abdominal pain/diaphoresis.     Vital Signs Last 24 Hrs  T(C): 36.7 (05-04-21 @ 05:30), Max: 37.4 (05-03-21 @ 13:15)  T(F): 98.1 (05-04-21 @ 05:30), Max: 99.4 (05-03-21 @ 13:15)  HR: 70 (05-04-21 @ 05:30) (70 - 89)  BP: 101/63 (05-04-21 @ 05:30) (92/58 - 122/61)  BP(mean): 78 (05-03-21 @ 22:53) (71 - 85)  RR: 18 (05-04-21 @ 05:30) (14 - 18)  SpO2: 96% (05-04-21 @ 05:30) (94% - 100%)  Daily Height in cm: 172.72 (03 May 2021 17:37)    Daily   I&O's Summary    02 May 2021 07:01  -  03 May 2021 07:00  --------------------------------------------------------  IN: 440 mL / OUT: 1900 mL / NET: -1460 mL    03 May 2021 07:01  -  04 May 2021 06:42  --------------------------------------------------------  IN: 2165 mL / OUT: 1805 mL / NET: 360 mL      CAPILLARY BLOOD GLUCOSE                          9.4    8.34  )-----------( 247      ( 03 May 2021 04:35 )             29.4     05-03    141  |  108  |  14  ----------------------------<  113<H>  4.1   |  23  |  1.01    Ca    9.0      03 May 2021 04:35      PT/INR - ( 03 May 2021 04:35 )   PT: 13.9 sec;   INR: 1.17 ratio         PTT - ( 03 May 2021 04:35 )  PTT:31.1 sec            PHYSICAL EXAM:  GENERAL: NAD,   CHEST/LUNG: Clear to auscultation bilaterally; + pain to palpation   HEART: Regular rate and rhythm;   ABDOMEN: Soft, Nontender, Nondistended; Bowel sounds present  PSYCH: AAOx3  NEUROLOGY: non-focal    Assessment/Plan: HPI:  55y Female presents with LEFT comminuted patella fracture s/p mechanical fall on 4/19. Pt seen by Dr. Trejo in office. Instructed to come to [facility] ED for med/cards clearance and plan for ORIF of LEFT patella fx. Denies numbness/tingling in the affected extremity. Patient has been minimally ambulating with walker. Sees cardiologist Dr. Lin outpatient. Now with midsternal CP     - Midsternal CP R/O ACS + pain to palpation / ? costochondritis / ? post cath spasms   > VSS, patient stating pain has subsided ( patient without pain, will hold off given patient endorsing hx of hypotension with treatment)   > EKG stat showing NSR 72bpm, unchanged from EKG done on 4/28, no acute ST-T wave changes   > CE with 3hr f/u   > patient with recent cardiac cath 4/30 : Cath with no obstructive CAD, LAD 10% LAD, RCA 30% stenosis   > will monitor patient closely   > VS q 4hr   > Dr. Main called, awaiting call back, will follow recommendations ( ? tele transfer, based on findings, will d/w attending)  > f/u cardiology   > will endorse to AM team        Ross Diaz PA-C,   Department of Medicine

## 2021-05-04 NOTE — PHYSICAL THERAPY INITIAL EVALUATION ADULT - ADDITIONAL COMMENTS
Pt lives with family in private home 6 steps to enter, full flight inside home. Prior to admission pt was independent with all functional mobility and used a RW to ambulate after fall. Pt owns commode.

## 2021-05-04 NOTE — PHYSICAL THERAPY INITIAL EVALUATION ADULT - PLANNED THERAPY INTERVENTIONS, PT EVAL
GOAL: Pt will independently negotiate stairs with 1 handrail with pattern in 2wks./balance training/bed mobility training/gait training/strengthening/transfer training

## 2021-05-04 NOTE — PROGRESS NOTE ADULT - SUBJECTIVE AND OBJECTIVE BOX
Ortho Progress Note    S: Patient seen and examined. No acute events overnight. Pain well controlled with current regimen. Denies lightheadedness/dizziness, CP/SOB. Tolerating diet.       O:  Physical Exam:  Gen: Laying in bed, NAD, alert and oriented.   Resp: Unlabored breathing  Ext: TA/Sol intact, weak EHL/FHL          + SILT DP/SP/MOREL/Sa/Tib complains of decreased sensation from the contarlateral side          +DP, extremity WWP    Vital Signs Last 24 Hrs  T(C): 36.7 (04 May 2021 05:30), Max: 37.4 (03 May 2021 13:15)  T(F): 98.1 (04 May 2021 05:30), Max: 99.4 (03 May 2021 13:15)  HR: 70 (04 May 2021 05:30) (70 - 89)  BP: 101/63 (04 May 2021 05:30) (92/58 - 122/61)  BP(mean): 78 (03 May 2021 22:53) (71 - 85)  RR: 18 (04 May 2021 05:30) (14 - 18)  SpO2: 96% (04 May 2021 05:30) (94% - 100%)                          9.4    8.34  )-----------( 247      ( 03 May 2021 04:35 )             29.4       05-03    141  |  108  |  14  ----------------------------<  113<H>  4.1   |  23  |  1.01        PT/INR - ( 03 May 2021 04:35 )   PT: 13.9 sec;   INR: 1.17 ratio         PTT - ( 03 May 2021 04:35 )  PTT:31.1 sec

## 2021-05-04 NOTE — OCCUPATIONAL THERAPY INITIAL EVALUATION ADULT - LIVES WITH, PROFILE
Pt lives with family in private home, 6 steps to enter, 1st floor setup available. Pt owns RW and commode 2* fall, I prior to fall

## 2021-05-04 NOTE — PROGRESS NOTE ADULT - ASSESSMENT
55F sp L patella ORIF    Neuro: Pain control  Resp: IS  GI: Regular diet, bowel reg  MSK: WBAT in KI, PT/OT  Heme: DVT PPX    Ortho 1337

## 2021-05-04 NOTE — OCCUPATIONAL THERAPY INITIAL EVALUATION ADULT - PRECAUTIONS/LIMITATIONS, REHAB EVAL
Denies numbness/tingling in the affected extremity. Patient has been minimally ambulating with walker. Sees cardiologist Dr. Lin outpatient.

## 2021-05-04 NOTE — PHYSICAL THERAPY INITIAL EVALUATION ADULT - PRECAUTIONS/LIMITATIONS, REHAB EVAL
Denies numbness/tingling in the affected extremity. Patient has been minimally ambulating with walker. Sees cardiologist Dr. Lin outpatient./fall precautions/surgical precautions

## 2021-05-04 NOTE — PROGRESS NOTE ADULT - SUBJECTIVE AND OBJECTIVE BOX
DATE OF SERVICE: 05-04-21     Patient is a 55y old  Female who presents with a chief complaint of L patella fracture, for surgery (04 May 2021 16:40)      INTERVAL HISTORY: chest pain this am - now resolved, still constipated, pain better controlled     REVIEW OF SYSTEMS:  CONSTITUTIONAL: No weakness  EYES/ENT: No visual changes;  No throat pain   NECK: No pain or stiffness  RESPIRATORY: No cough, wheezing; No shortness of breath  CARDIOVASCULAR: No chest pain or palpitations  GASTROINTESTINAL: No abdominal  pain. No nausea, vomiting, or hematemesis  GENITOURINARY: No dysuria, frequency or hematuria  NEUROLOGICAL: No stroke like symptoms  SKIN: No rashes      PHYSICAL EXAM:  T(C): 37.1 (05-04-21 @ 21:40), Max: 37.3 (05-04-21 @ 13:26)  HR: 80 (05-04-21 @ 21:40) (70 - 89)  BP: 142/83 (05-04-21 @ 21:40) (98/58 - 142/83)  RR: 18 (05-04-21 @ 21:40) (18 - 18)  SpO2: 94% (05-04-21 @ 21:40) (94% - 99%)  Wt(kg): --  I&O's Summary    03 May 2021 07:01  -  04 May 2021 07:00  --------------------------------------------------------  IN: 3040 mL / OUT: 1805 mL / NET: 1235 mL    04 May 2021 07:01  -  05 May 2021 00:21  --------------------------------------------------------  IN: 480 mL / OUT: 1850 mL / NET: -1370 mL          Appearance: In no distress	  HEENT:    PERRL, EOMI	  Cardiovascular:  S1 S2, No JVD  Respiratory: Lungs clear to auscultation	  Gastrointestinal:  Soft, Non-tender, + BS	  Vascularature:  No edema of LE  Psychiatric: Appropriate affect   Neuro: no acute focal deficits                               8.5    9.05  )-----------( 253      ( 04 May 2021 07:15 )             26.3     05-04    140  |  108  |  12  ----------------------------<  124<H>  4.6   |  19<L>  |  0.80    Ca    8.9      04 May 2021 07:12          Labs personally reviewed      Patient is a 54 Y/O Female with PMhx of CAD S/P PCI, HLD, kidney stone, migraine presents with LEFT comminuted patella fracture s/p mechanical fall on 4/19. Pt seen by Dr. Trejo in office. Instructed to come to [facility] ED for med/cards clearance and plan for ORIF of LEFT patella fx. Denies numbness/tingling in the affected extremity. Patient has been minimally ambulating with walker. in the ED she started having bladder pressure with decrease urine output.       Problem/Recommendation - 1:  Problem: Patella fracture. Recommendation: Ortho follow up   s/p OR, repair  Tolerated surgery well    Problem/Recommendation - 2:  ·  Problem: Palps/Exertional SOB  Recommendation: NM stress test done t with large areas of infarct with leonor-infarct ischemia   Risks and benefits of cardiac cath discussed with pt. Discussed with Outpt cardio Dr Lin as well  Cath with no obstructive CAD, LAD 10% LAD, RCA 30% stenosis     Problem/Recommendation - 3:  ·  Problem: CAD (coronary artery disease).  Recommendation: S/P stent x 2 to LAD about two years ago   cont ASA and lipitor 40 daily   Toprol 25mg daily   Cath with no obstructive CAD, LAD 10% LAD, RCA 30% stenosis   Chest pain today 5/4 - non cardiac in nature, reproducible to palpation. Prolonged discussion with pt - reassurance provided     Problem/Recommendation - 4:  ·  Problem: HLD (hyperlipidemia).  Recommendation: statin for secondary prevention     Problem/Recommendation - 5:  ·  Problem: Urinary retention.  Recommendation: acute onset o admission  Was retaining about 300cc of urine s/p straight cath  Again 5/2 with urinary retention, straight cath with >700cc of urine  Urology consult called, rec johansen placement  ID recs for UTI appreciated. Continue with Vanco    Problem/Recommendation - 6:  Problem: Constipation  Recommendation: Miralax, suppository, multiple enemas with no bowel movement  - likely impacted, will consult GI for disimpaction             Maximo Main DO [facility]  Cardiovascular Medicine  [address], [address]  Office: [phone]  Cell: [phone]

## 2021-05-04 NOTE — PHYSICAL THERAPY INITIAL EVALUATION ADULT - MANUAL MUSCLE TESTING RESULTS, REHAB EVAL
strength grossly at least 3/5 throughout; LLE not formally tested secondary to pain and placement of KI

## 2021-05-04 NOTE — OCCUPATIONAL THERAPY INITIAL EVALUATION ADULT - PERTINENT HX OF CURRENT PROBLEM, REHAB EVAL
56 yo F p/w L comminuted patella fracture s/p mechanical fall on 4/19. Pt seen by Dr. Trejo in office. Instructed to come to [facility] ED for med/cards clearance and plan for ORIF of LEFT patella fx. See below

## 2021-05-04 NOTE — PROGRESS NOTE ADULT - ASSESSMENT
Patient is a 54 Y/O Female with PMhx of CAD S/P PCI, HLD, kidney stone, migraine presents with LEFT comminuted patella fracture s/p mechanical fall on 4/19. Pt seen by Dr. Trejo in office. Instructed to come to [facility] ED for med/cards clearance and plan for ORIF of LEFT patella fx. Denies numbness/tingling in the affected extremity. Patient has been minimally ambulating with walker. in the ED she started having bladder pressure with decrease urine output.       Problem/Recommendation - 1:  Problem: Patella fracture. Recommendation: Ortho follow up   S/P OR , ORIF done , ortho follow up   card and med optimization   pain control   PT eval as tolerated, fall precautions.  S/P stress test   S/P cath, no stent placed medical management   constipation, XRay abdomen noted, obstructive diarrhea   rectal enema for constipation today , second dose,  IV hydration cont     Problem/Recommendation - 2:  ·  Problem: Urinary retention.  Recommendation: acute onset  resolved, had urine output, no retention   send UA and urine culture  monitor BUN/Cr  bladder US. PRN   ID eval for Urine Cx  cont vanco per ID recs     Problem/Recommendation - 3:  ·  Problem: CAD (coronary artery disease).  Recommendation: S/P stent x 2 2 years ago   cont ASA and lipitor 40 daily   toprol 25 daily   ischemic W/U per cardiolog, done y     Problem/Recommendation - 4:  ·  Problem: HLD (hyperlipidemia).  Recommendation: statin  lipdi panel  diet control.     Problem/Recommendation - 5:  ·  Problem: Nephrolithiasis.     Problem/Recommendation - 6:  Problem: Prophylactic measure. Recommendation: DVT and gI PPX     Differential diagnosis and plan of care discussed with patient after the evaluation.   Advanced care planning/advanced directives discussed with patient/family. DNR status including forceful chest compressions to attempt to restart the heart, ventilator support/artificial breathing, electric shock, artificial nutrition, health care proxy, Molst form all discussed with pt. Pt wishes to consider.   OMT on six regions for acute somatic dysfunctions done at the bedside   Importance of Fall prevention discussed.

## 2021-05-04 NOTE — PHYSICAL THERAPY INITIAL EVALUATION ADULT - PERTINENT HX OF CURRENT PROBLEM, REHAB EVAL
56 yo F p/w L comminuted patella fracture s/p mechanical fall on 4/19. Pt seen by Dr. Trejo in office. Instructed to come to [facility] ED for med/cards clearance and plan for ORIF of LEFT patella fx. See below

## 2021-05-04 NOTE — PROGRESS NOTE ADULT - ASSESSMENT
55y Female presents with LEFT comminuted patella fracture s/p mechanical fall on 4/19 with dysuria, urine retention, Aerococcus uti, multiple drug allergies     Arash Duarte  Attending Physician   Division of Infectious Disease  Pager #[phone]  Available on Microsoft Teams also  After 5pm/weekend or no response, call #[phone]

## 2021-05-04 NOTE — PHYSICAL THERAPY INITIAL EVALUATION ADULT - ACTIVE RANGE OF MOTION EXAMINATION, REHAB EVAL
L ankle WFL; L hip/knee not formally tested/bilateral upper extremity Active ROM was WFL (within functional limits)/Right LE Active ROM was WFL (within functional limits)

## 2021-05-05 LAB
ANION GAP SERPL CALC-SCNC: 11 MMOL/L — SIGNIFICANT CHANGE UP (ref 5–17)
BUN SERPL-MCNC: 10 MG/DL — SIGNIFICANT CHANGE UP (ref 7–23)
CALCIUM SERPL-MCNC: 8.5 MG/DL — SIGNIFICANT CHANGE UP (ref 8.4–10.5)
CHLORIDE SERPL-SCNC: 110 MMOL/L — HIGH (ref 96–108)
CO2 SERPL-SCNC: 22 MMOL/L — SIGNIFICANT CHANGE UP (ref 22–31)
CREAT SERPL-MCNC: 0.97 MG/DL — SIGNIFICANT CHANGE UP (ref 0.5–1.3)
GLUCOSE SERPL-MCNC: 114 MG/DL — HIGH (ref 70–99)
HCT VFR BLD CALC: 29.1 % — LOW (ref 34.5–45)
HGB BLD-MCNC: 9.2 G/DL — LOW (ref 11.5–15.5)
MCHC RBC-ENTMCNC: 31.3 PG — SIGNIFICANT CHANGE UP (ref 27–34)
MCHC RBC-ENTMCNC: 31.6 GM/DL — LOW (ref 32–36)
MCV RBC AUTO: 99 FL — SIGNIFICANT CHANGE UP (ref 80–100)
NRBC # BLD: 0 /100 WBCS — SIGNIFICANT CHANGE UP (ref 0–0)
PLATELET # BLD AUTO: 253 K/UL — SIGNIFICANT CHANGE UP (ref 150–400)
POTASSIUM SERPL-MCNC: 3.9 MMOL/L — SIGNIFICANT CHANGE UP (ref 3.5–5.3)
POTASSIUM SERPL-SCNC: 3.9 MMOL/L — SIGNIFICANT CHANGE UP (ref 3.5–5.3)
RBC # BLD: 2.94 M/UL — LOW (ref 3.8–5.2)
RBC # FLD: 12.8 % — SIGNIFICANT CHANGE UP (ref 10.3–14.5)
SODIUM SERPL-SCNC: 143 MMOL/L — SIGNIFICANT CHANGE UP (ref 135–145)
VANCOMYCIN TROUGH SERPL-MCNC: 11 UG/ML — SIGNIFICANT CHANGE UP (ref 10–20)
WBC # BLD: 7.22 K/UL — SIGNIFICANT CHANGE UP (ref 3.8–10.5)
WBC # FLD AUTO: 7.22 K/UL — SIGNIFICANT CHANGE UP (ref 3.8–10.5)

## 2021-05-05 PROCEDURE — 99232 SBSQ HOSP IP/OBS MODERATE 35: CPT

## 2021-05-05 RX ORDER — GABAPENTIN 400 MG/1
100 CAPSULE ORAL THREE TIMES A DAY
Refills: 0 | Status: DISCONTINUED | OUTPATIENT
Start: 2021-05-05 | End: 2021-05-13

## 2021-05-05 RX ORDER — SALIVA SUBSTITUTE COMB NO.11 351 MG
5 POWDER IN PACKET (EA) MUCOUS MEMBRANE DAILY
Refills: 0 | Status: DISCONTINUED | OUTPATIENT
Start: 2021-05-05 | End: 2021-05-13

## 2021-05-05 RX ORDER — ACETAMINOPHEN 500 MG
1000 TABLET ORAL ONCE
Refills: 0 | Status: COMPLETED | OUTPATIENT
Start: 2021-05-05 | End: 2021-05-05

## 2021-05-05 RX ADMIN — GABAPENTIN 100 MILLIGRAM(S): 400 CAPSULE ORAL at 23:07

## 2021-05-05 RX ADMIN — OXYCODONE HYDROCHLORIDE 10 MILLIGRAM(S): 5 TABLET ORAL at 10:29

## 2021-05-05 RX ADMIN — Medication 25 MILLIGRAM(S): at 13:26

## 2021-05-05 RX ADMIN — OXYCODONE HYDROCHLORIDE 10 MILLIGRAM(S): 5 TABLET ORAL at 02:00

## 2021-05-05 RX ADMIN — Medication 250 MILLIGRAM(S): at 01:59

## 2021-05-05 RX ADMIN — ATORVASTATIN CALCIUM 40 MILLIGRAM(S): 80 TABLET, FILM COATED ORAL at 21:01

## 2021-05-05 RX ADMIN — Medication 400 MILLIGRAM(S): at 06:18

## 2021-05-05 RX ADMIN — Medication 975 MILLIGRAM(S): at 14:27

## 2021-05-05 RX ADMIN — Medication 975 MILLIGRAM(S): at 13:27

## 2021-05-05 RX ADMIN — Medication 5 MILLILITER(S): at 10:30

## 2021-05-05 RX ADMIN — OXYCODONE HYDROCHLORIDE 10 MILLIGRAM(S): 5 TABLET ORAL at 17:33

## 2021-05-05 RX ADMIN — OXYCODONE HYDROCHLORIDE 10 MILLIGRAM(S): 5 TABLET ORAL at 18:33

## 2021-05-05 RX ADMIN — OXYCODONE HYDROCHLORIDE 10 MILLIGRAM(S): 5 TABLET ORAL at 20:59

## 2021-05-05 RX ADMIN — OXYCODONE HYDROCHLORIDE 10 MILLIGRAM(S): 5 TABLET ORAL at 21:30

## 2021-05-05 RX ADMIN — OXYCODONE HYDROCHLORIDE 10 MILLIGRAM(S): 5 TABLET ORAL at 02:30

## 2021-05-05 RX ADMIN — Medication 325 MILLIGRAM(S): at 17:33

## 2021-05-05 RX ADMIN — Medication 250 MILLIGRAM(S): at 15:55

## 2021-05-05 RX ADMIN — ONDANSETRON 4 MILLIGRAM(S): 8 TABLET, FILM COATED ORAL at 18:02

## 2021-05-05 RX ADMIN — CYCLOBENZAPRINE HYDROCHLORIDE 5 MILLIGRAM(S): 10 TABLET, FILM COATED ORAL at 06:16

## 2021-05-05 RX ADMIN — ESCITALOPRAM OXALATE 10 MILLIGRAM(S): 10 TABLET, FILM COATED ORAL at 23:08

## 2021-05-05 RX ADMIN — PANTOPRAZOLE SODIUM 40 MILLIGRAM(S): 20 TABLET, DELAYED RELEASE ORAL at 06:16

## 2021-05-05 RX ADMIN — Medication 1000 MILLIGRAM(S): at 06:48

## 2021-05-05 RX ADMIN — SENNA PLUS 2 TABLET(S): 8.6 TABLET ORAL at 21:00

## 2021-05-05 RX ADMIN — Medication 325 MILLIGRAM(S): at 06:16

## 2021-05-05 RX ADMIN — OXYCODONE HYDROCHLORIDE 10 MILLIGRAM(S): 5 TABLET ORAL at 11:30

## 2021-05-05 RX ADMIN — CYCLOBENZAPRINE HYDROCHLORIDE 5 MILLIGRAM(S): 10 TABLET, FILM COATED ORAL at 16:00

## 2021-05-05 RX ADMIN — Medication 1 TABLET(S): at 13:28

## 2021-05-05 RX ADMIN — OXYCODONE HYDROCHLORIDE 10 MILLIGRAM(S): 5 TABLET ORAL at 14:51

## 2021-05-05 RX ADMIN — POLYETHYLENE GLYCOL 3350 17 GRAM(S): 17 POWDER, FOR SOLUTION ORAL at 10:30

## 2021-05-05 RX ADMIN — OXYCODONE HYDROCHLORIDE 10 MILLIGRAM(S): 5 TABLET ORAL at 13:51

## 2021-05-05 NOTE — PROGRESS NOTE ADULT - ASSESSMENT
55y Female presents with LEFT comminuted patella fracture s/p mechanical fall on 4/19 with dysuria, urine retention, Aerococcus uti, multiple drug allergies     Arash Duarte  Attending Physician   Division of Infectious Disease  Pager #[phone]  Available on Microsoft Teams also  After 5pm/weekend or no response, call #[phone]

## 2021-05-05 NOTE — CONSULT NOTE ADULT - REASON FOR ADMISSION
L patella fracture, for surgery

## 2021-05-05 NOTE — DIETITIAN INITIAL EVALUATION ADULT. - FACTORS AFF FOOD INTAKE
in house, pt reports overall good appetite and intake, denies any chewing/swallowing issues; reports she did have somewhat of BM after enema yesterday

## 2021-05-05 NOTE — DIETITIAN INITIAL EVALUATION ADULT. - EDUCATION DIETARY MODIFICATIONS
Encouraged PO intake as tolerated, taking protein c all meals and consuming adequate fiber and fluid to promote regularity of BMs./(2) meets goals/outcomes/verbalization

## 2021-05-05 NOTE — CONSULT NOTE ADULT - ASSESSMENT
Patient is a 56 Y/O Female with PMhx of CAD S/P PCI, HLD, kidney stone, migraine presents with LEFT comminuted patella fracture s/p mechanical fall on 4/19. Now s/p Left patella ORIF 5/3. GI consulted for post- operative constipation.

## 2021-05-05 NOTE — CONSULT NOTE ADULT - PROBLEM SELECTOR RECOMMENDATION 4
- S/P stent x 2 2 years ago   - appreciate cardiology input    The plan of care was discussed with the physician assistant and modifications were made to the notation where appropriate.   Differential diagnosis and plan of care discussed with patient after the evaluation  35 minutes spent on total encounter of which more than fifty percent of the encounter was spent counseling and/or coordinating care by the attending physician.    [facility]  Gastroenterology and Hepatology  [phone] - S/P stent x 2 2 years ago   - appreciate cardiology input    The plan of care was discussed with the physician assistant and modifications were made to the notation where appropriate.   Differential diagnosis and plan of care discussed with patient after the evaluation  125 minutes spent on total encounter of which more than fifty percent of the encounter was spent counseling and/or coordinating care by the attending physician.    [facility]  Gastroenterology and Hepatology  [phone] s/p johansen cath  trial of void  follow up urology reccs

## 2021-05-05 NOTE — PROGRESS NOTE ADULT - SUBJECTIVE AND OBJECTIVE BOX
ASHWIN JACOB 55y [id number]    Patient is a 55y old  Female who presents with a chief complaint of L patella fracture, for surgery; pt S/P ORIF on 5/3. (05 May 2021 11:33)      Follow Up/CC:  ID following for uti    Interval History/ROS: no fever, tolerating abx     Allergies    apple (Hives)  Compazine (Unknown)  morphine (Unknown)  PC Pen VK (Unknown)  shellfish (Unknown)  sulfa drugs (Unknown)    Intolerances        ANTIMICROBIALS:  vancomycin  IVPB 1000 every 12 hours      MEDICATIONS  (STANDING):  acetaminophen   Tablet .. 975 milliGRAM(s) Oral every 8 hours  aspirin enteric coated 325 milliGRAM(s) Oral two times a day  atorvastatin 40 milliGRAM(s) Oral at bedtime  escitalopram 10 milliGRAM(s) Oral at bedtime  multivitamin 1 Tablet(s) Oral daily  pantoprazole    Tablet 40 milliGRAM(s) Oral before breakfast  polyethylene glycol 3350 17 Gram(s) Oral daily  senna 2 Tablet(s) Oral at bedtime  sodium chloride 0.9%. 1000 milliLiter(s) (125 mL/Hr) IV Continuous <Continuous>  topiramate 25 milliGRAM(s) Oral daily  vancomycin  IVPB 1000 milliGRAM(s) IV Intermittent every 12 hours    MEDICATIONS  (PRN):  Biotene Dry Mouth Oral Rinse 5 milliLiter(s) Swish and Spit daily PRN Mouth Care  bisacodyl Suppository 10 milliGRAM(s) Rectal daily PRN If no bowel movement  cyclobenzaprine 5 milliGRAM(s) Oral three times a day PRN Muscle Spasm  magnesium hydroxide Suspension 30 milliLiter(s) Oral daily PRN Constipation  ondansetron Injectable 4 milliGRAM(s) IV Push every 6 hours PRN Nausea  oxyCODONE    IR 5 milliGRAM(s) Oral every 4 hours PRN Moderate Pain (4 - 6)  oxyCODONE    IR 10 milliGRAM(s) Oral every 4 hours PRN Severe Pain (7 - 10)        Vital Signs Last 24 Hrs  T(C): 36.6 (05 May 2021 13:46), Max: 37.1 (04 May 2021 21:40)  T(F): 97.8 (05 May 2021 13:46), Max: 98.8 (04 May 2021 21:40)  HR: 81 (05 May 2021 13:46) (73 - 89)  BP: 95/61 (05 May 2021 13:46) (95/61 - 142/83)  BP(mean): --  RR: 18 (05 May 2021 13:46) (18 - 18)  SpO2: 98% (05 May 2021 13:46) (94% - 99%)    CBC Full  -  ( 05 May 2021 10:03 )  WBC Count : 7.22 K/uL  RBC Count : 2.94 M/uL  Hemoglobin : 9.2 g/dL  Hematocrit : 29.1 %  Platelet Count - Automated : 253 K/uL  Mean Cell Volume : 99.0 fl  Mean Cell Hemoglobin : 31.3 pg  Mean Cell Hemoglobin Concentration : 31.6 gm/dL  Auto Neutrophil # : x  Auto Lymphocyte # : x  Auto Monocyte # : x  Auto Eosinophil # : x  Auto Basophil # : x  Auto Neutrophil % : x  Auto Lymphocyte % : x  Auto Monocyte % : x  Auto Eosinophil % : x  Auto Basophil % : x    05-05    143  |  110<H>  |  10  ----------------------------<  114<H>  3.9   |  22  |  0.97    Ca    8.5      05 May 2021 10:03            MICROBIOLOGY:  .Urine Catheterized  04-29-21   50,000 - 99,000 CFU/mL Aerococcus species  "Aerococcus spp. are predictably susceptible to penicillin,  ampicillin, tetracycline, and vancomycin.  All isolates are  resistant to sulfonamides"  --  --        Rapid RVP Result: NotDetec (05-02 @ 01:38)        RADIOLOGY    < from: Xray Abdomen 1 View PORTABLE -Urgent (Xray Abdomen 1 View PORTABLE -Urgent .) (05.02.21 @ 19:44) >  Mildly distended bowel loops with moderate stool burden.    < end of copied text >

## 2021-05-05 NOTE — CONSULT NOTE ADULT - PROBLEM SELECTOR RECOMMENDATION 9
- AXR reviewed with large stool burden  - suspect 2/2 post surgical use of narcotic pain medications  - s/p 1 fleet enema  - would start aggressive bowel regimen with senna, miralax and dulcolax supp and monitor for bms  - tap water enema ordered - AXR reviewed with large stool burden  - suspect 2/2 post use of narcotic pain medications  - s/p 1 fleet enema  - would start aggressive bowel regimen with senna, miralax and dulcolax supp and monitor for bms  - tap water enema ordered

## 2021-05-05 NOTE — DIETITIAN INITIAL EVALUATION ADULT. - ORAL INTAKE PTA/DIET HISTORY
Pt reports overall good appetite & intake at home. Reports she avoids adding salt to meals since most Kosher meats are already salted. Reports trying to maintain an overall healthy, balanced diet. Confirms allergy to shellfish and raw apples (able to tolerate processed/cooked apples) and reports she avoids spinach and peanuts due to her kidney stones. Reports she was taking vitamin D supplement at home.

## 2021-05-05 NOTE — PROGRESS NOTE ADULT - SUBJECTIVE AND OBJECTIVE BOX
Name of Patient : ASHWIN JACOB  MRN: [id number]  DATE OF SERVICE: 05-05-21 @ 16:38    Subjective: Patient seen and examined. No new events except as noted.   SP OR  small bowel movement overnight  will try another enema     REVIEW OF SYSTEMS:    CONSTITUTIONAL: No weakness, fevers or chills  EYES/ENT: No visual changes;  No vertigo or throat pain   NECK: No pain or stiffness  RESPIRATORY: No cough, wheezing, hemoptysis; No shortness of breath  CARDIOVASCULAR: No chest pain or palpitations  GASTROINTESTINAL: No abdominal or epigastric pain. No nausea, vomiting, or hematemesis; No diarrhea or constipation. No melena or hematochezia.  GENITOURINARY: No dysuria, frequency or hematuria  NEUROLOGICAL: No numbness or weakness  SKIN: No itching, burning, rashes, or lesions   All other review of systems is negative unless indicated above.    MEDICATIONS:  MEDICATIONS  (STANDING):  acetaminophen   Tablet .. 975 milliGRAM(s) Oral every 8 hours  aspirin enteric coated 325 milliGRAM(s) Oral two times a day  atorvastatin 40 milliGRAM(s) Oral at bedtime  escitalopram 10 milliGRAM(s) Oral at bedtime  multivitamin 1 Tablet(s) Oral daily  pantoprazole    Tablet 40 milliGRAM(s) Oral before breakfast  polyethylene glycol 3350 17 Gram(s) Oral daily  senna 2 Tablet(s) Oral at bedtime  sodium chloride 0.9%. 1000 milliLiter(s) (125 mL/Hr) IV Continuous <Continuous>  topiramate 25 milliGRAM(s) Oral daily  vancomycin  IVPB 1000 milliGRAM(s) IV Intermittent every 12 hours      PHYSICAL EXAM:  T(C): 36.6 (05-05-21 @ 13:46), Max: 37.1 (05-04-21 @ 21:40)  HR: 81 (05-05-21 @ 13:46) (73 - 89)  BP: 95/61 (05-05-21 @ 13:46) (95/61 - 142/83)  RR: 18 (05-05-21 @ 13:46) (18 - 18)  SpO2: 98% (05-05-21 @ 13:46) (94% - 99%)  Wt(kg): --  I&O's Summary    04 May 2021 07:01  -  05 May 2021 07:00  --------------------------------------------------------  IN: 1040 mL / OUT: 3050 mL / NET: -2010 mL    05 May 2021 07:01  -  05 May 2021 16:38  --------------------------------------------------------  IN: 440 mL / OUT: 500 mL / NET: -60 mL          Appearance: Normal	  HEENT:  PERRLA   Lymphatic: No lymphadenopathy   Cardiovascular: Normal S1 S2, no JVD  Respiratory: normal effort , clear  Gastrointestinal:  Soft, Non-tender  Skin: No rashes,  warm to touch  Psychiatry:  Mood & affect appropriate  Musculuskeletal: L LE pain, dressing       All labs, Imaging and EKGs personally reviewed       05-04-21 @ 07:01  -  05-05-21 @ 07:00  --------------------------------------------------------  IN: 1040 mL / OUT: 3050 mL / NET: -2010 mL    05-05-21 @ 07:01  -  05-05-21 @ 16:38  --------------------------------------------------------  IN: 440 mL / OUT: 500 mL / NET: -60 mL                            9.2    7.22  )-----------( 253      ( 05 May 2021 10:03 )             29.1               05-05    143  |  110<H>  |  10  ----------------------------<  114<H>  3.9   |  22  |  0.97    Ca    8.5      05 May 2021 10:03             CARDIAC MARKERS ( 04 May 2021 10:12 )  x     / x     / 34 U/L / x     / 1.0 ng/mL

## 2021-05-05 NOTE — DIETITIAN INITIAL EVALUATION ADULT. - PERTINENT MEDS FT
MEDICATIONS  (STANDING):  acetaminophen   Tablet .. 975 milliGRAM(s) Oral every 8 hours  aspirin enteric coated 325 milliGRAM(s) Oral two times a day  atorvastatin 40 milliGRAM(s) Oral at bedtime  escitalopram 10 milliGRAM(s) Oral at bedtime  multivitamin 1 Tablet(s) Oral daily  pantoprazole    Tablet 40 milliGRAM(s) Oral before breakfast  polyethylene glycol 3350 17 Gram(s) Oral daily  senna 2 Tablet(s) Oral at bedtime  sodium chloride 0.9%. 1000 milliLiter(s) (125 mL/Hr) IV Continuous <Continuous>  topiramate 25 milliGRAM(s) Oral daily  vancomycin  IVPB 1000 milliGRAM(s) IV Intermittent every 12 hours    MEDICATIONS  (PRN):  Biotene Dry Mouth Oral Rinse 5 milliLiter(s) Swish and Spit daily PRN Mouth Care  bisacodyl Suppository 10 milliGRAM(s) Rectal daily PRN If no bowel movement  cyclobenzaprine 5 milliGRAM(s) Oral three times a day PRN Muscle Spasm  magnesium hydroxide Suspension 30 milliLiter(s) Oral daily PRN Constipation  ondansetron Injectable 4 milliGRAM(s) IV Push every 6 hours PRN Nausea  oxyCODONE    IR 5 milliGRAM(s) Oral every 4 hours PRN Moderate Pain (4 - 6)  oxyCODONE    IR 10 milliGRAM(s) Oral every 4 hours PRN Severe Pain (7 - 10)

## 2021-05-05 NOTE — PROGRESS NOTE ADULT - ASSESSMENT
Patient is a 54 Y/O Female with PMhx of CAD S/P PCI, HLD, kidney stone, migraine presents with LEFT comminuted patella fracture s/p mechanical fall on 4/19. Pt seen by Dr. Trejo in office. Instructed to come to [facility] ED for med/cards clearance and plan for ORIF of LEFT patella fx. Denies numbness/tingling in the affected extremity. Patient has been minimally ambulating with walker. in the ED she started having bladder pressure with decrease urine output.       Problem/Recommendation - 1:  Problem: Patella fracture. Recommendation: Ortho follow up   S/P OR , ORIF done , ortho follow up   card and med optimization   pain control   PT eval as tolerated, fall precautions.  S/P stress test   S/P cath, no stent placed medical management   constipation, XRay abdomen noted, obstructive diarrhea   rectal enema will try another dose, prior to manual disimpaction   IV hydration cont   GI eval called for further recs     Problem/Recommendation - 2:  ·  Problem: Urinary retention.  Recommendation: acute onset  resolved, had urine output, no retention   send UA and urine culture  monitor BUN/Cr  bladder US. PRN   ID eval for Urine Cx  cont vanco per ID recs     Problem/Recommendation - 3:  ·  Problem: CAD (coronary artery disease).  Recommendation: S/P stent x 2 2 years ago   cont ASA and lipitor 40 daily   toprol 25 daily   ischemic W/U per cardiolog, done y     Problem/Recommendation - 4:  ·  Problem: HLD (hyperlipidemia).  Recommendation: statin  lipdi panel  diet control.     Problem/Recommendation - 5:  ·  Problem: Nephrolithiasis.     Problem/Recommendation - 6:  Problem: Prophylactic measure. Recommendation: DVT and gI PPX     Differential diagnosis and plan of care discussed with patient after the evaluation.   Advanced care planning/advanced directives discussed with patient/family. DNR status including forceful chest compressions to attempt to restart the heart, ventilator support/artificial breathing, electric shock, artificial nutrition, health care proxy, Molst form all discussed with pt. Pt wishes to consider.   OMT on six regions for acute somatic dysfunctions done at the bedside   Importance of Fall prevention discussed.

## 2021-05-05 NOTE — PROGRESS NOTE ADULT - SUBJECTIVE AND OBJECTIVE BOX
Orthopaedic Surgery Progress Note    Subjective:   Patient seen and examined. No acute events overnight. Pain well controlled with current regimen. Denies lightheadedness/dizziness, CP/SOB. Tolerating diet.     Objective:  T(C): 36.7 (05-05-21 @ 08:31), Max: 37.3 (05-04-21 @ 13:26)  HR: 73 (05-05-21 @ 08:31) (71 - 89)  BP: 108/58 (05-05-21 @ 08:31) (99/62 - 142/83)  RR: 18 (05-05-21 @ 08:31) (18 - 18)  SpO2: 97% (05-05-21 @ 08:31) (94% - 99%)  Wt(kg): --    05-04 @ 07:01  -  05-05 @ 07:00  --------------------------------------------------------  IN: 920 mL / OUT: 3050 mL / NET: -2130 mL        Physical Exam:  Gen: Laying in bed, NAD, alert and oriented.   Resp: Unlabored breathing  Ext: TA/Sol intact, weak EHL/FHL          + SILT DP/SP/MOREL/Sa/Tib complains of decreased sensation from the contralateral side          +DP, extremity WWP                          8.5    9.05  )-----------( 253      ( 04 May 2021 07:15 )             26.3     05-04    140  |  108  |  12  ----------------------------<  124<H>  4.6   |  19<L>  |  0.80    Ca    8.9      04 May 2021 07:12

## 2021-05-05 NOTE — DIETITIAN INITIAL EVALUATION ADULT. - IDEAL BODY WEIGHT (LBS)
VSS, afebrile. Scheduled reglan given, no c/o nausea. Tums and simiethcone given prn. Tramadol prn for back and abdominal pain. Patient didn't sleep well, up in chair for part of night. Ileostomy with good output. Sidney drain output charted.  Ambulating with w and/or relaxation techniques  - Monitor for opioid side effects  - Notify MD/LIP if interventions unsuccessful or patient reports new pain  - Anticipate increased pain with activity and pre-medicate as appropriate  Outcome: Progressing     Problem: RISK FO ostomies/wounds/G-tubes  Outcome: Progressing     Problem: GASTROINTESTINAL - ADULT  Goal: Minimal or absence of nausea and vomiting  Description: INTERVENTIONS:  - Maintain adequate hydration with IV or PO as ordered and tolerated  - Nasogastric tube to l 140

## 2021-05-05 NOTE — PROGRESS NOTE ADULT - SUBJECTIVE AND OBJECTIVE BOX
Patient is a 55y old  Female who presents with a chief complaint of L patella fracture, for surgery (05 May 2021 16:38)      INTERVAL HISTORY: denies cp, sob    REVIEW OF SYSTEMS:   CONSTITUTIONAL: No weakness  EYES/ENT: No visual changes; No throat pain  Neck: No pain or stiffness  Respiratory: No cough, wheezing, No shortness of breath  CARDIOVASCULAR: no chest pain or palpitations  GASTROINTESTINAL: No abdominal pain, no nausea, vomiting or hematemesis  GENITOURINARY: No dysuria, frequency or hematuria  NEUROLOGICAL: No stroke like symptoms  SKIN: No rashes    	  MEDICATIONS:        PHYSICAL EXAM:  T(C): 36.6 (05-05-21 @ 13:46), Max: 37.1 (05-04-21 @ 21:40)  HR: 81 (05-05-21 @ 13:46) (73 - 81)  BP: 95/61 (05-05-21 @ 13:46) (95/61 - 142/83)  RR: 18 (05-05-21 @ 13:46) (18 - 18)  SpO2: 98% (05-05-21 @ 13:46) (94% - 99%)  Wt(kg): --  I&O's Summary    04 May 2021 07:01  -  05 May 2021 07:00  --------------------------------------------------------  IN: 1040 mL / OUT: 3050 mL / NET: -2010 mL    05 May 2021 07:01  -  05 May 2021 17:09  --------------------------------------------------------  IN: 440 mL / OUT: 500 mL / NET: -60 mL    Appearance: In no distress	  HEENT:    PERRL, EOMI	  Cardiovascular:  S1 S2, No JVD  Respiratory: Lungs clear to auscultation	  Gastrointestinal:  Soft, Non-tender, + BS	  Vascularature:  No edema of LE  Psychiatric: Appropriate affect   Neuro: no acute focal deficits                         9.2    7.22  )-----------( 253      ( 05 May 2021 10:03 )             29.1     05-05    143  |  110<H>  |  10  ----------------------------<  114<H>  3.9   |  22  |  0.97    Ca    8.5      05 May 2021 10:03    Labs personally reviewed    ASSESSMENT/PLAN: 	  Patient is a 56 Y/O Female with PMhx of CAD S/P PCI, HLD, kidney stone, migraine presents with LEFT comminuted patella fracture s/p mechanical fall on 4/19. Pt seen by Dr. Trejo in office. Instructed to come to [facility] ED for med/cards clearance and plan for ORIF of LEFT patella fx. Denies numbness/tingling in the affected extremity. Patient has been minimally ambulating with walker. in the ED she started having bladder pressure with decrease urine output.       Problem/Recommendation - 1:  Problem: Patella fracture. Recommendation: Ortho follow up   s/p OR, repair  Tolerated surgery well from cv standpoint     Problem/Recommendation - 2:  ·  Problem: Palps/Exertional SOB  Recommendation: NM stress test done t with large areas of infarct with leonor-infarct ischemia   Risks and benefits of cardiac cath discussed with pt. Discussed with Outpt cardio Dr Lin as well  Cath with no obstructive CAD, LAD 10% LAD, RCA 30% stenosis    Problem/Recommendation - 3:  ·  Problem: CAD (coronary artery disease).  Recommendation: S/P stent x 2 to LAD about two years ago   cont ASA and lipitor 40 daily   Toprol 25mg daily   Cath with no obstructive CAD, LAD 10% LAD, RCA 30% stenosis   Chest pain today 5/4 - non cardiac in nature, reproducible to palpation. Prolonged discussion with pt - reassurance provided   medical management     Problem/Recommendation - 4:  ·  Problem: HLD (hyperlipidemia).  Recommendation: statin for secondary prevention     Problem/Recommendation - 5:  ·  Problem: Urinary retention.  Recommendation: acute onset o admission  Was retaining about 300cc of urine s/p straight cath  Again 5/2 with urinary retention, straight cath with >700cc of urine  Urology consult called, rec johansen placement  ID recs for UTI appreciated. Continue with Vanco    Problem/Recommendation - 6:  Problem: Constipation  Recommendation: Miralax, suppository, multiple enemas with no bowel movement  - likely impacted, will consult GI for disimpaction           Susan Main DO [facility]  Cardiovascular Medicine  [address], [address]  Office: [phone]  Cell: [phone] Patient is a 55y old  Female who presents with a chief complaint of L patella fracture, for surgery (05 May 2021 16:38)      INTERVAL HISTORY: denies cp, sob, had BM last night - feels better    REVIEW OF SYSTEMS:   CONSTITUTIONAL: No weakness  EYES/ENT: No visual changes; No throat pain  Neck: No pain or stiffness  Respiratory: No cough, wheezing, No shortness of breath  CARDIOVASCULAR: no chest pain or palpitations  GASTROINTESTINAL: No abdominal pain, no nausea, vomiting or hematemesis  GENITOURINARY: No dysuria, frequency or hematuria  NEUROLOGICAL: No stroke like symptoms  SKIN: No rashes    	  MEDICATIONS:        PHYSICAL EXAM:  T(C): 36.6 (05-05-21 @ 13:46), Max: 37.1 (05-04-21 @ 21:40)  HR: 81 (05-05-21 @ 13:46) (73 - 81)  BP: 95/61 (05-05-21 @ 13:46) (95/61 - 142/83)  RR: 18 (05-05-21 @ 13:46) (18 - 18)  SpO2: 98% (05-05-21 @ 13:46) (94% - 99%)  Wt(kg): --  I&O's Summary    04 May 2021 07:01  -  05 May 2021 07:00  --------------------------------------------------------  IN: 1040 mL / OUT: 3050 mL / NET: -2010 mL    05 May 2021 07:01  -  05 May 2021 17:09  --------------------------------------------------------  IN: 440 mL / OUT: 500 mL / NET: -60 mL    Appearance: In no distress	  HEENT:    PERRL, EOMI	  Cardiovascular:  S1 S2, No JVD  Respiratory: Lungs clear to auscultation	  Gastrointestinal:  Soft, Non-tender, + BS	  Vascularature:  No edema of LE  Psychiatric: Appropriate affect   Neuro: no acute focal deficits                         9.2    7.22  )-----------( 253      ( 05 May 2021 10:03 )             29.1     05-05    143  |  110<H>  |  10  ----------------------------<  114<H>  3.9   |  22  |  0.97    Ca    8.5      05 May 2021 10:03    Labs personally reviewed    ASSESSMENT/PLAN: 	  Patient is a 56 Y/O Female with PMhx of CAD S/P PCI, HLD, kidney stone, migraine presents with LEFT comminuted patella fracture s/p mechanical fall on 4/19. Pt seen by Dr. Trejo in office. Instructed to come to [facility] ED for med/cards clearance and plan for ORIF of LEFT patella fx. Denies numbness/tingling in the affected extremity. Patient has been minimally ambulating with walker. in the ED she started having bladder pressure with decrease urine output.       Problem/Recommendation - 1:  Problem: Patella fracture. Recommendation: Ortho follow up   s/p OR, repair  Tolerated surgery well from cv standpoint     Problem/Recommendation - 2:  ·  Problem: Palps/Exertional SOB  Recommendation: NM stress test done t with large areas of infarct with leonor-infarct ischemia   Risks and benefits of cardiac cath discussed with pt.   Cath done with no obstructive CAD, LAD 10% LAD, RCA 30% stenosis    Problem/Recommendation - 3:  ·  Problem: CAD (coronary artery disease).  Recommendation: S/P stent x 2 to LAD about two years ago   cont ASA and lipitor 40 daily   Toprol 25mg daily   Cath with no obstructive CAD, LAD 10% LAD, RCA 30% stenosis   Chest pain today 5/4 - non cardiac in nature, reproducible to palpation. Prolonged discussion with pt - reassurance provided   medical management   No recurrence     Problem/Recommendation - 4:  ·  Problem: HLD (hyperlipidemia).  Recommendation: statin for secondary prevention     Problem/Recommendation - 5:  ·  Problem: Urinary retention.  Recommendation: acute onset o admission  Was retaining about 300cc of urine s/p straight cath  Again 5/2 with urinary retention, straight cath with >700cc of urine  Urology consult called, rec johansen placement  ID recs for UTI appreciated. Continue with Vanco for 3 day course    Problem/Recommendation - 6:  Problem: Constipation  Recommendation: Miralax, suppository, multiple enemas with no bowel movement  -   5/4 after tap water enema  - GI recs appreciated - physical disimpaction attempted   - will try another tap water enema today          Susan Main DO [facility]  Cardiovascular Medicine  [address] [place] [address], [address]  Office: [phone]  Cell: [phone]

## 2021-05-05 NOTE — CONSULT NOTE ADULT - PROBLEM SELECTOR RECOMMENDATION 8
- S/P stent x 2, 2 years ago   - appreciate cardiology input        125 minutes spent on the total encounter, of which more than fifty percent of the encounter was spent on counseling and/or coordinating care by the attending physician.  Advanced care planning forms were discussed. Code status including forceful chest compressions, defibrillation and intubation were discussed. The risks benefits and alternatives to pertinent gastrointestinal procedures and interventions were discussed in detail and all questions were answered. Duration: 15 Minutes.      Iftikhar Lora M.D.   Gastroenterology and Hepatology  [address] [place], [place]  Office: [phone]  Cell: [phone]

## 2021-05-05 NOTE — CONSULT NOTE ADULT - PROBLEM SELECTOR RECOMMENDATION 6
DVT and gI PPX     Differential diagnosis and plan of care discussed with patient after the evaluation.   Advanced care planning/advanced directives discussed with patient/family. DNR status including forceful chest compressions to attempt to restart the heart, ventilator support/artificial breathing, electric shock, artificial nutrition, health care proxy, Molst form all discussed with pt. Pt wishes to consider.   OMT on six regions for acute somatic dysfunctions done at the bedide   Importance of Fall prevention discussed.  I had a prolonged conversation with patient. More than 50% of the visit was spent counseling and/or coordinating care by the attending physician.  total of one hundred and twenty mins spent on total encounter
reports good control of cholesterol   on home lipitor dose

## 2021-05-05 NOTE — CONSULT NOTE ADULT - SUBJECTIVE AND OBJECTIVE BOX
Chief Complaint:  Patient is a 55y old  Female who presents with a chief complaint of L patella fracture, for surgery (05 May 2021 17:08)      Date of service: 21 @ 22:29    HPI:    The patient is a     The patient denies dysphagia, nausea and vomiting, abdominal pain, diarrhea, unintentional weight loss, change in bowel habits or NSAID use.      Allergies:  apple (Hives)  Compazine (Unknown)  morphine (Unknown)  PC Pen VK (Unknown)  shellfish (Unknown)  sulfa drugs (Unknown)      Home Medications:    Hospital Medications:  acetaminophen   Tablet .. 975 milliGRAM(s) Oral every 8 hours  aspirin enteric coated 325 milliGRAM(s) Oral two times a day  atorvastatin 40 milliGRAM(s) Oral at bedtime  Biotene Dry Mouth Oral Rinse 5 milliLiter(s) Swish and Spit daily PRN  bisacodyl Suppository 10 milliGRAM(s) Rectal daily PRN  cyclobenzaprine 5 milliGRAM(s) Oral three times a day PRN  escitalopram 10 milliGRAM(s) Oral at bedtime  gabapentin 100 milliGRAM(s) Oral three times a day  magnesium hydroxide Suspension 30 milliLiter(s) Oral daily PRN  multivitamin 1 Tablet(s) Oral daily  ondansetron Injectable 4 milliGRAM(s) IV Push every 6 hours PRN  oxyCODONE    IR 5 milliGRAM(s) Oral every 4 hours PRN  oxyCODONE    IR 10 milliGRAM(s) Oral every 4 hours PRN  pantoprazole    Tablet 40 milliGRAM(s) Oral before breakfast  polyethylene glycol 3350 17 Gram(s) Oral daily  senna 2 Tablet(s) Oral at bedtime  sodium chloride 0.9%. 1000 milliLiter(s) IV Continuous <Continuous>  topiramate 25 milliGRAM(s) Oral daily  vancomycin  IVPB 1000 milliGRAM(s) IV Intermittent every 12 hours      PMHX/PSHX:  Vertigo    Nephrolithiasis    Cyst of Pineal Gland    HTN (hypertension)    HLD (hyperlipidemia)    History of coronary artery stent placement    2019 novel coronavirus disease (COVID-19)     delivery delivered    S/P breast lumpectomy    S/P knee surgery        Family history:  Family history of early CAD (Father, Mother)        Social History:   Denies ethanol use.  Denies illicit drug use.    ROS:     General:  No wt loss, fevers, chills, night sweats, fatigue,   Eyes:  Good vision, no reported pain  ENT:  No sore throat, pain, runny nose, dysphagia  CV:  No pain, palpitations, hypo/hypertension  Resp:  No dyspnea, cough, tachypnea, wheezing  GI:  See HPI  :  No pain, bleeding, incontinence, nocturia  Muscle:  No pain, weakness  Neuro:  No weakness, tingling, memory problems  Psych:  No fatigue, insomnia, mood problems, depression  Endocrine:  No polyuria, polydipsia, cold/heat intolerance  Heme:  No petechiae, ecchymosis, easy bruisability  Integumentary:  No rash, edema      PHYSICAL EXAM:     GENERAL:  Appears stated age, well-groomed, well-nourished, no distress  HEENT:  NC/AT,  conjunctivae anicteric, clear and pink,   NECK: supple, trachea midline  CHEST:  Full & symmetric excursion, no increased effort, breath sounds clear  HEART:  Regular rhythm, no JVD  ABDOMEN:  Soft, non-tender, non-distended, normoactive bowel sounds,  no masses , no hepatosplenomegaly  EXTREMITIES:  no cyanosis,clubbing or edema  SKIN:  No rash, erythema, or, ecchymoses, no jaundice  NEURO:  Alert, non-focal, no asterixis  PSYCH: Appropriate affect, oriented to place and time  RECTAL: Deferred      Vital Signs:  Vital Signs Last 24 Hrs  T(C): 36.5 (05 May 2021 15:51), Max: 36.7 (05 May 2021 08:31)  T(F): 97.7 (05 May 2021 15:51), Max: 98.1 (05 May 2021 08:31)  HR: 82 (05 May 2021 15:51) (73 - 82)  BP: 91/60 (05 May 2021 15:51) (91/60 - 127/79)  BP(mean): --  RR: 18 (05 May 2021 15:51) (18 - 18)  SpO2: 95% (05 May 2021 15:51) (95% - 99%)  Daily     Daily     LABS: Labs personally reviewed by me:                        9.2    7.22  )-----------( 253      ( 05 May 2021 10:03 )             29.1     -    143  |  110<H>  |  10  ----------------------------<  114<H>  3.9   |  22  |  0.97    Ca    8.5      05 May 2021 10:03                Imaging personally reviewed by me:           Chief Complaint:  Patient is a 55y old  Female who presents with a chief complaint of L patella fracture, for surgery (05 May 2021 17:08)      Date of service: 21 @ 22:29    HPI:55y Female presents with LEFT comminuted patella fracture s/p mechanical fall on . Pt seen by Dr. Trejo in office. Instructed to come to [facility] ED for med/cards clearance and plan for ORIF of LEFT patella fx. Denies numbness/tingling in the affected extremity. Patient has been minimally ambulating with walker. Sees cardiologist Dr. Lin outpatient. Pt now s/p Left patella ORIF 5/3. GI consulted for constipation. The patient denies dysphagia, nausea and vomiting, abdominal pain, diarrhea, unintentional weight loss, change in bowel habits or NSAID use. She is passing flatus.       Allergies:  apple (Hives)  Compazine (Unknown)  morphine (Unknown)  PC Pen VK (Unknown)  shellfish (Unknown)  sulfa drugs (Unknown)      Home Medications:    Hospital Medications:  acetaminophen   Tablet .. 975 milliGRAM(s) Oral every 8 hours  aspirin enteric coated 325 milliGRAM(s) Oral two times a day  atorvastatin 40 milliGRAM(s) Oral at bedtime  Biotene Dry Mouth Oral Rinse 5 milliLiter(s) Swish and Spit daily PRN  bisacodyl Suppository 10 milliGRAM(s) Rectal daily PRN  cyclobenzaprine 5 milliGRAM(s) Oral three times a day PRN  escitalopram 10 milliGRAM(s) Oral at bedtime  gabapentin 100 milliGRAM(s) Oral three times a day  magnesium hydroxide Suspension 30 milliLiter(s) Oral daily PRN  multivitamin 1 Tablet(s) Oral daily  ondansetron Injectable 4 milliGRAM(s) IV Push every 6 hours PRN  oxyCODONE    IR 5 milliGRAM(s) Oral every 4 hours PRN  oxyCODONE    IR 10 milliGRAM(s) Oral every 4 hours PRN  pantoprazole    Tablet 40 milliGRAM(s) Oral before breakfast  polyethylene glycol 3350 17 Gram(s) Oral daily  senna 2 Tablet(s) Oral at bedtime  sodium chloride 0.9%. 1000 milliLiter(s) IV Continuous <Continuous>  topiramate 25 milliGRAM(s) Oral daily  vancomycin  IVPB 1000 milliGRAM(s) IV Intermittent every 12 hours      PMHX/PSHX:  Vertigo    Nephrolithiasis    Cyst of Pineal Gland    HTN (hypertension)    HLD (hyperlipidemia)    History of coronary artery stent placement    2019 novel coronavirus disease (COVID-19)     delivery delivered    S/P breast lumpectomy    S/P knee surgery        Family history:  Family history of early CAD (Father, Mother)        Social History:   Denies ethanol use.  Denies illicit drug use.    ROS:     General:  No wt loss, fevers, chills, night sweats, fatigue,   Eyes:  Good vision, no reported pain  ENT:  No sore throat, pain, runny nose, dysphagia  CV:  No pain, palpitations, hypo/hypertension  Resp:  No dyspnea, cough, tachypnea, wheezing  GI:  See HPI  :  No pain, bleeding, incontinence, nocturia  Muscle:  No pain, weakness  Neuro:  No weakness, tingling, memory problems  Psych:  No fatigue, insomnia, mood problems, depression  Endocrine:  No polyuria, polydipsia, cold/heat intolerance  Heme:  No petechiae, ecchymosis, easy bruisability  Integumentary:  No rash, edema      PHYSICAL EXAM:     GENERAL:  Appears stated age, well-groomed, well-nourished, no distress  HEENT:  NC/AT,  conjunctivae anicteric, clear and pink,   NECK: supple, trachea midline  CHEST:  Full & symmetric excursion, no increased effort, breath sounds clear  HEART:  Regular rhythm, no JVD  ABDOMEN:  Soft, non-tender, non-distended, normoactive bowel sounds,  no masses , no hepatosplenomegaly  EXTREMITIES:  no cyanosis,clubbing or edema  SKIN:  No rash, erythema, or, ecchymoses, no jaundice  NEURO:  Alert, non-focal, no asterixis  PSYCH: Appropriate affect, oriented to place and time  RECTAL: Deferred      Vital Signs:  Vital Signs Last 24 Hrs  T(C): 36.5 (05 May 2021 15:51), Max: 36.7 (05 May 2021 08:31)  T(F): 97.7 (05 May 2021 15:51), Max: 98.1 (05 May 2021 08:31)  HR: 82 (05 May 2021 15:51) (73 - 82)  BP: 91/60 (05 May 2021 15:51) (91/60 - 127/79)  BP(mean): --  RR: 18 (05 May 2021 15:51) (18 - 18)  SpO2: 95% (05 May 2021 15:51) (95% - 99%)  Daily     Daily     LABS: Labs personally reviewed by me:                        9.2    7.22  )-----------( 253      ( 05 May 2021 10:03 )             29.1         143  |  110<H>  |  10  ----------------------------<  114<H>  3.9   |  22  |  0.97    Ca    8.5      05 May 2021 10:03                Imaging personally reviewed by me:    ra< from: Xray Abdomen 1 View PORTABLE -Urgent (Xray Abdomen 1 View PORTABLE -Urgent .) (21 @ 19:44) >    EXAM:  XR ABDOMEN PORTABLE URGENT 1V                            PROCEDURE DATE:  2021            INTERPRETATION:  Indication:abdominal distention, constipation    Technique: Single view of the abdomen was obtained..    Comparison:None.    Findings:  The bowel loops appear mildly distended with moderate stool burden. Incompletely visualized left hemiabdomen. Visualized osseous structures are unremarkable.    Impression:  Mildly distended bowel loops with moderate stool burden.              ALBA STEARNS MD; Resident Radiology  This document has been electronically signed.  ERICK LARA MD; Attending Interventional Radiologist  This document has been electronically signed. May  3 2021 10:27AM

## 2021-05-05 NOTE — DIETITIAN INITIAL EVALUATION ADULT. - OTHER INFO
Pt reports she was trying to lose wt and feels she was able to go from 175 pounds down to 170 pounds but is not sure. Noted dosing wt of about 170 pounds, would continue to monitor, reports of wt consistent c [facility] weights.     Pt confirmed to follow Kosher dietary laws. Requested pears c all meals, will honor request.

## 2021-05-05 NOTE — CONSULT NOTE ADULT - CONSULT REASON
Preop Risk Stratification
Constipation, concern for fecal impaction
Medical management
urinary retention
UTI

## 2021-05-05 NOTE — CONSULT NOTE ADULT - PROBLEM SELECTOR RECOMMENDATION 2
acute onset  serial bladder scan johansen if needed   send UA and urine culture  monitor BUN/Cr  bladder US
- appreciate care per Orthopaedics  - physical therapy  - pain control
-aerococcus in cx  -multiple allergies - start vancomycin 1 gm iv q12 x 3 days

## 2021-05-05 NOTE — CHART NOTE - NSCHARTNOTEFT_GEN_A_CORE
Medicine PA Episodic Note    Patient is a 55y old  Female who presents with a chief complaint of L patella fracture, for surgery (05 May 2021 17:08)    Notified by RN of pt. c/o LUE antecubital swelling & pain. Pt. has Hx of LUE antecubital IV infiltrate a day ago and having erythema and tenderness upon palpation of antecubital fossa. Pt. had Vanco running prior to IV becoming infiltrated likely causing erythema and tenderness in and around the area where IV was inserted. Pt. states the pain, erythema and swelling have been getting better since yesterday.     Vital Signs Last 24 Hrs  T(C): 36.5 (05 May 2021 15:51), Max: 36.7 (05 May 2021 08:31)  T(F): 97.7 (05 May 2021 15:51), Max: 98.1 (05 May 2021 08:31)  HR: 82 (05 May 2021 15:51) (73 - 82)  BP: 91/60 (05 May 2021 15:51) (91/60 - 127/79)  BP(mean): --  RR: 18 (05 May 2021 15:51) (18 - 18)  SpO2: 95% (05 May 2021 15:51) (95% - 99%)      Labs:                          9.2    7.22  )-----------( 253      ( 05 May 2021 10:03 )             29.1     05-05    143  |  110<H>  |  10  ----------------------------<  114<H>  3.9   |  22  |  0.97    Ca    8.5      05 May 2021 10:03          Radiology:    Physical Exam:  General: WN/WD NAD  Neurology: A&Ox3, nonfocal, NEVES x 4  Head:  Normocephalic, atraumatic  Respiratory: CTA B/L  CV: RRR, S1S2, no murmur  Abdominal: Soft, NT, ND no palpable mass  MSK: No edema, + peripheral pulses, FROM all 4 extremity    Assessment & Plan:  HPI:  55y Female presents with LEFT comminuted patella fracture s/p mechanical fall on 4/19. Pt seen by Dr. Trejo in office. Instructed to come to [facility] ED for med/cards clearance and plan for ORIF of LEFT patella fx. Denies numbness/tingling in the affected extremity. Patient has been minimally ambulating with walker. Sees cardiologist Dr. Lin outpatient.                         (28 Apr 2021 12:23)      Plan:        Follow up with Attending in AM.    Tor Grier PA-C  Department of Medicine  [facility] Medicine PA Episodic Note    Patient is a 55y old  Female who presents with a chief complaint of L patella fracture, for surgery (05 May 2021 17:08)    Notified by RN of pt. c/o LUE antecubital swelling & pain. Pt. has Hx of LUE antecubital IV infiltrate a day ago and having erythema and tenderness upon palpation of antecubital fossa. Pt. had Vancomycin running prior to IV becoming infiltrated likely causing erythema and tenderness in and around the area where IV was inserted. Pt. states the pain, erythema and swelling have been getting better since yesterday. Will continue to monitor site.    Pt. also c/o intermittent neuropathic pain in LE at times, in previous instances as high as 10/10 on pain scale. Currently pain is under control per pt. Will continue current pain med regimen.    Pt. also c/o intermittent chest wall tenderness. Pt. is currently unable to ascertain whether she is having any pain. Discussed w/ pt. considering she has had cardiac w/u done in past and chest wall tenderness is known to be non-cardiac in nature, will opt not to do any cardiac w/u currently. Pt. is in agreement.     Vital Signs Last 24 Hrs  T(C): 36.5 (05 May 2021 15:51), Max: 36.7 (05 May 2021 08:31)  T(F): 97.7 (05 May 2021 15:51), Max: 98.1 (05 May 2021 08:31)  HR: 82 (05 May 2021 15:51) (73 - 82)  BP: 91/60 (05 May 2021 15:51) (91/60 - 127/79)  BP(mean): --  RR: 18 (05 May 2021 15:51) (18 - 18)  SpO2: 95% (05 May 2021 15:51) (95% - 99%)      Labs:                          9.2    7.22  )-----------( 253      ( 05 May 2021 10:03 )             29.1     05-05    143  |  110<H>  |  10  ----------------------------<  114<H>  3.9   |  22  |  0.97    Ca    8.5      05 May 2021 10:03      Physical Exam:  General: WN/WD NAD  Neurology: A&Ox3, nonfocal, NEVES x 4  Head:  Normocephalic, atraumatic  MSK: Refer to HPI, L LE pain, dressing, + peripheral pulses, + sensation in BLE    Assessment & Plan:   54 Y/O Female with PMhx of CAD S/P PCI, HLD, kidney stone, migraine presents with LEFT comminuted patella fracture s/p mechanical fall on 4/19. Pt seen by Dr. Trejo in office. Instructed to come to [facility] ED for med/cards clearance and plan for ORIF of LEFT patella fx. Denies numbness/tingling in the affected extremity. Patient has been minimally ambulating with walker. in the ED she started having bladder pressure with decrease urine output. Now c/o LUE antecubital swelling, neuropathic pain, and intermittent chest wall tenderness.     Plan:  #LUE antecubital swelling & erythema likely 2/2 IV infiltrate  - Keep LUE above heart level  - C/W current pain med regimen  - Monitor VS  - Consider VS duplex RUE if pt.'s symptoms persist or worsen    #LE neuropathic pain  - Pt. known to have neuropathic pain prior to sx per ortho  - C/W current pain med regimen  - Gabapentin added by ortho  - Case d/w ortho  - Monitor for sedation/ drowsiness    #Chest wall tenderness  - reproducible upon palpation  - Tylenol PRN for pain  - Pt. given reassurance  - Previous cardiac w/u neg.  - Discussed above w/ RN  - Will endorse above to day team in AM    Follow up with Attending in AM.    Tor Grier PA-C  Department of Medicine  [facility] [id number]

## 2021-05-05 NOTE — PROGRESS NOTE ADULT - ASSESSMENT
55F POD2 s/p L patella ORIF    Neuro: Pain control  Resp: IS  GI: Regular diet, bowel reg  MSK: WBAT in KI, PT/OT  Heme: DVT PPX    Ortho 1337

## 2021-05-06 DIAGNOSIS — K59.03 DRUG INDUCED CONSTIPATION: ICD-10-CM

## 2021-05-06 LAB
ANION GAP SERPL CALC-SCNC: 13 MMOL/L — SIGNIFICANT CHANGE UP (ref 5–17)
BUN SERPL-MCNC: 14 MG/DL — SIGNIFICANT CHANGE UP (ref 7–23)
CALCIUM SERPL-MCNC: 9.1 MG/DL — SIGNIFICANT CHANGE UP (ref 8.4–10.5)
CHLORIDE SERPL-SCNC: 107 MMOL/L — SIGNIFICANT CHANGE UP (ref 96–108)
CO2 SERPL-SCNC: 22 MMOL/L — SIGNIFICANT CHANGE UP (ref 22–31)
CREAT SERPL-MCNC: 1.33 MG/DL — HIGH (ref 0.5–1.3)
GLUCOSE SERPL-MCNC: 103 MG/DL — HIGH (ref 70–99)
HCT VFR BLD CALC: 31.1 % — LOW (ref 34.5–45)
HGB BLD-MCNC: 9.7 G/DL — LOW (ref 11.5–15.5)
MCHC RBC-ENTMCNC: 30.8 PG — SIGNIFICANT CHANGE UP (ref 27–34)
MCHC RBC-ENTMCNC: 31.2 GM/DL — LOW (ref 32–36)
MCV RBC AUTO: 98.7 FL — SIGNIFICANT CHANGE UP (ref 80–100)
NRBC # BLD: 0 /100 WBCS — SIGNIFICANT CHANGE UP (ref 0–0)
PLATELET # BLD AUTO: 272 K/UL — SIGNIFICANT CHANGE UP (ref 150–400)
POTASSIUM SERPL-MCNC: 4.1 MMOL/L — SIGNIFICANT CHANGE UP (ref 3.5–5.3)
POTASSIUM SERPL-SCNC: 4.1 MMOL/L — SIGNIFICANT CHANGE UP (ref 3.5–5.3)
RBC # BLD: 3.15 M/UL — LOW (ref 3.8–5.2)
RBC # FLD: 13 % — SIGNIFICANT CHANGE UP (ref 10.3–14.5)
SODIUM SERPL-SCNC: 142 MMOL/L — SIGNIFICANT CHANGE UP (ref 135–145)
WBC # BLD: 6.5 K/UL — SIGNIFICANT CHANGE UP (ref 3.8–10.5)
WBC # FLD AUTO: 6.5 K/UL — SIGNIFICANT CHANGE UP (ref 3.8–10.5)

## 2021-05-06 PROCEDURE — 99232 SBSQ HOSP IP/OBS MODERATE 35: CPT

## 2021-05-06 RX ORDER — LACTULOSE 10 G/15ML
20 SOLUTION ORAL ONCE
Refills: 0 | Status: COMPLETED | OUTPATIENT
Start: 2021-05-06 | End: 2021-05-06

## 2021-05-06 RX ORDER — SODIUM CHLORIDE 9 MG/ML
1000 INJECTION INTRAMUSCULAR; INTRAVENOUS; SUBCUTANEOUS
Refills: 0 | Status: COMPLETED | OUTPATIENT
Start: 2021-05-06 | End: 2021-05-06

## 2021-05-06 RX ORDER — ACETAMINOPHEN 500 MG
1000 TABLET ORAL ONCE
Refills: 0 | Status: COMPLETED | OUTPATIENT
Start: 2021-05-06 | End: 2021-05-07

## 2021-05-06 RX ORDER — LIDOCAINE 4 G/100G
1 CREAM TOPICAL DAILY
Refills: 0 | Status: DISCONTINUED | OUTPATIENT
Start: 2021-05-06 | End: 2021-05-13

## 2021-05-06 RX ADMIN — OXYCODONE HYDROCHLORIDE 10 MILLIGRAM(S): 5 TABLET ORAL at 06:38

## 2021-05-06 RX ADMIN — Medication 975 MILLIGRAM(S): at 07:05

## 2021-05-06 RX ADMIN — Medication 975 MILLIGRAM(S): at 14:50

## 2021-05-06 RX ADMIN — ATORVASTATIN CALCIUM 40 MILLIGRAM(S): 80 TABLET, FILM COATED ORAL at 22:16

## 2021-05-06 RX ADMIN — SENNA PLUS 2 TABLET(S): 8.6 TABLET ORAL at 22:15

## 2021-05-06 RX ADMIN — Medication 250 MILLIGRAM(S): at 03:04

## 2021-05-06 RX ADMIN — PANTOPRAZOLE SODIUM 40 MILLIGRAM(S): 20 TABLET, DELAYED RELEASE ORAL at 06:09

## 2021-05-06 RX ADMIN — GABAPENTIN 100 MILLIGRAM(S): 400 CAPSULE ORAL at 22:16

## 2021-05-06 RX ADMIN — Medication 975 MILLIGRAM(S): at 06:09

## 2021-05-06 RX ADMIN — Medication 5 MILLILITER(S): at 22:16

## 2021-05-06 RX ADMIN — OXYCODONE HYDROCHLORIDE 10 MILLIGRAM(S): 5 TABLET ORAL at 07:05

## 2021-05-06 RX ADMIN — Medication 25 MILLIGRAM(S): at 11:34

## 2021-05-06 RX ADMIN — Medication 325 MILLIGRAM(S): at 06:08

## 2021-05-06 RX ADMIN — LIDOCAINE 1 PATCH: 4 CREAM TOPICAL at 14:21

## 2021-05-06 RX ADMIN — GABAPENTIN 100 MILLIGRAM(S): 400 CAPSULE ORAL at 06:08

## 2021-05-06 RX ADMIN — SODIUM CHLORIDE 75 MILLILITER(S): 9 INJECTION INTRAMUSCULAR; INTRAVENOUS; SUBCUTANEOUS at 14:21

## 2021-05-06 RX ADMIN — Medication 975 MILLIGRAM(S): at 14:21

## 2021-05-06 RX ADMIN — Medication 250 MILLIGRAM(S): at 14:21

## 2021-05-06 RX ADMIN — GABAPENTIN 100 MILLIGRAM(S): 400 CAPSULE ORAL at 14:21

## 2021-05-06 RX ADMIN — ONDANSETRON 4 MILLIGRAM(S): 8 TABLET, FILM COATED ORAL at 19:11

## 2021-05-06 RX ADMIN — POLYETHYLENE GLYCOL 3350 17 GRAM(S): 17 POWDER, FOR SOLUTION ORAL at 11:35

## 2021-05-06 RX ADMIN — OXYCODONE HYDROCHLORIDE 10 MILLIGRAM(S): 5 TABLET ORAL at 12:17

## 2021-05-06 RX ADMIN — ESCITALOPRAM OXALATE 10 MILLIGRAM(S): 10 TABLET, FILM COATED ORAL at 22:16

## 2021-05-06 RX ADMIN — Medication 1 TABLET(S): at 11:34

## 2021-05-06 RX ADMIN — OXYCODONE HYDROCHLORIDE 10 MILLIGRAM(S): 5 TABLET ORAL at 12:47

## 2021-05-06 RX ADMIN — LIDOCAINE 1 PATCH: 4 CREAM TOPICAL at 23:17

## 2021-05-06 RX ADMIN — Medication 10 MILLIGRAM(S): at 19:15

## 2021-05-06 RX ADMIN — Medication 325 MILLIGRAM(S): at 17:53

## 2021-05-06 RX ADMIN — CYCLOBENZAPRINE HYDROCHLORIDE 5 MILLIGRAM(S): 10 TABLET, FILM COATED ORAL at 11:35

## 2021-05-06 RX ADMIN — LACTULOSE 20 GRAM(S): 10 SOLUTION ORAL at 11:34

## 2021-05-06 RX ADMIN — OXYCODONE HYDROCHLORIDE 10 MILLIGRAM(S): 5 TABLET ORAL at 19:10

## 2021-05-06 NOTE — PROGRESS NOTE ADULT - SUBJECTIVE AND OBJECTIVE BOX
INTERVAL HPI/OVERNIGHT EVENTS:  pt seen and examined. She admits to mild nausea which is responding well to zofran. denies vomiting  s/p 3 enemas. last 2 enemas with adequate stool after  tolerating diet    MEDICATIONS  (STANDING):  acetaminophen   Tablet .. 975 milliGRAM(s) Oral every 8 hours  aspirin enteric coated 325 milliGRAM(s) Oral two times a day  atorvastatin 40 milliGRAM(s) Oral at bedtime  bisacodyl Suppository 10 milliGRAM(s) Rectal once  escitalopram 10 milliGRAM(s) Oral at bedtime  gabapentin 100 milliGRAM(s) Oral three times a day  multivitamin 1 Tablet(s) Oral daily  pantoprazole    Tablet 40 milliGRAM(s) Oral before breakfast  polyethylene glycol 3350 17 Gram(s) Oral daily  senna 2 Tablet(s) Oral at bedtime  sodium chloride 0.9%. 1000 milliLiter(s) (75 mL/Hr) IV Continuous <Continuous>  topiramate 25 milliGRAM(s) Oral daily  vancomycin  IVPB 1000 milliGRAM(s) IV Intermittent every 12 hours    MEDICATIONS  (PRN):  Biotene Dry Mouth Oral Rinse 5 milliLiter(s) Swish and Spit daily PRN Mouth Care  bisacodyl Suppository 10 milliGRAM(s) Rectal daily PRN If no bowel movement  cyclobenzaprine 5 milliGRAM(s) Oral three times a day PRN Muscle Spasm  magnesium hydroxide Suspension 30 milliLiter(s) Oral daily PRN Constipation  ondansetron Injectable 4 milliGRAM(s) IV Push every 6 hours PRN Nausea  oxyCODONE    IR 5 milliGRAM(s) Oral every 4 hours PRN Moderate Pain (4 - 6)  oxyCODONE    IR 10 milliGRAM(s) Oral every 4 hours PRN Severe Pain (7 - 10)      Allergies    apple (Hives)  Compazine (Unknown)  morphine (Unknown)  PC Pen VK (Unknown)  shellfish (Unknown)  sulfa drugs (Unknown)    Intolerances        Review of Systems:    General:  No wt loss, fevers, chills, night sweats,fatigue,   Eyes:  Good vision, no reported pain  ENT:  No sore throat, pain, runny nose, dysphagia  CV:  No pain, palpitations, hypo/hypertension  Resp:  No dyspnea, cough, tachypnea, wheezing  GI: see HPI  :  No pain, bleeding, incontinence, nocturia  Muscle:  No pain, weakness  Neuro:  No weakness, tingling, memory problems  Psych:  No fatigue, insomnia, mood problems, depression  Endocrine:  No polyuria, polydypsia, cold/heat intolerance  Heme:  No petechiae, ecchymosis, easy bruisability  Skin:  No rash, tattoos, scars, edema      Vital Signs Last 24 Hrs  T(C): 36.7 (06 May 2021 09:04), Max: 36.8 (06 May 2021 04:38)  T(F): 98.1 (06 May 2021 09:04), Max: 98.2 (06 May 2021 04:38)  HR: 87 (06 May 2021 09:04) (69 - 87)  BP: 111/75 (06 May 2021 09:04) (91/60 - 111/75)  BP(mean): --  RR: 18 (06 May 2021 09:04) (18 - 18)  SpO2: 98% (06 May 2021 09:04) (95% - 99%)    PHYSICAL EXAM:    Constitutional: NAD, well-developed  HEENT: EOMI, throat clear  Neck: No LAD, supple  Respiratory: CTA and P  Cardiovascular: S1 and S2, RRR, no M  Gastrointestinal: BS+, soft, NT/ND, neg HSM,  Extremities: No peripheral edema, neg clubing, cyanosis  Vascular: 2+ peripheral pulses  Neurological: A/O x 3, no focal deficits  Psychiatric: Normal mood, normal affect  Skin: No rashes      LABS:                        9.7    6.50  )-----------( 272      ( 06 May 2021 07:03 )             31.1     05-06    142  |  107  |  14  ----------------------------<  103<H>  4.1   |  22  |  1.33<H>    Ca    9.1      06 May 2021 07:02            RADIOLOGY & ADDITIONAL TESTS:   INTERVAL HPI/OVERNIGHT EVENTS:  pt seen and examined. She admits to mild nausea which is responding well to zofran. denies vomiting  s/p 3 enemas. last 2 enemas with adequate stool after  tolerating diet    MEDICATIONS  (STANDING):  acetaminophen   Tablet .. 975 milliGRAM(s) Oral every 8 hours  aspirin enteric coated 325 milliGRAM(s) Oral two times a day  atorvastatin 40 milliGRAM(s) Oral at bedtime  bisacodyl Suppository 10 milliGRAM(s) Rectal once  escitalopram 10 milliGRAM(s) Oral at bedtime  gabapentin 100 milliGRAM(s) Oral three times a day  multivitamin 1 Tablet(s) Oral daily  pantoprazole    Tablet 40 milliGRAM(s) Oral before breakfast  polyethylene glycol 3350 17 Gram(s) Oral daily  senna 2 Tablet(s) Oral at bedtime  sodium chloride 0.9%. 1000 milliLiter(s) (75 mL/Hr) IV Continuous <Continuous>  topiramate 25 milliGRAM(s) Oral daily  vancomycin  IVPB 1000 milliGRAM(s) IV Intermittent every 12 hours    MEDICATIONS  (PRN):  Biotene Dry Mouth Oral Rinse 5 milliLiter(s) Swish and Spit daily PRN Mouth Care  bisacodyl Suppository 10 milliGRAM(s) Rectal daily PRN If no bowel movement  cyclobenzaprine 5 milliGRAM(s) Oral three times a day PRN Muscle Spasm  magnesium hydroxide Suspension 30 milliLiter(s) Oral daily PRN Constipation  ondansetron Injectable 4 milliGRAM(s) IV Push every 6 hours PRN Nausea  oxyCODONE    IR 5 milliGRAM(s) Oral every 4 hours PRN Moderate Pain (4 - 6)  oxyCODONE    IR 10 milliGRAM(s) Oral every 4 hours PRN Severe Pain (7 - 10)      Allergies    apple (Hives)  Compazine (Unknown)  morphine (Unknown)  PC Pen VK (Unknown)  shellfish (Unknown)  sulfa drugs (Unknown)    Intolerances        Review of Systems:    General:  No wt loss, fevers, chills, night sweats,fatigue,   Eyes:  Good vision, no reported pain  ENT:  No sore throat, pain, runny nose, dysphagia  CV:  No pain, palpitations, hypo/hypertension  Resp:  No dyspnea, cough, tachypnea, wheezing  GI: see HPI  :  No pain, bleeding, incontinence, nocturia  Muscle:  No pain, weakness  Neuro:  No weakness, tingling, memory problems  Psych:  No fatigue, insomnia, mood problems, depression  Endocrine:  No polyuria, polydypsia, cold/heat intolerance  Heme:  No petechiae, ecchymosis, easy bruisability  Skin:  No rash, tattoos, scars, edema      Vital Signs Last 24 Hrs  T(C): 36.7 (06 May 2021 09:04), Max: 36.8 (06 May 2021 04:38)  T(F): 98.1 (06 May 2021 09:04), Max: 98.2 (06 May 2021 04:38)  HR: 87 (06 May 2021 09:04) (69 - 87)  BP: 111/75 (06 May 2021 09:04) (91/60 - 111/75)  BP(mean): --  RR: 18 (06 May 2021 09:04) (18 - 18)  SpO2: 98% (06 May 2021 09:04) (95% - 99%)    PHYSICAL EXAM:    Constitutional: NAD, well-developed  HEENT: EOMI, throat clear  Neck: No LAD, supple  Respiratory: CTA and P  Cardiovascular: S1 and S2, RRR, no M  Gastrointestinal: BS+, soft, NT/ND, neg HSM,  Extremities: No peripheral edema, neg clubing, cyanosis  Vascular: 2+ peripheral pulses  Neurological: A/O x 3, no focal deficits  Psychiatric: Normal mood, normal affect  Skin: No rashes      LABS:                        9.7    6.50  )-----------( 272      ( 06 May 2021 07:03 )             31.1     05-06    142  |  107  |  14  ----------------------------<  103<H>  4.1   |  22  |  1.33<H>    Ca    9.1      06 May 2021 07:02            RADIOLOGY personally reviewed by me:  a< from: Xray Abdomen 1 View PORTABLE -Urgent (Xray Abdomen 1 View PORTABLE -Urgent .) (05.02.21 @ 19:44) >    EXAM:  XR ABDOMEN PORTABLE URGENT 1V                            PROCEDURE DATE:  05/02/2021            INTERPRETATION:  Indication:abdominal distention, constipation    Technique: Single view of the abdomen was obtained..    Comparison:None.    Findings:  The bowel loops appear mildly distended with moderate stool burden. Incompletely visualized left hemiabdomen. Visualized osseous structures are unremarkable.    Impression:  Mildly distended bowel loops with moderate stool burden.              ALBA STEARNS MD; Resident Radiology  This document has been electronically signed.  ERICK LARA MD; Attending Interventional Radiologist  This document has been electronically signed. May  3 2021 10:27AM    < end of copied text >

## 2021-05-06 NOTE — PROGRESS NOTE ADULT - ATTENDING COMMENTS
Pt care and plan discussed and reviewed with NP. Plan as outlined above edited by me to reflect our discussion. I had a prolonged conversation with the patient regarding hospital course, results of diagnostic tests. We had an extensive discussion regarding discharge planning and when she will be ready for discharge. She states she is still dizzy with minimal exertion and she doesn't feel well. Will hold off discharge at this time.  Plan of care discussed with patient after the evaluation. Patient expresses clear understanding and satisfaction with the plan of care. Sixty five minutes spent on total encounter, of which more than fifty percent of the encounter was spent on counseling and/or coordinating care by the attending physician. I

## 2021-05-06 NOTE — PROGRESS NOTE ADULT - ASSESSMENT
55y Female presents with LEFT comminuted patella fracture s/p mechanical fall on 4/19 with dysuria, urine retention, Aerococcus uti, multiple drug allergies     Arash Duarte  Attending Physician   Division of Infectious Disease  Pager #[phone]  Available on Microsoft Teams also  After 5pm/weekend or no response, call #[phone]    Will sign off, recall ID if needed #[phone].

## 2021-05-06 NOTE — PROGRESS NOTE ADULT - SUBJECTIVE AND OBJECTIVE BOX
Patient is a 55y old  Female who presents with a chief complaint of L patella fracture, for surgery (05 May 2021 17:08)      POST OPERATIVE DAY #:  3  Patient comfortable  No complaints    T(C): 36.8 (05-06-21 @ 04:38), Max: 36.8 (05-06-21 @ 04:38)  HR: 69 (05-06-21 @ 04:38) (69 - 82)  BP: 94/65 (05-06-21 @ 04:38) (91/60 - 108/58)  RR: 18 (05-06-21 @ 04:38) (18 - 18)  SpO2: 99% (05-06-21 @ 04:38) (95% - 99%)  Wt(kg): --    PHYSICAL EXAM:  NAD, Alert  EXT:  LLE: Dressing C/D/I; KI in place  Calves soft  (+) DF/PF;  EHL FHL:  No gross Sensation deficits noted   (+) Distal Pulses;       LABS:                        9.2<L>  7.22  )-----------( 253      ( 05 May 2021 10:03 )             29.1<L>    05-05    143  |  110<H>  |  10  ----------------------------<  114<H>  3.9   |  22  |  0.97

## 2021-05-06 NOTE — PROGRESS NOTE ADULT - ASSESSMENT
S/P ORIF Lt patella fracture    Plan    Continue present care  OOB/PT/WBAT in [place]  F/U with Dr Trejo 2 weeks post-op for wound check and removal of staples  [phone]       Mary Kate Calzada PA-C   Beeper    [id number]

## 2021-05-06 NOTE — PROGRESS NOTE ADULT - SUBJECTIVE AND OBJECTIVE BOX
ASHWIN JACOB 55y [id number]    Patient is a 55y old  Female who presents with a chief complaint of L patella fracture, for surgery (06 May 2021 09:45)      Follow Up/CC:  ID following for uti    Interval History/ROS: no fever, left knee pain+    Allergies    apple (Hives)  Compazine (Unknown)  morphine (Unknown)  PC Pen VK (Unknown)  shellfish (Unknown)  sulfa drugs (Unknown)    Intolerances        ANTIMICROBIALS:      MEDICATIONS  (STANDING):  acetaminophen   Tablet .. 975 milliGRAM(s) Oral every 8 hours  aspirin enteric coated 325 milliGRAM(s) Oral two times a day  atorvastatin 40 milliGRAM(s) Oral at bedtime  bisacodyl Suppository 10 milliGRAM(s) Rectal once  escitalopram 10 milliGRAM(s) Oral at bedtime  gabapentin 100 milliGRAM(s) Oral three times a day  lidocaine   Patch 1 Patch Transdermal daily  multivitamin 1 Tablet(s) Oral daily  pantoprazole    Tablet 40 milliGRAM(s) Oral before breakfast  polyethylene glycol 3350 17 Gram(s) Oral daily  senna 2 Tablet(s) Oral at bedtime  sodium chloride 0.9%. 1000 milliLiter(s) (75 mL/Hr) IV Continuous <Continuous>  topiramate 25 milliGRAM(s) Oral daily    MEDICATIONS  (PRN):  Biotene Dry Mouth Oral Rinse 5 milliLiter(s) Swish and Spit daily PRN Mouth Care  bisacodyl Suppository 10 milliGRAM(s) Rectal daily PRN If no bowel movement  cyclobenzaprine 5 milliGRAM(s) Oral three times a day PRN Muscle Spasm  magnesium hydroxide Suspension 30 milliLiter(s) Oral daily PRN Constipation  ondansetron Injectable 4 milliGRAM(s) IV Push every 6 hours PRN Nausea  oxyCODONE    IR 5 milliGRAM(s) Oral every 4 hours PRN Moderate Pain (4 - 6)  oxyCODONE    IR 10 milliGRAM(s) Oral every 4 hours PRN Severe Pain (7 - 10)        Vital Signs Last 24 Hrs  T(C): 36.9 (06 May 2021 14:20), Max: 36.9 (06 May 2021 14:20)  T(F): 98.4 (06 May 2021 14:20), Max: 98.4 (06 May 2021 14:20)  HR: 89 (06 May 2021 13:42) (69 - 89)  BP: 105/69 (06 May 2021 13:42) (93/63 - 111/75)  BP(mean): --  RR: 18 (06 May 2021 13:42) (18 - 18)  SpO2: 98% (06 May 2021 13:42) (98% - 99%)    CBC Full  -  ( 06 May 2021 07:03 )  WBC Count : 6.50 K/uL  RBC Count : 3.15 M/uL  Hemoglobin : 9.7 g/dL  Hematocrit : 31.1 %  Platelet Count - Automated : 272 K/uL  Mean Cell Volume : 98.7 fl  Mean Cell Hemoglobin : 30.8 pg  Mean Cell Hemoglobin Concentration : 31.2 gm/dL  Auto Neutrophil # : x  Auto Lymphocyte # : x  Auto Monocyte # : x  Auto Eosinophil # : x  Auto Basophil # : x  Auto Neutrophil % : x  Auto Lymphocyte % : x  Auto Monocyte % : x  Auto Eosinophil % : x  Auto Basophil % : x    05-06    142  |  107  |  14  ----------------------------<  103<H>  4.1   |  22  |  1.33<H>    Ca    9.1      06 May 2021 07:02            MICROBIOLOGY:  .Urine Catheterized  04-29-21   50,000 - 99,000 CFU/mL Aerococcus species  "Aerococcus spp. are predictably susceptible to penicillin,  ampicillin, tetracycline, and vancomycin.  All isolates are  resistant to sulfonamides"  --  --      Rapid RVP Result: NotDetec (05-02 @ 01:38)          RADIOLOGY    < from: Xray Abdomen 1 View PORTABLE -Urgent (Xray Abdomen 1 View PORTABLE -Urgent .) (05.02.21 @ 19:44) >  Mildly distended bowel loops with moderate stool burden.    < end of copied text >

## 2021-05-06 NOTE — PROGRESS NOTE ADULT - SUBJECTIVE AND OBJECTIVE BOX
Name of Patient : ASHWIN JACOB  MRN: [id number]  DATE OF SERVICE: 05-06-21     Subjective: Patient seen and examined. No new events except as noted.   had a bowel movement yesterday     REVIEW OF SYSTEMS:    CONSTITUTIONAL: No weakness, fevers or chills  EYES/ENT: No visual changes;  No vertigo or throat pain   NECK: No pain or stiffness  RESPIRATORY: No cough, wheezing, hemoptysis; No shortness of breath  CARDIOVASCULAR: No chest pain or palpitations  GASTROINTESTINAL: No abdominal or epigastric pain. No nausea, vomiting, or hematemesis; No diarrhea or constipation. No melena or hematochezia.  GENITOURINARY: No dysuria, frequency or hematuria  NEUROLOGICAL: No numbness or weakness  SKIN: No itching, burning, rashes, or lesions   All other review of systems is negative unless indicated above.    MEDICATIONS:  MEDICATIONS  (STANDING):  acetaminophen   Tablet .. 975 milliGRAM(s) Oral every 8 hours  acetaminophen  IVPB .. 1000 milliGRAM(s) IV Intermittent once  aspirin enteric coated 325 milliGRAM(s) Oral two times a day  atorvastatin 40 milliGRAM(s) Oral at bedtime  escitalopram 10 milliGRAM(s) Oral at bedtime  gabapentin 100 milliGRAM(s) Oral three times a day  lidocaine   Patch 1 Patch Transdermal daily  multivitamin 1 Tablet(s) Oral daily  pantoprazole    Tablet 40 milliGRAM(s) Oral before breakfast  polyethylene glycol 3350 17 Gram(s) Oral daily  senna 2 Tablet(s) Oral at bedtime  sodium chloride 0.9%. 1000 milliLiter(s) (75 mL/Hr) IV Continuous <Continuous>  topiramate 25 milliGRAM(s) Oral daily      PHYSICAL EXAM:  T(C): 36.8 (05-06-21 @ 20:24), Max: 36.9 (05-06-21 @ 14:20)  HR: 80 (05-06-21 @ 20:24) (69 - 89)  BP: 112/62 (05-06-21 @ 20:24) (93/63 - 112/62)  RR: 18 (05-06-21 @ 20:24) (18 - 18)  SpO2: 98% (05-06-21 @ 20:24) (96% - 99%)  Wt(kg): --  I&O's Summary    05 May 2021 07:01  -  06 May 2021 07:00  --------------------------------------------------------  IN: 690 mL / OUT: 1350 mL / NET: -660 mL    06 May 2021 07:01  -  06 May 2021 22:15  --------------------------------------------------------  IN: 1280 mL / OUT: 875 mL / NET: 405 mL          Appearance: Normal	  HEENT:  PERRLA   Lymphatic: No lymphadenopathy   Cardiovascular: Normal S1 S2, no JVD  Respiratory: normal effort , clear  Gastrointestinal:  Soft, Non-tender  Skin: No rashes,  warm to touch  Psychiatry:  Mood & affect appropriate  Musculuskeletal: L leg pain       All labs, Imaging and EKGs personally reviewed       05-05-21 @ 07:01  -  05-06-21 @ 07:00  --------------------------------------------------------  IN: 690 mL / OUT: 1350 mL / NET: -660 mL    05-06-21 @ 07:01  -  05-06-21 @ 22:15  --------------------------------------------------------  IN: 1280 mL / OUT: 875 mL / NET: 405 mL                            9.7    6.50  )-----------( 272      ( 06 May 2021 07:03 )             31.1               05-06    142  |  107  |  14  ----------------------------<  103<H>  4.1   |  22  |  1.33<H>    Ca    9.1      06 May 2021 07:02

## 2021-05-06 NOTE — PROGRESS NOTE ADULT - SUBJECTIVE AND OBJECTIVE BOX
Patient is a 55y old  Female who presents with a chief complaint of L patella fracture, for surgery (06 May 2021 14:17)      INTERVAL HISTORY: feels ok, denies chest pain    REVIEW OF SYSTEMS:   CONSTITUTIONAL: No weakness  EYES/ENT: No visual changes; No throat pain  Neck: No pain or stiffness  Respiratory: No cough, wheezing, No shortness of breath  CARDIOVASCULAR: no chest pain or palpitations  GASTROINTESTINAL: No abdominal pain, no nausea, vomiting or hematemesis  GENITOURINARY: No dysuria, frequency or hematuria  NEUROLOGICAL: No stroke like symptoms  SKIN: No rashes    	  MEDICATIONS:        PHYSICAL EXAM:  T(C): 36.7 (05-06-21 @ 16:12), Max: 36.9 (05-06-21 @ 14:20)  HR: 86 (05-06-21 @ 16:12) (69 - 89)  BP: 95/62 (05-06-21 @ 16:12) (93/63 - 111/75)  RR: 18 (05-06-21 @ 16:12) (18 - 18)  SpO2: 96% (05-06-21 @ 16:12) (96% - 99%)  Wt(kg): --  I&O's Summary    05 May 2021 07:01  -  06 May 2021 07:00  --------------------------------------------------------  IN: 690 mL / OUT: 1350 mL / NET: -660 mL    06 May 2021 07:01  -  06 May 2021 18:14  --------------------------------------------------------  IN: 800 mL / OUT: 375 mL / NET: 425 mL      Appearance: In no distress	  HEENT:    PERRL, EOMI	  Cardiovascular:  S1 S2, No JVD  Respiratory: Lungs clear to auscultation	  Gastrointestinal:  Soft, Non-tender, + BS	  Vascularature:  No edema of LE  Psychiatric: Appropriate affect   Neuro: no acute focal deficits                         9.7    6.50  )-----------( 272      ( 06 May 2021 07:03 )             31.1     05-06    142  |  107  |  14  ----------------------------<  103<H>  4.1   |  22  |  1.33<H>    Ca    9.1      06 May 2021 07:0    Labs personally reviewed    ASSESSMENT/PLAN: 	  Patient is a 56 Y/O Female with PMhx of CAD S/P PCI, HLD, kidney stone, migraine presents with LEFT comminuted patella fracture s/p mechanical fall on 4/19. Pt seen by Dr. Trejo in office. Instructed to come to [facility] ED for med/cards clearance and plan for ORIF of LEFT patella fx. Denies numbness/tingling in the affected extremity. Patient has been minimally ambulating with walker. in the ED she started having bladder pressure with decrease urine output.       Problem/Recommendation - 1:  Problem: Patella fracture. Recommendation: Ortho follow up   s/p OR, repair  Tolerated surgery well from cv standpoint     Problem/Recommendation - 2:  ·  Problem: Palps/Exertional SOB  Recommendation: NM stress test done t with large areas of infarct with leonor-infarct ischemia   Risks and benefits of cardiac cath discussed with pt.   Cath done with no obstructive CAD, LAD 10% LAD, RCA 30% stenosis    Problem/Recommendation - 3:  ·  Problem: CAD (coronary artery disease).  Recommendation: S/P stent x 2 to LAD about two years ago   cont ASA and lipitor 40 daily   Toprol 25mg daily   Cath with no obstructive CAD, LAD 10% LAD, RCA 30% stenosis   Chest pain today 5/4 - non cardiac in nature, reproducible to palpation. Prolonged discussion with pt - reassurance provided   medical management   No recurrence     Problem/Recommendation - 4:  ·  Problem: HLD (hyperlipidemia).  Recommendation: statin for secondary prevention     Problem/Recommendation - 5:  ·  Problem: Urinary retention.  Recommendation: acute onset o admission  Was retaining about 300cc of urine s/p straight cath  Again 5/2 with urinary retention, straight cath with >700cc of urine  Urology consult called, rec johansen placement  ID recs for UTI appreciated. Continue with Vanco for 3 day course  TOV per        Problem/Recommendation - 6:  Problem: Constipation  Recommendation: jose 2/2 narcotic use Miralax, suppository, multiple enemas with no bowel movement  -  BM 5/4 after tap water enema  - GI recs appreciated - physical disimpaction attempted   - s/p 3 enemas-- patient feels better. aggressive bowel regimen  - Gi following.         Susan Main DO [facility]  Cardiovascular Medicine  [address] [place] [address], [address]  Office: [phone]  Cell: [phone] Patient is a 55y old  Female who presents with a chief complaint of L patella fracture, for surgery (06 May 2021 14:17)      INTERVAL HISTORY: feels ok, denies chest pain    REVIEW OF SYSTEMS:   CONSTITUTIONAL: No weakness  EYES/ENT: No visual changes; No throat pain  Neck: No pain or stiffness  Respiratory: No cough, wheezing, No shortness of breath  CARDIOVASCULAR: no chest pain or palpitations  GASTROINTESTINAL: No abdominal pain, no nausea, vomiting or hematemesis  GENITOURINARY: No dysuria, frequency or hematuria  NEUROLOGICAL: No stroke like symptoms  SKIN: No rashes    	  MEDICATIONS:        PHYSICAL EXAM:  T(C): 36.7 (05-06-21 @ 16:12), Max: 36.9 (05-06-21 @ 14:20)  HR: 86 (05-06-21 @ 16:12) (69 - 89)  BP: 95/62 (05-06-21 @ 16:12) (93/63 - 111/75)  RR: 18 (05-06-21 @ 16:12) (18 - 18)  SpO2: 96% (05-06-21 @ 16:12) (96% - 99%)  Wt(kg): --  I&O's Summary    05 May 2021 07:01  -  06 May 2021 07:00  --------------------------------------------------------  IN: 690 mL / OUT: 1350 mL / NET: -660 mL    06 May 2021 07:01  -  06 May 2021 18:14  --------------------------------------------------------  IN: 800 mL / OUT: 375 mL / NET: 425 mL      Appearance: In no distress	  HEENT:    PERRL, EOMI	  Cardiovascular:  S1 S2, No JVD  Respiratory: Lungs clear to auscultation	  Gastrointestinal:  Soft, Non-tender, + BS	  Vascularature:  No edema of LE  Psychiatric: Appropriate affect   Neuro: no acute focal deficits                         9.7    6.50  )-----------( 272      ( 06 May 2021 07:03 )             31.1     05-06    142  |  107  |  14  ----------------------------<  103<H>  4.1   |  22  |  1.33<H>    Ca    9.1      06 May 2021 07:0    Labs personally reviewed    ASSESSMENT/PLAN: 	  Patient is a 54 Y/O Female with PMhx of CAD S/P PCI, HLD, kidney stone, migraine presents with LEFT comminuted patella fracture s/p mechanical fall on 4/19. Pt seen by Dr. Trejo in office. Instructed to come to [facility] ED for med/cards clearance and plan for ORIF of LEFT patella fx. Denies numbness/tingling in the affected extremity. Patient has been minimally ambulating with walker. in the ED she started having bladder pressure with decrease urine output.       Problem/Recommendation - 1:  Problem: Patella fracture. Recommendation: Ortho follow up   s/p OR, repair  Tolerated surgery well from cv standpoint     Problem/Recommendation - 2:  ·  Problem: Palps/Exertional SOB  Recommendation: NM stress test done t with large areas of infarct with leonor-infarct ischemia   Risks and benefits of cardiac cath discussed with pt.   Cath done with no obstructive CAD, LAD 10% LAD, RCA 30% stenosis    Problem/Recommendation - 3:  ·  Problem: CAD (coronary artery disease).  Recommendation: S/P stent x 2 to LAD about two years ago   cont ASA and lipitor 40 daily   Toprol 25mg daily   Cath with no obstructive CAD, LAD 10% LAD, RCA 30% stenosis   Chest pain today 5/4 - non cardiac in nature, reproducible to palpation. Prolonged discussion with pt - reassurance provided   medical management   No recurrence     Problem/Recommendation - 4:  ·  Problem: HLD (hyperlipidemia).  Recommendation: statin for secondary prevention     Problem/Recommendation - 5:  ·  Problem: Urinary retention.  Recommendation: acute onset o admission  Was retaining about 300cc of urine s/p straight cath  Again 5/2 with urinary retention, straight cath with >700cc of urine  Urology consult called, rec johansen placement  ID recs for UTI appreciated. Continue with Vanco for 3 day course  TOV per        Problem/Recommendation - 6:  Problem: Constipation  Recommendation: jose 2/2 narcotic use Miralax, suppository, multiple enemas with no bowel movement  -  BM 5/4 after tap water enema  - GI recs appreciated - physical disimpaction attempted   - s/p 3 enemas-- patient feels better. Aggressive bowel regimen  - Gi following.     Problem/Recommendation - 7:  Problem: BEULAH  Recommendation: Will hydrate x 24 hours and repeat Cr in AM  - prerenal state      Susan Main DO [facility]  Cardiovascular Medicine  [address], [address]  Office: [phone]  Cell: [phone] shortly

## 2021-05-06 NOTE — PROGRESS NOTE ADULT - ASSESSMENT
Patient is a 56 Y/O Female with PMhx of CAD S/P PCI, HLD, kidney stone, migraine presents with LEFT comminuted patella fracture s/p mechanical fall on 4/19. Now s/p Left patella ORIF 5/3. GI consulted for post- operative constipation. Patient is a 56 Y/O Female with PMhx of CAD S/P PCI, HLD, kidney stone, migraine presents with LEFT comminuted patella fracture s/p mechanical fall on 4/19. Now s/p Left patella ORIF 5/3. GI consulted for post- operative constipation.     Patient is a 56 Y/O Female with PMhx of CAD S/P PCI, HLD, kidney stone, migraine presents with LEFT comminuted patella fracture s/p mechanical fall on 4/19. Now s/p Left patella ORIF 5/3. GI consulted for post- operative constipation.     Problem/Recommendation - 1:  Problem: Constipation due to pain medication. Recommendation: - AXR reviewed with large stool burden  - suspect 2/2 post use of narcotic pain medications  - s/p 1 fleet enema  - would start aggressive bowel regimen with senna, miralax and dulcolax supp and monitor for bms  - tap water enema ordered.     Problem/Recommendation - 2:  ·  Problem: Patella fracture.  Recommendation: - appreciate care per Orthopaedics  - physical therapy  - pain control.      Problem/Recommendation - 3:  ·  Problem: UTI (urinary tract infection).  Recommendation: - s/p 3 days of abx  - care per ID appreciated.     Problem/Recommendation - 4:  ·  Problem: Urinary retention. Recommendation: s/p johansen cath  trial of void  follow up urology reccs.     Problem/Recommendation - 5:  ·  Problem: Nephrolithiasis.  Recommendation: no signs of active nephrolithiasis at this time  care per .      Problem/Recommendation - 6:  Problem: HLD (hyperlipidemia). Recommendation: reports good control of cholesterol   on home lipitor dose.     Problem/Recommendation - 7:  Problem: Prophylactic measure. Recommendation: on  BID.     Problem/Recommendation - 8:  Problem: CAD (coronary artery disease). Recommendation: - S/P stent x 2, 2 years ago   - appreciate cardiology input        I had a prolonged conversation with the patient regarding the hospital course, differential diagnosis, results of diagnostic tests this far, and therapeutic modalities available. Plan of care discussed with the patient after the evaluation. Patient expresses a clear understanding of the plan of care.  Sixty five minutes spent on the total encounter, of which more than fifty percent of the encounter was spent on counseling and/or coordinating care by the attending physician.        Iftikhar Lora M.D.   Gastroenterology and Hepatology  [address] [place], [place]  Office: [phone]  Cell: [phone].

## 2021-05-06 NOTE — PROGRESS NOTE ADULT - ASSESSMENT
Patient is a 56 Y/O Female with PMhx of CAD S/P PCI, HLD, kidney stone, migraine presents with LEFT comminuted patella fracture s/p mechanical fall on 4/19. Pt seen by Dr. Trejo in office. Instructed to come to [facility] ED for med/cards clearance and plan for ORIF of LEFT patella fx. Denies numbness/tingling in the affected extremity. Patient has been minimally ambulating with walker. in the ED she started having bladder pressure with decrease urine output.       Problem/Recommendation - 1:  Problem: Patella fracture. Recommendation: Ortho follow up   S/P OR , ORIF done , ortho follow up   card and med optimization   pain control   PT eval as tolerated, fall precautions.  S/P stress test   S/P cath, no stent placed medical management   constipation, XRay abdomen noted, obstructive diarrhea   enema as per GI , multiple bowel movement   IV hydration cont   GI eval called for further recs appreciated       Problem/Recommendation - 2:  ·  Problem: Urinary retention.  Recommendation: acute onset  resolved, had urine output, no retention   send UA and urine culture  monitor BUN/Cr  bladder US. PRN   ID eval for Urine Cx  cont vanco per ID recs   elevated Cr , start gentle hydration , avid nephrotoxic medications     Problem/Recommendation - 3:  ·  Problem: CAD (coronary artery disease).  Recommendation: S/P stent x 2 2 years ago   cont ASA and lipitor 40 daily   toprol 25 daily   ischemic W/U per cardiolog, done    Problem/Recommendation - 4:  ·  Problem: HLD (hyperlipidemia).  Recommendation: statin  lipdi panel  diet control.     Problem/Recommendation - 5:  ·  Problem: Nephrolithiasis.     Problem/Recommendation - 6:  Problem: Prophylactic measure. Recommendation: DVT and gI PPX     Differential diagnosis and plan of care discussed with patient after the evaluation.   Advanced care planning/advanced directives discussed with patient/family. DNR status including forceful chest compressions to attempt to restart the heart, ventilator support/artificial breathing, electric shock, artificial nutrition, health care proxy, Molst form all discussed with pt. Pt wishes to consider.   OMT on six regions for acute somatic dysfunctions done at the bedside   Importance of Fall prevention discussed.

## 2021-05-07 ENCOUNTER — TRANSCRIPTION ENCOUNTER (OUTPATIENT)
Age: 56
End: 2021-05-07

## 2021-05-07 LAB
ANION GAP SERPL CALC-SCNC: 11 MMOL/L — SIGNIFICANT CHANGE UP (ref 5–17)
BUN SERPL-MCNC: 14 MG/DL — SIGNIFICANT CHANGE UP (ref 7–23)
CALCIUM SERPL-MCNC: 8.5 MG/DL — SIGNIFICANT CHANGE UP (ref 8.4–10.5)
CHLORIDE SERPL-SCNC: 110 MMOL/L — HIGH (ref 96–108)
CO2 SERPL-SCNC: 22 MMOL/L — SIGNIFICANT CHANGE UP (ref 22–31)
CREAT SERPL-MCNC: 0.99 MG/DL — SIGNIFICANT CHANGE UP (ref 0.5–1.3)
GLUCOSE SERPL-MCNC: 87 MG/DL — SIGNIFICANT CHANGE UP (ref 70–99)
HCT VFR BLD CALC: 28.5 % — LOW (ref 34.5–45)
HGB BLD-MCNC: 8.9 G/DL — LOW (ref 11.5–15.5)
MCHC RBC-ENTMCNC: 30.8 PG — SIGNIFICANT CHANGE UP (ref 27–34)
MCHC RBC-ENTMCNC: 31.2 GM/DL — LOW (ref 32–36)
MCV RBC AUTO: 98.6 FL — SIGNIFICANT CHANGE UP (ref 80–100)
NRBC # BLD: 0 /100 WBCS — SIGNIFICANT CHANGE UP (ref 0–0)
PLATELET # BLD AUTO: 289 K/UL — SIGNIFICANT CHANGE UP (ref 150–400)
POTASSIUM SERPL-MCNC: 3.7 MMOL/L — SIGNIFICANT CHANGE UP (ref 3.5–5.3)
POTASSIUM SERPL-SCNC: 3.7 MMOL/L — SIGNIFICANT CHANGE UP (ref 3.5–5.3)
RBC # BLD: 2.89 M/UL — LOW (ref 3.8–5.2)
RBC # FLD: 12.8 % — SIGNIFICANT CHANGE UP (ref 10.3–14.5)
SARS-COV-2 RNA SPEC QL NAA+PROBE: SIGNIFICANT CHANGE UP
SODIUM SERPL-SCNC: 143 MMOL/L — SIGNIFICANT CHANGE UP (ref 135–145)
WBC # BLD: 6.27 K/UL — SIGNIFICANT CHANGE UP (ref 3.8–10.5)
WBC # FLD AUTO: 6.27 K/UL — SIGNIFICANT CHANGE UP (ref 3.8–10.5)

## 2021-05-07 PROCEDURE — 74018 RADEX ABDOMEN 1 VIEW: CPT | Mod: 26

## 2021-05-07 RX ORDER — OXYCODONE HYDROCHLORIDE 5 MG/1
1 TABLET ORAL
Qty: 0 | Refills: 0 | DISCHARGE
Start: 2021-05-07

## 2021-05-07 RX ORDER — TOPIRAMATE 25 MG
1 TABLET ORAL
Qty: 0 | Refills: 0 | DISCHARGE
Start: 2021-05-07

## 2021-05-07 RX ORDER — METHYLNALTREXONE BROMIDE 12 MG/.6ML
12 INJECTION, SOLUTION SUBCUTANEOUS ONCE
Refills: 0 | Status: COMPLETED | OUTPATIENT
Start: 2021-05-07 | End: 2021-05-07

## 2021-05-07 RX ORDER — ASPIRIN/CALCIUM CARB/MAGNESIUM 324 MG
1 TABLET ORAL
Qty: 0 | Refills: 0 | DISCHARGE
Start: 2021-05-07

## 2021-05-07 RX ORDER — METOPROLOL TARTRATE 50 MG
1 TABLET ORAL
Qty: 0 | Refills: 0 | DISCHARGE

## 2021-05-07 RX ORDER — ACETAMINOPHEN 500 MG
975 TABLET ORAL ONCE
Refills: 0 | Status: COMPLETED | OUTPATIENT
Start: 2021-05-07 | End: 2021-05-07

## 2021-05-07 RX ORDER — TOPIRAMATE 25 MG
1 TABLET ORAL
Qty: 0 | Refills: 0 | DISCHARGE

## 2021-05-07 RX ORDER — MAGNESIUM HYDROXIDE 400 MG/1
30 TABLET, CHEWABLE ORAL
Qty: 0 | Refills: 0 | DISCHARGE
Start: 2021-05-07

## 2021-05-07 RX ORDER — UBIDECARENONE 100 MG
200 CAPSULE ORAL
Qty: 0 | Refills: 0 | DISCHARGE

## 2021-05-07 RX ORDER — LIDOCAINE 4 G/100G
1 CREAM TOPICAL
Qty: 0 | Refills: 0 | DISCHARGE
Start: 2021-05-07

## 2021-05-07 RX ORDER — CYCLOBENZAPRINE HYDROCHLORIDE 10 MG/1
1 TABLET, FILM COATED ORAL
Qty: 0 | Refills: 0 | DISCHARGE
Start: 2021-05-07

## 2021-05-07 RX ORDER — SENNA PLUS 8.6 MG/1
2 TABLET ORAL
Qty: 0 | Refills: 0 | DISCHARGE
Start: 2021-05-07

## 2021-05-07 RX ORDER — SALIVA SUBSTITUTE COMB NO.11 351 MG
5 POWDER IN PACKET (EA) MUCOUS MEMBRANE
Qty: 0 | Refills: 0 | DISCHARGE
Start: 2021-05-07

## 2021-05-07 RX ORDER — POLYETHYLENE GLYCOL 3350 17 G/17G
17 POWDER, FOR SOLUTION ORAL
Qty: 0 | Refills: 0 | DISCHARGE
Start: 2021-05-07

## 2021-05-07 RX ORDER — ASPIRIN/CALCIUM CARB/MAGNESIUM 324 MG
1 TABLET ORAL
Qty: 0 | Refills: 0 | DISCHARGE

## 2021-05-07 RX ORDER — GABAPENTIN 400 MG/1
1 CAPSULE ORAL
Qty: 0 | Refills: 0 | DISCHARGE
Start: 2021-05-07

## 2021-05-07 RX ADMIN — CYCLOBENZAPRINE HYDROCHLORIDE 5 MILLIGRAM(S): 10 TABLET, FILM COATED ORAL at 05:40

## 2021-05-07 RX ADMIN — Medication 1000 MILLIGRAM(S): at 00:50

## 2021-05-07 RX ADMIN — ATORVASTATIN CALCIUM 40 MILLIGRAM(S): 80 TABLET, FILM COATED ORAL at 22:27

## 2021-05-07 RX ADMIN — METHYLNALTREXONE BROMIDE 12 MILLIGRAM(S): 12 INJECTION, SOLUTION SUBCUTANEOUS at 22:26

## 2021-05-07 RX ADMIN — GABAPENTIN 100 MILLIGRAM(S): 400 CAPSULE ORAL at 22:35

## 2021-05-07 RX ADMIN — OXYCODONE HYDROCHLORIDE 10 MILLIGRAM(S): 5 TABLET ORAL at 22:40

## 2021-05-07 RX ADMIN — Medication 975 MILLIGRAM(S): at 06:00

## 2021-05-07 RX ADMIN — LIDOCAINE 1 PATCH: 4 CREAM TOPICAL at 19:07

## 2021-05-07 RX ADMIN — Medication 325 MILLIGRAM(S): at 17:10

## 2021-05-07 RX ADMIN — LIDOCAINE 1 PATCH: 4 CREAM TOPICAL at 02:27

## 2021-05-07 RX ADMIN — ONDANSETRON 4 MILLIGRAM(S): 8 TABLET, FILM COATED ORAL at 15:02

## 2021-05-07 RX ADMIN — POLYETHYLENE GLYCOL 3350 17 GRAM(S): 17 POWDER, FOR SOLUTION ORAL at 12:56

## 2021-05-07 RX ADMIN — ESCITALOPRAM OXALATE 10 MILLIGRAM(S): 10 TABLET, FILM COATED ORAL at 22:27

## 2021-05-07 RX ADMIN — OXYCODONE HYDROCHLORIDE 10 MILLIGRAM(S): 5 TABLET ORAL at 15:02

## 2021-05-07 RX ADMIN — GABAPENTIN 100 MILLIGRAM(S): 400 CAPSULE ORAL at 13:52

## 2021-05-07 RX ADMIN — OXYCODONE HYDROCHLORIDE 10 MILLIGRAM(S): 5 TABLET ORAL at 23:40

## 2021-05-07 RX ADMIN — Medication 975 MILLIGRAM(S): at 06:30

## 2021-05-07 RX ADMIN — Medication 400 MILLIGRAM(S): at 00:20

## 2021-05-07 RX ADMIN — Medication 25 MILLIGRAM(S): at 12:57

## 2021-05-07 RX ADMIN — LIDOCAINE 1 PATCH: 4 CREAM TOPICAL at 12:56

## 2021-05-07 RX ADMIN — Medication 1 TABLET(S): at 12:57

## 2021-05-07 RX ADMIN — GABAPENTIN 100 MILLIGRAM(S): 400 CAPSULE ORAL at 05:40

## 2021-05-07 RX ADMIN — OXYCODONE HYDROCHLORIDE 10 MILLIGRAM(S): 5 TABLET ORAL at 11:09

## 2021-05-07 RX ADMIN — SODIUM CHLORIDE 75 MILLILITER(S): 9 INJECTION INTRAMUSCULAR; INTRAVENOUS; SUBCUTANEOUS at 05:41

## 2021-05-07 RX ADMIN — OXYCODONE HYDROCHLORIDE 10 MILLIGRAM(S): 5 TABLET ORAL at 10:39

## 2021-05-07 RX ADMIN — SENNA PLUS 2 TABLET(S): 8.6 TABLET ORAL at 22:27

## 2021-05-07 RX ADMIN — Medication 325 MILLIGRAM(S): at 05:41

## 2021-05-07 RX ADMIN — PANTOPRAZOLE SODIUM 40 MILLIGRAM(S): 20 TABLET, DELAYED RELEASE ORAL at 05:40

## 2021-05-07 NOTE — PROGRESS NOTE ADULT - SUBJECTIVE AND OBJECTIVE BOX
Name of Patient : ASHWIN JACOB  MRN: [id number]  DATE OF SERVICE: 05-07-21     Subjective: Patient seen and examined. No new events except as noted.   pastoran lovey  sitting down  L LE pain   small bowel movement     REVIEW OF SYSTEMS:    CONSTITUTIONAL: No weakness, fevers or chills  EYES/ENT: No visual changes;  No vertigo or throat pain   NECK: No pain or stiffness  RESPIRATORY: No cough, wheezing, hemoptysis; No shortness of breath  CARDIOVASCULAR: No chest pain or palpitations  GASTROINTESTINAL: No abdominal or epigastric pain. No nausea, vomiting, or hematemesis; No diarrhea or constipation. No melena or hematochezia.  GENITOURINARY: No dysuria, frequency or hematuria  NEUROLOGICAL: No numbness or weakness  SKIN: No itching, burning, rashes, or lesions   All other review of systems is negative unless indicated above.    MEDICATIONS:  MEDICATIONS  (STANDING):  aspirin enteric coated 325 milliGRAM(s) Oral two times a day  atorvastatin 40 milliGRAM(s) Oral at bedtime  escitalopram 10 milliGRAM(s) Oral at bedtime  gabapentin 100 milliGRAM(s) Oral three times a day  lidocaine   Patch 1 Patch Transdermal daily  multivitamin 1 Tablet(s) Oral daily  pantoprazole    Tablet 40 milliGRAM(s) Oral before breakfast  polyethylene glycol 3350 17 Gram(s) Oral daily  senna 2 Tablet(s) Oral at bedtime  topiramate 25 milliGRAM(s) Oral daily      PHYSICAL EXAM:  T(C): 36.8 (05-07-21 @ 20:43), Max: 36.8 (05-07-21 @ 08:46)  HR: 84 (05-07-21 @ 20:43) (81 - 92)  BP: 104/66 (05-07-21 @ 20:43) (104/66 - 119/70)  RR: 18 (05-07-21 @ 20:43) (18 - 18)  SpO2: 95% (05-07-21 @ 20:43) (95% - 97%)  Wt(kg): --  I&O's Summary    06 May 2021 07:01  -  07 May 2021 07:00  --------------------------------------------------------  IN: 1380 mL / OUT: 1625 mL / NET: -245 mL    07 May 2021 07:01  -  08 May 2021 00:32  --------------------------------------------------------  IN: 980 mL / OUT: 1150 mL / NET: -170 mL          Appearance: Normal	  HEENT:  PERRLA   Lymphatic: No lymphadenopathy   Cardiovascular: Normal S1 S2, no JVD  Respiratory: normal effort , clear  Gastrointestinal:  Soft, Non-tender  Skin: No rashes,  warm to touch  Psychiatry:  Mood & affect appropriate  Musculuskeletal: L LE cast       All labs, Imaging and EKGs personally reviewed         05-06-21 @ 07:01  -  05-07-21 @ 07:00  --------------------------------------------------------  IN: 1380 mL / OUT: 1625 mL / NET: -245 mL    05-07-21 @ 07:01  -  05-08-21 @ 00:32  --------------------------------------------------------  IN: 980 mL / OUT: 1150 mL / NET: -170 mL                              8.9    6.27  )-----------( 289      ( 07 May 2021 10:29 )             28.5               05-07    143  |  110<H>  |  14  ----------------------------<  87  3.7   |  22  |  0.99    Ca    8.5      07 May 2021 10:29

## 2021-05-07 NOTE — DISCHARGE NOTE PROVIDER - PROVIDER TOKENS
PROVIDER:[TOKEN:[[id number]],FOLLOWUP:[2 weeks]],PROVIDER:[TOKEN:[[id number]],FOLLOWUP:[2 weeks]],PROVIDER:[TOKEN:[[id number]]]

## 2021-05-07 NOTE — PROGRESS NOTE ADULT - ASSESSMENT
Patient is a 56 Y/O Female with PMhx of CAD S/P PCI, HLD, kidney stone, migraine presents with LEFT comminuted patella fracture s/p mechanical fall on 4/19. Now s/p Left patella ORIF 5/3. GI consulted for post- operative constipation.  .     Problem/Recommendation - 1:  Problem: Constipation due to pain medication. Recommendation: - AXR reviewed with large stool burden  - rpt AXR  - trial of relistor 12mg     Problem/Recommendation - 2:  ·  Problem: Patella fracture.  Recommendation: - appreciate care per Orthopaedics  - physical therapy  - pain control.      Problem/Recommendation - 3:  ·  Problem: UTI (urinary tract infection).  Recommendation: - s/p 3 days of abx  - care per ID appreciated.     Problem/Recommendation - 4:  ·  Problem: Urinary retention. Recommendation: s/p johansen cath  trial of void  follow up urology reccs.     Problem/Recommendation - 5:  ·  Problem: Nephrolithiasis.  Recommendation: no signs of active nephrolithiasis at this time  care per .      Problem/Recommendation - 6:  Problem: HLD (hyperlipidemia). Recommendation: reports good control of cholesterol   on home lipitor dose.     Problem/Recommendation - 7:  Problem: Prophylactic measure. Recommendation: on  BID.     Problem/Recommendation - 8:  Problem: CAD (coronary artery disease). Recommendation: - S/P stent x 2, 2 years ago   - appreciate cardiology input        I had a prolonged conversation with the patient regarding the hospital course, differential diagnosis, results of diagnostic tests this far, and therapeutic modalities available. Plan of care discussed with the patient after the evaluation. Patient expresses a clear understanding of the plan of care.  Sixty five minutes spent on the total encounter, of which more than fifty percent of the encounter was spent on counseling and/or coordinating care by the attending physician.        Iftikhar Lora M.D.   Gastroenterology and Hepatology  [address] [place], [place]  Office: [phone]  Cell: [phone].

## 2021-05-07 NOTE — PROGRESS NOTE ADULT - ASSESSMENT
Patient is a 54 Y/O Female with PMhx of CAD S/P PCI, HLD, kidney stone, migraine presents with LEFT comminuted patella fracture s/p mechanical fall on 4/19. Pt seen by Dr. Trejo in office. Instructed to come to [facility] ED for med/cards clearance and plan for ORIF of LEFT patella fx. Denies numbness/tingling in the affected extremity. Patient has been minimally ambulating with walker. in the ED she started having bladder pressure with decrease urine output.       Problem/Recommendation - 1:  Problem: Patella fracture. Recommendation: Ortho follow up   S/P OR , ORIF done , ortho follow up   card and med optimization   pain control   PT eval as tolerated, fall precautions.  S/P stress test   S/P cath, no stent placed medical management   constipation, XRay abdomen noted, obstructive diarrhea   enema as per GI , multiple bowel movement , laxatives per GI, repeat abdominal xray   IV hydration   GI eval called for further recs appreciated       Problem/Recommendation - 2:  ·  Problem: Urinary retention.  Recommendation: acute onset  resolved, had urine output, no retention   send UA and urine culture  monitor BUN/Cr  bladder US. PRN   ID eval for Urine Cx  cont vanco per ID recs   elevated Cr resolved  , S/Pgentle hydration , avid nephrotoxic medications     Problem/Recommendation - 3:  ·  Problem: CAD (coronary artery disease).  Recommendation: S/P stent x 2 2 years ago   cont ASA and lipitor 40 daily   toprol 25 daily   ischemic W/U per cardiolog, done    Problem/Recommendation - 4:  ·  Problem: HLD (hyperlipidemia).  Recommendation: statin  lipdi panel  diet control.     Problem/Recommendation - 5:  ·  Problem: Nephrolithiasis.     Problem/Recommendation - 6:  Problem: Prophylactic measure. Recommendation: DVT and gI PPX     Differential diagnosis and plan of care discussed with patient after the evaluation.   Advanced care planning/advanced directives discussed with patient/family. DNR status including forceful chest compressions to attempt to restart the heart, ventilator support/artificial breathing, electric shock, artificial nutrition, health care proxy, Molst form all discussed with pt. Pt wishes to consider.   OMT on six regions for acute somatic dysfunctions done at the bedside   Importance of Fall prevention discussed.

## 2021-05-07 NOTE — DISCHARGE NOTE PROVIDER - CARE PROVIDER_API CALL
Jose Raul Trejo  ORTHOPAEDIC SURGERY  [address] [place] [id number]  Phone: [phone]  Fax: [phone]  Follow Up Time: 2 weeks    EDER MORA  Internal Medicine  [address] [place] [address]  Phone: [phone]  Fax: [phone]  Follow Up Time: 2 weeks    Iftikhar Lora)  Internal Medicine  [address] [place] [address]  Phone: [phone]  Fax: [phone]  Follow Up Time:

## 2021-05-07 NOTE — PROGRESS NOTE ADULT - SUBJECTIVE AND OBJECTIVE BOX
Patient is a 55y old  Female who presents with a chief complaint of L patella fracture, for surgery (05 May 2021 17:08)      POST OPERATIVE DAY #:  4  Patient seen and examined this AM, resting comfortably in bed, no real complaints other than some pain which is improving with medication.  She was able to get into chair yesterday with PT but was difficult, states she was light headed with PT.   Denies any CP/SOB/Fever/Chills. Plan for JACQUELYN      Vital Signs Last 24 Hrs  T(C): 36.7 (07 May 2021 05:40), Max: 36.9 (06 May 2021 14:20)  T(F): 98.1 (07 May 2021 05:40), Max: 98.4 (06 May 2021 14:20)  HR: 81 (07 May 2021 05:40) (79 - 89)  BP: 111/64 (07 May 2021 05:40) (95/62 - 112/62)  BP(mean): --  RR: 18 (07 May 2021 05:40) (18 - 18)  SpO2: 97% (07 May 2021 05:40) (93% - 98%)    PHYSICAL EXAM:  NAD, Alert  EXT:  LLE: Dressing C/D/I; KI in place  Calves soft  (+) DF/PF;  EHL FHL:  No gross Sensation deficits noted   (+) Distal Pulses;  Compartments soft, no calf TTP

## 2021-05-07 NOTE — DISCHARGE NOTE PROVIDER - NSDCFUADDINST_GEN_ALL_CORE_FT
PT: WBAT: LLE: NO ROM of L knee until seen by surgeon in his office (p rehab)     - .keep Aquacel dressing clean and dry       - ice to affected incision every 4-6 hours prn      - POD #12-14: (5/16-5/18): Remove Aquacel dressing and d/c sutures as needed

## 2021-05-07 NOTE — PROGRESS NOTE ADULT - SUBJECTIVE AND OBJECTIVE BOX
Patient is a 55y old  Female who presents with a chief complaint of L patella fracture, for surgery (07 May 2021 11:22)      INTERVAL HISTORY: feels ok, denies chest pain, c/o abdominal discomfort     REVIEW OF SYSTEMS:   CONSTITUTIONAL: No weakness  EYES/ENT: No visual changes; No throat pain  Neck: No pain or stiffness  Respiratory: No cough, wheezing, No shortness of breath  CARDIOVASCULAR: no chest pain or palpitations  GASTROINTESTINAL: No abdominal pain, no nausea, vomiting or hematemesis  GENITOURINARY: No dysuria, frequency or hematuria  NEUROLOGICAL: No stroke like symptoms  SKIN: No rashes    	  MEDICATIONS:        PHYSICAL EXAM:  T(C): 36.8 (05-07-21 @ 12:44), Max: 36.8 (05-06-21 @ 20:24)  HR: 81 (05-07-21 @ 08:46) (79 - 86)  BP: 108/66 (05-07-21 @ 12:44) (95/62 - 119/70)  RR: 18 (05-07-21 @ 12:44) (18 - 18)  SpO2: 97% (05-07-21 @ 12:44) (93% - 98%)  Wt(kg): --  I&O's Summary    06 May 2021 07:01  -  07 May 2021 07:00  --------------------------------------------------------  IN: 1380 mL / OUT: 1625 mL / NET: -245 mL    07 May 2021 07:01  -  07 May 2021 15:13  --------------------------------------------------------  IN: 740 mL / OUT: 800 mL / NET: -60 mL          Appearance: In no distress	  HEENT:    PERRL, EOMI	  Cardiovascular:  S1 S2, No JVD  Respiratory: Lungs clear to auscultation	  Gastrointestinal:  Soft, Non-tender, + BS	  Vascularature:  No edema of LE  Psychiatric: Appropriate affect   Neuro: no acute focal deficits                           8.9    6.27  )-----------( 289      ( 07 May 2021 10:29 )             28.5     05-07    143  |  110<H>  |  14  ----------------------------<  87  3.7   |  22  |  0.99    Ca    8.5      07 May 2021 10:29    Labs personally reviewed    ASSESSMENT/PLAN: 	  Patient is a 54 Y/O Female with PMhx of CAD S/P PCI, HLD, kidney stone, migraine presents with LEFT comminuted patella fracture s/p mechanical fall on 4/19. Pt seen by Dr. Trejo in office. Instructed to come to [facility] ED for med/cards clearance and plan for ORIF of LEFT patella fx. Denies numbness/tingling in the affected extremity. Patient has been minimally ambulating with walker. in the ED she started having bladder pressure with decrease urine output.       Problem/Recommendation - 1:  Problem: Patella fracture. Recommendation: Ortho follow up   s/p OR, repair  Tolerated surgery well from cv standpoint     Problem/Recommendation - 2:  ·  Problem: Palps/Exertional SOB  Recommendation: NM stress test done t with large areas of infarct with leonor-infarct ischemia   Risks and benefits of cardiac cath discussed with pt.   Cath done with no obstructive CAD, LAD 10% LAD, RCA 30% stenosis    Problem/Recommendation - 3:  ·  Problem: CAD (coronary artery disease).  Recommendation: S/P stent x 2 to LAD about two years ago   cont ASA and lipitor 40 daily   Toprol 25mg daily   Cath with no obstructive CAD, LAD 10% LAD, RCA 30% stenosis   Chest pain today 5/4 - non cardiac in nature, reproducible to palpation. Prolonged discussion with pt - reassurance provided   medical management   No recurrence     Problem/Recommendation - 4:  ·  Problem: HLD (hyperlipidemia).  Recommendation: statin for secondary prevention     Problem/Recommendation - 5:  ·  Problem: Urinary retention.  Recommendation: acute onset o admission  Was retaining about 300cc of urine s/p straight cath  Again 5/2 with urinary retention, straight cath with >700cc of urine  Urology consult called, rec johansen placement  ID recs for UTI appreciated. Continue with Vanco for 3 day course  TOV per        Problem/Recommendation - 6:  Problem: Constipation  Recommendation: jose 2/2 narcotic use Miralax, suppository, multiple enemas with no bowel movement  -  BM 5/4 after tap water enema  - GI recs appreciated - physical disimpaction attempted   - s/p 3 enemas-- patient feels better. Aggressive bowel regimen  - Gi following.     Problem/Recommendation - 7:  Problem: BEULAH  Recommendation: Will hydrate x 24 hours and repeat Cr in AM  - prerenal state          Susan Keyes ANP-C  Maximo Main DO [facility]  Cardiovascular Medicine  [address] [place] [address], [address]  Office: [phone]  Cell: [phone]

## 2021-05-07 NOTE — DISCHARGE NOTE PROVIDER - NSDCFUADDAPPT_GEN_ALL_CORE_FT
please follow up with your PCP, orthopedic and GI (if your constipation is not resolving) in 1 ~ 2 weeks.  please follow up with your PCP, orthopedic and GI (if your constipation is not resolving) in 1 ~ 2 weeks.   Follow up with your Orthopedic Surgeon: Dr Trejo 1-2 weeks after d/c from rehab: re: wound check at which time the patient may be switched to a hinged brace

## 2021-05-07 NOTE — PROGRESS NOTE ADULT - ASSESSMENT
S/P ORIF Lt patella fracture POD 4    Plan    Continue present care  Pain control  OOB/PT/WBAT in KI, no ROM  Plan for JACQUELYN in 1-2 days   F/U with Dr Trejo 2 weeks post-op for wound check and removal of staples  [phone]

## 2021-05-07 NOTE — DISCHARGE NOTE PROVIDER - HOSPITAL COURSE
56 Y/O Female with PMhx of CAD S/P PCI, HLD, kidney stone, migraine presents with LEFT comminuted patella fracture s/p mechanical fall on 4/19. Pt seen by Dr. Trejo in office. Instructed to come to [facility] ED for med/cards clearance and plan for ORIF of LEFT patella fx. she started having bladder pressure with decrease urine output. s/p stress test and S/P cath, no stent placed medical management. seen by ortho. s/p right hip surgical fixation with IM nail, OOB/PT/WBAT in KI, no ROM, F/U with Dr Trejo 2 weeks post-op for wound check and removal of staples. urinary retention resolved, treated UTI with vanco x 3 days. pt c/o constipation, seen by GI, xray abd showed obstructive diarrhea. s/p enema w/ multiple bowel regimens & hydration. pt remains stable for DC. follow up with PCP, ortho and GI in 1 ~ 2 weeks     56 Y/O Female with PMhx of CAD S/P PCI, HLD, kidney stone, migraine presents with LEFT comminuted patella fracture s/p mechanical fall on 4/19. Pt seen by Dr. Trejo in office. Instructed to come to [facility] ED for med/cards clearance and plan for ORIF of LEFT patella fx. she started having bladder pressure with decrease urine output. s/p stress test and S/P cath, no stent placed medical management. seen by ortho. s/p right hip surgical fixation with IM nail, OOB/PT/WBAT in KI, no ROM, F/U with Dr Trejo 2 weeks post-op for wound check and removal of staples. urinary retention resolved, treated UTI with vanco x 3 days and IV levaquin x 2 days. pt c/o constipation, seen by GI, xray abd showed obstructive diarrhea. s/p enema w/ multiple bowel regimens & hydration. pt remains stable for DC. follow up with PCP, ortho and GI in 1 ~ 2 weeks     54 Y/O Female with PMhx of CAD S/P PCI, HLD, kidney stone, migraine presents with LEFT comminuted patella fracture s/p mechanical fall on 4/19. Pt seen by Dr. Trejo in office. Instructed to come to [facility] ED for med/cards clearance and plan for ORIF of LEFT patella fx. she started having bladder pressure with decrease urine output. s/p stress test and S/P cath, no stent placed medical management. seen by ortho. s/p right hip surgical fixation with IM nail, OOB/PT/WBAT in KI, no ROM, F/U with Dr Trejo 2 weeks post-op for wound check and removal of staples. urinary retention resolved, treated UTI with vanco x 3 days and IV levaquin x 2 days. pt c/o constipation, seen by GI, xray abd showed obstructive diarrhea. s/p enema w/ multiple bowel regimens & hydration. pt remains stable for DC. follow up with PCP, ortho and GI in 1 ~ 2 weeks.  Toprol held 2/2 hypotension.  If BP increases can resume while at rehab.

## 2021-05-07 NOTE — PROGRESS NOTE ADULT - SUBJECTIVE AND OBJECTIVE BOX
Chief Complaint:  Patient is a 55y old  Female who presents with a chief complaint of L patella fracture, for surgery (07 May 2021 12:00)      Date of service 05-07-21 @ 20:04      Interval Events:   no abd pain  passing flatus but still feels constipation    Hospital Medications:  aspirin enteric coated 325 milliGRAM(s) Oral two times a day  atorvastatin 40 milliGRAM(s) Oral at bedtime  Biotene Dry Mouth Oral Rinse 5 milliLiter(s) Swish and Spit daily PRN  bisacodyl Suppository 10 milliGRAM(s) Rectal daily PRN  cyclobenzaprine 5 milliGRAM(s) Oral three times a day PRN  escitalopram 10 milliGRAM(s) Oral at bedtime  gabapentin 100 milliGRAM(s) Oral three times a day  lidocaine   Patch 1 Patch Transdermal daily  magnesium hydroxide Suspension 30 milliLiter(s) Oral daily PRN  methylnaltrexone Injectable 12 milliGRAM(s) SubCutaneous once  multivitamin 1 Tablet(s) Oral daily  ondansetron Injectable 4 milliGRAM(s) IV Push every 6 hours PRN  oxyCODONE    IR 5 milliGRAM(s) Oral every 4 hours PRN  oxyCODONE    IR 10 milliGRAM(s) Oral every 4 hours PRN  pantoprazole    Tablet 40 milliGRAM(s) Oral before breakfast  polyethylene glycol 3350 17 Gram(s) Oral daily  senna 2 Tablet(s) Oral at bedtime  topiramate 25 milliGRAM(s) Oral daily        Review of Systems:  General:  No wt loss, fevers, chills, night sweats, fatigue,   Eyes:  Good vision, no reported pain  ENT:  No sore throat, pain, runny nose, dysphagia  CV:  No pain, palpitations, hypo/hypertension  Resp:  No dyspnea, cough, tachypnea, wheezing  GI:  See HPI  :  No pain, bleeding, incontinence, nocturia  Muscle:  No pain, weakness  Neuro:  No weakness, tingling, memory problems  Psych:  No fatigue, insomnia, mood problems, depression  Endocrine:  No polyuria, polydipsia, cold/heat intolerance  Heme:  No petechiae, ecchymosis, easy bruisability  Integumentary:  No rash, edema    PHYSICAL EXAM:   Vital Signs:  Vital Signs Last 24 Hrs  T(C): 36.5 (07 May 2021 16:46), Max: 36.8 (06 May 2021 20:24)  T(F): 97.7 (07 May 2021 16:46), Max: 98.3 (06 May 2021 23:43)  HR: 92 (07 May 2021 16:46) (79 - 92)  BP: 105/65 (07 May 2021 16:46) (100/63 - 119/70)  BP(mean): --  RR: 18 (07 May 2021 16:46) (18 - 18)  SpO2: 97% (07 May 2021 16:46) (93% - 98%)  Daily     Daily       PHYSICAL EXAM:     GENERAL:  Appears stated age, well-groomed, well-nourished, no distress  HEENT:  NC/AT,  conjunctivae anicteric, clear and pink,   NECK: supple, trachea midline  CHEST:  Full & symmetric excursion, no increased effort, breath sounds clear  HEART:  Regular rhythm, no JVD  ABDOMEN:  Soft, non-tender, non-distended, normoactive bowel sounds,  no masses , no hepatosplenomegaly  EXTREMITIES:  no cyanosis,clubbing or edema  SKIN:  No rash, erythema, or, ecchymoses, no jaundice  NEURO:  Alert, non-focal, no asterixis  PSYCH: Appropriate affect, oriented to place and time  RECTAL: Deferred      LABS Personally reviewed by me:                        8.9    6.27  )-----------( 289      ( 07 May 2021 10:29 )             28.5     Mean Cell Volume: 98.6 fl (05-07-21 @ 10:29)    05-07    143  |  110<H>  |  14  ----------------------------<  87  3.7   |  22  |  0.99    Ca    8.5      07 May 2021 10:29                                    8.9    6.27  )-----------( 289      ( 07 May 2021 10:29 )             28.5                         9.7    6.50  )-----------( 272      ( 06 May 2021 07:03 )             31.1                         9.2    7.22  )-----------( 253      ( 05 May 2021 10:03 )             29.1       Imaging personally reviewed by me:

## 2021-05-07 NOTE — DISCHARGE NOTE PROVIDER - NSDCMRMEDTOKEN_GEN_ALL_CORE_FT
aspirin 325 mg oral delayed release tablet: 1 tab(s) orally 2 times a day until 6/3/21 and then aspirin 81mg daily  atorvastatin 40 mg oral tablet: 1 tab(s) orally once a day  bisacodyl 10 mg rectal suppository: 1 suppository(ies) rectal once a day, As needed, If no bowel movement  cyclobenzaprine 5 mg oral tablet: 1 tab(s) orally 3 times a day, As needed, Muscle Spasm  gabapentin 100 mg oral capsule: 1 cap(s) orally 3 times a day  Lexapro 10 mg oral tablet: 1 tab(s) orally once a day  lidocaine 5% topical film: Apply topically to affected area once a day  magnesium hydroxide 8% oral suspension: 30 milliliter(s) orally once a day, As needed, Constipation  Multiple Vitamins oral tablet: 1 tab(s) orally once a day  oxyCODONE 10 mg oral tablet: 1 tab(s) orally every 4 hours, As needed, Severe Pain (7 - 10)  oxyCODONE 5 mg oral tablet: 1 tab(s) orally every 4 hours, As needed, Moderate Pain (4 - 6)  polyethylene glycol 3350 oral powder for reconstitution: 17 gram(s) orally once a day  Protonix 40 mg oral delayed release tablet: 1 tab(s) orally 2 times a day  Reglan 10 mg oral tablet: 1 tab(s) orally 3 times a day   saliva substitutes oral solution: 5 milliliter(s) orally once a day until 5/8/21  senna oral tablet: 2 tab(s) orally once a day (at bedtime)  topiramate 25 mg oral tablet: 1 tab(s) orally once a day   aspirin 325 mg oral delayed release tablet: 1 tab(s) orally 2 times a day until 6/3/21 and then aspirin 81mg daily  atorvastatin 40 mg oral tablet: 1 tab(s) orally once a day  bisacodyl 10 mg rectal suppository: 1 suppository(ies) rectal once a day, As needed, If no bowel movement  cyclobenzaprine 5 mg oral tablet: 1 tab(s) orally 3 times a day, As needed, Muscle Spasm  ferrous sulfate 324 mg (65 mg elemental iron) oral delayed release tablet: 1 tab(s) orally once a day  gabapentin 100 mg oral capsule: 1 cap(s) orally 3 times a day  Lexapro 10 mg oral tablet: 1 tab(s) orally once a day  lidocaine 5% topical film: Apply topically to affected area once a day  magnesium hydroxide 8% oral suspension: 30 milliliter(s) orally once a day, As needed, Constipation  Multiple Vitamins oral tablet: 1 tab(s) orally once a day  oxyCODONE 10 mg oral tablet: 1 tab(s) orally every 4 hours, As needed, Severe Pain (7 - 10)  oxyCODONE 5 mg oral tablet: 1 tab(s) orally every 4 hours, As needed, Moderate Pain (4 - 6)  polyethylene glycol 3350 oral powder for reconstitution: 17 gram(s) orally once a day  Protonix 40 mg oral delayed release tablet: 1 tab(s) orally 2 times a day  Reglan 10 mg oral tablet: 1 tab(s) orally 3 times a day   saliva substitutes oral solution: 5 milliliter(s) orally once a day until 5/8/21  senna oral tablet: 2 tab(s) orally once a day (at bedtime)  topiramate 25 mg oral tablet: 1 tab(s) orally once a day

## 2021-05-08 LAB
ANION GAP SERPL CALC-SCNC: 12 MMOL/L — SIGNIFICANT CHANGE UP (ref 5–17)
APPEARANCE UR: CLEAR — SIGNIFICANT CHANGE UP
BACTERIA # UR AUTO: ABNORMAL
BILIRUB UR-MCNC: NEGATIVE — SIGNIFICANT CHANGE UP
BUN SERPL-MCNC: 14 MG/DL — SIGNIFICANT CHANGE UP (ref 7–23)
CALCIUM SERPL-MCNC: 8.8 MG/DL — SIGNIFICANT CHANGE UP (ref 8.4–10.5)
CHLORIDE SERPL-SCNC: 107 MMOL/L — SIGNIFICANT CHANGE UP (ref 96–108)
CO2 SERPL-SCNC: 24 MMOL/L — SIGNIFICANT CHANGE UP (ref 22–31)
COLOR SPEC: SIGNIFICANT CHANGE UP
CREAT SERPL-MCNC: 1.14 MG/DL — SIGNIFICANT CHANGE UP (ref 0.5–1.3)
DIFF PNL FLD: ABNORMAL
EPI CELLS # UR: 0 /HPF — SIGNIFICANT CHANGE UP
GLUCOSE SERPL-MCNC: 107 MG/DL — HIGH (ref 70–99)
GLUCOSE UR QL: NEGATIVE — SIGNIFICANT CHANGE UP
HCT VFR BLD CALC: 28.7 % — LOW (ref 34.5–45)
HGB BLD-MCNC: 9.1 G/DL — LOW (ref 11.5–15.5)
HYALINE CASTS # UR AUTO: 2 /LPF — SIGNIFICANT CHANGE UP (ref 0–2)
KETONES UR-MCNC: NEGATIVE — SIGNIFICANT CHANGE UP
LEUKOCYTE ESTERASE UR-ACNC: ABNORMAL
MCHC RBC-ENTMCNC: 30.7 PG — SIGNIFICANT CHANGE UP (ref 27–34)
MCHC RBC-ENTMCNC: 31.7 GM/DL — LOW (ref 32–36)
MCV RBC AUTO: 97 FL — SIGNIFICANT CHANGE UP (ref 80–100)
NITRITE UR-MCNC: POSITIVE
NRBC # BLD: 0 /100 WBCS — SIGNIFICANT CHANGE UP (ref 0–0)
PH UR: 7.5 — SIGNIFICANT CHANGE UP (ref 5–8)
PLATELET # BLD AUTO: 293 K/UL — SIGNIFICANT CHANGE UP (ref 150–400)
POTASSIUM SERPL-MCNC: 3.7 MMOL/L — SIGNIFICANT CHANGE UP (ref 3.5–5.3)
POTASSIUM SERPL-SCNC: 3.7 MMOL/L — SIGNIFICANT CHANGE UP (ref 3.5–5.3)
PROT UR-MCNC: NEGATIVE — SIGNIFICANT CHANGE UP
RBC # BLD: 2.96 M/UL — LOW (ref 3.8–5.2)
RBC # FLD: 12.9 % — SIGNIFICANT CHANGE UP (ref 10.3–14.5)
RBC CASTS # UR COMP ASSIST: 3 /HPF — SIGNIFICANT CHANGE UP (ref 0–4)
SODIUM SERPL-SCNC: 143 MMOL/L — SIGNIFICANT CHANGE UP (ref 135–145)
SP GR SPEC: 1.01 — SIGNIFICANT CHANGE UP (ref 1.01–1.02)
UROBILINOGEN FLD QL: NEGATIVE — SIGNIFICANT CHANGE UP
WBC # BLD: 8.27 K/UL — SIGNIFICANT CHANGE UP (ref 3.8–10.5)
WBC # FLD AUTO: 8.27 K/UL — SIGNIFICANT CHANGE UP (ref 3.8–10.5)
WBC UR QL: 26 /HPF — HIGH (ref 0–5)

## 2021-05-08 RX ORDER — SOD SULF/SODIUM/NAHCO3/KCL/PEG
1000 SOLUTION, RECONSTITUTED, ORAL ORAL ONCE
Refills: 0 | Status: COMPLETED | OUTPATIENT
Start: 2021-05-08 | End: 2021-05-09

## 2021-05-08 RX ORDER — ACETAMINOPHEN 500 MG
650 TABLET ORAL ONCE
Refills: 0 | Status: COMPLETED | OUTPATIENT
Start: 2021-05-08 | End: 2021-05-08

## 2021-05-08 RX ORDER — SODIUM CHLORIDE 9 MG/ML
1000 INJECTION INTRAMUSCULAR; INTRAVENOUS; SUBCUTANEOUS
Refills: 0 | Status: DISCONTINUED | OUTPATIENT
Start: 2021-05-08 | End: 2021-05-10

## 2021-05-08 RX ADMIN — OXYCODONE HYDROCHLORIDE 10 MILLIGRAM(S): 5 TABLET ORAL at 04:42

## 2021-05-08 RX ADMIN — CYCLOBENZAPRINE HYDROCHLORIDE 5 MILLIGRAM(S): 10 TABLET, FILM COATED ORAL at 17:19

## 2021-05-08 RX ADMIN — Medication 25 MILLIGRAM(S): at 12:47

## 2021-05-08 RX ADMIN — POLYETHYLENE GLYCOL 3350 17 GRAM(S): 17 POWDER, FOR SOLUTION ORAL at 12:11

## 2021-05-08 RX ADMIN — Medication 325 MILLIGRAM(S): at 05:25

## 2021-05-08 RX ADMIN — ESCITALOPRAM OXALATE 10 MILLIGRAM(S): 10 TABLET, FILM COATED ORAL at 21:16

## 2021-05-08 RX ADMIN — Medication 650 MILLIGRAM(S): at 18:30

## 2021-05-08 RX ADMIN — GABAPENTIN 100 MILLIGRAM(S): 400 CAPSULE ORAL at 13:46

## 2021-05-08 RX ADMIN — GABAPENTIN 100 MILLIGRAM(S): 400 CAPSULE ORAL at 05:25

## 2021-05-08 RX ADMIN — GABAPENTIN 100 MILLIGRAM(S): 400 CAPSULE ORAL at 21:16

## 2021-05-08 RX ADMIN — CYCLOBENZAPRINE HYDROCHLORIDE 5 MILLIGRAM(S): 10 TABLET, FILM COATED ORAL at 21:16

## 2021-05-08 RX ADMIN — PANTOPRAZOLE SODIUM 40 MILLIGRAM(S): 20 TABLET, DELAYED RELEASE ORAL at 05:27

## 2021-05-08 RX ADMIN — CYCLOBENZAPRINE HYDROCHLORIDE 5 MILLIGRAM(S): 10 TABLET, FILM COATED ORAL at 00:18

## 2021-05-08 RX ADMIN — ATORVASTATIN CALCIUM 40 MILLIGRAM(S): 80 TABLET, FILM COATED ORAL at 21:16

## 2021-05-08 RX ADMIN — SODIUM CHLORIDE 50 MILLILITER(S): 9 INJECTION INTRAMUSCULAR; INTRAVENOUS; SUBCUTANEOUS at 22:30

## 2021-05-08 RX ADMIN — LIDOCAINE 1 PATCH: 4 CREAM TOPICAL at 00:20

## 2021-05-08 RX ADMIN — Medication 325 MILLIGRAM(S): at 17:19

## 2021-05-08 RX ADMIN — LIDOCAINE 1 PATCH: 4 CREAM TOPICAL at 18:54

## 2021-05-08 RX ADMIN — LIDOCAINE 1 PATCH: 4 CREAM TOPICAL at 12:30

## 2021-05-08 RX ADMIN — OXYCODONE HYDROCHLORIDE 10 MILLIGRAM(S): 5 TABLET ORAL at 05:10

## 2021-05-08 RX ADMIN — Medication 1 TABLET(S): at 12:11

## 2021-05-08 NOTE — PROVIDER CONTACT NOTE (OTHER) - ACTION/TREATMENT ORDERED:
As per Deandre, PA continue to monitor pt for changes. No further interventions ordered at this time.
Provider made aware of situation, PA at bedside to assess pt this AM. 500 cc NS bolus ordered, will reassess bp after bolus as per PA and pass on to day RN. Will cont to monitor.
PA Ross Wade aware. PA to order CRK, CKMB, and trops, and EKG. will draw labs and EKG  as per orders. PA to come up to bedside to assess. Will continue to monitor. Will continue to monitor.
SHYAM King aware. PA to order 500cc Nacl bolus. will adm 500cc Nacl as ordered. Will continue to monitor.
as per JOSE MIGUEL Bragg, will come assess pt shortly. safety maintained. will cont. to monitor. awaiting for orders.
As per Deandre, PA will come to bedside to assess pt.
As per SHYAM Carrera tap water enema and IV tylenol to be ordered.

## 2021-05-08 NOTE — PROGRESS NOTE ADULT - ASSESSMENT
Patient is a 56 Y/O Female with PMhx of CAD S/P PCI, HLD, kidney stone, migraine presents with LEFT comminuted patella fracture s/p mechanical fall on 4/19. Pt seen by Dr. Trejo in office. Instructed to come to [facility] ED for med/cards clearance and plan for ORIF of LEFT patella fx. Denies numbness/tingling in the affected extremity. Patient has been minimally ambulating with walker. in the ED she started having bladder pressure with decrease urine output.       Problem/Recommendation - 1:  Problem: Patella fracture. Recommendation: Ortho follow up   S/P OR , ORIF done , ortho follow up   card and med optimization   pain control   PT eval as tolerated, fall precautions.  S/P stress test   S/P cath, no stent placed medical management   constipation, XRay abdomen noted, obstructive diarrhea   enema as per GI , multiple bowel movement , laxatives per GI, repeat abdominal xray   IV hydration   GI eval called for further recs appreciated       Problem/Recommendation - 2:  ·  Problem: Urinary retention.  Recommendation: acute onset  resolved, had urine output, no retention   send UA and urine culture  monitor BUN/Cr  bladder US. PRN   ID eval for Urine Cx  cont vanco per ID recs   elevated Cr resolved  , S/Pgentle hydration , avid nephrotoxic medications     Problem/Recommendation - 3:  ·  Problem: CAD (coronary artery disease).  Recommendation: S/P stent x 2 2 years ago   cont ASA and lipitor 40 daily   toprol 25 daily   ischemic W/U per cardiolog, done    Problem/Recommendation - 4:  ·  Problem: HLD (hyperlipidemia).  Recommendation: statin  lipdi panel  diet control.     Problem/Recommendation - 5:  ·  Problem: Nephrolithiasis.     Problem/Recommendation - 6:  Problem: Prophylactic measure. Recommendation: DVT and gI PPX     Differential diagnosis and plan of care discussed with patient after the evaluation.   Advanced care planning/advanced directives discussed with patient/family. DNR status including forceful chest compressions to attempt to restart the heart, ventilator support/artificial breathing, electric shock, artificial nutrition, health care proxy, Molst form all discussed with pt. Pt wishes to consider.   OMT on six regions for acute somatic dysfunctions done at the bedside   Importance of Fall prevention discussed.

## 2021-05-08 NOTE — PROGRESS NOTE ADULT - SUBJECTIVE AND OBJECTIVE BOX
Name of Patient : ASHWIN JACOB  MRN: [id number]  DATE OF SERVICE: 05-08-21 @ 19:50    Subjective: Patient seen and examined. No new events except as noted.     REVIEW OF SYSTEMS:    CONSTITUTIONAL: No weakness, fevers or chills  EYES/ENT: No visual changes;  No vertigo or throat pain   NECK: No pain or stiffness  RESPIRATORY: No cough, wheezing, hemoptysis; No shortness of breath  CARDIOVASCULAR: No chest pain or palpitations  GASTROINTESTINAL: No abdominal or epigastric pain. No nausea, vomiting, or hematemesis; No diarrhea or constipation. No melena or hematochezia.  GENITOURINARY: No dysuria, frequency or hematuria  NEUROLOGICAL: No numbness or weakness  SKIN: No itching, burning, rashes, or lesions   All other review of systems is negative unless indicated above.    MEDICATIONS:  MEDICATIONS  (STANDING):  aspirin enteric coated 325 milliGRAM(s) Oral two times a day  atorvastatin 40 milliGRAM(s) Oral at bedtime  bisacodyl Suppository 10 milliGRAM(s) Rectal once  escitalopram 10 milliGRAM(s) Oral at bedtime  gabapentin 100 milliGRAM(s) Oral three times a day  lidocaine   Patch 1 Patch Transdermal daily  multivitamin 1 Tablet(s) Oral daily  pantoprazole    Tablet 40 milliGRAM(s) Oral before breakfast  polyethylene glycol 3350 17 Gram(s) Oral daily  senna 2 Tablet(s) Oral at bedtime  topiramate 25 milliGRAM(s) Oral daily      PHYSICAL EXAM:  T(C): 37.2 (05-08-21 @ 17:48), Max: 37.2 (05-08-21 @ 17:48)  HR: 98 (05-08-21 @ 17:48) (81 - 98)  BP: 128/74 (05-08-21 @ 17:48) (104/66 - 128/74)  RR: 16 (05-08-21 @ 17:48) (16 - 18)  SpO2: 96% (05-08-21 @ 17:48) (95% - 97%)  Wt(kg): --  I&O's Summary    07 May 2021 07:01  -  08 May 2021 07:00  --------------------------------------------------------  IN: 1380 mL / OUT: 1700 mL / NET: -320 mL    08 May 2021 07:01  -  08 May 2021 19:50  --------------------------------------------------------  IN: 240 mL / OUT: 1300 mL / NET: -1060 mL          Appearance: Normal	  HEENT:  PERRLA   Lymphatic: No lymphadenopathy   Cardiovascular: Normal S1 S2, no JVD  Respiratory: normal effort , clear  Gastrointestinal:  Soft, Non-tender  Skin: No rashes,  warm to touch  Psychiatry:  Mood & affect appropriate  Musculuskeletal: No edema      All labs, Imaging and EKGs personally reviewed         05-07-21 @ 07:01  -  05-08-21 @ 07:00  --------------------------------------------------------  IN: 1380 mL / OUT: 1700 mL / NET: -320 mL    05-08-21 @ 07:01  -  05-08-21 @ 19:50  --------------------------------------------------------  IN: 240 mL / OUT: 1300 mL / NET: -1060 mL                            9.1    8.27  )-----------( 293      ( 08 May 2021 07:25 )             28.7               05-08    143  |  107  |  14  ----------------------------<  107<H>  3.7   |  24  |  1.14    Ca    8.8      08 May 2021 07:23

## 2021-05-08 NOTE — PROVIDER CONTACT NOTE (OTHER) - NAME OF MD/NP/PA/DO NOTIFIED:
SHYAM Carrera
Ross HOWE
dr dominguez
SHYAM King
SHYAM Wade
SHYAM Carrera
SHYAM El ( Ortho)
Mahsa Hubbard NP
SHYAM Grier

## 2021-05-08 NOTE — CHART NOTE - NSCHARTNOTEFT_GEN_A_CORE
Called by RN to evaluate Pt. for temp =   (f)     .  Pt seen and evaluated at bedside.  Denies headache, dizziness, visual disturbance, chest pain, cough, SOB, palpitations, abdominal pain, N/V/D , dysuria.   Offers no complaints at present time.      Vital Signs Last 24 Hrs  T(C): 37.7 (08 May 2021 20:42), Max: 37.7 (08 May 2021 20:42)  T(F): 99.9 (08 May 2021 20:42), Max: 99.9 (08 May 2021 20:42)  HR: 105 (08 May 2021 20:42) (81 - 105)  BP: 123/70 (08 May 2021 20:42) (104/67 - 128/74)  BP(mean): --  RR: 16 (08 May 2021 20:42) (16 - 18)  SpO2: 96% (08 May 2021 20:42) (96% - 97%)    Labs:                        9.1    8.27  )-----------( 293      ( 08 May 2021 07:25 )             28.7       05-08    143  |  107  |  14  ----------------------------<  107<H>  3.7   |  24  |  1.14    Ca    8.8      08 May 2021 07:23        Antimicrobials:  MEDICATIONS  (STANDING):  aspirin enteric coated 325 milliGRAM(s) Oral two times a day  atorvastatin 40 milliGRAM(s) Oral at bedtime  bisacodyl Suppository 10 milliGRAM(s) Rectal once  escitalopram 10 milliGRAM(s) Oral at bedtime  gabapentin 100 milliGRAM(s) Oral three times a day  lidocaine   Patch 1 Patch Transdermal daily  multivitamin 1 Tablet(s) Oral daily  pantoprazole    Tablet 40 milliGRAM(s) Oral before breakfast  polyethylene glycol 3350 17 Gram(s) Oral daily  polyethylene glycol/electrolyte Solution 1000 milliLiter(s) Oral once  senna 2 Tablet(s) Oral at bedtime  sodium chloride 0.9%. 1000 milliLiter(s) (50 mL/Hr) IV Continuous <Continuous>  topiramate 25 milliGRAM(s) Oral daily        PE:    General: Alert & Oriented x 3, non toxic, in no acute distress  Neuro: non focal  Cardiac: S1, S2, tachycardic  Pulm: Lungs CTA  GI: Abd soft, NT/ND, + bowel sounds x 4 quadrants  Ext: no edema, + pedal pulses BL  Skin: intact      AP:     1.  Neutropenic fever       - continue with current antimicrobials       - continue antipyretic/cooling measures      - continue IV hydration      - BC x 2      - UA/CS      - f/u panculture results      - cxr      - continue to closely monitor      - f/u with primary team in AM    Mahsa Hubbard, FLORENTINO x Called by RN to evaluate Pt. for temp =99.9 (f)   37.7(c) .  Pt seen and evaluated at bedside.  A&O x 3, non-toxic, in no acute distress.  Pt.  Denies headache, dizziness, visual disturbance, chest pain, cough, SOB, palpitations, N/V/D , dysuria.   c/o mild lower abd pain, feeling "not well.".      Vital Signs Last 24 Hrs  T(C): 37.7 (08 May 2021 20:42), Max: 37.7 (08 May 2021 20:42)  T(F): 99.9 (08 May 2021 20:42), Max: 99.9 (08 May 2021 20:42)  HR: 105 (08 May 2021 20:42) (81 - 105)  BP: 123/70 (08 May 2021 20:42) (104/67 - 128/74)  BP(mean): --  RR: 16 (08 May 2021 20:42) (16 - 18)  SpO2: 96% (08 May 2021 20:42) (96% - 97%)    Labs:                        9.1    8.27  )-----------( 293      ( 08 May 2021 07:25 )             28.7       05-08    143  |  107  |  14  ----------------------------<  107<H>  3.7   |  24  |  1.14    Ca    8.8      08 May 2021 07:23    Blood culture x 2  5/8/2021 - pending  Urine culture /8/2021 - pending.    Culture - Urine (04.29.21 @ 04:33)    Specimen Source: .Urine Catheterized    Culture Results:   50,000 - 99,000 CFU/mL Aerococcus species  "Aerococcus spp. are predictably susceptible to penicillin,  ampicillin, tetracycline, and vancomycin.  All isolates are  resistant to sulfonamides"        Antimicrobials:  none    CXR 4/28/2021 IMPRESSION:    The heart is normal size. Lungs are clear. No pleural effusion. No pneumothorax. No acute bony pathology could be identified.    PE:    General: Alert & Oriented x 3, non toxic, in no acute distress  Neuro: non focal  Cardiac: S1, S2, tachycardic  Pulm: Lungs CTA  GI: Abd soft, ND, + bowel sounds x 4 quadrants; tender to palpation in umbilical area  Ext: no edema, + pedal pulses BL  Skin: intact      AP:     1.  Fever      - continue antipyretic/cooling measures      - f/u panculture results      - MD at bedside - recommends cxr, abd xray in am; NS at 50 cc/hour.       - continue to closely monitor      - f/u with primary team in AM    Mahsa Hubbard, ANP [id number] Called by RN to evaluate Pt. for temp =99.9 (f)   37.7(c) .  Pt seen and evaluated at bedside.  A&O x 3, non-toxic, in no acute distress.  Pt.  Denies headache, dizziness, visual disturbance, chest pain, cough, SOB, palpitations, N/V/D , dysuria.   c/o mild lower abd pain, feeling "not well.".      Vital Signs Last 24 Hrs  T(C): 37.7 (08 May 2021 20:42), Max: 37.7 (08 May 2021 20:42)  T(F): 99.9 (08 May 2021 20:42), Max: 99.9 (08 May 2021 20:42)  HR: 105 (08 May 2021 20:42) (81 - 105)  BP: 123/70 (08 May 2021 20:42) (104/67 - 128/74)  BP(mean): --  RR: 16 (08 May 2021 20:42) (16 - 18)  SpO2: 96% (08 May 2021 20:42) (96% - 97%)    Labs:                        9.1    8.27  )-----------( 293      ( 08 May 2021 07:25 )             28.7       05-08    143  |  107  |  14  ----------------------------<  107<H>  3.7   |  24  |  1.14    Ca    8.8      08 May 2021 07:23    Blood culture x 2  5/8/2021 - pending  Urine culture /8/2021 - pending.    Culture - Urine (04.29.21 @ 04:33)    Specimen Source: .Urine Catheterized    Culture Results:   50,000 - 99,000 CFU/mL Aerococcus species  "Aerococcus spp. are predictably susceptible to penicillin,  ampicillin, tetracycline, and vancomycin.  All isolates are  resistant to sulfonamides"        Antimicrobials:  none    CXR 4/28/2021 IMPRESSION:    The heart is normal size. Lungs are clear. No pleural effusion. No pneumothorax. No acute bony pathology could be identified.    PE:    General: Alert & Oriented x 3, non toxic, in no acute distress  Neuro: non focal  Cardiac: S1, S2, tachycardic  Pulm: Lungs CTA  GI: Abd soft, ND, + bowel sounds x 4 quadrants; tender to palpation in umbilical area  Ext: no edema, + pedal pulses BL  Skin: intact      AP: Patient is a 56 Y/O Female with PMhx of CAD S/P PCI, HLD, kidney stone, migraine presents with LEFT comminuted patella fracture s/p mechanical fall on 4/19. Now s/p Left patella ORIF 5/3. Course complicated by constipation. Now with fever.     1.  Fever      - continue antipyretic/cooling measures      - f/u panculture results      - MD at bedside - recommends cxr, abd xray in am; NS at 50 cc/hour.       - continue to closely monitor      - f/u with primary team in AM    Mahsa Hubbard, ANP [id number]

## 2021-05-08 NOTE — PROGRESS NOTE ADULT - SUBJECTIVE AND OBJECTIVE BOX
DATE OF SERVICE: 05-08-21 @ 23:57    Patient is a 55y old  Female who presents with a chief complaint of L patella fracture, for surgery (08 May 2021 19:49)      INTERVAL HISTORY: feels ok, low grade fever earlier <100.0    REVIEW OF SYSTEMS:  CONSTITUTIONAL: No weakness  EYES/ENT: No visual changes;  No throat pain   NECK: No pain or stiffness  RESPIRATORY: No cough, wheezing; No shortness of breath  CARDIOVASCULAR: No chest pain or palpitations  GASTROINTESTINAL: No abdominal  pain. No nausea, vomiting, or hematemesis  GENITOURINARY: No dysuria, frequency or hematuria  NEUROLOGICAL: No stroke like symptoms  SKIN: No rashes           PHYSICAL EXAM:  T(C): 37.7 (05-08-21 @ 20:42), Max: 37.7 (05-08-21 @ 20:42)  HR: 105 (05-08-21 @ 20:42) (81 - 105)  BP: 123/70 (05-08-21 @ 20:42) (104/67 - 128/74)  RR: 16 (05-08-21 @ 20:42) (16 - 18)  SpO2: 96% (05-08-21 @ 20:42) (96% - 97%)  Wt(kg): --  I&O's Summary    07 May 2021 07:01  -  08 May 2021 07:00  --------------------------------------------------------  IN: 1380 mL / OUT: 1700 mL / NET: -320 mL    08 May 2021 07:01  -  08 May 2021 23:57  --------------------------------------------------------  IN: 480 mL / OUT: 1900 mL / NET: -1420 mL          Appearance: In no distress	  HEENT:    PERRL, EOMI	  Cardiovascular:  S1 S2, No JVD  Respiratory: Lungs clear to auscultation	  Gastrointestinal:  Soft, Non-tender, + BS	  Vascularature:  No edema of LE  Psychiatric: Appropriate affect   Neuro: no acute focal deficits                               9.1    8.27  )-----------( 293      ( 08 May 2021 07:25 )             28.7     05-08    143  |  107  |  14  ----------------------------<  107<H>  3.7   |  24  |  1.14    Ca    8.8      08 May 2021 07:23          Labs personally reviewed      ASSESSMENT/PLAN: 	  Patient is a 56 Y/O Female with PMhx of CAD S/P PCI, HLD, kidney stone, migraine presents with LEFT comminuted patella fracture s/p mechanical fall on 4/19. Pt seen by Dr. Trejo in office. Instructed to come to [facility] ED for med/cards clearance and plan for ORIF of LEFT patella fx. Denies numbness/tingling in the affected extremity. Patient has been minimally ambulating with walker. in the ED she started having bladder pressure with decrease urine output.       Problem/Recommendation - 1:  Problem: Patella fracture. Recommendation: Ortho follow up   s/p OR, repair  Tolerated surgery well from cv standpoint     Problem/Recommendation - 2:  ·  Problem: Palps/Exertional SOB  Recommendation: NM stress test done t with large areas of infarct with leonor-infarct ischemia   Risks and benefits of cardiac cath discussed with pt.   Cath done with no obstructive CAD, LAD 10% LAD, RCA 30% stenosis    Problem/Recommendation - 3:  ·  Problem: CAD (coronary artery disease).  Recommendation: S/P stent x 2 to LAD about two years ago   cont ASA and lipitor 40 daily   Toprol 25mg daily   Cath with no obstructive CAD, LAD 10% LAD, RCA 30% stenosis   Chest pain today 5/4 - non cardiac in nature, reproducible to palpation. Prolonged discussion with pt - reassurance provided   medical management   No recurrence     Problem/Recommendation - 4:  ·  Problem: HLD (hyperlipidemia).  Recommendation: statin for secondary prevention     Problem/Recommendation - 5:  ·  Problem: Urinary retention.  Recommendation: acute onset o admission  Was retaining about 300cc of urine s/p straight cath  Again 5/2 with urinary retention, straight cath with >700cc of urine  Urology consult called, rec johansen placement  ID recs for UTI appreciated. Completed Vanco 3 day course  TOV per        Problem/Recommendation - 6:  Problem: Constipation  Recommendation: jose 2/2 narcotic use Miralax, suppository, multiple enemas with no bowel movement  -  BM 5/4 after tap water enema  - GI recs appreciated - physical disimpaction attempted   - s/p 3 enemas-- patient feels better. Aggressive bowel regimen  - Gi following.     Problem/Recommendation - 7:  Problem: BEULAH  Recommendation: repeat Cr improved  - prerenal state    Problem/Recommendation - 8:  Problem: Low grade fever <100.0  Recommendation: Blood culture, urine culture, UA and CXR  - ID reconsult   - Ortho follow up for ?wound infection       Thirty five minutes spent on total encounter, of which more than fifty percent of the encounter was spent on counseling and/or coordinating care by the attending physician.      Maximo Main DO [facility]  Cardiovascular Medicine  [address], [address]  Office: [phone]  Cell: [phone] DATE OF SERVICE: 05-08-21      Patient is a 55y old  Female who presents with a chief complaint of L patella fracture, for surgery (08 May 2021 19:49)      INTERVAL HISTORY: feels ok, low grade fever earlier <100.0    REVIEW OF SYSTEMS:  CONSTITUTIONAL: No weakness  EYES/ENT: No visual changes;  No throat pain   NECK: No pain or stiffness  RESPIRATORY: No cough, wheezing; No shortness of breath  CARDIOVASCULAR: No chest pain or palpitations  GASTROINTESTINAL: No abdominal  pain. No nausea, vomiting, or hematemesis  GENITOURINARY: No dysuria, frequency or hematuria  NEUROLOGICAL: No stroke like symptoms  SKIN: No rashes           PHYSICAL EXAM:  T(C): 37.7 (05-08-21 @ 20:42), Max: 37.7 (05-08-21 @ 20:42)  HR: 105 (05-08-21 @ 20:42) (81 - 105)  BP: 123/70 (05-08-21 @ 20:42) (104/67 - 128/74)  RR: 16 (05-08-21 @ 20:42) (16 - 18)  SpO2: 96% (05-08-21 @ 20:42) (96% - 97%)  Wt(kg): --  I&O's Summary    07 May 2021 07:01  -  08 May 2021 07:00  --------------------------------------------------------  IN: 1380 mL / OUT: 1700 mL / NET: -320 mL    08 May 2021 07:01  -  08 May 2021 23:57  --------------------------------------------------------  IN: 480 mL / OUT: 1900 mL / NET: -1420 mL          Appearance: In no distress	  HEENT:    PERRL, EOMI	  Cardiovascular:  S1 S2, No JVD  Respiratory: Lungs clear to auscultation	  Gastrointestinal:  Soft, Non-tender, + BS	  Vascularature:  No edema of LE  Psychiatric: Appropriate affect   Neuro: no acute focal deficits                               9.1    8.27  )-----------( 293      ( 08 May 2021 07:25 )             28.7     05-08    143  |  107  |  14  ----------------------------<  107<H>  3.7   |  24  |  1.14    Ca    8.8      08 May 2021 07:23          Labs personally reviewed      ASSESSMENT/PLAN: 	  Patient is a 56 Y/O Female with PMhx of CAD S/P PCI, HLD, kidney stone, migraine presents with LEFT comminuted patella fracture s/p mechanical fall on 4/19. Pt seen by Dr. Trejo in office. Instructed to come to [facility] ED for med/cards clearance and plan for ORIF of LEFT patella fx. Denies numbness/tingling in the affected extremity. Patient has been minimally ambulating with walker. in the ED she started having bladder pressure with decrease urine output.       Problem/Recommendation - 1:  Problem: Patella fracture. Recommendation: Ortho follow up   s/p OR, repair  Tolerated surgery well from cv standpoint     Problem/Recommendation - 2:  ·  Problem: Palps/Exertional SOB  Recommendation: NM stress test done t with large areas of infarct with leonor-infarct ischemia   Risks and benefits of cardiac cath discussed with pt.   Cath done with no obstructive CAD, LAD 10% LAD, RCA 30% stenosis    Problem/Recommendation - 3:  ·  Problem: CAD (coronary artery disease).  Recommendation: S/P stent x 2 to LAD about two years ago   cont ASA and lipitor 40 daily   Toprol 25mg daily   Cath with no obstructive CAD, LAD 10% LAD, RCA 30% stenosis   Chest pain today 5/4 - non cardiac in nature, reproducible to palpation. Prolonged discussion with pt - reassurance provided   medical management   No recurrence     Problem/Recommendation - 4:  ·  Problem: HLD (hyperlipidemia).  Recommendation: statin for secondary prevention     Problem/Recommendation - 5:  ·  Problem: Urinary retention.  Recommendation: acute onset o admission  Was retaining about 300cc of urine s/p straight cath  Again 5/2 with urinary retention, straight cath with >700cc of urine  Urology consult called, rec johansen placement  ID recs for UTI appreciated. Completed Vanco 3 day course  TOV per        Problem/Recommendation - 6:  Problem: Constipation  Recommendation: jose 2/2 narcotic use Miralax, suppository, multiple enemas with no bowel movement  -  BM 5/4 after tap water enema  - GI recs appreciated - physical disimpaction attempted   - s/p 3 enemas-- patient feels better. Aggressive bowel regimen  - Gi following.     Problem/Recommendation - 7:  Problem: BEULAH  Recommendation: repeat Cr improved  - prerenal state    Problem/Recommendation - 8:  Problem: Low grade fever <100.0  Recommendation: Blood culture, urine culture, UA and CXR  - ID reconsult   - Ortho follow up for ?wound infection       Thirty five minutes spent on total encounter, of which more than fifty percent of the encounter was spent on counseling and/or coordinating care by the attending physician.      Maximo Main DO [facility]  Cardiovascular Medicine  [address], [address]  Office: [phone]  Cell: [phone]

## 2021-05-08 NOTE — PROVIDER CONTACT NOTE (OTHER) - REASON
IV medication infiltration
pt c/o of pain to LUE
elevated heart rate 105, pt c/o not feeling well
pt c/o of urine retention
Hypotension
BP 97/56
Patient c/o unable to urinate .
pt. c/o chest spasms
pt continues to c/o of LLE pain and constipation

## 2021-05-08 NOTE — PROGRESS NOTE ADULT - ASSESSMENT
Patient is a 54 Y/O Female with PMhx of CAD S/P PCI, HLD, kidney stone, migraine presents with LEFT comminuted patella fracture s/p mechanical fall on 4/19. Now s/p Left patella ORIF 5/3. GI consulted for post- operative constipation.  .     Problem/Recommendation - 1:  Problem: Constipation due to pain medication. Recommendation: - AXR from 5/7 reviewed with persisting large stool burden  - will likely need moviprep     Problem/Recommendation - 2:  ·  Problem: Patella fracture.  Recommendation: - appreciate care per Orthopaedics  - physical therapy  - pain control.      Problem/Recommendation - 3:  ·  Problem: UTI (urinary tract infection).  Recommendation: - s/p 3 days of abx  - care per ID appreciated.     Problem/Recommendation - 4:  ·  Problem: Urinary retention. Recommendation: s/p johansen cath  trial of void  follow up urology reccs.     Problem/Recommendation - 5:  ·  Problem: Nephrolithiasis.  Recommendation: no signs of active nephrolithiasis at this time  care per .      Problem/Recommendation - 6:  Problem: HLD (hyperlipidemia). Recommendation: reports good control of cholesterol   on home lipitor dose.     Problem/Recommendation - 7:  Problem: Prophylactic measure. Recommendation: on  BID.     Problem/Recommendation - 8:  Problem: CAD (coronary artery disease). Recommendation: - S/P stent x 2, 2 years ago   - appreciate cardiology input     Problem/Recommendation - 9:  Problem: normocytic anemia, likely due to surgical blood loss  -trend CBC  -consider iron supplementation on d/c        I had a prolonged conversation with the patient regarding the hospital course, differential diagnosis, results of diagnostic tests this far, and therapeutic modalities available. Plan of care discussed with the patient after the evaluation. Patient expresses a clear understanding of the plan of care.  Sixty five minutes spent on the total encounter, of which more than fifty percent of the encounter was spent on counseling and/or coordinating care by the attending physician.        Iftikhar Lora M.D.   Gastroenterology and Hepatology  [address] [place], [place]  Office: [phone]  Cell: [phone].

## 2021-05-08 NOTE — PROVIDER CONTACT NOTE (OTHER) - SITUATION
elevated heart rate 105, pt c/o not feeling well
pt continues to c/o of LLE pain and constipation
pt c/o of pain to LUE
IV medication infiltration
Patient c/o unable to urinate . Was able to urinate earlier today with external catheter ( primafit) connected to suction. As per RN Melony patient had been voiding.
Pt w/ low BP this morning
BP 97/56
pt. c/o chest spasms

## 2021-05-08 NOTE — PROVIDER CONTACT NOTE (OTHER) - DATE AND TIME:
05-May-2021 22:00
02-May-2021 15:25
03-May-2021 05:50
03-May-2021 23:20
04-May-2021 06:20
08-May-2021 21:18
04-May-2021 08:22
28-Apr-2021 19:20
05-May-2021 03:15

## 2021-05-08 NOTE — PROVIDER CONTACT NOTE (OTHER) - ASSESSMENT
A&OX4. VSS, except BP 97/56. pt. is lethargic. pt. c/o dizziness, weakness, and light-headedness. neuro WDL. NVS WDL.
AO x,4 Vitals stable. Bladder scan showed 380.
Pt noted to have erythema and swelling to LUE around IV site from vancomycin infusion. IV infusion stopped. IV removed. Heat packs applied to site with elevation. + radial pulse. Denies pain at this time.
Pt resting in bed in no apparent distress. BP 85/53 electronic, manual of 82/52. All other VSS at this time. Pt states she feels 'weak and lightheaded' this AM. Pt has been NPO since midnight last night w/ IVF @ 75 cc/hr. No other complaints at this time
pt was bladder scan 273 in bladder per attending straight cath pt. will continue to monitor pt closely
pt. c/o chest spasms. VsS. A&OX4. lung and heart sounds WDL. pt. states she used to take renaxa and nitroglycerin. pt. states she woke up from a dead sleep with the chest spasms. she texted her cardiologist  who recommend she tell the nurse. no s/s of distress.
during assessment, pt c/o not feeling well since this afternoon. temp of 99 trending, since this afternoon. pt c/o feeling warm and chill. heart rate 105 noted, temp 99.9, /70, sating 96 RA. pt denies chest pain and sob. right groin & left knee drsg intact no sing of bleeding noted. blood culture sent prior shift, as well as UA & UC.  650mg Tylenol also given prior at 1800, not due for next dose. safety maintained. fluid intake encouraged. incentive spirometer also encouraged.  NP Mahsa made aware. awaiting for NP to assess pt at bedside. no signs of distress noted. will cont. to monitor.
pt continues to c/o of LLE pain and constipation. Pt with +bowel sounds and reports passing gas. Pt received mineral oil enema earlier in the day as well as lactulose with no bowel movement. Pt eager to go to the bathroom. pt also reports pain to LLE at 10/10 with intermittent spasming at times.
Pt endorses pain to LUE at the antecubital site from previous IV site where vancomycin was infusing. RN noted pt to have swelling at time and IV was removed and heat packs applied. Erythema and edema noted to site at this time. +radial pulses +2, and pt has full ROM of extremity.  Pt reports improvement since RN put heat packs on arm but states " it still hurts"

## 2021-05-08 NOTE — PROGRESS NOTE ADULT - SUBJECTIVE AND OBJECTIVE BOX
Chief Complaint:  Patient is a 55y old  Female who presents with a chief complaint of L patella fracture, for surgery (08 May 2021 07:19)      Date of service 05-08-21 @ 12:54      Interval Events:   no BM documented overnight    Hospital Medications:  aspirin enteric coated 325 milliGRAM(s) Oral two times a day  atorvastatin 40 milliGRAM(s) Oral at bedtime  Biotene Dry Mouth Oral Rinse 5 milliLiter(s) Swish and Spit daily PRN  bisacodyl Suppository 10 milliGRAM(s) Rectal daily PRN  cyclobenzaprine 5 milliGRAM(s) Oral three times a day PRN  escitalopram 10 milliGRAM(s) Oral at bedtime  gabapentin 100 milliGRAM(s) Oral three times a day  lidocaine   Patch 1 Patch Transdermal daily  magnesium hydroxide Suspension 30 milliLiter(s) Oral daily PRN  multivitamin 1 Tablet(s) Oral daily  ondansetron Injectable 4 milliGRAM(s) IV Push every 6 hours PRN  oxyCODONE    IR 5 milliGRAM(s) Oral every 4 hours PRN  oxyCODONE    IR 10 milliGRAM(s) Oral every 4 hours PRN  pantoprazole    Tablet 40 milliGRAM(s) Oral before breakfast  polyethylene glycol 3350 17 Gram(s) Oral daily  senna 2 Tablet(s) Oral at bedtime  topiramate 25 milliGRAM(s) Oral daily        Review of Systems:  General:  No wt loss, fevers, chills, night sweats, fatigue,   Eyes:  Good vision, no reported pain  ENT:  No sore throat, pain, runny nose, dysphagia  CV:  No pain, palpitations, hypo/hypertension  Resp:  No dyspnea, cough, tachypnea, wheezing  GI:  See HPI  :  No pain, bleeding, incontinence, nocturia  Muscle:  No pain, weakness  Neuro:  No weakness, tingling, memory problems  Psych:  No fatigue, insomnia, mood problems, depression  Endocrine:  No polyuria, polydipsia, cold/heat intolerance  Heme:  No petechiae, ecchymosis, easy bruisability  Integumentary:  No rash, edema    PHYSICAL EXAM:   Vital Signs:  Vital Signs Last 24 Hrs  T(C): 36.9 (08 May 2021 09:31), Max: 36.9 (08 May 2021 09:31)  T(F): 98.5 (08 May 2021 09:31), Max: 98.5 (08 May 2021 09:31)  HR: 89 (08 May 2021 09:31) (81 - 92)  BP: 104/67 (08 May 2021 09:31) (104/66 - 108/68)  BP(mean): --  RR: 18 (08 May 2021 09:31) (18 - 18)  SpO2: 96% (08 May 2021 09:31) (95% - 97%)  Daily     Daily       PHYSICAL EXAM:     GENERAL:  Appears stated age, well-groomed, well-nourished, no distress  HEENT:  NC/AT,  conjunctivae anicteric, clear and pink,   NECK: supple, trachea midline  CHEST:  Full & symmetric excursion, no increased effort, breath sounds clear  HEART:  Regular rhythm, no JVD  ABDOMEN:  Soft, non-tender, non-distended, normoactive bowel sounds,  no masses , no hepatosplenomegaly  EXTREMITIES:  no cyanosis,clubbing or edema  SKIN:  No rash, erythema, or, ecchymoses, no jaundice  NEURO:  Alert, non-focal, no asterixis  PSYCH: Appropriate affect, oriented to place and time  RECTAL: Deferred      LABS Personally reviewed by me:                        9.1    8.27  )-----------( 293      ( 08 May 2021 07:25 )             28.7     Mean Cell Volume: 97.0 fl (05-08-21 @ 07:25)    05-08    143  |  107  |  14  ----------------------------<  107<H>  3.7   |  24  |  1.14    Ca    8.8      08 May 2021 07:23                                    9.1    8.27  )-----------( 293      ( 08 May 2021 07:25 )             28.7                         8.9    6.27  )-----------( 289      ( 07 May 2021 10:29 )             28.5                         9.7    6.50  )-----------( 272      ( 06 May 2021 07:03 )             31.1       Imaging personally reviewed by me:    abd XR from 5/7 shows persistent large stool burden

## 2021-05-08 NOTE — PROVIDER CONTACT NOTE (OTHER) - BACKGROUND
Admitted for pattellar fracture.
s/p Left patella ORIF
s/p s/p Left patella ORIF
LLE ORIF
s/p LLE ORIF
s/p L tib plauteau ORIF
s/p fall, left patella fx. left patella ORIF

## 2021-05-08 NOTE — PROGRESS NOTE ADULT - SUBJECTIVE AND OBJECTIVE BOX
ORTHO  Patient is a 55y old  Female who presents with a chief complaint of L patella fracture, for surgery (07 May 2021 20:04)    Pt. resting without complaint    VS-  T(C): 36.8 (05-08-21 @ 04:19), Max: 36.8 (05-07-21 @ 08:46)  HR: 81 (05-08-21 @ 04:19) (81 - 92)  BP: 108/68 (05-08-21 @ 04:19) (104/66 - 119/70)  RR: 18 (05-08-21 @ 04:19) (18 - 18)  SpO2: 97% (05-08-21 @ 04:19) (95% - 97%)  Wt(kg): --    M.S.- A&O  Extremity- Left LE- immobilizer in place, dressing- C/D/I  Neuro-              Motor- (+)Ankle- DF/PF              Sensation- grossly intact to light touch              Calves- soft, nontender                               8.9    6.27  )-----------( 289      ( 07 May 2021 10:29 )             28.5     05-07    143  |  110<H>  |  14  ----------------------------<  87  3.7   |  22  |  0.99    Ca    8.5      07 May 2021 10:29

## 2021-05-08 NOTE — PROGRESS NOTE ADULT - ASSESSMENT
Impression: Stable       Plan:   Continue present treatment per medicine                 Out of bed, ambulate, weight bearing as tolerated                  Physical therapy follow up                 Call for follow up as needed    Skinny Patten PA-C  Orthopaedic Surgery  Team pager [phone]  [phone]

## 2021-05-08 NOTE — PROVIDER CONTACT NOTE (OTHER) - RECOMMENDATIONS
Notify Provider
Tap water enema
As per PA give patient another hour to void and if she is unable to , then follow up with team. As per PA patient is stable to be transferred to assigned bed at [address] [place]. Endorsed to DAVIS Wolff at ED Hold
Bolus?
Nacl bolus
Notify Provider
notify PA
JOSE MIGUEL Bragg made aware

## 2021-05-09 LAB
ANION GAP SERPL CALC-SCNC: 13 MMOL/L — SIGNIFICANT CHANGE UP (ref 5–17)
BUN SERPL-MCNC: 13 MG/DL — SIGNIFICANT CHANGE UP (ref 7–23)
CALCIUM SERPL-MCNC: 8.7 MG/DL — SIGNIFICANT CHANGE UP (ref 8.4–10.5)
CHLORIDE SERPL-SCNC: 106 MMOL/L — SIGNIFICANT CHANGE UP (ref 96–108)
CO2 SERPL-SCNC: 23 MMOL/L — SIGNIFICANT CHANGE UP (ref 22–31)
CREAT SERPL-MCNC: 0.8 MG/DL — SIGNIFICANT CHANGE UP (ref 0.5–1.3)
GLUCOSE SERPL-MCNC: 145 MG/DL — HIGH (ref 70–99)
HCT VFR BLD CALC: 26.7 % — LOW (ref 34.5–45)
HGB BLD-MCNC: 8.4 G/DL — LOW (ref 11.5–15.5)
MCHC RBC-ENTMCNC: 30.4 PG — SIGNIFICANT CHANGE UP (ref 27–34)
MCHC RBC-ENTMCNC: 31.5 GM/DL — LOW (ref 32–36)
MCV RBC AUTO: 96.7 FL — SIGNIFICANT CHANGE UP (ref 80–100)
NRBC # BLD: 0 /100 WBCS — SIGNIFICANT CHANGE UP (ref 0–0)
PLATELET # BLD AUTO: 288 K/UL — SIGNIFICANT CHANGE UP (ref 150–400)
POTASSIUM SERPL-MCNC: 3.9 MMOL/L — SIGNIFICANT CHANGE UP (ref 3.5–5.3)
POTASSIUM SERPL-SCNC: 3.9 MMOL/L — SIGNIFICANT CHANGE UP (ref 3.5–5.3)
RBC # BLD: 2.76 M/UL — LOW (ref 3.8–5.2)
RBC # FLD: 12.7 % — SIGNIFICANT CHANGE UP (ref 10.3–14.5)
SODIUM SERPL-SCNC: 142 MMOL/L — SIGNIFICANT CHANGE UP (ref 135–145)
WBC # BLD: 7.31 K/UL — SIGNIFICANT CHANGE UP (ref 3.8–10.5)
WBC # FLD AUTO: 7.31 K/UL — SIGNIFICANT CHANGE UP (ref 3.8–10.5)

## 2021-05-09 PROCEDURE — 74019 RADEX ABDOMEN 2 VIEWS: CPT | Mod: 26

## 2021-05-09 PROCEDURE — 71046 X-RAY EXAM CHEST 2 VIEWS: CPT | Mod: 26

## 2021-05-09 RX ORDER — VANCOMYCIN HCL 1 G
1000 VIAL (EA) INTRAVENOUS ONCE
Refills: 0 | Status: COMPLETED | OUTPATIENT
Start: 2021-05-09 | End: 2021-05-09

## 2021-05-09 RX ORDER — VANCOMYCIN HCL 1 G
VIAL (EA) INTRAVENOUS
Refills: 0 | Status: DISCONTINUED | OUTPATIENT
Start: 2021-05-09 | End: 2021-05-11

## 2021-05-09 RX ORDER — VANCOMYCIN HCL 1 G
1000 VIAL (EA) INTRAVENOUS EVERY 12 HOURS
Refills: 0 | Status: DISCONTINUED | OUTPATIENT
Start: 2021-05-09 | End: 2021-05-11

## 2021-05-09 RX ADMIN — ATORVASTATIN CALCIUM 40 MILLIGRAM(S): 80 TABLET, FILM COATED ORAL at 21:34

## 2021-05-09 RX ADMIN — OXYCODONE HYDROCHLORIDE 10 MILLIGRAM(S): 5 TABLET ORAL at 10:01

## 2021-05-09 RX ADMIN — Medication 1 TABLET(S): at 11:55

## 2021-05-09 RX ADMIN — Medication 325 MILLIGRAM(S): at 17:39

## 2021-05-09 RX ADMIN — LIDOCAINE 1 PATCH: 4 CREAM TOPICAL at 18:50

## 2021-05-09 RX ADMIN — CYCLOBENZAPRINE HYDROCHLORIDE 5 MILLIGRAM(S): 10 TABLET, FILM COATED ORAL at 10:56

## 2021-05-09 RX ADMIN — OXYCODONE HYDROCHLORIDE 10 MILLIGRAM(S): 5 TABLET ORAL at 17:39

## 2021-05-09 RX ADMIN — GABAPENTIN 100 MILLIGRAM(S): 400 CAPSULE ORAL at 21:34

## 2021-05-09 RX ADMIN — ESCITALOPRAM OXALATE 10 MILLIGRAM(S): 10 TABLET, FILM COATED ORAL at 21:34

## 2021-05-09 RX ADMIN — Medication 1000 MILLILITER(S): at 10:55

## 2021-05-09 RX ADMIN — OXYCODONE HYDROCHLORIDE 10 MILLIGRAM(S): 5 TABLET ORAL at 23:10

## 2021-05-09 RX ADMIN — LIDOCAINE 1 PATCH: 4 CREAM TOPICAL at 00:40

## 2021-05-09 RX ADMIN — GABAPENTIN 100 MILLIGRAM(S): 400 CAPSULE ORAL at 13:08

## 2021-05-09 RX ADMIN — LIDOCAINE 1 PATCH: 4 CREAM TOPICAL at 11:55

## 2021-05-09 RX ADMIN — GABAPENTIN 100 MILLIGRAM(S): 400 CAPSULE ORAL at 05:12

## 2021-05-09 RX ADMIN — ONDANSETRON 4 MILLIGRAM(S): 8 TABLET, FILM COATED ORAL at 14:31

## 2021-05-09 RX ADMIN — CYCLOBENZAPRINE HYDROCHLORIDE 5 MILLIGRAM(S): 10 TABLET, FILM COATED ORAL at 20:12

## 2021-05-09 RX ADMIN — PANTOPRAZOLE SODIUM 40 MILLIGRAM(S): 20 TABLET, DELAYED RELEASE ORAL at 05:12

## 2021-05-09 RX ADMIN — Medication 25 MILLIGRAM(S): at 13:08

## 2021-05-09 RX ADMIN — OXYCODONE HYDROCHLORIDE 10 MILLIGRAM(S): 5 TABLET ORAL at 10:30

## 2021-05-09 RX ADMIN — Medication 250 MILLIGRAM(S): at 21:34

## 2021-05-09 RX ADMIN — OXYCODONE HYDROCHLORIDE 10 MILLIGRAM(S): 5 TABLET ORAL at 22:36

## 2021-05-09 RX ADMIN — OXYCODONE HYDROCHLORIDE 10 MILLIGRAM(S): 5 TABLET ORAL at 18:10

## 2021-05-09 RX ADMIN — CYCLOBENZAPRINE HYDROCHLORIDE 5 MILLIGRAM(S): 10 TABLET, FILM COATED ORAL at 05:12

## 2021-05-09 RX ADMIN — Medication 325 MILLIGRAM(S): at 05:12

## 2021-05-09 RX ADMIN — Medication 250 MILLIGRAM(S): at 10:09

## 2021-05-09 NOTE — PROGRESS NOTE ADULT - SUBJECTIVE AND OBJECTIVE BOX
Name of Patient : ASHWIN JACOB  MRN: [id number]  DATE OF SERVICE: 21 @ 20:44    Subjective: Patient seen and examined. No new events except as noted.   doing okay  ? chills, T of 99    REVIEW OF SYSTEMS:    CONSTITUTIONAL: + weakness  EYES/ENT: No visual changes;  No vertigo or throat pain   NECK: No pain or stiffness  RESPIRATORY: No cough, wheezing, hemoptysis; No shortness of breath  CARDIOVASCULAR: No chest pain or palpitations  GASTROINTESTINAL: No abdominal or epigastric pain.   GENITOURINARY: No dysuria, frequency or hematuria  NEUROLOGICAL: No numbness or weakness  SKIN: No itching, burning, rashes, or lesions   All other review of systems is negative unless indicated above.    MEDICATIONS:  MEDICATIONS  (STANDING):  aspirin enteric coated 325 milliGRAM(s) Oral two times a day  atorvastatin 40 milliGRAM(s) Oral at bedtime  bisacodyl Suppository 10 milliGRAM(s) Rectal once  escitalopram 10 milliGRAM(s) Oral at bedtime  gabapentin 100 milliGRAM(s) Oral three times a day  lidocaine   Patch 1 Patch Transdermal daily  multivitamin 1 Tablet(s) Oral daily  pantoprazole    Tablet 40 milliGRAM(s) Oral before breakfast  polyethylene glycol 3350 17 Gram(s) Oral daily  senna 2 Tablet(s) Oral at bedtime  sodium chloride 0.9%. 1000 milliLiter(s) (50 mL/Hr) IV Continuous <Continuous>  topiramate 25 milliGRAM(s) Oral daily  vancomycin  IVPB 1000 milliGRAM(s) IV Intermittent every 12 hours  vancomycin  IVPB          PHYSICAL EXAM:  T(C): 36.7 (21 @ 16:18), Max: 36.9 (21 @ 23:45)  HR: 87 (21 @ 16:18) (81 - 94)  BP: 111/66 (21 @ 16:18) (111/66 - 118/78)  RR: 16 (21 @ 16:18) (16 - 16)  SpO2: 96% (21 @ 16:18) (94% - 97%)  Wt(kg): --  I&O's Summary    08 May 2021 07:01  -  09 May 2021 07:00  --------------------------------------------------------  IN: 720 mL / OUT: 2825 mL / NET: -2105 mL    09 May 2021 07:  -  09 May 2021 20:44  --------------------------------------------------------  IN:  mL / OUT: 1200 mL / NET: 822 mL          Appearance: Normal	  HEENT:  PERRLA   Lymphatic: No lymphadenopathy   Cardiovascular: Normal S1 S2, no JVD  Respiratory: normal effort , clear  Gastrointestinal:  Soft, Non-tender  Skin: No rashes,  warm to touch  Psychiatry:  Mood & affect appropriate  Musculuskeletal: L LE pain       All labs, Imaging and EKGs personally reviewed         21 @ 07:01  -  21 @ 07:00  --------------------------------------------------------  IN: 720 mL / OUT: 2825 mL / NET: -2105 mL    21 @ 07:01  -  21 @ 20:44  --------------------------------------------------------  IN:  mL / OUT: 1200 mL / NET: 822 mL                            8.4    7.31  )-----------( 288      ( 09 May 2021 11:31 )             26.7                   142  |  106  |  13  ----------------------------<  145<H>  3.9   |  23  |  0.80    Ca    8.7      09 May 2021 11:30                         Urinalysis Basic - ( 08 May 2021 21:10 )    Color: Light Yellow / Appearance: Clear / S.010 / pH: x  Gluc: x / Ketone: Negative  / Bili: Negative / Urobili: Negative   Blood: x / Protein: Negative / Nitrite: Positive   Leuk Esterase: Large / RBC: 3 /hpf / WBC 26 /HPF   Sq Epi: x / Non Sq Epi: 0 /hpf / Bacteria: Many

## 2021-05-09 NOTE — PROGRESS NOTE ADULT - ASSESSMENT
Patient is a 56 Y/O Female with PMhx of CAD S/P PCI, HLD, kidney stone, migraine presents with LEFT comminuted patella fracture s/p mechanical fall on 4/19. Pt seen by Dr. Trejo in office. Instructed to come to [facility] ED for med/cards clearance and plan for ORIF of LEFT patella fx. Denies numbness/tingling in the affected extremity. Patient has been minimally ambulating with walker. in the ED she started having bladder pressure with decrease urine output.       Problem/Recommendation - 1:  Problem: Patella fracture. Recommendation: Ortho follow up   S/P OR , ORIF done , ortho follow up   card and med optimization   pain control   PT eval as tolerated, fall precautions.  S/P stress test   S/P cath, no stent placed medical management   constipation, XRay abdomen noted, obstructive diarrhea   enema as per GI , multiple bowel movement , laxatives per GI, repeat abdominal xray   IV hydration   GI eval called for further recs appreciated       Problem/Recommendation - 2:  ·  Problem: Urinary retention.  Recommendation: acute onset  resolved, had urine output, no retention   send UA and urine culture  monitor BUN/Cr  bladder US. PRN   ID eval for Urine Cx  cont vanco per ID recs   elevated Cr resolved  , S/Pgentle hydration , avid nephrotoxic medications   johansen , repeat UA and Blood cx, UA posigtive  will restart vanco for now till cx results     Problem/Recommendation - 3:  ·  Problem: CAD (coronary artery disease).  Recommendation: S/P stent x 2 2 years ago   cont ASA and lipitor 40 daily   toprol 25 daily   ischemic W/U per cardiolog, done    Problem/Recommendation - 4:  ·  Problem: HLD (hyperlipidemia).  Recommendation: statin  lipdi panel  diet control.     Problem/Recommendation - 5:  ·  Problem: Nephrolithiasis.     Problem/Recommendation - 6:  Problem: Prophylactic measure. Recommendation: DVT and gI PPX     Differential diagnosis and plan of care discussed with patient after the evaluation.   Advanced care planning/advanced directives discussed with patient/family. DNR status including forceful chest compressions to attempt to restart the heart, ventilator support/artificial breathing, electric shock, artificial nutrition, health care proxy, Molst form all discussed with pt. Pt wishes to consider.   OMT on six regions for acute somatic dysfunctions done at the bedside   Importance of Fall prevention discussed.

## 2021-05-09 NOTE — PROGRESS NOTE ADULT - SUBJECTIVE AND OBJECTIVE BOX
Chief Complaint:  Patient is a 55y old  Female who presents with a chief complaint of L patella fracture, for surgery (08 May 2021 20:56)      Date of service 21 @ 14:20      Interval Events:   patient had a large BM from tap water enema, feels more comfortable    Hospital Medications:  aspirin enteric coated 325 milliGRAM(s) Oral two times a day  atorvastatin 40 milliGRAM(s) Oral at bedtime  Biotene Dry Mouth Oral Rinse 5 milliLiter(s) Swish and Spit daily PRN  bisacodyl Suppository 10 milliGRAM(s) Rectal once  cyclobenzaprine 5 milliGRAM(s) Oral three times a day PRN  escitalopram 10 milliGRAM(s) Oral at bedtime  gabapentin 100 milliGRAM(s) Oral three times a day  lidocaine   Patch 1 Patch Transdermal daily  magnesium hydroxide Suspension 30 milliLiter(s) Oral daily PRN  multivitamin 1 Tablet(s) Oral daily  ondansetron Injectable 4 milliGRAM(s) IV Push every 6 hours PRN  oxyCODONE    IR 5 milliGRAM(s) Oral every 4 hours PRN  oxyCODONE    IR 10 milliGRAM(s) Oral every 4 hours PRN  pantoprazole    Tablet 40 milliGRAM(s) Oral before breakfast  polyethylene glycol 3350 17 Gram(s) Oral daily  senna 2 Tablet(s) Oral at bedtime  sodium chloride 0.9%. 1000 milliLiter(s) IV Continuous <Continuous>  topiramate 25 milliGRAM(s) Oral daily  vancomycin  IVPB 1000 milliGRAM(s) IV Intermittent every 12 hours  vancomycin  IVPB            Review of Systems:  General:  No wt loss, fevers, chills, night sweats, fatigue,   Eyes:  Good vision, no reported pain  ENT:  No sore throat, pain, runny nose, dysphagia  CV:  No pain, palpitations, hypo/hypertension  Resp:  No dyspnea, cough, tachypnea, wheezing  GI:  See HPI  :  No pain, bleeding, incontinence, nocturia  Muscle:  No pain, weakness  Neuro:  No weakness, tingling, memory problems  Psych:  No fatigue, insomnia, mood problems, depression  Endocrine:  No polyuria, polydipsia, cold/heat intolerance  Heme:  No petechiae, ecchymosis, easy bruisability  Integumentary:  No rash, edema    PHYSICAL EXAM:   Vital Signs:  Vital Signs Last 24 Hrs  T(C): 36.6 (09 May 2021 04:04), Max: 37.7 (08 May 2021 20:42)  T(F): 97.9 (09 May 2021 04:04), Max: 99.9 (08 May 2021 20:42)  HR: 81 (09 May 2021 04:04) (81 - 105)  BP: 115/71 (09 May 2021 04:04) (115/71 - 128/74)  BP(mean): --  RR: 16 (09 May 2021 04:04) (16 - 16)  SpO2: 97% (09 May 2021 04:04) (94% - 97%)  Daily     Daily       PHYSICAL EXAM:     GENERAL:  Appears stated age, well-groomed, well-nourished, no distress  HEENT:  NC/AT,  conjunctivae anicteric, clear and pink,   NECK: supple, trachea midline  CHEST:  Full & symmetric excursion, no increased effort, breath sounds clear  HEART:  Regular rhythm, no JVD  ABDOMEN:  Soft, non-tender, non-distended, normoactive bowel sounds,  no masses , no hepatosplenomegaly  EXTREMITIES:  no cyanosis,clubbing or edema  SKIN:  No rash, erythema, or, ecchymoses, no jaundice  NEURO:  Alert, non-focal, no asterixis  PSYCH: Appropriate affect, oriented to place and time  RECTAL: Deferred      LABS Personally reviewed by me:                        8.4    7.31  )-----------( 288      ( 09 May 2021 11:31 )             26.7     Mean Cell Volume: 96.7 fl (21 @ 11:31)        142  |  106  |  13  ----------------------------<  145<H>  3.9   |  23  |  0.80    Ca    8.7      09 May 2021 11:30          Urinalysis Basic - ( 08 May 2021 21:10 )    Color: Light Yellow / Appearance: Clear / S.010 / pH: x  Gluc: x / Ketone: Negative  / Bili: Negative / Urobili: Negative   Blood: x / Protein: Negative / Nitrite: Positive   Leuk Esterase: Large / RBC: 3 /hpf / WBC 26 /HPF   Sq Epi: x / Non Sq Epi: 0 /hpf / Bacteria: Many                              8.4    7.31  )-----------( 288      ( 09 May 2021 11:31 )             26.7                         9.1    8.27  )-----------( 293      ( 08 May 2021 07:25 )             28.7                         8.9    6.27  )-----------( 289      ( 07 May 2021 10:29 )             28.5       Imaging personally reviewed by me:

## 2021-05-09 NOTE — PROGRESS NOTE ADULT - ASSESSMENT
Patient is a 56 Y/O Female with PMhx of CAD S/P PCI, HLD, kidney stone, migraine presents with LEFT comminuted patella fracture s/p mechanical fall on 4/19. Now s/p Left patella ORIF 5/3. GI consulted for post- operative constipation.  .     Problem/Recommendation - 1:  Problem: Constipation due to pain medication. Recommendation: - pt refused moviprep, but had large BM from      Problem/Recommendation - 2:  ·  Problem: Patella fracture.  Recommendation: - appreciate care per Orthopaedics  - physical therapy  - pain control.      Problem/Recommendation - 3:  ·  Problem: UTI (urinary tract infection).  Recommendation: - s/p 3 days of abx. Now with low grade temperature elevation overnight  - care per ID appreciated.  - infectious workup     Problem/Recommendation - 4:  ·  Problem: Urinary retention. Recommendation: s/p johansen cath  trial of void  follow up urology reccs.     Problem/Recommendation - 5:  ·  Problem: Nephrolithiasis.  Recommendation: no signs of active nephrolithiasis at this time  care per .      Problem/Recommendation - 6:  Problem: HLD (hyperlipidemia). Recommendation: reports good control of cholesterol   on home lipitor dose.     Problem/Recommendation - 7:  Problem: Prophylactic measure. Recommendation: on  BID.     Problem/Recommendation - 8:  Problem: CAD (coronary artery disease). Recommendation: - S/P stent x 2, 2 years ago   - appreciate cardiology input     Problem/Recommendation - 9:  Problem: normocytic anemia, likely due to surgical blood loss  -trend CBC  -consider iron supplementation on d/c        I had a prolonged conversation with the patient regarding the hospital course, differential diagnosis, results of diagnostic tests this far, and therapeutic modalities available. Plan of care discussed with the patient after the evaluation. Patient expresses a clear understanding of the plan of care.  Sixty five minutes spent on the total encounter, of which more than fifty percent of the encounter was spent on counseling and/or coordinating care by the attending physician.        Iftikhar Lora M.D.   Gastroenterology and Hepatology  [address] [place], [place]  Office: [phone]  Cell: [phone].

## 2021-05-09 NOTE — PROGRESS NOTE ADULT - SUBJECTIVE AND OBJECTIVE BOX
DATE OF SERVICE: 05-09-21 @ 23:20    Patient is a 55y old  Female who presents with a chief complaint of L patella fracture, for surgery (09 May 2021 20:44)      INTERVAL HISTORY: feels weak, fatigied, low grade fever last night    REVIEW OF SYSTEMS:  CONSTITUTIONAL: + weakness and low grade fever  EYES/ENT: No visual changes;  No throat pain   NECK: No pain or stiffness  RESPIRATORY: No cough, wheezing; No shortness of breath  CARDIOVASCULAR: No chest pain or palpitations  GASTROINTESTINAL: No abdominal  pain. No nausea, vomiting, or hematemesis  GENITOURINARY: No dysuria, frequency or hematuria  NEUROLOGICAL: No stroke like symptoms  SKIN: No rashes        PHYSICAL EXAM:  T(C): 36.8 (05-09-21 @ 21:00), Max: 36.9 (05-08-21 @ 23:45)  HR: 78 (05-09-21 @ 21:00) (78 - 94)  BP: 109/64 (05-09-21 @ 21:00) (109/64 - 118/78)  RR: 16 (05-09-21 @ 21:00) (16 - 16)  SpO2: 94% (05-09-21 @ 21:00) (94% - 97%)  Wt(kg): --  I&O's Summary    08 May 2021 07:01  -  09 May 2021 07:00  --------------------------------------------------------  IN: 720 mL / OUT: 2825 mL / NET: -2105 mL    09 May 2021 07:01  -  09 May 2021 23:20  --------------------------------------------------------  IN: 2022 mL / OUT: 1550 mL / NET: 472 mL          Appearance: In no distress	  HEENT:    PERRL, EOMI	  Cardiovascular:  S1 S2, No JVD  Respiratory: Lungs clear to auscultation	  Gastrointestinal:  Soft, Non-tender, + BS	  Vascularature:  No edema of LE  Psychiatric: Appropriate affect   Neuro: no acute focal deficits                               8.4    7.31  )-----------( 288      ( 09 May 2021 11:31 )             26.7     05-09    142  |  106  |  13  ----------------------------<  145<H>  3.9   |  23  |  0.80    Ca    8.7      09 May 2021 11:30          Labs personally reviewed      ASSESSMENT/PLAN: 	  Patient is a 54 Y/O Female with PMhx of CAD S/P PCI, HLD, kidney stone, migraine presents with LEFT comminuted patella fracture s/p mechanical fall on 4/19. Pt seen by Dr. Trejo in office. Instructed to come to [facility] ED for med/cards clearance and plan for ORIF of LEFT patella fx. Denies numbness/tingling in the affected extremity. Patient has been minimally ambulating with walker. in the ED she started having bladder pressure with decrease urine output.       Problem/Recommendation - 1:  Problem: Patella fracture. Recommendation: Ortho follow up   s/p OR, repair  Tolerated surgery well from cv standpoint     Problem/Recommendation - 2:  ·  Problem: Palps/Exertional SOB  Recommendation: NM stress test done t with large areas of infarct with leonor-infarct ischemia   Risks and benefits of cardiac cath discussed with pt.   Cath done with no obstructive CAD, LAD 10% LAD, RCA 30% stenosis    Problem/Recommendation - 3:  ·  Problem: CAD (coronary artery disease).  Recommendation: S/P stent x 2 to LAD about two years ago   cont ASA and lipitor 40 daily   Toprol 25mg daily   Cath with no obstructive CAD, LAD 10% LAD, RCA 30% stenosis   Chest pain today 5/4 - non cardiac in nature, reproducible to palpation. Prolonged discussion with pt - reassurance provided   medical management   No recurrence     Problem/Recommendation - 4:  ·  Problem: HLD (hyperlipidemia).  Recommendation: statin for secondary prevention     Problem/Recommendation - 5:  ·  Problem: Urinary retention.  Recommendation: acute onset o admission  Was retaining about 300cc of urine s/p straight cath  Again 5/2 with urinary retention, straight cath with >700cc of urine  Urology consult called, rec johansen placement  ID recs for UTI appreciated. Completed Vanco 3 day course  TOV per        Problem/Recommendation - 6:  Problem: Constipation  Recommendation: ojse 2/2 narcotic use Miralax, suppository, multiple enemas with no bowel movement  -  BM 5/4 after tap water enema  - GI recs appreciated - physical disimpaction attempted   - s/p 3 enemas-- patient feels better. Aggressive bowel regimen  - Gi following.     Problem/Recommendation - 7:  Problem: BEULAH  Recommendation: repeat Cr improved  - prerenal state    Problem/Recommendation - 8:  Problem: Low grade fever <100.0  Recommendation: Blood culture, urine culture, UA and CXR  - ID reconsult   - Restarted on vanco for ?persistent UTI      Thirty five minutes spent on total encounter, of which more than fifty percent of the encounter was spent on counseling and/or coordinating care by the attending physician.      Maximo Main DO [facility]  Cardiovascular Medicine  [address] [place] [address], [address]  Office: [phone]  Cell: [phone]          Maximo Main DO [facility]  Cardiovascular [address], [address]  Office: [phone]  Cell: [phone]

## 2021-05-10 LAB
ANION GAP SERPL CALC-SCNC: 11 MMOL/L — SIGNIFICANT CHANGE UP (ref 5–17)
BASOPHILS # BLD AUTO: 0.05 K/UL — SIGNIFICANT CHANGE UP (ref 0–0.2)
BASOPHILS NFR BLD AUTO: 0.8 % — SIGNIFICANT CHANGE UP (ref 0–2)
BUN SERPL-MCNC: 9 MG/DL — SIGNIFICANT CHANGE UP (ref 7–23)
CALCIUM SERPL-MCNC: 8.6 MG/DL — SIGNIFICANT CHANGE UP (ref 8.4–10.5)
CHLORIDE SERPL-SCNC: 108 MMOL/L — SIGNIFICANT CHANGE UP (ref 96–108)
CO2 SERPL-SCNC: 23 MMOL/L — SIGNIFICANT CHANGE UP (ref 22–31)
CREAT SERPL-MCNC: 0.93 MG/DL — SIGNIFICANT CHANGE UP (ref 0.5–1.3)
EOSINOPHIL # BLD AUTO: 0.46 K/UL — SIGNIFICANT CHANGE UP (ref 0–0.5)
EOSINOPHIL NFR BLD AUTO: 7.2 % — HIGH (ref 0–6)
GLUCOSE SERPL-MCNC: 93 MG/DL — SIGNIFICANT CHANGE UP (ref 70–99)
HCT VFR BLD CALC: 25.2 % — LOW (ref 34.5–45)
HGB BLD-MCNC: 8 G/DL — LOW (ref 11.5–15.5)
IMM GRANULOCYTES NFR BLD AUTO: 0.9 % — SIGNIFICANT CHANGE UP (ref 0–1.5)
LYMPHOCYTES # BLD AUTO: 2.09 K/UL — SIGNIFICANT CHANGE UP (ref 1–3.3)
LYMPHOCYTES # BLD AUTO: 32.7 % — SIGNIFICANT CHANGE UP (ref 13–44)
MCHC RBC-ENTMCNC: 30.8 PG — SIGNIFICANT CHANGE UP (ref 27–34)
MCHC RBC-ENTMCNC: 31.7 GM/DL — LOW (ref 32–36)
MCV RBC AUTO: 96.9 FL — SIGNIFICANT CHANGE UP (ref 80–100)
MONOCYTES # BLD AUTO: 0.58 K/UL — SIGNIFICANT CHANGE UP (ref 0–0.9)
MONOCYTES NFR BLD AUTO: 9.1 % — SIGNIFICANT CHANGE UP (ref 2–14)
NEUTROPHILS # BLD AUTO: 3.15 K/UL — SIGNIFICANT CHANGE UP (ref 1.8–7.4)
NEUTROPHILS NFR BLD AUTO: 49.3 % — SIGNIFICANT CHANGE UP (ref 43–77)
NRBC # BLD: 0 /100 WBCS — SIGNIFICANT CHANGE UP (ref 0–0)
PLATELET # BLD AUTO: 285 K/UL — SIGNIFICANT CHANGE UP (ref 150–400)
POTASSIUM SERPL-MCNC: 3.9 MMOL/L — SIGNIFICANT CHANGE UP (ref 3.5–5.3)
POTASSIUM SERPL-SCNC: 3.9 MMOL/L — SIGNIFICANT CHANGE UP (ref 3.5–5.3)
RBC # BLD: 2.6 M/UL — LOW (ref 3.8–5.2)
RBC # FLD: 13.2 % — SIGNIFICANT CHANGE UP (ref 10.3–14.5)
SARS-COV-2 RNA SPEC QL NAA+PROBE: SIGNIFICANT CHANGE UP
SODIUM SERPL-SCNC: 142 MMOL/L — SIGNIFICANT CHANGE UP (ref 135–145)
VANCOMYCIN TROUGH SERPL-MCNC: 17.4 UG/ML — SIGNIFICANT CHANGE UP (ref 10–20)
WBC # BLD: 6.39 K/UL — SIGNIFICANT CHANGE UP (ref 3.8–10.5)
WBC # FLD AUTO: 6.39 K/UL — SIGNIFICANT CHANGE UP (ref 3.8–10.5)

## 2021-05-10 PROCEDURE — 99232 SBSQ HOSP IP/OBS MODERATE 35: CPT

## 2021-05-10 RX ORDER — POLYETHYLENE GLYCOL 3350 17 G/17G
17 POWDER, FOR SOLUTION ORAL
Refills: 0 | Status: DISCONTINUED | OUTPATIENT
Start: 2021-05-10 | End: 2021-05-13

## 2021-05-10 RX ORDER — SODIUM CHLORIDE 9 MG/ML
1000 INJECTION INTRAMUSCULAR; INTRAVENOUS; SUBCUTANEOUS
Refills: 0 | Status: DISCONTINUED | OUTPATIENT
Start: 2021-05-10 | End: 2021-05-13

## 2021-05-10 RX ORDER — ACETAMINOPHEN 500 MG
975 TABLET ORAL ONCE
Refills: 0 | Status: COMPLETED | OUTPATIENT
Start: 2021-05-10 | End: 2021-05-10

## 2021-05-10 RX ADMIN — ESCITALOPRAM OXALATE 10 MILLIGRAM(S): 10 TABLET, FILM COATED ORAL at 21:13

## 2021-05-10 RX ADMIN — GABAPENTIN 100 MILLIGRAM(S): 400 CAPSULE ORAL at 14:44

## 2021-05-10 RX ADMIN — OXYCODONE HYDROCHLORIDE 10 MILLIGRAM(S): 5 TABLET ORAL at 06:40

## 2021-05-10 RX ADMIN — LIDOCAINE 1 PATCH: 4 CREAM TOPICAL at 19:02

## 2021-05-10 RX ADMIN — PANTOPRAZOLE SODIUM 40 MILLIGRAM(S): 20 TABLET, DELAYED RELEASE ORAL at 05:27

## 2021-05-10 RX ADMIN — ATORVASTATIN CALCIUM 40 MILLIGRAM(S): 80 TABLET, FILM COATED ORAL at 21:15

## 2021-05-10 RX ADMIN — LIDOCAINE 1 PATCH: 4 CREAM TOPICAL at 00:47

## 2021-05-10 RX ADMIN — Medication 250 MILLIGRAM(S): at 22:35

## 2021-05-10 RX ADMIN — CYCLOBENZAPRINE HYDROCHLORIDE 5 MILLIGRAM(S): 10 TABLET, FILM COATED ORAL at 01:49

## 2021-05-10 RX ADMIN — Medication 1 TABLET(S): at 14:43

## 2021-05-10 RX ADMIN — CYCLOBENZAPRINE HYDROCHLORIDE 5 MILLIGRAM(S): 10 TABLET, FILM COATED ORAL at 13:49

## 2021-05-10 RX ADMIN — Medication 250 MILLIGRAM(S): at 09:56

## 2021-05-10 RX ADMIN — Medication 25 MILLIGRAM(S): at 13:49

## 2021-05-10 RX ADMIN — Medication 325 MILLIGRAM(S): at 05:27

## 2021-05-10 RX ADMIN — GABAPENTIN 100 MILLIGRAM(S): 400 CAPSULE ORAL at 05:26

## 2021-05-10 RX ADMIN — LIDOCAINE 1 PATCH: 4 CREAM TOPICAL at 13:50

## 2021-05-10 RX ADMIN — OXYCODONE HYDROCHLORIDE 10 MILLIGRAM(S): 5 TABLET ORAL at 07:09

## 2021-05-10 RX ADMIN — POLYETHYLENE GLYCOL 3350 17 GRAM(S): 17 POWDER, FOR SOLUTION ORAL at 18:54

## 2021-05-10 RX ADMIN — Medication 325 MILLIGRAM(S): at 18:53

## 2021-05-10 RX ADMIN — ONDANSETRON 4 MILLIGRAM(S): 8 TABLET, FILM COATED ORAL at 08:57

## 2021-05-10 RX ADMIN — GABAPENTIN 100 MILLIGRAM(S): 400 CAPSULE ORAL at 21:13

## 2021-05-10 RX ADMIN — CYCLOBENZAPRINE HYDROCHLORIDE 5 MILLIGRAM(S): 10 TABLET, FILM COATED ORAL at 21:13

## 2021-05-10 NOTE — PROGRESS NOTE ADULT - ASSESSMENT
Patient is a 54 Y/O Female with PMhx of CAD S/P PCI, HLD, kidney stone, migraine presents with LEFT comminuted patella fracture s/p mechanical fall on 4/19. Now s/p Left patella ORIF 5/3. GI consulted for post- operative constipation.  .     Problem/Recommendation - 1:  Problem: Constipation due to pain medication. Recommendation: - status post multiple large BMs overnight  -continue miralax BID to prevent constipation     Problem/Recommendation - 2:  ·  Problem: Patella fracture.  Recommendation: - appreciate care per Orthopaedics  - physical therapy  - pain control.      Problem/Recommendation - 3:  ·  Problem: UTI (urinary tract infection).  Recommendation: - s/p 3 days of abx. Now with low grade temperature elevation overnight  - care per ID appreciated, resumed on vanco given fever     Problem/Recommendation - 4:  ·  Problem: Urinary retention. Recommendation: s/p johansen cath  trial of void  follow up urology reccs.     Problem/Recommendation - 5:  ·  Problem: Nephrolithiasis.  Recommendation: no signs of active nephrolithiasis at this time  care per .      Problem/Recommendation - 6:  Problem: HLD (hyperlipidemia). Recommendation: reports good control of cholesterol   on home lipitor dose.     Problem/Recommendation - 7:  Problem: Prophylactic measure. Recommendation: on  BID.     Problem/Recommendation - 8:  Problem: CAD (coronary artery disease). Recommendation: - S/P stent x 2, 2 years ago   - appreciate cardiology input     Problem/Recommendation - 9:  Problem: normocytic anemia, likely due to surgical blood loss  -trend CBC  -consider iron supplementation on d/c        I had a prolonged conversation with the patient regarding the hospital course, differential diagnosis, results of diagnostic tests this far, and therapeutic modalities available. Plan of care discussed with the patient after the evaluation. Patient expresses a clear understanding of the plan of care.  Sixty five minutes spent on the total encounter, of which more than fifty percent of the encounter was spent on counseling and/or coordinating care by the attending physician.        Iftikhar Lora M.D.   Gastroenterology and Hepatology  [address] [place], [place]  Office: [phone]  Cell: [phone].

## 2021-05-10 NOTE — PROGRESS NOTE ADULT - SUBJECTIVE AND OBJECTIVE BOX
Chief Complaint:  Patient is a 55y old  Female who presents with a chief complaint of L patella fracture, for surgery (08 May 2021 20:56)      Date of service 05-10-21 @ 14:20      Interval Events:   patient had a large BM from tap water enema, feels more comfortable    Hospital Medications:  aspirin enteric coated 325 milliGRAM(s) Oral two times a day  atorvastatin 40 milliGRAM(s) Oral at bedtime  Biotene Dry Mouth Oral Rinse 5 milliLiter(s) Swish and Spit daily PRN  bisacodyl Suppository 10 milliGRAM(s) Rectal once  cyclobenzaprine 5 milliGRAM(s) Oral three times a day PRN  escitalopram 10 milliGRAM(s) Oral at bedtime  gabapentin 100 milliGRAM(s) Oral three times a day  lidocaine   Patch 1 Patch Transdermal daily  magnesium hydroxide Suspension 30 milliLiter(s) Oral daily PRN  multivitamin 1 Tablet(s) Oral daily  ondansetron Injectable 4 milliGRAM(s) IV Push every 6 hours PRN  oxyCODONE    IR 5 milliGRAM(s) Oral every 4 hours PRN  oxyCODONE    IR 10 milliGRAM(s) Oral every 4 hours PRN  pantoprazole    Tablet 40 milliGRAM(s) Oral before breakfast  polyethylene glycol 3350 17 Gram(s) Oral daily  senna 2 Tablet(s) Oral at bedtime  sodium chloride 0.9%. 1000 milliLiter(s) IV Continuous <Continuous>  topiramate 25 milliGRAM(s) Oral daily  vancomycin  IVPB 1000 milliGRAM(s) IV Intermittent every 12 hours  vancomycin  IVPB            Review of Systems:  General:  No wt loss, fevers, chills, night sweats, fatigue,   Eyes:  Good vision, no reported pain  ENT:  No sore throat, pain, runny nose, dysphagia  CV:  No pain, palpitations, hypo/hypertension  Resp:  No dyspnea, cough, tachypnea, wheezing  GI:  See HPI  :  No pain, bleeding, incontinence, nocturia  Muscle:  No pain, weakness  Neuro:  No weakness, tingling, memory problems  Psych:  No fatigue, insomnia, mood problems, depression  Endocrine:  No polyuria, polydipsia, cold/heat intolerance  Heme:  No petechiae, ecchymosis, easy bruisability  Integumentary:  No rash, edema    PHYSICAL EXAM:   Vital Signs:  Vital Signs Last 24 Hrs  T(C): 36.6 (09 May 2021 04:04), Max: 37.7 (08 May 2021 20:42)  T(F): 97.9 (09 May 2021 04:04), Max: 99.9 (08 May 2021 20:42)  HR: 81 (09 May 2021 04:04) (81 - 105)  BP: 115/71 (09 May 2021 04:04) (115/71 - 128/74)  BP(mean): --  RR: 16 (09 May 2021 04:04) (16 - 16)  SpO2: 97% (09 May 2021 04:04) (94% - 97%)  Daily     Daily       PHYSICAL EXAM:     GENERAL:  Appears stated age, well-groomed, well-nourished, no distress  HEENT:  NC/AT,  conjunctivae anicteric, clear and pink,   NECK: supple, trachea midline  CHEST:  Full & symmetric excursion, no increased effort, breath sounds clear  HEART:  Regular rhythm, no JVD  ABDOMEN:  Soft, non-tender, non-distended, normoactive bowel sounds,  no masses , no hepatosplenomegaly  EXTREMITIES:  no cyanosis,clubbing or edema  SKIN:  No rash, erythema, or, ecchymoses, no jaundice  NEURO:  Alert, non-focal, no asterixis  PSYCH: Appropriate affect, oriented to place and time  RECTAL: Deferred      LABS Personally reviewed by me:                        8.4    7.31  )-----------( 288      ( 09 May 2021 11:31 )             26.7     Mean Cell Volume: 96.7 fl (21 @ 11:31)        142  |  106  |  13  ----------------------------<  145<H>  3.9   |  23  |  0.80    Ca    8.7      09 May 2021 11:30          Urinalysis Basic - ( 08 May 2021 21:10 )    Color: Light Yellow / Appearance: Clear / S.010 / pH: x  Gluc: x / Ketone: Negative  / Bili: Negative / Urobili: Negative   Blood: x / Protein: Negative / Nitrite: Positive   Leuk Esterase: Large / RBC: 3 /hpf / WBC 26 /HPF   Sq Epi: x / Non Sq Epi: 0 /hpf / Bacteria: Many                              8.4    7.31  )-----------( 288      ( 09 May 2021 11:31 )             26.7                         9.1    8.27  )-----------( 293      ( 08 May 2021 07:25 )             28.7                         8.9    6.27  )-----------( 289      ( 07 May 2021 10:29 )             28.5       Imaging personally reviewed by me:

## 2021-05-10 NOTE — PROGRESS NOTE ADULT - ATTENDING COMMENTS
Pt care and plan discussed and reviewed with NP. Plan as outlined above edited by me to reflect our discussion. I had a prolonged conversation with the patient and  regarding hospital course, differential diagnosis and results of diagnostic tests.  Plan of care discussed with patient and  after the evaluation. We discussed her constipation, anemia, dizziness, UTI, urinary retention home PT vs [facility], Patient expresses  understanding and satisfaction with the plan of care. Sixty five minutes spent on total encounter, of which more than fifty percent of the encounter was spent on counseling and/or coordinating care by the attending physician.

## 2021-05-10 NOTE — PROGRESS NOTE ADULT - SUBJECTIVE AND OBJECTIVE BOX
Name of Patient : ASHWIN JACOB  MRN: [id number]  DATE OF SERVICE: 05-10-21 @ 09:41    Subjective: Patient seen and examined. No new events except as noted.   doing okay   feeling better     REVIEW OF SYSTEMS:    CONSTITUTIONAL: No weakness, fevers or chills  EYES/ENT: No visual changes;  No vertigo or throat pain   NECK: No pain or stiffness  RESPIRATORY: No cough, wheezing, hemoptysis; No shortness of breath  CARDIOVASCULAR: No chest pain or palpitations  GASTROINTESTINAL: No abdominal or epigastric pain. No nausea, vomiting, or hematemesis; No diarrhea or constipation. No melena or hematochezia.  GENITOURINARY: No dysuria, frequency or hematuria  NEUROLOGICAL: No numbness or weakness  SKIN: No itching, burning, rashes, or lesions   All other review of systems is negative unless indicated above.    MEDICATIONS:  MEDICATIONS  (STANDING):  aspirin enteric coated 325 milliGRAM(s) Oral two times a day  atorvastatin 40 milliGRAM(s) Oral at bedtime  escitalopram 10 milliGRAM(s) Oral at bedtime  gabapentin 100 milliGRAM(s) Oral three times a day  lidocaine   Patch 1 Patch Transdermal daily  multivitamin 1 Tablet(s) Oral daily  pantoprazole    Tablet 40 milliGRAM(s) Oral before breakfast  polyethylene glycol 3350 17 Gram(s) Oral daily  senna 2 Tablet(s) Oral at bedtime  sodium chloride 0.9%. 1000 milliLiter(s) (50 mL/Hr) IV Continuous <Continuous>  topiramate 25 milliGRAM(s) Oral daily  vancomycin  IVPB 1000 milliGRAM(s) IV Intermittent every 12 hours  vancomycin  IVPB          PHYSICAL EXAM:  T(C): 36.7 (05-10-21 @ 04:21), Max: 36.8 (21 @ 21:00)  HR: 74 (05-10-21 @ 04:21) (74 - 87)  BP: 96/59 (05-10-21 @ 04:21) (96/59 - 111/66)  RR: 16 (05-10-21 @ 04:21) (16 - 16)  SpO2: 96% (05-10-21 @ 04:21) (94% - 96%)  Wt(kg): --  I&O's Summary    09 May 2021 07:01  -  10 May 2021 07:00  --------------------------------------------------------  IN:  mL / OUT: 2400 mL / NET: -378 mL          Appearance: Normal	  HEENT:  PERRLA   Lymphatic: No lymphadenopathy   Cardiovascular: Normal S1 S2, no JVD  Respiratory: normal effort , clear  Gastrointestinal:  Soft, Non-tender  Skin: No rashes,  warm to touch  Psychiatry:  Mood & affect appropriate  Musculuskeletal: No edema      All labs, Imaging and EKGs personally reviewed       21 @ 07:01  -  05-10-21 @ 07:00  --------------------------------------------------------  IN:  mL / OUT: 2400 mL / NET: -378 mL                            8.0    6.39  )-----------( 285      ( 10 May 2021 06:55 )             25.2               05-10    142  |  108  |  9   ----------------------------<  93  3.9   |  23  |  0.93    Ca    8.6      10 May 2021 06:55                         Urinalysis Basic - ( 08 May 2021 21:10 )    Color: Light Yellow / Appearance: Clear / S.010 / pH: x  Gluc: x / Ketone: Negative  / Bili: Negative / Urobili: Negative   Blood: x / Protein: Negative / Nitrite: Positive   Leuk Esterase: Large / RBC: 3 /hpf / WBC 26 /HPF   Sq Epi: x / Non Sq Epi: 0 /hpf / Bacteria: Many      < from: Xray Chest 2 Views PA/Lat (21 @ 09:02) >  IMPRESSION:    The heart is normal in size. Lungs are clear. No pleural effusion. No pneumothorax. No acute bony pathology could be identified.

## 2021-05-10 NOTE — PROGRESS NOTE ADULT - SUBJECTIVE AND OBJECTIVE BOX
Patient is a 55y old  Female who presents with a chief complaint of L patella fracture, for surgery (10 May 2021 13:01)      INTERVAL HISTORY: bloated- retaining urine, nurse at bedside to perform straight cath     REVIEW OF SYSTEMS:   CONSTITUTIONAL: No weakness  EYES/ENT: No visual changes; No throat pain  Neck: No pain or stiffness  Respiratory: No cough, wheezing, No shortness of breath  CARDIOVASCULAR: no chest pain or palpitations  GASTROINTESTINAL: No abdominal pain, no nausea, vomiting or hematemesis  GENITOURINARY: No dysuria, frequency or hematuria  NEUROLOGICAL: No stroke like symptoms  SKIN: No rashes    	  MEDICATIONS:        PHYSICAL EXAM:  T(C): 36.9 (05-10-21 @ 16:07), Max: 36.9 (05-10-21 @ 16:07)  HR: 86 (05-10-21 @ 16:07) (65 - 87)  BP: 105/67 (05-10-21 @ 16:07) (96/59 - 111/66)  RR: 16 (05-10-21 @ 16:07) (16 - 16)  SpO2: 99% (05-10-21 @ 16:07) (94% - 99%)  Wt(kg): --  I&O's Summary    09 May 2021 07:01  -  10 May 2021 07:00  --------------------------------------------------------  IN: 2022 mL / OUT: 2400 mL / NET: -378 mL    10 May 2021 07:01  -  10 May 2021 16:16  --------------------------------------------------------  IN: 480 mL / OUT: 850 mL / NET: -370 mL    Appearance: In no distress	  HEENT:    PERRL, EOMI	  Cardiovascular:  S1 S2, No JVD  Respiratory: Lungs clear to auscultation	  Gastrointestinal:  Soft, Non-tender, + BS	  Vascularature:  No edema of LE  Psychiatric: Appropriate affect   Neuro: no acute focal deficits                         8.0    6.39  )-----------( 285      ( 10 May 2021 06:55 )             25.2     05-10    142  |  108  |  9   ----------------------------<  93  3.9   |  23  |  0.93    Ca    8.6      10 May 2021 06:55    Labs personally reviewed    ASSESSMENT/PLAN: 	  Patient is a 56 Y/O Female with PMhx of CAD S/P PCI, HLD, kidney stone, migraine presents with LEFT comminuted patella fracture s/p mechanical fall on 4/19. Pt seen by Dr. Trejo in office. Instructed to come to [facility] ED for med/cards clearance and plan for ORIF of LEFT patella fx. Denies numbness/tingling in the affected extremity. Patient has been minimally ambulating with walker. in the ED she started having bladder pressure with decrease urine output.       Problem/Recommendation - 1:  Problem: Patella fracture. Recommendation: Ortho follow up   s/p OR, repair  Tolerated surgery well from cv standpoint     Problem/Recommendation - 2:  ·  Problem: Palps/Exertional SOB  Recommendation: NM stress test done t with large areas of infarct with leonor-infarct ischemia   Risks and benefits of cardiac cath discussed with pt.   Cath done with no obstructive CAD, LAD 10% LAD, RCA 30% stenosis    Problem/Recommendation - 3:  ·  Problem: CAD (coronary artery disease).  Recommendation: S/P stent x 2 to LAD about two years ago   cont ASA and lipitor 40 daily   Toprol 25mg daily   Cath with no obstructive CAD, LAD 10% LAD, RCA 30% stenosis   Chest pain today 5/4 - non cardiac in nature, reproducible to palpation. Prolonged discussion with pt - reassurance provided   medical management   No recurrence     Problem/Recommendation - 4:  ·  Problem: HLD (hyperlipidemia).  Recommendation: statin for secondary prevention     Problem/Recommendation - 5:  ·  Problem: Urinary retention.  Recommendation: acute onset o admission  Was retaining about 300cc of urine s/p straight cath  Again 5/2 with urinary retention, straight cath with >700cc of urine  Urology consult called, rec johansen placement  ID recs for UTI appreciated. Completed Vanco 3 day course  - straight cath x1 today for retention, johansen removed yesturday 5/9    Problem/Recommendation - 6:  Problem: Constipation  Recommendation: jose 2/2 narcotic use Miralax, suppository, multiple enemas with no bowel movement  -  BM 5/4 after tap water enema  - GI recs appreciated - physical disimpaction attempted   - s/p 3 enemas-- patient feels better. Aggressive bowel regimen  - Gi following.     Problem/Recommendation - 7:  Problem: BEULAH  Recommendation: repeat Cr improved  - prerenal state    Problem/Recommendation - 8:  Problem: Low grade fever <100.0  Recommendation: Blood culture, urine culture, UA and CXR  - ID reconsult   - Restarted on vanco for ?persistent UTI          Susan Keyes ANP-C  Maximo Main DO [facility]  Cardiovascular Medicine  [address] [place] [address], [address]  Office: [phone]  Cell: [phone] Patient is a 55y old  Female who presents with a chief complaint of L patella fracture, for surgery (10 May 2021 13:01)      INTERVAL HISTORY: bloated- retaining urine, nurse at bedside to perform straight cath     REVIEW OF SYSTEMS:   CONSTITUTIONAL: No weakness  EYES/ENT: No visual changes; No throat pain  Neck: No pain or stiffness  Respiratory: No cough, wheezing, No shortness of breath  CARDIOVASCULAR: no chest pain or palpitations  GASTROINTESTINAL: No abdominal pain, no nausea, vomiting or hematemesis  GENITOURINARY: No dysuria, frequency or hematuria  NEUROLOGICAL: No stroke like symptoms  SKIN: No rashes    	  MEDICATIONS:        PHYSICAL EXAM:  T(C): 36.9 (05-10-21 @ 16:07), Max: 36.9 (05-10-21 @ 16:07)  HR: 86 (05-10-21 @ 16:07) (65 - 87)  BP: 105/67 (05-10-21 @ 16:07) (96/59 - 111/66)  RR: 16 (05-10-21 @ 16:07) (16 - 16)  SpO2: 99% (05-10-21 @ 16:07) (94% - 99%)  Wt(kg): --  I&O's Summary    09 May 2021 07:01  -  10 May 2021 07:00  --------------------------------------------------------  IN: 2022 mL / OUT: 2400 mL / NET: -378 mL    10 May 2021 07:01  -  10 May 2021 16:16  --------------------------------------------------------  IN: 480 mL / OUT: 850 mL / NET: -370 mL    Appearance: In no distress	  HEENT:    PERRL, EOMI	  Cardiovascular:  S1 S2, No JVD  Respiratory: Lungs clear to auscultation	  Gastrointestinal:  Soft, Non-tender, + BS	  Vascularature:  No edema of LE  Psychiatric: Appropriate affect   Neuro: no acute focal deficits                         8.0    6.39  )-----------( 285      ( 10 May 2021 06:55 )             25.2     05-10    142  |  108  |  9   ----------------------------<  93  3.9   |  23  |  0.93    Ca    8.6      10 May 2021 06:55    Labs personally reviewed    ASSESSMENT/PLAN: 	  Patient is a 54 Y/O Female with PMhx of CAD S/P PCI, HLD, kidney stone, migraine presents with LEFT comminuted patella fracture s/p mechanical fall on 4/19. Pt seen by Dr. Trejo in office. Instructed to come to [facility] ED for med/cards clearance and plan for ORIF of LEFT patella fx. Denies numbness/tingling in the affected extremity. Patient has been minimally ambulating with walker. in the ED she started having bladder pressure with decrease urine output.       Problem/Recommendation - 1:  Problem: Patella fracture. Recommendation: Ortho follow up   s/p OR, repair  Tolerated surgery well from cv standpoint     Problem/Recommendation - 2:  ·  Problem: Palps/Exertional SOB  Recommendation: NM stress test done t with large areas of infarct with leonor-infarct ischemia   Risks and benefits of cardiac cath discussed with pt.   Cath done with no obstructive CAD, LAD 10% LAD, RCA 30% stenosis    Problem/Recommendation - 3:  ·  Problem: CAD (coronary artery disease).  Recommendation: S/P stent x 2 to LAD about two years ago   cont ASA and lipitor 40 daily   Toprol 25mg daily   Cath with no obstructive CAD, LAD 10% LAD, RCA 30% stenosis   Chest pain today 5/4 - non cardiac in nature, reproducible to palpation. Prolonged discussion with pt - reassurance provided   medical management   No recurrence     Problem/Recommendation - 4:  ·  Problem: HLD (hyperlipidemia).  Recommendation: statin for secondary prevention     Problem/Recommendation - 5:  ·  Problem: Urinary retention.  Recommendation: acute onset o admission  Was retaining about 300cc of urine s/p straight cath  Again 5/2 with urinary retention, straight cath with >700cc of urine  Urology consult called, rec johansen placement  ID recs for UTI appreciated. Completed Vanco 3 day course  - straight cath x1 today for retention, johansen removed yesturday 5/9  f/u Ucx 5/9 prelim growth with gram negative rods   5/9 Bcx prelim no growth    Problem/Recommendation - 6:  Problem: Constipation  Recommendation: jose 2/2 narcotic use Miralax, suppository, multiple enemas with no bowel movement  -  BM 5/4 after tap water enema  - GI recs appreciated - physical disimpaction attempted   - s/p 3 enemas-- patient feels better. Aggressive bowel regimen  - Gi following.     Problem/Recommendation - 7:  Problem: BEULAH  Recommendation: repeat Cr improved  - prerenal state    Problem/Recommendation - 8:  Problem: Low grade fever <100.0  Recommendation: Blood culture, urine culture, UA and CXR  - ID reconsult   - Restarted on vanco for ?persistent UTI  -f/u Ucx 5/9 prelim growth with gram negative rods   5/9 Bcx prelim no growth        Susan Main DO [facility]  Cardiovascular Medicine  [address] [place] [address], [address]  Office: [phone]  Cell: [phone] Patient is a 55y old  Female who presents with a chief complaint of L patella fracture, for surgery (10 May 2021 13:01)      INTERVAL HISTORY: bloated- retaining urine, nurse at bedside to perform straight cath     REVIEW OF SYSTEMS:   CONSTITUTIONAL: No weakness  EYES/ENT: No visual changes; No throat pain  Neck: No pain or stiffness  Respiratory: No cough, wheezing, No shortness of breath  CARDIOVASCULAR: no chest pain or palpitations  GASTROINTESTINAL: No abdominal pain, no nausea, vomiting or hematemesis  GENITOURINARY: No dysuria, frequency or hematuria  NEUROLOGICAL: No stroke like symptoms  SKIN: No rashes    	  MEDICATIONS:        PHYSICAL EXAM:  T(C): 36.9 (05-10-21 @ 16:07), Max: 36.9 (05-10-21 @ 16:07)  HR: 86 (05-10-21 @ 16:07) (65 - 87)  BP: 105/67 (05-10-21 @ 16:07) (96/59 - 111/66)  RR: 16 (05-10-21 @ 16:07) (16 - 16)  SpO2: 99% (05-10-21 @ 16:07) (94% - 99%)  Wt(kg): --  I&O's Summary    09 May 2021 07:01  -  10 May 2021 07:00  --------------------------------------------------------  IN: 2022 mL / OUT: 2400 mL / NET: -378 mL    10 May 2021 07:01  -  10 May 2021 16:16  --------------------------------------------------------  IN: 480 mL / OUT: 850 mL / NET: -370 mL    Appearance: In no distress	  HEENT:    PERRL, EOMI	  Cardiovascular:  S1 S2, No JVD  Respiratory: Lungs clear to auscultation	  Gastrointestinal:  Soft, Non-tender, + BS	  Vascularature:  No edema of LE  Psychiatric: Appropriate affect   Neuro: no acute focal deficits                         8.0    6.39  )-----------( 285      ( 10 May 2021 06:55 )             25.2     05-10    142  |  108  |  9   ----------------------------<  93  3.9   |  23  |  0.93    Ca    8.6      10 May 2021 06:55    Labs personally reviewed    ASSESSMENT/PLAN: 	  Patient is a 54 Y/O Female with PMhx of CAD S/P PCI, HLD, kidney stone, migraine presents with LEFT comminuted patella fracture s/p mechanical fall on 4/19. Pt seen by Dr. Trejo in office. Instructed to come to [facility] ED for med/cards clearance and plan for ORIF of LEFT patella fx. Denies numbness/tingling in the affected extremity. Patient has been minimally ambulating with walker. in the ED she started having bladder pressure with decrease urine output.       Problem/Recommendation - 1:  Problem: Patella fracture. Recommendation: Ortho follow up   s/p OR, repair  Tolerated surgery well from cv standpoint     Problem/Recommendation - 2:  ·  Problem: Palps/Exertional SOB  Recommendation: NM stress test done t with large areas of infarct with leonor-infarct ischemia   Risks and benefits of cardiac cath discussed with pt.   Cath done with no obstructive CAD, LAD 10% LAD, RCA 30% stenosis    Problem/Recommendation - 3:  ·  Problem: CAD (coronary artery disease).  Recommendation: S/P stent x 2 to LAD about two years ago   cont ASA and lipitor 40 daily   Toprol 25mg daily   Cath with no obstructive CAD, LAD 10% LAD, RCA 30% stenosis   Chest pain today 5/4 - non cardiac in nature, reproducible to palpation. Prolonged discussion with pt - reassurance provided   medical management   No recurrence     Problem/Recommendation - 4:  ·  Problem: HLD (hyperlipidemia).  Recommendation: statin for secondary prevention     Problem/Recommendation - 5:  ·  Problem: Urinary retention.  Recommendation: acute onset o admission  Was retaining about 300cc of urine s/p straight cath  Again 5/2 with urinary retention, straight cath with >700cc of urine  Urology consult called, rec johansen placement  ID recs for UTI appreciated. Completed Vanco 3 day course  - straight cath x1 today for retention, johansen removed 5/9  5/10 again with urinary retention, straight cath with a Liter of urine, johansen reinserted   f/u Ucx 5/9 prelim growth with gram negative rods   5/9 Bcx prelim no growth    Problem/Recommendation - 6:  Problem: Constipation  Recommendation: jose 2/2 narcotic use Miralax, suppository, multiple enemas with no bowel movement  -  BM 5/4 after tap water enema  - GI recs appreciated - physical disimpaction attempted   - s/p 3 enemas-- patient feels better. Aggressive bowel regimen  - GI following.     Problem/Recommendation - 7:  Problem: BEULAH  Recommendation: repeat Cr improved  - prerenal state    Problem/Recommendation - 8:  Problem: Low grade fever <100.0  Recommendation: Blood culture, urine culture, UA and CXR  - ID reconsult   - Restarted on vanco for ?persistent UTI  -f/u Ucx 5/9 prelim growth with gram negative rods   5/9 Bcx prelim no growth    Problem/Recommendation - 9:  Problem: Anemia   Plan:  Repeat Hb in AM, Hb slowly trending down  - ?dilutional vs from frequent blood sraws        Susan Keyes ANP-C  Maximo Main DO [facility]  Cardiovascular Medicine  [address] [place] [address], [address]  Office: [phone]  Cell: [phone]

## 2021-05-10 NOTE — PROGRESS NOTE ADULT - SUBJECTIVE AND OBJECTIVE BOX
ASHWIN JACOB 55y [id number]    Patient is a 55y old  Female who presents with a chief complaint of L patella fracture, for surgery (10 May 2021 09:37)      Follow Up/CC:  ID following for uti    Interval History/ROS: chills over weekend, restarted on IV vanco for uti, johansen removed today    Allergies    apple (Hives)  Compazine (Unknown)  morphine (Unknown)  PC Pen VK (Unknown)  shellfish (Unknown)  sulfa drugs (Unknown)    Intolerances        ANTIMICROBIALS:  vancomycin  IVPB 1000 every 12 hours  vancomycin  IVPB        MEDICATIONS  (STANDING):  aspirin enteric coated 325 milliGRAM(s) Oral two times a day  atorvastatin 40 milliGRAM(s) Oral at bedtime  escitalopram 10 milliGRAM(s) Oral at bedtime  gabapentin 100 milliGRAM(s) Oral three times a day  lidocaine   Patch 1 Patch Transdermal daily  multivitamin 1 Tablet(s) Oral daily  pantoprazole    Tablet 40 milliGRAM(s) Oral before breakfast  polyethylene glycol 3350 17 Gram(s) Oral two times a day  sodium chloride 0.9%. 1000 milliLiter(s) (50 mL/Hr) IV Continuous <Continuous>  topiramate 25 milliGRAM(s) Oral daily  vancomycin  IVPB 1000 milliGRAM(s) IV Intermittent every 12 hours  vancomycin  IVPB        MEDICATIONS  (PRN):  Biotene Dry Mouth Oral Rinse 5 milliLiter(s) Swish and Spit daily PRN Mouth Care  cyclobenzaprine 5 milliGRAM(s) Oral three times a day PRN Muscle Spasm  magnesium hydroxide Suspension 30 milliLiter(s) Oral daily PRN Constipation  ondansetron Injectable 4 milliGRAM(s) IV Push every 6 hours PRN Nausea  oxyCODONE    IR 5 milliGRAM(s) Oral every 4 hours PRN Moderate Pain (4 - 6)  oxyCODONE    IR 10 milliGRAM(s) Oral every 4 hours PRN Severe Pain (7 - 10)        Vital Signs Last 24 Hrs  T(C): 36.6 (10 May 2021 08:55), Max: 36.8 (09 May 2021 21:00)  T(F): 97.8 (10 May 2021 08:55), Max: 98.3 (09 May 2021 21:00)  HR: 65 (10 May 2021 08:55) (65 - 87)  BP: 97/59 (10 May 2021 08:55) (96/59 - 111/66)  BP(mean): --  RR: 16 (10 May 2021 08:55) (16 - 16)  SpO2: 94% (10 May 2021 08:55) (94% - 96%)    CBC Full  -  ( 10 May 2021 06:55 )  WBC Count : 6.39 K/uL  RBC Count : 2.60 M/uL  Hemoglobin : 8.0 g/dL  Hematocrit : 25.2 %  Platelet Count - Automated : 285 K/uL  Mean Cell Volume : 96.9 fl  Mean Cell Hemoglobin : 30.8 pg  Mean Cell Hemoglobin Concentration : 31.7 gm/dL  Auto Neutrophil # : 3.15 K/uL  Auto Lymphocyte # : 2.09 K/uL  Auto Monocyte # : 0.58 K/uL  Auto Eosinophil # : 0.46 K/uL  Auto Basophil # : 0.05 K/uL  Auto Neutrophil % : 49.3 %  Auto Lymphocyte % : 32.7 %  Auto Monocyte % : 9.1 %  Auto Eosinophil % : 7.2 %  Auto Basophil % : 0.8 %    05-10    142  |  108  |  9   ----------------------------<  93  3.9   |  23  |  0.93    Ca    8.6      10 May 2021 06:55        Urinalysis Basic - ( 08 May 2021 21:10 )    Color: Light Yellow / Appearance: Clear / S.010 / pH: x  Gluc: x / Ketone: Negative  / Bili: Negative / Urobili: Negative   Blood: x / Protein: Negative / Nitrite: Positive   Leuk Esterase: Large / RBC: 3 /hpf / WBC 26 /HPF   Sq Epi: x / Non Sq Epi: 0 /hpf / Bacteria: Many        MICROBIOLOGY:  .Blood Blood-Peripheral  21   No growth to date.  --  --      .Urine Catheterized  21   50,000 - 99,000 CFU/mL Aerococcus species  "Aerococcus spp. are predictably susceptible to penicillin,  ampicillin, tetracycline, and vancomycin.  All isolates are  resistant to sulfonamides"  --  --              v            RADIOLOGY

## 2021-05-10 NOTE — PROGRESS NOTE ADULT - ASSESSMENT
Patient is a 54 Y/O Female with PMhx of CAD S/P PCI, HLD, kidney stone, migraine presents with LEFT comminuted patella fracture s/p mechanical fall on 4/19. Pt seen by Dr. Trejo in office. Instructed to come to [facility] ED for med/cards clearance and plan for ORIF of LEFT patella fx. Denies numbness/tingling in the affected extremity. Patient has been minimally ambulating with walker. in the ED she started having bladder pressure with decrease urine output.       Problem/Recommendation - 1:  Problem: Patella fracture. Recommendation: Ortho follow up   S/P OR , ORIF done , ortho follow up   card and med optimization   pain control   PT eval as tolerated, fall precautions.  S/P stress test   S/P cath, no stent placed medical management   enema as per GI , multiple bowel movement , laxatives per GI, repeat abdominal xray   IV hydration   GI eval called for further recs appreciated   TOV today       Problem/Recommendation - 2:  ·  Problem: Urinary retention.  Recommendation: acute onset  resolved, had urine output, no retention   send UA and urine culture  monitor BUN/Cr  bladder US. PRN   ID eval for Urine Cx  elevated Cr resolved  , S/Pgentle hydration , avid nephrotoxic medications   johansen , repeat UA and Blood cx, UA positive, TOV  COnt vanco for now till cx results , ID follow up     Problem/Recommendation - 3:  ·  Problem: CAD (coronary artery disease).  Recommendation: S/P stent x 2 2 years ago   cont ASA and lipitor 40 daily   toprol 25 daily   ischemic W/U per cardiology done    Problem/Recommendation - 4:  ·  Problem: HLD (hyperlipidemia).  Recommendation: statin  lipdi panel  diet control.     Problem/Recommendation - 5:  ·  Problem: Nephrolithiasis.     Problem/Recommendation - 6:  Problem: Prophylactic measure. Recommendation: DVT and gI PPX     Differential diagnosis and plan of care discussed with patient after the evaluation.   Advanced care planning/advanced directives discussed with patient/family. DNR status including forceful chest compressions to attempt to restart the heart, ventilator support/artificial breathing, electric shock, artificial nutrition, health care proxy, Molst form all discussed with pt. Pt wishes to consider.   OMT on six regions for acute somatic dysfunctions done at the bedside   Importance of Fall prevention discussed.

## 2021-05-10 NOTE — PROGRESS NOTE ADULT - ASSESSMENT
55y Female presents with LEFT comminuted patella fracture s/p mechanical fall on 4/19 with dysuria, urine retention, Aerococcus uti, multiple drug allergies     Arash Duarte  Attending Physician   Division of Infectious Disease  Pager #[phone]  Available on [employer] Teams also  After 5pm/weekend or no response, call #[phone]    D/w Dr. Duckworth

## 2021-05-11 LAB
-  AMIKACIN: SIGNIFICANT CHANGE UP
-  AMOXICILLIN/CLAVULANIC ACID: SIGNIFICANT CHANGE UP
-  AMPICILLIN/SULBACTAM: SIGNIFICANT CHANGE UP
-  AMPICILLIN: SIGNIFICANT CHANGE UP
-  AZTREONAM: SIGNIFICANT CHANGE UP
-  CEFAZOLIN: SIGNIFICANT CHANGE UP
-  CEFEPIME: SIGNIFICANT CHANGE UP
-  CEFOXITIN: SIGNIFICANT CHANGE UP
-  CEFTRIAXONE: SIGNIFICANT CHANGE UP
-  CIPROFLOXACIN: SIGNIFICANT CHANGE UP
-  ERTAPENEM: SIGNIFICANT CHANGE UP
-  GENTAMICIN: SIGNIFICANT CHANGE UP
-  IMIPENEM: SIGNIFICANT CHANGE UP
-  LEVOFLOXACIN: SIGNIFICANT CHANGE UP
-  MEROPENEM: SIGNIFICANT CHANGE UP
-  NITROFURANTOIN: SIGNIFICANT CHANGE UP
-  PIPERACILLIN/TAZOBACTAM: SIGNIFICANT CHANGE UP
-  TIGECYCLINE: SIGNIFICANT CHANGE UP
-  TOBRAMYCIN: SIGNIFICANT CHANGE UP
-  TRIMETHOPRIM/SULFAMETHOXAZOLE: SIGNIFICANT CHANGE UP
ANION GAP SERPL CALC-SCNC: 14 MMOL/L — SIGNIFICANT CHANGE UP (ref 5–17)
BASOPHILS # BLD AUTO: 0.04 K/UL — SIGNIFICANT CHANGE UP (ref 0–0.2)
BASOPHILS NFR BLD AUTO: 0.6 % — SIGNIFICANT CHANGE UP (ref 0–2)
BLD GP AB SCN SERPL QL: NEGATIVE — SIGNIFICANT CHANGE UP
BUN SERPL-MCNC: 13 MG/DL — SIGNIFICANT CHANGE UP (ref 7–23)
CALCIUM SERPL-MCNC: 8.5 MG/DL — SIGNIFICANT CHANGE UP (ref 8.4–10.5)
CHLORIDE SERPL-SCNC: 108 MMOL/L — SIGNIFICANT CHANGE UP (ref 96–108)
CO2 SERPL-SCNC: 21 MMOL/L — LOW (ref 22–31)
CREAT SERPL-MCNC: 1.06 MG/DL — SIGNIFICANT CHANGE UP (ref 0.5–1.3)
CULTURE RESULTS: SIGNIFICANT CHANGE UP
EOSINOPHIL # BLD AUTO: 0.48 K/UL — SIGNIFICANT CHANGE UP (ref 0–0.5)
EOSINOPHIL NFR BLD AUTO: 7.6 % — HIGH (ref 0–6)
GLUCOSE SERPL-MCNC: 96 MG/DL — SIGNIFICANT CHANGE UP (ref 70–99)
HCT VFR BLD CALC: 25.1 % — LOW (ref 34.5–45)
HGB BLD-MCNC: 8.2 G/DL — LOW (ref 11.5–15.5)
IMM GRANULOCYTES NFR BLD AUTO: 1.1 % — SIGNIFICANT CHANGE UP (ref 0–1.5)
LYMPHOCYTES # BLD AUTO: 1.53 K/UL — SIGNIFICANT CHANGE UP (ref 1–3.3)
LYMPHOCYTES # BLD AUTO: 24.1 % — SIGNIFICANT CHANGE UP (ref 13–44)
MCHC RBC-ENTMCNC: 31.7 PG — SIGNIFICANT CHANGE UP (ref 27–34)
MCHC RBC-ENTMCNC: 32.7 GM/DL — SIGNIFICANT CHANGE UP (ref 32–36)
MCV RBC AUTO: 96.9 FL — SIGNIFICANT CHANGE UP (ref 80–100)
METHOD TYPE: SIGNIFICANT CHANGE UP
MONOCYTES # BLD AUTO: 0.55 K/UL — SIGNIFICANT CHANGE UP (ref 0–0.9)
MONOCYTES NFR BLD AUTO: 8.7 % — SIGNIFICANT CHANGE UP (ref 2–14)
NEUTROPHILS # BLD AUTO: 3.67 K/UL — SIGNIFICANT CHANGE UP (ref 1.8–7.4)
NEUTROPHILS NFR BLD AUTO: 57.9 % — SIGNIFICANT CHANGE UP (ref 43–77)
NRBC # BLD: 0 /100 WBCS — SIGNIFICANT CHANGE UP (ref 0–0)
ORGANISM # SPEC MICROSCOPIC CNT: SIGNIFICANT CHANGE UP
ORGANISM # SPEC MICROSCOPIC CNT: SIGNIFICANT CHANGE UP
PLATELET # BLD AUTO: 311 K/UL — SIGNIFICANT CHANGE UP (ref 150–400)
POTASSIUM SERPL-MCNC: 4 MMOL/L — SIGNIFICANT CHANGE UP (ref 3.5–5.3)
POTASSIUM SERPL-SCNC: 4 MMOL/L — SIGNIFICANT CHANGE UP (ref 3.5–5.3)
RBC # BLD: 2.59 M/UL — LOW (ref 3.8–5.2)
RBC # FLD: 13.1 % — SIGNIFICANT CHANGE UP (ref 10.3–14.5)
RH IG SCN BLD-IMP: POSITIVE — SIGNIFICANT CHANGE UP
SODIUM SERPL-SCNC: 143 MMOL/L — SIGNIFICANT CHANGE UP (ref 135–145)
SPECIMEN SOURCE: SIGNIFICANT CHANGE UP
WBC # BLD: 6.34 K/UL — SIGNIFICANT CHANGE UP (ref 3.8–10.5)
WBC # FLD AUTO: 6.34 K/UL — SIGNIFICANT CHANGE UP (ref 3.8–10.5)

## 2021-05-11 PROCEDURE — 99232 SBSQ HOSP IP/OBS MODERATE 35: CPT

## 2021-05-11 RX ORDER — IRON SUCROSE 20 MG/ML
200 INJECTION, SOLUTION INTRAVENOUS ONCE
Refills: 0 | Status: COMPLETED | OUTPATIENT
Start: 2021-05-11 | End: 2021-05-11

## 2021-05-11 RX ORDER — OXYCODONE HYDROCHLORIDE 5 MG/1
5 TABLET ORAL EVERY 4 HOURS
Refills: 0 | Status: DISCONTINUED | OUTPATIENT
Start: 2021-05-11 | End: 2021-05-13

## 2021-05-11 RX ORDER — OXYCODONE HYDROCHLORIDE 5 MG/1
10 TABLET ORAL EVERY 4 HOURS
Refills: 0 | Status: DISCONTINUED | OUTPATIENT
Start: 2021-05-11 | End: 2021-05-13

## 2021-05-11 RX ORDER — ACETAMINOPHEN 500 MG
650 TABLET ORAL ONCE
Refills: 0 | Status: COMPLETED | OUTPATIENT
Start: 2021-05-11 | End: 2021-05-11

## 2021-05-11 RX ORDER — IRON SUCROSE 20 MG/ML
200 INJECTION, SOLUTION INTRAVENOUS ONCE
Refills: 0 | Status: COMPLETED | OUTPATIENT
Start: 2021-05-12 | End: 2021-05-12

## 2021-05-11 RX ADMIN — Medication 650 MILLIGRAM(S): at 23:41

## 2021-05-11 RX ADMIN — OXYCODONE HYDROCHLORIDE 5 MILLIGRAM(S): 5 TABLET ORAL at 21:25

## 2021-05-11 RX ADMIN — ESCITALOPRAM OXALATE 10 MILLIGRAM(S): 10 TABLET, FILM COATED ORAL at 21:26

## 2021-05-11 RX ADMIN — Medication 25 MILLIGRAM(S): at 12:21

## 2021-05-11 RX ADMIN — OXYCODONE HYDROCHLORIDE 5 MILLIGRAM(S): 5 TABLET ORAL at 21:55

## 2021-05-11 RX ADMIN — LIDOCAINE 1 PATCH: 4 CREAM TOPICAL at 01:54

## 2021-05-11 RX ADMIN — IRON SUCROSE 110 MILLIGRAM(S): 20 INJECTION, SOLUTION INTRAVENOUS at 13:47

## 2021-05-11 RX ADMIN — OXYCODONE HYDROCHLORIDE 5 MILLIGRAM(S): 5 TABLET ORAL at 02:20

## 2021-05-11 RX ADMIN — Medication 325 MILLIGRAM(S): at 06:43

## 2021-05-11 RX ADMIN — CYCLOBENZAPRINE HYDROCHLORIDE 5 MILLIGRAM(S): 10 TABLET, FILM COATED ORAL at 13:47

## 2021-05-11 RX ADMIN — OXYCODONE HYDROCHLORIDE 10 MILLIGRAM(S): 5 TABLET ORAL at 16:50

## 2021-05-11 RX ADMIN — LIDOCAINE 1 PATCH: 4 CREAM TOPICAL at 12:20

## 2021-05-11 RX ADMIN — LIDOCAINE 1 PATCH: 4 CREAM TOPICAL at 18:25

## 2021-05-11 RX ADMIN — OXYCODONE HYDROCHLORIDE 10 MILLIGRAM(S): 5 TABLET ORAL at 16:17

## 2021-05-11 RX ADMIN — POLYETHYLENE GLYCOL 3350 17 GRAM(S): 17 POWDER, FOR SOLUTION ORAL at 06:43

## 2021-05-11 RX ADMIN — OXYCODONE HYDROCHLORIDE 5 MILLIGRAM(S): 5 TABLET ORAL at 01:51

## 2021-05-11 RX ADMIN — GABAPENTIN 100 MILLIGRAM(S): 400 CAPSULE ORAL at 13:46

## 2021-05-11 RX ADMIN — PANTOPRAZOLE SODIUM 40 MILLIGRAM(S): 20 TABLET, DELAYED RELEASE ORAL at 06:43

## 2021-05-11 RX ADMIN — Medication 10 MILLIGRAM(S): at 12:20

## 2021-05-11 RX ADMIN — GABAPENTIN 100 MILLIGRAM(S): 400 CAPSULE ORAL at 06:43

## 2021-05-11 RX ADMIN — Medication 1 TABLET(S): at 12:21

## 2021-05-11 RX ADMIN — CYCLOBENZAPRINE HYDROCHLORIDE 5 MILLIGRAM(S): 10 TABLET, FILM COATED ORAL at 20:08

## 2021-05-11 RX ADMIN — GABAPENTIN 100 MILLIGRAM(S): 400 CAPSULE ORAL at 21:25

## 2021-05-11 RX ADMIN — Medication 325 MILLIGRAM(S): at 18:06

## 2021-05-11 RX ADMIN — POLYETHYLENE GLYCOL 3350 17 GRAM(S): 17 POWDER, FOR SOLUTION ORAL at 18:06

## 2021-05-11 RX ADMIN — ATORVASTATIN CALCIUM 40 MILLIGRAM(S): 80 TABLET, FILM COATED ORAL at 21:26

## 2021-05-11 NOTE — PROGRESS NOTE ADULT - SUBJECTIVE AND OBJECTIVE BOX
Chief Complaint:  Patient is a 55y old  Female who presents with a chief complaint of L patella fracture, for surgery (11 May 2021 11:22)      Date of service 05-11-21 @ 22:03      Interval Events: no BM now for 2 d  reports last BM was not melenic    Hospital Medications:  aspirin enteric coated 325 milliGRAM(s) Oral two times a day  atorvastatin 40 milliGRAM(s) Oral at bedtime  Biotene Dry Mouth Oral Rinse 5 milliLiter(s) Swish and Spit daily PRN  bisacodyl 10 milliGRAM(s) Oral daily  cyclobenzaprine 5 milliGRAM(s) Oral three times a day PRN  escitalopram 10 milliGRAM(s) Oral at bedtime  gabapentin 100 milliGRAM(s) Oral three times a day  levoFLOXacin IVPB      lidocaine   Patch 1 Patch Transdermal daily  magnesium hydroxide Suspension 30 milliLiter(s) Oral daily PRN  multivitamin 1 Tablet(s) Oral daily  ondansetron Injectable 4 milliGRAM(s) IV Push every 6 hours PRN  oxyCODONE    IR 5 milliGRAM(s) Oral every 4 hours PRN  oxyCODONE    IR 10 milliGRAM(s) Oral every 4 hours PRN  pantoprazole    Tablet 40 milliGRAM(s) Oral before breakfast  polyethylene glycol 3350 17 Gram(s) Oral two times a day  sodium chloride 0.9%. 1000 milliLiter(s) IV Continuous <Continuous>  topiramate 25 milliGRAM(s) Oral daily        Review of Systems:  General:  No wt loss, fevers, chills, night sweats, fatigue,   Eyes:  Good vision, no reported pain  ENT:  No sore throat, pain, runny nose, dysphagia  CV:  No pain, palpitations, hypo/hypertension  Resp:  No dyspnea, cough, tachypnea, wheezing  GI:  See HPI  :  No pain, bleeding, incontinence, nocturia  Muscle:  No pain, weakness  Neuro:  No weakness, tingling, memory problems  Psych:  No fatigue, insomnia, mood problems, depression  Endocrine:  No polyuria, polydipsia, cold/heat intolerance  Heme:  No petechiae, ecchymosis, easy bruisability  Integumentary:  No rash, edema    PHYSICAL EXAM:   Vital Signs:  Vital Signs Last 24 Hrs  T(C): 36.4 (11 May 2021 21:49), Max: 36.8 (11 May 2021 04:05)  T(F): 97.6 (11 May 2021 21:49), Max: 98.3 (11 May 2021 04:05)  HR: 77 (11 May 2021 21:49) (77 - 87)  BP: 131/81 (11 May 2021 21:49) (111/67 - 139/82)  BP(mean): --  RR: 18 (11 May 2021 21:49) (16 - 18)  SpO2: 98% (11 May 2021 21:49) (94% - 98%)  Daily     Daily       PHYSICAL EXAM:     GENERAL:  Appears stated age, well-groomed, well-nourished, no distress  HEENT:  NC/AT,  conjunctivae anicteric, clear and pink,   NECK: supple, trachea midline  CHEST:  Full & symmetric excursion, no increased effort, breath sounds clear  HEART:  Regular rhythm, no JVD  ABDOMEN:  Soft, non-tender, non-distended, normoactive bowel sounds,  no masses , no hepatosplenomegaly  EXTREMITIES:  no cyanosis,clubbing or edema, LLE brace  SKIN:  No rash, erythema, or, ecchymoses, no jaundice  NEURO:  Alert, non-focal, no asterixis  PSYCH: Appropriate affect, oriented to place and time  RECTAL: Deferred      LABS Personally reviewed by me:                        8.2    6.34  )-----------( 311      ( 11 May 2021 07:59 )             25.1     Mean Cell Volume: 96.9 fl (05-11-21 @ 07:59)    05-11    143  |  108  |  13  ----------------------------<  96  4.0   |  21<L>  |  1.06    Ca    8.5      11 May 2021 07:59                                    8.2    6.34  )-----------( 311      ( 11 May 2021 07:59 )             25.1                         8.0    6.39  )-----------( 285      ( 10 May 2021 06:55 )             25.2                         8.4    7.31  )-----------( 288      ( 09 May 2021 11:31 )             26.7       Imaging personally reviewed by me:

## 2021-05-11 NOTE — PROGRESS NOTE ADULT - ASSESSMENT
Patient is a 56 Y/O Female with PMhx of CAD S/P PCI, HLD, kidney stone, migraine presents with LEFT comminuted patella fracture s/p mechanical fall on 4/19. Pt seen by Dr. Trejo in office. Instructed to come to [facility] ED for med/cards clearance and plan for ORIF of LEFT patella fx. Denies numbness/tingling in the affected extremity. Patient has been minimally ambulating with walker. in the ED she started having bladder pressure with decrease urine output.       Problem/Recommendation - 1:  Problem: Patella fracture. Recommendation: Ortho follow up   S/P OR , ORIF done , ortho follow up   card and med optimization   pain control   PT eval as tolerated, fall precautions.  S/P stress test   S/P cath, no stent placed medical management   enema as per GI , multiple bowel movement , laxatives per GI, repeat abdominal xray   IV hydration   GI eval called for further recs appreciated     # ANemia   noted Since admission  drop in base Hgb level   anemia W/U  keep above 8   active T&S   no gross bleeding, occult may be positive due to multiple enema and suppositories and oral prepps for severe constipation, will check occult if cont to drop hgb level         Problem/Recommendation - 2:  ·  Problem: Urinary retention.  Recommendation: acute onset  resolved, had urine output, no retention   send UA and urine culture  monitor BUN/Cr  bladder US. PRN   ID eval for Urine Cx  elevated Cr resolved  , S/Pgentle hydration , avid nephrotoxic medications   johansen , repeat UA and Blood cx, UA positive, toV failed johansen replaced back   D/C vanco cx results, grew >100K Ecoli , ID follow up for antibiotics recs     Problem/Recommendation - 3:  ·  Problem: CAD (coronary artery disease).  Recommendation: S/P stent x 2 2 years ago   cont ASA and lipitor 40 daily   toprol 25 daily   ischemic W/U per cardiology done    Problem/Recommendation - 4:  ·  Problem: HLD (hyperlipidemia).  Recommendation: statin  lipdi panel  diet control.     Problem/Recommendation - 5:  ·  Problem: Nephrolithiasis.     Problem/Recommendation - 6:  Problem: Prophylactic measure. Recommendation: DVT and gI PPX     Differential diagnosis and plan of care discussed with patient after the evaluation.   Advanced care planning/advanced directives discussed with patient/family. DNR status including forceful chest compressions to attempt to restart the heart, ventilator support/artificial breathing, electric shock, artificial nutrition, health care proxy, Molst form all discussed with pt. Pt wishes to consider.   OMT on six regions for acute somatic dysfunctions done at the bedside   Importance of Fall prevention discussed.

## 2021-05-11 NOTE — PROGRESS NOTE ADULT - SUBJECTIVE AND OBJECTIVE BOX
ASHWIN JACOB 55y [id number]    Patient is a 55y old  Female who presents with a chief complaint of L patella fracture, for surgery (11 May 2021 11:22)      Follow Up/CC:  ID following for uti    Interval History/ROS: johansen replaced today due to retention, no fever    Allergies    apple (Hives)  Compazine (Unknown)  morphine (Unknown)  PC Pen VK (Unknown)  shellfish (Unknown)  sulfa drugs (Unknown)    Intolerances        ANTIMICROBIALS:  levoFLOXacin IVPB 250 once  levoFLOXacin IVPB        MEDICATIONS  (STANDING):  aspirin enteric coated 325 milliGRAM(s) Oral two times a day  atorvastatin 40 milliGRAM(s) Oral at bedtime  bisacodyl 10 milliGRAM(s) Oral daily  escitalopram 10 milliGRAM(s) Oral at bedtime  gabapentin 100 milliGRAM(s) Oral three times a day  levoFLOXacin IVPB 250 milliGRAM(s) IV Intermittent once  levoFLOXacin IVPB      lidocaine   Patch 1 Patch Transdermal daily  multivitamin 1 Tablet(s) Oral daily  pantoprazole    Tablet 40 milliGRAM(s) Oral before breakfast  polyethylene glycol 3350 17 Gram(s) Oral two times a day  sodium chloride 0.9%. 1000 milliLiter(s) (50 mL/Hr) IV Continuous <Continuous>  topiramate 25 milliGRAM(s) Oral daily    MEDICATIONS  (PRN):  Biotene Dry Mouth Oral Rinse 5 milliLiter(s) Swish and Spit daily PRN Mouth Care  cyclobenzaprine 5 milliGRAM(s) Oral three times a day PRN Muscle Spasm  magnesium hydroxide Suspension 30 milliLiter(s) Oral daily PRN Constipation  ondansetron Injectable 4 milliGRAM(s) IV Push every 6 hours PRN Nausea  oxyCODONE    IR 5 milliGRAM(s) Oral every 4 hours PRN Moderate Pain (4 - 6)  oxyCODONE    IR 10 milliGRAM(s) Oral every 4 hours PRN Severe Pain (7 - 10)        Vital Signs Last 24 Hrs  T(C): 36.7 (11 May 2021 09:25), Max: 36.9 (10 May 2021 16:07)  T(F): 98.1 (11 May 2021 09:25), Max: 98.5 (10 May 2021 20:13)  HR: 87 (11 May 2021 09:25) (70 - 87)  BP: 121/67 (11 May 2021 09:25) (98/64 - 121/67)  BP(mean): --  RR: 16 (11 May 2021 09:25) (16 - 16)  SpO2: 95% (11 May 2021 09:25) (94% - 99%)    CBC Full  -  ( 11 May 2021 07:59 )  WBC Count : 6.34 K/uL  RBC Count : 2.59 M/uL  Hemoglobin : 8.2 g/dL  Hematocrit : 25.1 %  Platelet Count - Automated : 311 K/uL  Mean Cell Volume : 96.9 fl  Mean Cell Hemoglobin : 31.7 pg  Mean Cell Hemoglobin Concentration : 32.7 gm/dL  Auto Neutrophil # : 3.67 K/uL  Auto Lymphocyte # : 1.53 K/uL  Auto Monocyte # : 0.55 K/uL  Auto Eosinophil # : 0.48 K/uL  Auto Basophil # : 0.04 K/uL  Auto Neutrophil % : 57.9 %  Auto Lymphocyte % : 24.1 %  Auto Monocyte % : 8.7 %  Auto Eosinophil % : 7.6 %  Auto Basophil % : 0.6 %    05-11    143  |  108  |  13  ----------------------------<  96  4.0   |  21<L>  |  1.06    Ca    8.5      11 May 2021 07:59            MICROBIOLOGY:  .Urine Clean Catch (Midstream)  05-09-21   >100,000 CFU/ml Escherichia coli  --  --      .Blood Blood-Peripheral  05-09-21   No growth to date.  --  --      .Urine Catheterized  04-29-21   50,000 - 99,000 CFU/mL Aerococcus species  "Aerococcus spp. are predictably susceptible to penicillin,  ampicillin, tetracycline, and vancomycin.  All isolates are  resistant to sulfonamides"  --  --        Vancomycin Level, Trough: 17.4 ug/mL (05-10-21 @ 21:16)      RADIOLOGY    < from: Xray Chest 2 Views PA/Lat (05.09.21 @ 09:02) >  The heart is normal in size. Lungs are clear. No pleural effusion. No pneumothorax. No acute bony pathology could be identified.    < end of copied text >

## 2021-05-11 NOTE — PROGRESS NOTE ADULT - SUBJECTIVE AND OBJECTIVE BOX
Name of Patient : ASHWIN JACOB  MRN: [id number]  DATE OF SERVICE: 05-11-21 @ 09:53    Subjective: Patient seen and examined. No new events except as noted.   doing better  bowel movements after prepped,  retention overnight  folwy placed back  Urine cultures positive for E-coli cont vanco     REVIEW OF SYSTEMS:    CONSTITUTIONAL: No weakness, fevers or chills  EYES/ENT: No visual changes;  No vertigo or throat pain   NECK: No pain or stiffness  RESPIRATORY: No cough, wheezing, hemoptysis; No shortness of breath  CARDIOVASCULAR: No chest pain or palpitations  GASTROINTESTINAL: No abdominal or epigastric pain. No nausea, vomiting, or hematemesis; No diarrhea or constipation. No melena or hematochezia.  GENITOURINARY: No dysuria, frequency or hematuria  NEUROLOGICAL: No numbness or weakness  SKIN: No itching, burning, rashes, or lesions   All other review of systems is negative unless indicated above.    MEDICATIONS:  MEDICATIONS  (STANDING):  aspirin enteric coated 325 milliGRAM(s) Oral two times a day  atorvastatin 40 milliGRAM(s) Oral at bedtime  escitalopram 10 milliGRAM(s) Oral at bedtime  gabapentin 100 milliGRAM(s) Oral three times a day  lidocaine   Patch 1 Patch Transdermal daily  multivitamin 1 Tablet(s) Oral daily  pantoprazole    Tablet 40 milliGRAM(s) Oral before breakfast  polyethylene glycol 3350 17 Gram(s) Oral two times a day  sodium chloride 0.9%. 1000 milliLiter(s) (50 mL/Hr) IV Continuous <Continuous>  topiramate 25 milliGRAM(s) Oral daily  vancomycin  IVPB 1000 milliGRAM(s) IV Intermittent every 12 hours  vancomycin  IVPB          PHYSICAL EXAM:  T(C): 36.8 (05-11-21 @ 04:05), Max: 36.9 (05-10-21 @ 16:07)  HR: 84 (05-11-21 @ 04:05) (70 - 86)  BP: 112/67 (05-11-21 @ 04:05) (98/64 - 113/72)  RR: 16 (05-11-21 @ 04:05) (16 - 16)  SpO2: 94% (05-11-21 @ 04:05) (94% - 99%)  Wt(kg): --  I&O's Summary    10 May 2021 07:01  -  11 May 2021 07:00  --------------------------------------------------------  IN: 2300 mL / OUT: 2650 mL / NET: -350 mL          Appearance: Normal	  HEENT:  PERRLA   Lymphatic: No lymphadenopathy   Cardiovascular: Normal S1 S2, no JVD  Respiratory: normal effort , clear  Gastrointestinal:  Soft, Non-tender  Skin: No rashes,  warm to touch  Psychiatry:  Mood & affect appropriate  Musculuskeletal: LLE splint       All labs, Imaging and EKGs personally reviewed                             8.2    6.34  )-----------( 311      ( 11 May 2021 07:59 )             25.1               05-11    143  |  108  |  13  ----------------------------<  96  4.0   |  21<L>  |  1.06    Ca    8.5      11 May 2021 07:59                                     05-10-21 @ 07:01  -  05-11-21 @ 07:00  --------------------------------------------------------  IN: 2300 mL / OUT: 2650 mL / NET: -350 mL

## 2021-05-11 NOTE — PROGRESS NOTE ADULT - SUBJECTIVE AND OBJECTIVE BOX
Patient is a 55y old  Female who presents with a chief complaint of L patella fracture, for surgery (11 May 2021 09:53)      INTERVAL HISTORY: feels ok, had 3 bms yesturday, denies chest pain,sob, johansen in place    REVIEW OF SYSTEMS:   CONSTITUTIONAL: No weakness  EYES/ENT: No visual changes; No throat pain  Neck: No pain or stiffness  Respiratory: No cough, wheezing, No shortness of breath  CARDIOVASCULAR: no chest pain or palpitations  GASTROINTESTINAL: No abdominal pain, no nausea, vomiting or hematemesis  GENITOURINARY: No dysuria, frequency or hematuria  NEUROLOGICAL: No stroke like symptoms  SKIN: No rashes    	  MEDICATIONS:        PHYSICAL EXAM:  T(C): 36.7 (05-11-21 @ 09:25), Max: 36.9 (05-10-21 @ 16:07)  HR: 87 (05-11-21 @ 09:25) (70 - 87)  BP: 121/67 (05-11-21 @ 09:25) (98/64 - 121/67)  RR: 16 (05-11-21 @ 09:25) (16 - 16)  SpO2: 95% (05-11-21 @ 09:25) (94% - 99%)  Wt(kg): --  I&O's Summary    10 May 2021 07:01  -  11 May 2021 07:00  --------------------------------------------------------  IN: 2300 mL / OUT: 2650 mL / NET: -350 mL    Appearance: In no distress	  HEENT:    PERRL, EOMI	  Cardiovascular:  S1 S2, No JVD  Respiratory: Lungs clear to auscultation	  Gastrointestinal:  Soft, Non-tender, + BS	  Vascularature:  No edema of LE  Psychiatric: Appropriate affect   Neuro: no acute focal deficits                      8.2    6.34  )-----------( 311      ( 11 May 2021 07:59 )             25.1     05-11    143  |  108  |  13  ----------------------------<  96  4.0   |  21<L>  |  1.06    Ca    8.5      11 May 2021 07:59  Labs personally reviewed    ASSESSMENT/PLAN: 	  Patient is a 54 Y/O Female with PMhx of CAD S/P PCI, HLD, kidney stone, migraine presents with LEFT comminuted patella fracture s/p mechanical fall on 4/19. Pt seen by Dr. Trejo in office. Instructed to come to [facility] ED for med/cards clearance and plan for ORIF of LEFT patella fx. Denies numbness/tingling in the affected extremity. Patient has been minimally ambulating with walker. in the ED she started having bladder pressure with decrease urine output.       Problem/Recommendation - 1:  Problem: Patella fracture. Recommendation: Ortho follow up   s/p OR, repair  Tolerated surgery well from cv standpoint     Problem/Recommendation - 2:  ·  Problem: Palps/Exertional SOB  Recommendation: NM stress test done t with large areas of infarct with leonor-infarct ischemia   Risks and benefits of cardiac cath discussed with pt.   Cath done with no obstructive CAD, LAD 10% LAD, RCA 30% stenosis    Problem/Recommendation - 3:  ·  Problem: CAD (coronary artery disease).  Recommendation: S/P stent x 2 to LAD about two years ago   cont ASA and lipitor 40 daily   Toprol 25mg daily   Cath with no obstructive CAD, LAD 10% LAD, RCA 30% stenosis   Chest pain today 5/4 - non cardiac in nature, reproducible to palpation. Prolonged discussion with pt - reassurance provided   medical management   No recurrence     Problem/Recommendation - 4:  ·  Problem: HLD (hyperlipidemia).  Recommendation: statin for secondary prevention     Problem/Recommendation - 5:  ·  Problem: Urinary retention.  Recommendation: acute onset o admission  Was retaining about 300cc of urine s/p straight cath  Again 5/2 with urinary retention, straight cath with >700cc of urine  Urology consult called, rec johansen placement  ID recs for UTI appreciated. Completed Vanco 3 day course  - straight cath x1 today for retention, johansen removed 5/9  5/10 again with urinary retention, straight cath with a Liter of urine, johansen reinserted   f/u Ucx 5/9 prelim growth with gram negative rods   5/9 Bcx prelim no growth    Problem/Recommendation - 6:  Problem: Constipation  Recommendation: likley 2/2 narcotic use Miralax, suppository, multiple enemas with no bowel movement  -  BM 5/4 after tap water enema  - GI recs appreciated - physical disimpaction attempted   - s/p 3 enemas-- patient feels better. Aggressive bowel regimen  - GI following.     Problem/Recommendation - 7:  Problem: BEULAH  Recommendation: repeat Cr improved  - prerenal state    Problem/Recommendation - 8:  Problem: Low grade fever <100.0  Recommendation: Blood culture, urine culture, UA and CXR  - ID reconsult   - Restarted on vanco for ?persistent UTI  -f/u Ucx 5/9 prelim growth with gram negative rods   5/9 Bcx prelim no growth    Problem/Recommendation - 9:  Problem: Anemia   Plan:  Repeat Hb in AM, Hb slowly trending down  - ?dilutional vs from frequent blood sraws  hgb 8.2 today           Susan Keyes ANP-C  Maximo Main DO [facility]  Cardiovascular Medicine  [address] [place] [address], [address]  Office: [phone]  Cell: [phone] Patient is a 55y old  Female who presents with a chief complaint of L patella fracture, for surgery (11 May 2021 09:53)      INTERVAL HISTORY: feels ok, had 3 bms yesturday, denies chest pain,sob, johansen in place    REVIEW OF SYSTEMS:   CONSTITUTIONAL: No weakness  EYES/ENT: No visual changes; No throat pain  Neck: No pain or stiffness  Respiratory: No cough, wheezing, No shortness of breath  CARDIOVASCULAR: no chest pain or palpitations  GASTROINTESTINAL: No abdominal pain, no nausea, vomiting or hematemesis  GENITOURINARY: No dysuria, frequency or hematuria  NEUROLOGICAL: No stroke like symptoms  SKIN: No rashes    	  MEDICATIONS:        PHYSICAL EXAM:  T(C): 36.7 (05-11-21 @ 09:25), Max: 36.9 (05-10-21 @ 16:07)  HR: 87 (05-11-21 @ 09:25) (70 - 87)  BP: 121/67 (05-11-21 @ 09:25) (98/64 - 121/67)  RR: 16 (05-11-21 @ 09:25) (16 - 16)  SpO2: 95% (05-11-21 @ 09:25) (94% - 99%)  Wt(kg): --  I&O's Summary    10 May 2021 07:01  -  11 May 2021 07:00  --------------------------------------------------------  IN: 2300 mL / OUT: 2650 mL / NET: -350 mL    Appearance: In no distress	  HEENT:    PERRL, EOMI	  Cardiovascular:  S1 S2, No JVD  Respiratory: Lungs clear to auscultation	  Gastrointestinal:  Soft, Non-tender, + BS	  Vascularature:  No edema of LE  Psychiatric: Appropriate affect   Neuro: no acute focal deficits                      8.2    6.34  )-----------( 311      ( 11 May 2021 07:59 )             25.1     05-11    143  |  108  |  13  ----------------------------<  96  4.0   |  21<L>  |  1.06    Ca    8.5      11 May 2021 07:59  Labs personally reviewed    ASSESSMENT/PLAN: 	  Patient is a 54 Y/O Female with PMhx of CAD S/P PCI, HLD, kidney stone, migraine presents with LEFT comminuted patella fracture s/p mechanical fall on 4/19. Pt seen by Dr. Trejo in office. Instructed to come to [facility] ED for med/cards clearance and plan for ORIF of LEFT patella fx. Denies numbness/tingling in the affected extremity. Patient has been minimally ambulating with walker. in the ED she started having bladder pressure with decrease urine output.       Problem/Recommendation - 1:  Problem: Patella fracture. Recommendation: Ortho follow up   s/p OR, repair  Tolerated surgery well from cv standpoint     Problem/Recommendation - 2:  ·  Problem: Palps/Exertional SOB  Recommendation: NM stress test done t with large areas of infarct with leonor-infarct ischemia   Risks and benefits of cardiac cath discussed with pt.   Cath done with no obstructive CAD, LAD 10% LAD, RCA 30% stenosis    Problem/Recommendation - 3:  ·  Problem: CAD (coronary artery disease).  Recommendation: S/P stent x 2 to LAD about two years ago   cont ASA and lipitor 40 daily   Toprol 25mg daily   Cath with no obstructive CAD, LAD 10% LAD, RCA 30% stenosis   Chest pain today 5/4 - non cardiac in nature, reproducible to palpation. Prolonged discussion with pt - reassurance provided   medical management   No recurrence     Problem/Recommendation - 4:  ·  Problem: HLD (hyperlipidemia).  Recommendation: statin for secondary prevention     Problem/Recommendation - 5:  ·  Problem: Urinary retention.  Recommendation: acute onset o admission  Was retaining about 300cc of urine s/p straight cath  Again 5/2 with urinary retention, straight cath with >700cc of urine  Urology consult called, rec johansen placement  ID recs for UTI appreciated. Completed Vanco 3 day course  - straight cath x1 today for retention, johansen removed 5/9  5/10 again with urinary retention, straight cath with a Liter of urine, johansen reinserted   f/u Ucx 5/9 prelim growth with gram negative rods  ( continuing vanco per ID )   5/9 Bcx prelim no growth    Problem/Recommendation - 6:  Problem: Constipation  Recommendation: abimbolaley 2/2 narcotic use Miralax, suppository, multiple enemas with no bowel movement  -  BM 5/4 after tap water enema  - GI recs appreciated - physical disimpaction attempted   - s/p 3 enemas-- patient feels better. Aggressive bowel regimen  - GI following.     Problem/Recommendation - 7:  Problem: BEULAH  Recommendation: repeat Cr improved  - prerenal state    Problem/Recommendation - 8:  Problem: Low grade fever <100.0  Recommendation: Blood culture, urine culture, UA and CXR  - ID reconsult   -f/u Ucx 5/9 prelim growth with gram negative rods   5/9 Bcx prelim no growth  ID following continue vanco for now     Problem/Recommendation - 9:  Problem: Anemia   Plan:  Repeat Hb in AM, Hb slowly trending down  - ?dilutional vs from frequent blood sraws  hgb 8.2 today   continue to trend           Susan Keyes ANP-C  Maximo Main DO [facility]  Cardiovascular Medicine  [address] [place] [address], [address]  Office: [phone]  Cell: [phone] Patient is a 55y old  Female who presents with a chief complaint of L patella fracture, for surgery (11 May 2021 09:53)      INTERVAL HISTORY: feels ok, had 3 bms yesturday, denies chest pain,sob, johansen in place    REVIEW OF SYSTEMS:   CONSTITUTIONAL: No weakness  EYES/ENT: No visual changes; No throat pain  Neck: No pain or stiffness  Respiratory: No cough, wheezing, No shortness of breath  CARDIOVASCULAR: no chest pain or palpitations  GASTROINTESTINAL: No abdominal pain, no nausea, vomiting or hematemesis  GENITOURINARY: No dysuria, frequency or hematuria  NEUROLOGICAL: No stroke like symptoms  SKIN: No rashes    	  MEDICATIONS:        PHYSICAL EXAM:  T(C): 36.7 (05-11-21 @ 09:25), Max: 36.9 (05-10-21 @ 16:07)  HR: 87 (05-11-21 @ 09:25) (70 - 87)  BP: 121/67 (05-11-21 @ 09:25) (98/64 - 121/67)  RR: 16 (05-11-21 @ 09:25) (16 - 16)  SpO2: 95% (05-11-21 @ 09:25) (94% - 99%)  Wt(kg): --  I&O's Summary    10 May 2021 07:01  -  11 May 2021 07:00  --------------------------------------------------------  IN: 2300 mL / OUT: 2650 mL / NET: -350 mL    Appearance: In no distress	  HEENT:    PERRL, EOMI	  Cardiovascular:  S1 S2, No JVD  Respiratory: Lungs clear to auscultation	  Gastrointestinal:  Soft, Non-tender, + BS	  Vascularature:  No edema of LE  Psychiatric: Appropriate affect   Neuro: no acute focal deficits                      8.2    6.34  )-----------( 311      ( 11 May 2021 07:59 )             25.1     05-11    143  |  108  |  13  ----------------------------<  96  4.0   |  21<L>  |  1.06    Ca    8.5      11 May 2021 07:59  Labs personally reviewed    ASSESSMENT/PLAN: 	  Patient is a 56 Y/O Female with PMhx of CAD S/P PCI, HLD, kidney stone, migraine presents with LEFT comminuted patella fracture s/p mechanical fall on 4/19. Pt seen by Dr. Trejo in office. Instructed to come to [facility] ED for med/cards clearance and plan for ORIF of LEFT patella fx. Denies numbness/tingling in the affected extremity. Patient has been minimally ambulating with walker. in the ED she started having bladder pressure with decrease urine output.       Problem/Recommendation - 1:  Problem: Patella fracture. Recommendation: Ortho follow up   s/p OR, repair  Tolerated surgery well from cv standpoint     Problem/Recommendation - 2:  ·  Problem: Palps/Exertional SOB  Recommendation: NM stress test done t with large areas of infarct with leonor-infarct ischemia   Risks and benefits of cardiac cath discussed with pt.   Cath done with no obstructive CAD, LAD 10% LAD, RCA 30% stenosis    Problem/Recommendation - 3:  ·  Problem: CAD (coronary artery disease).  Recommendation: S/P stent x 2 to LAD about two years ago   cont ASA and lipitor 40 daily   Toprol 25mg daily   Cath with no obstructive CAD, LAD 10% LAD, RCA 30% stenosis   Chest pain today 5/4 - non cardiac in nature, reproducible to palpation. Prolonged discussion with pt - reassurance provided   medical management   No recurrence     Problem/Recommendation - 4:  ·  Problem: HLD (hyperlipidemia).  Recommendation: statin for secondary prevention     Problem/Recommendation - 5:  ·  Problem: Urinary retention.  Recommendation: acute onset o admission  Was retaining about 300cc of urine s/p straight cath  Again 5/2 with urinary retention, straight cath with >700cc of urine  Urology consult called, rec johansen placement  ID recs for UTI appreciated. Completed Vanco 3 day course  - straight cath x1 today for retention, johansen removed 5/9  5/10 again with urinary retention, straight cath with a Liter of urine, johansen reinserted   f/u Ucx 5/9 prelim growth with gram negative rods  ( continuing vanco per ID )   5/9 Bcx prelim no growth    Problem/Recommendation - 6:  Problem: Constipation  Recommendation: abimbolaley 2/2 narcotic use Miralax, suppository, multiple enemas with no bowel movement  -  BM 5/4 after tap water enema  - GI recs appreciated - physical disimpaction attempted   - s/p 3 enemas-- patient feels better. Aggressive bowel regimen  - GI following.     Problem/Recommendation - 7:  Problem: BEULAH  Recommendation: repeat Cr improved  - prerenal state    Problem/Recommendation - 8:  Problem: Low grade fever <100.0  Recommendation: Blood culture, urine culture, UA and CXR  - ID reconsult   -f/u Ucx 5/9 prelim growth with gram negative rods   5/9 Bcx prelim no growth  ID following discontinue vanco and switch to Levaquin    Problem/Recommendation - 9:  Problem: Anemia   Plan:  Repeat Hb in AM, Hb slowly trending down  - ?dilutional vs from frequent blood sraws  hgb 8.2 today   continue to trend   No sign of blood loss    Discharge planning for Wednesday to JACQUELYN Main DO [facility]  Cardiovascular Medicine  [address] [place] [address], [address]  Office: [phone]  Cell: [phone]

## 2021-05-12 LAB
ALBUMIN SERPL ELPH-MCNC: 3 G/DL — LOW (ref 3.3–5)
ALP SERPL-CCNC: 130 U/L — HIGH (ref 40–120)
ALT FLD-CCNC: 27 U/L — SIGNIFICANT CHANGE UP (ref 10–45)
ANION GAP SERPL CALC-SCNC: 14 MMOL/L — SIGNIFICANT CHANGE UP (ref 5–17)
AST SERPL-CCNC: 27 U/L — SIGNIFICANT CHANGE UP (ref 10–40)
BILIRUB SERPL-MCNC: 0.4 MG/DL — SIGNIFICANT CHANGE UP (ref 0.2–1.2)
BUN SERPL-MCNC: 11 MG/DL — SIGNIFICANT CHANGE UP (ref 7–23)
CALCIUM SERPL-MCNC: 9.1 MG/DL — SIGNIFICANT CHANGE UP (ref 8.4–10.5)
CHLORIDE SERPL-SCNC: 108 MMOL/L — SIGNIFICANT CHANGE UP (ref 96–108)
CO2 SERPL-SCNC: 21 MMOL/L — LOW (ref 22–31)
CREAT SERPL-MCNC: 0.94 MG/DL — SIGNIFICANT CHANGE UP (ref 0.5–1.3)
FERRITIN SERPL-MCNC: 277 NG/ML — HIGH (ref 15–150)
FOLATE SERPL-MCNC: 14.7 NG/ML — SIGNIFICANT CHANGE UP
GLUCOSE SERPL-MCNC: 81 MG/DL — SIGNIFICANT CHANGE UP (ref 70–99)
HCT VFR BLD CALC: 26.9 % — LOW (ref 34.5–45)
HGB BLD-MCNC: 8.7 G/DL — LOW (ref 11.5–15.5)
IRON SATN MFR SERPL: 199 UG/DL — HIGH (ref 30–160)
IRON SATN MFR SERPL: 85 % — HIGH (ref 14–50)
MCHC RBC-ENTMCNC: 31.3 PG — SIGNIFICANT CHANGE UP (ref 27–34)
MCHC RBC-ENTMCNC: 32.3 GM/DL — SIGNIFICANT CHANGE UP (ref 32–36)
MCV RBC AUTO: 96.8 FL — SIGNIFICANT CHANGE UP (ref 80–100)
NRBC # BLD: 0 /100 WBCS — SIGNIFICANT CHANGE UP (ref 0–0)
PLATELET # BLD AUTO: 308 K/UL — SIGNIFICANT CHANGE UP (ref 150–400)
POTASSIUM SERPL-MCNC: 4.3 MMOL/L — SIGNIFICANT CHANGE UP (ref 3.5–5.3)
POTASSIUM SERPL-SCNC: 4.3 MMOL/L — SIGNIFICANT CHANGE UP (ref 3.5–5.3)
PROT SERPL-MCNC: 5.9 G/DL — LOW (ref 6–8.3)
RBC # BLD: 2.78 M/UL — LOW (ref 3.8–5.2)
RBC # FLD: 13.1 % — SIGNIFICANT CHANGE UP (ref 10.3–14.5)
SODIUM SERPL-SCNC: 143 MMOL/L — SIGNIFICANT CHANGE UP (ref 135–145)
TIBC SERPL-MCNC: 235 UG/DL — SIGNIFICANT CHANGE UP (ref 220–430)
UIBC SERPL-MCNC: 36 UG/DL — LOW (ref 110–370)
VIT B12 SERPL-MCNC: 594 PG/ML — SIGNIFICANT CHANGE UP (ref 232–1245)
WBC # BLD: 5.73 K/UL — SIGNIFICANT CHANGE UP (ref 3.8–10.5)
WBC # FLD AUTO: 5.73 K/UL — SIGNIFICANT CHANGE UP (ref 3.8–10.5)

## 2021-05-12 PROCEDURE — 99232 SBSQ HOSP IP/OBS MODERATE 35: CPT

## 2021-05-12 RX ORDER — ACETAMINOPHEN 500 MG
650 TABLET ORAL ONCE
Refills: 0 | Status: COMPLETED | OUTPATIENT
Start: 2021-05-12 | End: 2021-05-12

## 2021-05-12 RX ADMIN — Medication 650 MILLIGRAM(S): at 17:58

## 2021-05-12 RX ADMIN — OXYCODONE HYDROCHLORIDE 5 MILLIGRAM(S): 5 TABLET ORAL at 14:49

## 2021-05-12 RX ADMIN — ESCITALOPRAM OXALATE 10 MILLIGRAM(S): 10 TABLET, FILM COATED ORAL at 22:25

## 2021-05-12 RX ADMIN — CYCLOBENZAPRINE HYDROCHLORIDE 5 MILLIGRAM(S): 10 TABLET, FILM COATED ORAL at 05:46

## 2021-05-12 RX ADMIN — OXYCODONE HYDROCHLORIDE 5 MILLIGRAM(S): 5 TABLET ORAL at 23:00

## 2021-05-12 RX ADMIN — Medication 325 MILLIGRAM(S): at 17:26

## 2021-05-12 RX ADMIN — LIDOCAINE 1 PATCH: 4 CREAM TOPICAL at 17:58

## 2021-05-12 RX ADMIN — CYCLOBENZAPRINE HYDROCHLORIDE 5 MILLIGRAM(S): 10 TABLET, FILM COATED ORAL at 13:10

## 2021-05-12 RX ADMIN — Medication 650 MILLIGRAM(S): at 17:38

## 2021-05-12 RX ADMIN — ONDANSETRON 4 MILLIGRAM(S): 8 TABLET, FILM COATED ORAL at 13:07

## 2021-05-12 RX ADMIN — OXYCODONE HYDROCHLORIDE 5 MILLIGRAM(S): 5 TABLET ORAL at 15:58

## 2021-05-12 RX ADMIN — Medication 1 TABLET(S): at 11:27

## 2021-05-12 RX ADMIN — GABAPENTIN 100 MILLIGRAM(S): 400 CAPSULE ORAL at 22:25

## 2021-05-12 RX ADMIN — LIDOCAINE 1 PATCH: 4 CREAM TOPICAL at 11:28

## 2021-05-12 RX ADMIN — Medication 650 MILLIGRAM(S): at 00:10

## 2021-05-12 RX ADMIN — OXYCODONE HYDROCHLORIDE 5 MILLIGRAM(S): 5 TABLET ORAL at 22:25

## 2021-05-12 RX ADMIN — PANTOPRAZOLE SODIUM 40 MILLIGRAM(S): 20 TABLET, DELAYED RELEASE ORAL at 06:57

## 2021-05-12 RX ADMIN — POLYETHYLENE GLYCOL 3350 17 GRAM(S): 17 POWDER, FOR SOLUTION ORAL at 05:46

## 2021-05-12 RX ADMIN — IRON SUCROSE 110 MILLIGRAM(S): 20 INJECTION, SOLUTION INTRAVENOUS at 11:26

## 2021-05-12 RX ADMIN — LIDOCAINE 1 PATCH: 4 CREAM TOPICAL at 01:08

## 2021-05-12 RX ADMIN — GABAPENTIN 100 MILLIGRAM(S): 400 CAPSULE ORAL at 05:45

## 2021-05-12 RX ADMIN — Medication 25 MILLIGRAM(S): at 11:28

## 2021-05-12 RX ADMIN — ATORVASTATIN CALCIUM 40 MILLIGRAM(S): 80 TABLET, FILM COATED ORAL at 22:25

## 2021-05-12 RX ADMIN — Medication 325 MILLIGRAM(S): at 05:45

## 2021-05-12 RX ADMIN — GABAPENTIN 100 MILLIGRAM(S): 400 CAPSULE ORAL at 13:05

## 2021-05-12 NOTE — PROGRESS NOTE ADULT - PROBLEM SELECTOR PROBLEM 2
Patella fracture
UTI (urinary tract infection)

## 2021-05-12 NOTE — PROGRESS NOTE ADULT - RS GEN PE MLT RESP DETAILS PC
respirations non-labored/clear to auscultation bilaterally/no wheezes
clear to auscultation bilaterally/no wheezes
respirations non-labored/clear to auscultation bilaterally/no wheezes

## 2021-05-12 NOTE — PROGRESS NOTE ADULT - PROBLEM SELECTOR PLAN 3
- on vancomycin  - care per ID appreciated
-s/p left patella ORIF  -postop care per ortho

## 2021-05-12 NOTE — PROGRESS NOTE ADULT - PROBLEM SELECTOR PLAN 2
- appreciate care per Orthopaedics  - physical therapy  - pain control
-chills over weekend  -repeat urine cx with E. coli  -DC vancomycin   -levofloxacin 250 mg iv q24 - pt denies prior issues
-cont vanco  -total 3 days of abx   -pt tolerating
-chills over weekend  -f/u repeat urine cx  -cont vancomycin for now
-chills over weekend  -repeat urine cx with E. coli  -levofloxacin 250 mg iv q24 - day 2 - total 3 days of abx - complete tomorrow
-cont vanco  -total 3 days of abx   -pt tolerating
-s/p 3 days of abx   -pt tolerating

## 2021-05-12 NOTE — PROGRESS NOTE ADULT - PROBLEM SELECTOR PLAN 1
***See Above  Ramez HOWE  Orthopedics  B: [phone]  S: [phone]
PT/OT-WBAT in [place]  IS  DVT PPx-venodynes  Pain Control  Continue Current Tx.  Plan per primary team    Wilder Farrell PA-C  Team Pager: #[phone]
- AXR reviewed with large stool burden  - suspect 2/2 post surgical use of narcotic pain medications  - s/p 1 fleet enema  - s/p 2 tap water enemas with adequate bms  - if patient feels she needs it, can give 1 liter of movipep for further bms   - cw bowel regimen with senna, miralax and dulcolax supp and monitor for bms
-johansen replaced  -per 
-johansen replaced  -per 
-johansen removed  -per 
-johansen in place  -per 

## 2021-05-12 NOTE — CHART NOTE - NSCHARTNOTEFT_GEN_A_CORE
Nutrition Follow Up Note  Patient seen for: length of stay follow up. Chart reviewed and events noted.     Patient is a "54 y/o Female with PMhx of CAD S/P PCI, HLD, kidney stone, migraine presents with LEFT comminuted patella fracture s/p mechanical fall on 4/19. Pt seen by Dr. Trejo in office. Instructed to come to [facility] ED for med/cards clearance and plan for ORIF of LEFT patella fx (5/3). D/C planing ot rehab with outpatient follow up."    Source: [x] Patient       [x] Medical Record          Diet Order:   Diet, Regular:   Kosher (05-04-21)    - Is current order appropriate? [x] Yes  []  No:     - PO intake :   [] >75%  Adequate    [x] 50-75%  Fair       [] <50%  Poor    - Nutrition-related concerns:      -Pt reports PO intake and appetite slightly less than baseline, "picks at food" 2/2 constipation and institutional foods. Pt with questions regarding Kosher items in-house, RD answered to best of ability. Obtained food preferences to optimize protein-energy intake.     GI: No known recent N/V. Pt with Hx of constipation s/p enema. Last BM 5/12.   Bowel Regimen? [x] Yes   [] No    Weights:   Dosing wt 169.9 lbs  RD obtained bed scale wt: 200 lbs.   Question accuracy of bed wt as pt reported wt of 170 lbs PTA. RD will continue to trend as new wts available/able.     Nutritionally Pertinent MEDICATIONS  (STANDING):  atorvastatin  bisacodyl  levoFLOXacin IVPB  levoFLOXacin IVPB  multivitamin  pantoprazole    Tablet  polyethylene glycol 3350  sodium chloride 0.9%.    Pertinent Labs: 05-12 @ 07:27: Na 143, BUN 11, Cr 0.94, BG 81, K+ 4.3, Alk Phos 130<H>, ALT/SGPT 27, AST/SGOT 27    Skin per nursing documentation: No pressure injuries noted.  Edema per nursing documentation: +1 L foot and leg    Estimated Needs:   [x] no change since previous assessment  IBW used for estimated nutrient needs (63.5 kG)  Estimated Energy Needs: (28-30 kcals/kG) 7877-8823 kcals  Estimated Protein Needs: (1.2-1.4 gm/kG) 76-89 gm  Defer fluid needs to team at this time    Previous Nutrition Diagnosis: No active diagnosis   Nutrition Diagnosis is: [] ongoing  [] resolved [x] not applicable     New Nutrition Diagnosis: Inadequate protein-energy intake related to lack of appetite with constipation as evidenced by PO intake 50-75% meals    Nutrition Care Plan:  [x] In Progress  [] Achieved  [] Not applicable    Nutrition Interventions: RD reviewed need for adequate protein-energy intake and obtained food preferences, will honor as able. Pt amenable to trialing Ensure Enlive during admission.      Education Provided   [] Yes:  [x] No:     Recommendations:      1) Continue current Diet, Regular: Kosher. RD remains available for diet changes as needed/able.   2) Recommend Ensure Enlive x1 daily.   3) RD to honor food preferences as able.   4) As medically feasible, continue to provide micronutrients.     Monitoring and Evaluation:   Continue to monitor nutritional intake, tolerance to diet prescription, weights, labs, skin integrity    RD remains available upon request and will follow up per protocol  Maggy Calderon, MS, RD, CDN Pager #[phone]

## 2021-05-12 NOTE — PROGRESS NOTE ADULT - SUBJECTIVE AND OBJECTIVE BOX
Name of Patient : ASHWIN JACOB  MRN: [id number]  DATE OF SERVICE: 05-12-21 @ 09:43    Subjective: Patient seen and examined. No new events except as noted.   doing okay   plan for rehab placement  IV Iron while inpatient   oral on discharge     REVIEW OF SYSTEMS:    CONSTITUTIONAL: mild weakness  EYES/ENT: No visual changes;  No vertigo or throat pain   NECK: No pain or stiffness  RESPIRATORY: No cough, wheezing, hemoptysis; No shortness of breath  CARDIOVASCULAR: No chest pain or palpitations  GASTROINTESTINAL: No abdominal or epigastric pain. No nausea, vomiting, or hematemesis; No diarrhea or constipation. No melena or hematochezia.  GENITOURINARY: No dysuria, frequency or hematuria  NEUROLOGICAL: No numbness or weakness  SKIN: No itching, burning, rashes, or lesions   All other review of systems is negative unless indicated above.    MEDICATIONS:  MEDICATIONS  (STANDING):  aspirin enteric coated 325 milliGRAM(s) Oral two times a day  atorvastatin 40 milliGRAM(s) Oral at bedtime  bisacodyl 10 milliGRAM(s) Oral daily  escitalopram 10 milliGRAM(s) Oral at bedtime  gabapentin 100 milliGRAM(s) Oral three times a day  iron sucrose IVPB 200 milliGRAM(s) IV Intermittent once  levoFLOXacin IVPB 250 milliGRAM(s) IV Intermittent every 24 hours  levoFLOXacin IVPB      lidocaine   Patch 1 Patch Transdermal daily  multivitamin 1 Tablet(s) Oral daily  pantoprazole    Tablet 40 milliGRAM(s) Oral before breakfast  polyethylene glycol 3350 17 Gram(s) Oral two times a day  sodium chloride 0.9%. 1000 milliLiter(s) (50 mL/Hr) IV Continuous <Continuous>  topiramate 25 milliGRAM(s) Oral daily      PHYSICAL EXAM:  T(C): 36.5 (05-12-21 @ 04:55), Max: 36.8 (05-11-21 @ 13:36)  HR: 67 (05-12-21 @ 04:55) (67 - 87)  BP: 120/69 (05-12-21 @ 04:55) (111/67 - 139/82)  RR: 18 (05-12-21 @ 04:55) (16 - 18)  SpO2: 96% (05-12-21 @ 04:55) (96% - 98%)  Wt(kg): --  I&O's Summary    11 May 2021 07:01  -  12 May 2021 07:00  --------------------------------------------------------  IN: 1360 mL / OUT: 3000 mL / NET: -1640 mL          Appearance: Normal	  HEENT:  PERRLA   Lymphatic: No lymphadenopathy   Cardiovascular: Normal S1 S2, no JVD  Respiratory: normal effort , clear  Gastrointestinal:  Soft, Non-tender  Skin: No rashes,  warm to touch  Psychiatry:  Mood & affect appropriate  Musculuskeletal: L LE splint       All labs, Imaging and EKGs personally reviewed                             8.7    5.73  )-----------( 308      ( 12 May 2021 07:28 )             26.9               05-12    143  |  108  |  11  ----------------------------<  81  4.3   |  21<L>  |  0.94    Ca    9.1      12 May 2021 07:27    TPro  5.9<L>  /  Alb  3.0<L>  /  TBili  0.4  /  DBili  x   /  AST  27  /  ALT  27  /  AlkPhos  130<H>  05-12 05-11-21 @ 07:01  -  05-12-21 @ 07:00  --------------------------------------------------------  IN: 1360 mL / OUT: 3000 mL / NET: -1640 mL

## 2021-05-12 NOTE — PROGRESS NOTE ADULT - NEGATIVE GENERAL GENITOURINARY SYMPTOMS
no hematuria/no dysuria
no hematuria/no dysuria
no hematuria
no hematuria
no hematuria/no dysuria
no hematuria

## 2021-05-12 NOTE — PROGRESS NOTE ADULT - ASSESSMENT
Patient is a 54 Y/O Female with PMhx of CAD S/P PCI, HLD, kidney stone, migraine presents with LEFT comminuted patella fracture s/p mechanical fall on 4/19. Pt seen by Dr. Trejo in office. Instructed to come to [facility] ED for med/cards clearance and plan for ORIF of LEFT patella fx. Denies numbness/tingling in the affected extremity. Patient has been minimally ambulating with walker. in the ED she started having bladder pressure with decrease urine output.       Problem/Recommendation - 1:  Problem: Patella fracture. Recommendation: Ortho follow up   S/P OR , ORIF done , ortho follow up   card and med optimization   pain control   PT eval as tolerated, fall precautions.  S/P stress test   S/P cath, no stent placed medical management   enema as per GI , multiple bowel movement , laxatives per GI, repeat abdominal xray   IV hydration   GI eval called for further recs appreciated     # ANemia   noted Since admission  drop in base Hgb level   anemia W/U  keep above 8   active T&S   no gross bleeding, occult may be positive due to multiple enema and suppositories and oral prepps for severe constipation,  hx of SHUKRI< start IV Iron 200 daily while inpatient for total of 3, can switch to oral on discharge           Problem/Recommendation - 2:  ·  Problem: Urinary retention.  Recommendation: acute onset  resolved, had urine output, no retention   send UA and urine culture  monitor BUN/Cr  bladder US. PRN   ID eval for Urine Cx  elevated Cr resolved  , S/Pgentle hydration , avid nephrotoxic medications   johansen , repeat UA and Blood cx, UA positive, toV failed johansen replaced back   D/C vanco cx results, grew >100K Ecoli , ID follow up for antibiotics recs , oral/IV levaquin,    Problem/Recommendation - 3:  ·  Problem: CAD (coronary artery disease).  Recommendation: S/P stent x 2 2 years ago   cont ASA and lipitor 40 daily   toprol 25 daily   ischemic W/U per cardiology done    Problem/Recommendation - 4:  ·  Problem: HLD (hyperlipidemia).  Recommendation: statin  lipdi panel  diet control.     Problem/Recommendation - 5:  ·  Problem: Nephrolithiasis.     Problem/Recommendation - 6:  Problem: Prophylactic measure. Recommendation: DVT and gI PPX     D/C planing ot rehab with outpatient follow up

## 2021-05-12 NOTE — PROGRESS NOTE ADULT - NEGATIVE ENMT SYMPTOMS
no ear pain/no nasal congestion/no throat pain

## 2021-05-12 NOTE — PROGRESS NOTE ADULT - PROBLEM SELECTOR PLAN 4
- S/P stent x 2 2 years ago   - appreciate cardiology input    The plan of care was discussed with the physician assistant and modifications were made to the notation where appropriate.   Differential diagnosis and plan of care discussed with patient after the evaluation  35 minutes spent on total encounter of which more than fifty percent of the encounter was spent counseling and/or coordinating care by the attending physician.    [facility]  Gastroenterology and Hepatology  [phone]
-no issues with levofloxacin in past - will do IV for now   -only nausea with cipro
-tolerating vanco so far
-no issues with levofloxacin in past - will do IV for now   -only nausea with cipro

## 2021-05-12 NOTE — PROGRESS NOTE ADULT - SUBJECTIVE AND OBJECTIVE BOX
DATE OF SERVICE: 05-12-21 @ 22:58    Patient is a 55y old  Female who presents with a chief complaint of L patella fracture, for surgery (12 May 2021 22:26)      INTERVAL HISTORY: feels better    REVIEW OF SYSTEMS:  CONSTITUTIONAL: No weakness  EYES/ENT: No visual changes;  No throat pain   NECK: No pain or stiffness  RESPIRATORY: No cough, wheezing; No shortness of breath  CARDIOVASCULAR: No chest pain or palpitations  GASTROINTESTINAL: No abdominal  pain. No nausea, vomiting, or hematemesis  GENITOURINARY: No dysuria, frequency or hematuria  NEUROLOGICAL: No stroke like symptoms  SKIN: No rashes         PHYSICAL EXAM:  T(C): 36.7 (05-12-21 @ 21:18), Max: 36.8 (05-12-21 @ 09:26)  HR: 74 (05-12-21 @ 21:18) (67 - 84)  BP: 127/77 (05-12-21 @ 21:18) (106/60 - 127/77)  RR: 18 (05-12-21 @ 21:18) (18 - 18)  SpO2: 98% (05-12-21 @ 21:18) (96% - 98%)  Wt(kg): --  I&O's Summary    11 May 2021 07:01  -  12 May 2021 07:00  --------------------------------------------------------  IN: 1360 mL / OUT: 3000 mL / NET: -1640 mL    12 May 2021 07:01  -  12 May 2021 22:58  --------------------------------------------------------  IN: 740 mL / OUT: 2800 mL / NET: -2060 mL          Appearance: In no distress	  HEENT:    PERRL, EOMI	  Cardiovascular:  S1 S2, No JVD  Respiratory: Lungs clear to auscultation	  Gastrointestinal:  Soft, Non-tender, + BS	  Vascularature:  No edema of LE  Psychiatric: Appropriate affect   Neuro: no acute focal deficits                               8.7    5.73  )-----------( 308      ( 12 May 2021 07:28 )             26.9     05-12    143  |  108  |  11  ----------------------------<  81  4.3   |  21<L>  |  0.94    Ca    9.1      12 May 2021 07:27    TPro  5.9<L>  /  Alb  3.0<L>  /  TBili  0.4  /  DBili  x   /  AST  27  /  ALT  27  /  AlkPhos  130<H>  05-12        Labs personally reviewed      ASSESSMENT/PLAN: 	          Maximo Main DO [facility]  Cardiovascular Medicine  [address] [place] [address], [address]  Office: [phone]  Cell: [phone] DATE OF SERVICE: 05-12-21      Patient is a 55y old  Female who presents with a chief complaint of L patella fracture, for surgery (12 May 2021 22:26)      INTERVAL HISTORY: feels better    REVIEW OF SYSTEMS:  CONSTITUTIONAL: No weakness  EYES/ENT: No visual changes;  No throat pain   NECK: No pain or stiffness  RESPIRATORY: No cough, wheezing; No shortness of breath  CARDIOVASCULAR: No chest pain or palpitations  GASTROINTESTINAL: No abdominal  pain. No nausea, vomiting, or hematemesis  GENITOURINARY: No dysuria, frequency or hematuria  NEUROLOGICAL: No stroke like symptoms  SKIN: No rashes         PHYSICAL EXAM:  T(C): 36.7 (05-12-21 @ 21:18), Max: 36.8 (05-12-21 @ 09:26)  HR: 74 (05-12-21 @ 21:18) (67 - 84)  BP: 127/77 (05-12-21 @ 21:18) (106/60 - 127/77)  RR: 18 (05-12-21 @ 21:18) (18 - 18)  SpO2: 98% (05-12-21 @ 21:18) (96% - 98%)  Wt(kg): --  I&O's Summary    11 May 2021 07:01  -  12 May 2021 07:00  --------------------------------------------------------  IN: 1360 mL / OUT: 3000 mL / NET: -1640 mL    12 May 2021 07:01  -  12 May 2021 22:58  --------------------------------------------------------  IN: 740 mL / OUT: 2800 mL / NET: -2060 mL          Appearance: In no distress	  HEENT:    PERRL, EOMI	  Cardiovascular:  S1 S2, No JVD  Respiratory: Lungs clear to auscultation	  Gastrointestinal:  Soft, Non-tender, + BS	  Vascularature:  No edema of LE  Psychiatric: Appropriate affect   Neuro: no acute focal deficits                               8.7    5.73  )-----------( 308      ( 12 May 2021 07:28 )             26.9     05-12    143  |  108  |  11  ----------------------------<  81  4.3   |  21<L>  |  0.94    Ca    9.1      12 May 2021 07:27    TPro  5.9<L>  /  Alb  3.0<L>  /  TBili  0.4  /  DBili  x   /  AST  27  /  ALT  27  /  AlkPhos  130<H>  05-12        Labs personally reviewed      ASSESSMENT/PLAN: 	  Patient is a 54 Y/O Female with PMhx of CAD S/P PCI, HLD, kidney stone, migraine presents with LEFT comminuted patella fracture s/p mechanical fall on 4/19. Pt seen by Dr. Trejo in office. Instructed to come to [facility] ED for med/cards clearance and plan for ORIF of LEFT patella fx. Denies numbness/tingling in the affected extremity. Patient has been minimally ambulating with walker. in the ED she started having bladder pressure with decrease urine output.       Problem/Recommendation - 1:  Problem: Patella fracture. Recommendation: Ortho follow up   s/p OR, repair  Tolerated surgery well from cv standpoint     Problem/Recommendation - 2:  ·  Problem: Palps/Exertional SOB  Recommendation: NM stress test done t with large areas of infarct with leonor-infarct ischemia   Risks and benefits of cardiac cath discussed with pt.   Cath done with no obstructive CAD, LAD 10% LAD, RCA 30% stenosis    Problem/Recommendation - 3:  ·  Problem: CAD (coronary artery disease).  Recommendation: S/P stent x 2 to LAD about two years ago   cont ASA and lipitor 40 daily   Toprol 25mg daily   Cath with no obstructive CAD, LAD 10% LAD, RCA 30% stenosis   Chest pain 5/4 - non cardiac in nature, reproducible to palpation. Prolonged discussion with pt - reassurance provided   medical management   No recurrence     Problem/Recommendation - 4:  ·  Problem: HLD (hyperlipidemia).  Recommendation: statin for secondary prevention     Problem/Recommendation - 5:  ·  Problem: Urinary retention.  Recommendation: acute onset o admission  Was retaining about 300cc of urine s/p straight cath  Again 5/2 with urinary retention, straight cath with >700cc of urine  Urology consult called, rec johansen placement  ID recs for UTI appreciated. Completed Vanco 3 day course  - straight cath x1 today for retention, johansen removed 5/9  5/10 again with urinary retention, straight cath with a Liter of urine, johansen reinserted   f/u Ucx 5/9 prelim growth with gram negative rods  ( continuing vanco per ID )   5/9 Bcx prelim no growth  Discharge with johansen and outpt follow up with urology     Problem/Recommendation - 6:  Problem: Constipation  Recommendation: jose 2/2 narcotic use Miralax, suppository, multiple enemas with no bowel movement  -  BM 5/4 after tap water enema  - GI recs appreciated - physical disimpaction attempted   - s/p 3 enemas-- patient feels better. Aggressive bowel regimen  - GI following.     Problem/Recommendation - 7:  Problem: BEULAH  Recommendation: repeat Cr improved  - 2/2 prerenal state    Problem/Recommendation - 8:  Problem: Low grade fever <100.0  Recommendation: Blood culture, urine culture, UA and CXR  - ID reconsult   -f/u Ucx 5/9 prelim growth with gram negative rods   5/9 Bcx prelim no growth  ID following discontinue vanco and switch to Levaquin    Problem/Recommendation - 9:  Problem: Anemia   Plan:  Repeat Hb in AM, Hb slowly trending down  - ?dilutional vs from frequent blood sraws  hgb 8.2 today   continue to trend   No sign of blood loss    Discharge planning for Thursday to [facility]    Thirty five minutes spent on total encounter, of which more than fifty percent of the encounter was spent on counseling and/or coordinating care by the attending physician.      Maximo Main DO [facility]  Cardiovascular Medicine  [address] [place] [address], [address]  Office: [phone]  Cell: [phone]

## 2021-05-12 NOTE — PROGRESS NOTE ADULT - NEGATIVE OPHTHALMOLOGIC SYMPTOMS
no pain L/no pain R

## 2021-05-12 NOTE — PROGRESS NOTE ADULT - NEGATIVE GASTROINTESTINAL SYMPTOMS
no vomiting/no diarrhea/no abdominal pain

## 2021-05-12 NOTE — PROGRESS NOTE ADULT - SUBJECTIVE AND OBJECTIVE BOX
ASHWIN JACOB 55y [id number]    Patient is a 55y old  Female who presents with a chief complaint of L patella fracture, for surgery (12 May 2021 09:43)      Follow Up/CC:  ID following for UTI    Interval History/ROS: no fever, johansen still in place     Allergies    apple (Hives)  Compazine (Unknown)  morphine (Unknown)  PC Pen VK (Unknown)  shellfish (Unknown)  sulfa drugs (Unknown)    Intolerances        ANTIMICROBIALS:  levoFLOXacin IVPB 250 every 24 hours  levoFLOXacin IVPB        MEDICATIONS  (STANDING):  aspirin enteric coated 325 milliGRAM(s) Oral two times a day  atorvastatin 40 milliGRAM(s) Oral at bedtime  bisacodyl 10 milliGRAM(s) Oral daily  escitalopram 10 milliGRAM(s) Oral at bedtime  gabapentin 100 milliGRAM(s) Oral three times a day  levoFLOXacin IVPB 250 milliGRAM(s) IV Intermittent every 24 hours  levoFLOXacin IVPB      lidocaine   Patch 1 Patch Transdermal daily  multivitamin 1 Tablet(s) Oral daily  pantoprazole    Tablet 40 milliGRAM(s) Oral before breakfast  polyethylene glycol 3350 17 Gram(s) Oral two times a day  sodium chloride 0.9%. 1000 milliLiter(s) (50 mL/Hr) IV Continuous <Continuous>  topiramate 25 milliGRAM(s) Oral daily    MEDICATIONS  (PRN):  Biotene Dry Mouth Oral Rinse 5 milliLiter(s) Swish and Spit daily PRN Mouth Care  cyclobenzaprine 5 milliGRAM(s) Oral three times a day PRN Muscle Spasm  magnesium hydroxide Suspension 30 milliLiter(s) Oral daily PRN Constipation  ondansetron Injectable 4 milliGRAM(s) IV Push every 6 hours PRN Nausea  oxyCODONE    IR 5 milliGRAM(s) Oral every 4 hours PRN Moderate Pain (4 - 6)  oxyCODONE    IR 10 milliGRAM(s) Oral every 4 hours PRN Severe Pain (7 - 10)        Vital Signs Last 24 Hrs  T(C): 36.7 (12 May 2021 12:44), Max: 36.8 (12 May 2021 09:26)  T(F): 98.1 (12 May 2021 12:44), Max: 98.2 (12 May 2021 09:26)  HR: 84 (12 May 2021 12:44) (67 - 87)  BP: 126/72 (12 May 2021 12:44) (106/60 - 139/82)  BP(mean): --  RR: 18 (12 May 2021 12:44) (18 - 18)  SpO2: 97% (12 May 2021 12:44) (96% - 98%)    CBC Full  -  ( 12 May 2021 07:28 )  WBC Count : 5.73 K/uL  RBC Count : 2.78 M/uL  Hemoglobin : 8.7 g/dL  Hematocrit : 26.9 %  Platelet Count - Automated : 308 K/uL  Mean Cell Volume : 96.8 fl  Mean Cell Hemoglobin : 31.3 pg  Mean Cell Hemoglobin Concentration : 32.3 gm/dL  Auto Neutrophil # : x  Auto Lymphocyte # : x  Auto Monocyte # : x  Auto Eosinophil # : x  Auto Basophil # : x  Auto Neutrophil % : x  Auto Lymphocyte % : x  Auto Monocyte % : x  Auto Eosinophil % : x  Auto Basophil % : x    05-12    143  |  108  |  11  ----------------------------<  81  4.3   |  21<L>  |  0.94    Ca    9.1      12 May 2021 07:27    TPro  5.9<L>  /  Alb  3.0<L>  /  TBili  0.4  /  DBili  x   /  AST  27  /  ALT  27  /  AlkPhos  130<H>  05-12    LIVER FUNCTIONS - ( 12 May 2021 07:27 )  Alb: 3.0 g/dL / Pro: 5.9 g/dL / ALK PHOS: 130 U/L / ALT: 27 U/L / AST: 27 U/L / GGT: x               MICROBIOLOGY:  .Urine Clean Catch (Midstream)  05-09-21   >100,000 CFU/ml Escherichia coli  --  Escherichia coli      .Blood Blood-Peripheral  05-09-21   No growth to date.  --  --      .Urine Catheterized  04-29-21   50,000 - 99,000 CFU/mL Aerococcus species  "Aerococcus spp. are predictably susceptible to penicillin,  ampicillin, tetracycline, and vancomycin.  All isolates are  resistant to sulfonamides"  --  --    RADIOLOGY    < from: Xray Chest 2 Views PA/Lat (05.09.21 @ 09:02) >  The heart is normal in size. Lungs are clear. No pleural effusion. No pneumothorax. No acute bony pathology could be identified.    < end of copied text >

## 2021-05-12 NOTE — PROGRESS NOTE ADULT - PROBLEM SELECTOR PROBLEM 4
Multiple drug allergies
Multiple drug allergies
CAD (coronary artery disease)
Multiple drug allergies

## 2021-05-12 NOTE — PROGRESS NOTE ADULT - NEGATIVE SKIN SYMPTOMS
no rash/no itching

## 2021-05-12 NOTE — PROGRESS NOTE ADULT - SUBJECTIVE AND OBJECTIVE BOX
Chief Complaint:  Patient is a 55y old  Female who presents with a chief complaint of L patella fracture, for surgery (12 May 2021 14:08)      Date of service 05-12-21 @ 22:30      Interval Events:   no BM    Hospital Medications:  aspirin enteric coated 325 milliGRAM(s) Oral two times a day  atorvastatin 40 milliGRAM(s) Oral at bedtime  Biotene Dry Mouth Oral Rinse 5 milliLiter(s) Swish and Spit daily PRN  bisacodyl 10 milliGRAM(s) Oral daily  cyclobenzaprine 5 milliGRAM(s) Oral three times a day PRN  escitalopram 10 milliGRAM(s) Oral at bedtime  gabapentin 100 milliGRAM(s) Oral three times a day  levoFLOXacin IVPB 250 milliGRAM(s) IV Intermittent every 24 hours  levoFLOXacin IVPB      lidocaine   Patch 1 Patch Transdermal daily  magnesium hydroxide Suspension 30 milliLiter(s) Oral daily PRN  multivitamin 1 Tablet(s) Oral daily  ondansetron Injectable 4 milliGRAM(s) IV Push every 6 hours PRN  oxyCODONE    IR 5 milliGRAM(s) Oral every 4 hours PRN  oxyCODONE    IR 10 milliGRAM(s) Oral every 4 hours PRN  pantoprazole    Tablet 40 milliGRAM(s) Oral before breakfast  polyethylene glycol 3350 17 Gram(s) Oral two times a day  sodium chloride 0.9%. 1000 milliLiter(s) IV Continuous <Continuous>  topiramate 25 milliGRAM(s) Oral daily        Review of Systems:  General:  No wt loss, fevers, chills, night sweats, fatigue,   Eyes:  Good vision, no reported pain  ENT:  No sore throat, pain, runny nose, dysphagia  CV:  No pain, palpitations, hypo/hypertension  Resp:  No dyspnea, cough, tachypnea, wheezing  GI:  See HPI  :  No pain, bleeding, incontinence, nocturia  Muscle:  No pain, weakness  Neuro:  No weakness, tingling, memory problems  Psych:  No fatigue, insomnia, mood problems, depression  Endocrine:  No polyuria, polydipsia, cold/heat intolerance  Heme:  No petechiae, ecchymosis, easy bruisability  Integumentary:  No rash, edema    PHYSICAL EXAM:   Vital Signs:  Vital Signs Last 24 Hrs  T(C): 36.7 (12 May 2021 21:18), Max: 36.8 (12 May 2021 09:26)  T(F): 98.1 (12 May 2021 21:18), Max: 98.2 (12 May 2021 09:26)  HR: 74 (12 May 2021 21:18) (67 - 84)  BP: 127/77 (12 May 2021 21:18) (106/60 - 127/77)  BP(mean): --  RR: 18 (12 May 2021 21:18) (18 - 18)  SpO2: 98% (12 May 2021 21:18) (96% - 98%)  Daily     Daily       PHYSICAL EXAM:     GENERAL:  Appears stated age, well-groomed, well-nourished, no distress  HEENT:  NC/AT,  conjunctivae anicteric, clear and pink,   NECK: supple, trachea midline  CHEST:  Full & symmetric excursion, no increased effort, breath sounds clear  HEART:  Regular rhythm, no JVD  ABDOMEN:  Soft, non-tender, non-distended, normoactive bowel sounds,  no masses , no hepatosplenomegaly  EXTREMITIES:  no cyanosis,clubbing, LLE brace  SKIN:  No rash, erythema, or, ecchymoses, no jaundice  NEURO:  Alert, non-focal, no asterixis  PSYCH: Appropriate affect, oriented to place and time  RECTAL: Deferred      LABS Personally reviewed by me:                        8.7    5.73  )-----------( 308      ( 12 May 2021 07:28 )             26.9     Mean Cell Volume: 96.8 fl (05-12-21 @ 07:28)    05-12    143  |  108  |  11  ----------------------------<  81  4.3   |  21<L>  |  0.94    Ca    9.1      12 May 2021 07:27    TPro  5.9<L>  /  Alb  3.0<L>  /  TBili  0.4  /  DBili  x   /  AST  27  /  ALT  27  /  AlkPhos  130<H>  05-12    LIVER FUNCTIONS - ( 12 May 2021 07:27 )  Alb: 3.0 g/dL / Pro: 5.9 g/dL / ALK PHOS: 130 U/L / ALT: 27 U/L / AST: 27 U/L / GGT: x                                       8.7    5.73  )-----------( 308      ( 12 May 2021 07:28 )             26.9                         8.2    6.34  )-----------( 311      ( 11 May 2021 07:59 )             25.1                         8.0    6.39  )-----------( 285      ( 10 May 2021 06:55 )             25.2       Imaging personally reviewed by me:

## 2021-05-12 NOTE — PROGRESS NOTE ADULT - PROBLEM SELECTOR PROBLEM 1
Patella fracture
Constipation due to pain medication
Urinary retention

## 2021-05-12 NOTE — PROGRESS NOTE ADULT - NEUROLOGICAL DETAILS
alert and oriented x 3/responds to verbal commands

## 2021-05-12 NOTE — PROGRESS NOTE ADULT - ASSESSMENT
55y Female presents with LEFT comminuted patella fracture s/p mechanical fall on 4/19 with dysuria, urine retention, Aerococcus uti, multiple drug allergies     Arash Duarte  Attending Physician   Division of Infectious Disease  Pager #[phone]  Available on [employer] Teams also  After 5pm/weekend or no response, call #[phone]    D/w Dr. Duckworth    Will sign off, recall ID if needed #[phone].

## 2021-05-12 NOTE — PROGRESS NOTE ADULT - GASTROINTESTINAL DETAILS
soft/nontender/no distention/no rebound tenderness/no guarding
soft/nontender/no guarding/no rigidity
soft/nontender/no distention/no guarding/no rigidity

## 2021-05-12 NOTE — PROGRESS NOTE ADULT - PROBLEM SELECTOR PROBLEM 3
Patella fracture
UTI (urinary tract infection)
Patella fracture

## 2021-05-12 NOTE — PROGRESS NOTE ADULT - ASSESSMENT
Patient is a 56 Y/O Female with PMhx of CAD S/P PCI, HLD, kidney stone, migraine presents with LEFT comminuted patella fracture s/p mechanical fall on 4/19. Now s/p Left patella ORIF 5/3. GI consulted for post- operative constipation.  .     Problem/Recommendation - 1:  Problem: Constipation due to pain medication. Recommendation: - now 3 d without a BM  -will give tap water enema today     Problem/Recommendation - 2:  ·  Problem: Patella fracture.  Recommendation: - appreciate care per Orthopaedics  - physical therapy  - pain control.      Problem/Recommendation - 3:  ·  Problem: UTI (urinary tract infection).  Recommendation: - s/p 3 days of abx. Now with low grade temperature elevation overnight  - care per ID appreciated, now on levaquin     Problem/Recommendation - 4:  ·  Problem: Urinary retention. Recommendation: s/p johansen cath  follow up urology reccs.     Problem/Recommendation - 5:  ·  Problem: Nephrolithiasis.  Recommendation: no signs of active nephrolithiasis at this time  care per .      Problem/Recommendation - 6:  Problem: HLD (hyperlipidemia). Recommendation: reports good control of cholesterol   on home lipitor dose.     Problem/Recommendation - 7:  Problem: Prophylactic measure. Recommendation: on  BID.     Problem/Recommendation - 8:  Problem: CAD (coronary artery disease). Recommendation: - S/P stent x 2, 2 years ago   - appreciate cardiology input     Problem/Recommendation - 9:  Problem: normocytic anemia, likely due to surgical blood loss. Hgb leveled off at 8.  -trend CBC  -IV iron ordered        I had a prolonged conversation with the patient regarding the hospital course, differential diagnosis, results of diagnostic tests this far, and therapeutic modalities available. Plan of care discussed with the patient after the evaluation. Patient expresses a clear understanding of the plan of care.  Sixty five minutes spent on the total encounter, of which more than fifty percent of the encounter was spent on counseling and/or coordinating care by the attending physician.        Iftikhar Lora M.D.   Gastroenterology and Hepatology  [address] [place], [place]  Office: [phone]  Cell: [phone].

## 2021-05-13 ENCOUNTER — TRANSCRIPTION ENCOUNTER (OUTPATIENT)
Age: 56
End: 2021-05-13

## 2021-05-13 VITALS
SYSTOLIC BLOOD PRESSURE: 102 MMHG | HEART RATE: 68 BPM | OXYGEN SATURATION: 96 % | RESPIRATION RATE: 18 BRPM | TEMPERATURE: 98 F | DIASTOLIC BLOOD PRESSURE: 60 MMHG

## 2021-05-13 LAB — SARS-COV-2 RNA SPEC QL NAA+PROBE: SIGNIFICANT CHANGE UP

## 2021-05-13 PROCEDURE — U0003: CPT

## 2021-05-13 PROCEDURE — 97530 THERAPEUTIC ACTIVITIES: CPT

## 2021-05-13 PROCEDURE — 93306 TTE W/DOPPLER COMPLETE: CPT

## 2021-05-13 PROCEDURE — 97162 PT EVAL MOD COMPLEX 30 MIN: CPT

## 2021-05-13 PROCEDURE — 74018 RADEX ABDOMEN 1 VIEW: CPT

## 2021-05-13 PROCEDURE — 84484 ASSAY OF TROPONIN QUANT: CPT

## 2021-05-13 PROCEDURE — 86900 BLOOD TYPING SEROLOGIC ABO: CPT

## 2021-05-13 PROCEDURE — 97116 GAIT TRAINING THERAPY: CPT

## 2021-05-13 PROCEDURE — C1889: CPT

## 2021-05-13 PROCEDURE — 93458 L HRT ARTERY/VENTRICLE ANGIO: CPT

## 2021-05-13 PROCEDURE — U0005: CPT

## 2021-05-13 PROCEDURE — 87186 SC STD MICRODIL/AGAR DIL: CPT

## 2021-05-13 PROCEDURE — 80048 BASIC METABOLIC PNL TOTAL CA: CPT

## 2021-05-13 PROCEDURE — 97110 THERAPEUTIC EXERCISES: CPT

## 2021-05-13 PROCEDURE — C9399: CPT

## 2021-05-13 PROCEDURE — 78452 HT MUSCLE IMAGE SPECT MULT: CPT

## 2021-05-13 PROCEDURE — 87040 BLOOD CULTURE FOR BACTERIA: CPT

## 2021-05-13 PROCEDURE — 71046 X-RAY EXAM CHEST 2 VIEWS: CPT

## 2021-05-13 PROCEDURE — 84702 CHORIONIC GONADOTROPIN TEST: CPT

## 2021-05-13 PROCEDURE — 80202 ASSAY OF VANCOMYCIN: CPT

## 2021-05-13 PROCEDURE — 71045 X-RAY EXAM CHEST 1 VIEW: CPT

## 2021-05-13 PROCEDURE — 99285 EMERGENCY DEPT VISIT HI MDM: CPT

## 2021-05-13 PROCEDURE — 73564 X-RAY EXAM KNEE 4 OR MORE: CPT

## 2021-05-13 PROCEDURE — 83550 IRON BINDING TEST: CPT

## 2021-05-13 PROCEDURE — 86901 BLOOD TYPING SEROLOGIC RH(D): CPT

## 2021-05-13 PROCEDURE — 74019 RADEX ABDOMEN 2 VIEWS: CPT

## 2021-05-13 PROCEDURE — 85730 THROMBOPLASTIN TIME PARTIAL: CPT

## 2021-05-13 PROCEDURE — 86803 HEPATITIS C AB TEST: CPT

## 2021-05-13 PROCEDURE — 97112 NEUROMUSCULAR REEDUCATION: CPT

## 2021-05-13 PROCEDURE — 85027 COMPLETE CBC AUTOMATED: CPT

## 2021-05-13 PROCEDURE — C1887: CPT

## 2021-05-13 PROCEDURE — 85025 COMPLETE CBC W/AUTO DIFF WBC: CPT

## 2021-05-13 PROCEDURE — 82553 CREATINE MB FRACTION: CPT

## 2021-05-13 PROCEDURE — 76377 3D RENDER W/INTRP POSTPROCES: CPT

## 2021-05-13 PROCEDURE — 87086 URINE CULTURE/COLONY COUNT: CPT

## 2021-05-13 PROCEDURE — 86769 SARS-COV-2 COVID-19 ANTIBODY: CPT

## 2021-05-13 PROCEDURE — 76000 FLUOROSCOPY <1 HR PHYS/QHP: CPT

## 2021-05-13 PROCEDURE — 0225U NFCT DS DNA&RNA 21 SARSCOV2: CPT

## 2021-05-13 PROCEDURE — A9500: CPT

## 2021-05-13 PROCEDURE — 82746 ASSAY OF FOLIC ACID SERUM: CPT

## 2021-05-13 PROCEDURE — 80053 COMPREHEN METABOLIC PANEL: CPT

## 2021-05-13 PROCEDURE — 81001 URINALYSIS AUTO W/SCOPE: CPT

## 2021-05-13 PROCEDURE — 82550 ASSAY OF CK (CPK): CPT

## 2021-05-13 PROCEDURE — 85610 PROTHROMBIN TIME: CPT

## 2021-05-13 PROCEDURE — C1713: CPT

## 2021-05-13 PROCEDURE — C1894: CPT

## 2021-05-13 PROCEDURE — 71250 CT THORAX DX C-: CPT

## 2021-05-13 PROCEDURE — 86850 RBC ANTIBODY SCREEN: CPT

## 2021-05-13 PROCEDURE — 82607 VITAMIN B-12: CPT

## 2021-05-13 PROCEDURE — 87635 SARS-COV-2 COVID-19 AMP PRB: CPT

## 2021-05-13 PROCEDURE — 73700 CT LOWER EXTREMITY W/O DYE: CPT

## 2021-05-13 PROCEDURE — 93971 EXTREMITY STUDY: CPT

## 2021-05-13 PROCEDURE — 82728 ASSAY OF FERRITIN: CPT

## 2021-05-13 PROCEDURE — 83540 ASSAY OF IRON: CPT

## 2021-05-13 PROCEDURE — 93017 CV STRESS TEST TRACING ONLY: CPT

## 2021-05-13 PROCEDURE — 87077 CULTURE AEROBIC IDENTIFY: CPT

## 2021-05-13 PROCEDURE — 93005 ELECTROCARDIOGRAM TRACING: CPT

## 2021-05-13 PROCEDURE — 97165 OT EVAL LOW COMPLEX 30 MIN: CPT

## 2021-05-13 PROCEDURE — C1769: CPT

## 2021-05-13 RX ORDER — IRON SUCROSE 20 MG/ML
200 INJECTION, SOLUTION INTRAVENOUS ONCE
Refills: 0 | Status: COMPLETED | OUTPATIENT
Start: 2021-05-13 | End: 2021-05-13

## 2021-05-13 RX ADMIN — GABAPENTIN 100 MILLIGRAM(S): 400 CAPSULE ORAL at 05:29

## 2021-05-13 RX ADMIN — Medication 325 MILLIGRAM(S): at 05:29

## 2021-05-13 RX ADMIN — LIDOCAINE 1 PATCH: 4 CREAM TOPICAL at 00:01

## 2021-05-13 RX ADMIN — OXYCODONE HYDROCHLORIDE 5 MILLIGRAM(S): 5 TABLET ORAL at 05:28

## 2021-05-13 RX ADMIN — PANTOPRAZOLE SODIUM 40 MILLIGRAM(S): 20 TABLET, DELAYED RELEASE ORAL at 05:28

## 2021-05-13 RX ADMIN — POLYETHYLENE GLYCOL 3350 17 GRAM(S): 17 POWDER, FOR SOLUTION ORAL at 05:29

## 2021-05-13 RX ADMIN — OXYCODONE HYDROCHLORIDE 5 MILLIGRAM(S): 5 TABLET ORAL at 06:10

## 2021-05-13 RX ADMIN — IRON SUCROSE 110 MILLIGRAM(S): 20 INJECTION, SOLUTION INTRAVENOUS at 09:49

## 2021-05-13 NOTE — PROGRESS NOTE ADULT - NSICDXPILOT_GEN_ALL_CORE
[place]

## 2021-05-13 NOTE — PROGRESS NOTE ADULT - SUBJECTIVE AND OBJECTIVE BOX
Chief Complaint:  Patient is a 55y old  Female who presents with a chief complaint of L patella fracture, for surgery (13 May 2021 09:47)      Date of service 05-13-21 @ 10:19      Interval Events:   had substantial BM  Hospital Medications:  aspirin enteric coated 325 milliGRAM(s) Oral two times a day  atorvastatin 40 milliGRAM(s) Oral at bedtime  Biotene Dry Mouth Oral Rinse 5 milliLiter(s) Swish and Spit daily PRN  bisacodyl 10 milliGRAM(s) Oral daily  cyclobenzaprine 5 milliGRAM(s) Oral three times a day PRN  escitalopram 10 milliGRAM(s) Oral at bedtime  gabapentin 100 milliGRAM(s) Oral three times a day  lidocaine   Patch 1 Patch Transdermal daily  magnesium hydroxide Suspension 30 milliLiter(s) Oral daily PRN  multivitamin 1 Tablet(s) Oral daily  ondansetron Injectable 4 milliGRAM(s) IV Push every 6 hours PRN  oxyCODONE    IR 5 milliGRAM(s) Oral every 4 hours PRN  oxyCODONE    IR 10 milliGRAM(s) Oral every 4 hours PRN  pantoprazole    Tablet 40 milliGRAM(s) Oral before breakfast  polyethylene glycol 3350 17 Gram(s) Oral two times a day  sodium chloride 0.9%. 1000 milliLiter(s) IV Continuous <Continuous>  topiramate 25 milliGRAM(s) Oral daily        Review of Systems:  General:  No wt loss, fevers, chills, night sweats, fatigue,   Eyes:  Good vision, no reported pain  ENT:  No sore throat, pain, runny nose, dysphagia  CV:  No pain, palpitations, hypo/hypertension  Resp:  No dyspnea, cough, tachypnea, wheezing  GI:  See HPI  :  No pain, bleeding, incontinence, nocturia  Muscle:  No pain, weakness  Neuro:  No weakness, tingling, memory problems  Psych:  No fatigue, insomnia, mood problems, depression  Endocrine:  No polyuria, polydipsia, cold/heat intolerance  Heme:  No petechiae, ecchymosis, easy bruisability  Integumentary:  No rash, edema    PHYSICAL EXAM:   Vital Signs:  Vital Signs Last 24 Hrs  T(C): 36.5 (13 May 2021 05:17), Max: 36.8 (12 May 2021 16:02)  T(F): 97.7 (13 May 2021 05:17), Max: 98.2 (12 May 2021 16:02)  HR: 68 (13 May 2021 05:17) (68 - 84)  BP: 102/60 (13 May 2021 05:17) (102/60 - 127/77)  BP(mean): --  RR: 18 (13 May 2021 05:17) (18 - 18)  SpO2: 96% (13 May 2021 05:17) (96% - 98%)  Daily     Daily       PHYSICAL EXAM:     GENERAL:  Appears stated age, well-groomed, well-nourished, no distress  HEENT:  NC/AT,  conjunctivae anicteric, clear and pink,   NECK: supple, trachea midline  CHEST:  Full & symmetric excursion, no increased effort, breath sounds clear  HEART:  Regular rhythm, no JVD  ABDOMEN:  Soft, non-tender, non-distended, normoactive bowel sounds,  no masses , no hepatosplenomegaly  EXTREMITIES:  no cyanosis,clubbing or edema, LLE brace  SKIN:  No rash, erythema, or, ecchymoses, no jaundice  NEURO:  Alert, non-focal, no asterixis  PSYCH: Appropriate affect, oriented to place and time  RECTAL: Deferred      LABS Personally reviewed by me:                        8.7    5.73  )-----------( 308      ( 12 May 2021 07:28 )             26.9       05-12    143  |  108  |  11  ----------------------------<  81  4.3   |  21<L>  |  0.94    Ca    9.1      12 May 2021 07:27    TPro  5.9<L>  /  Alb  3.0<L>  /  TBili  0.4  /  DBili  x   /  AST  27  /  ALT  27  /  AlkPhos  130<H>  05-12    LIVER FUNCTIONS - ( 12 May 2021 07:27 )  Alb: 3.0 g/dL / Pro: 5.9 g/dL / ALK PHOS: 130 U/L / ALT: 27 U/L / AST: 27 U/L / GGT: x                                       8.7    5.73  )-----------( 308      ( 12 May 2021 07:28 )             26.9                         8.2    6.34  )-----------( 311      ( 11 May 2021 07:59 )             25.1       Imaging personally reviewed by me:

## 2021-05-13 NOTE — PROGRESS NOTE ADULT - ASSESSMENT
Patient is a 56 Y/O Female with PMhx of CAD S/P PCI, HLD, kidney stone, migraine presents with LEFT comminuted patella fracture s/p mechanical fall on 4/19. Pt seen by Dr. Trejo in office. Instructed to come to [facility] ED for med/cards clearance and plan for ORIF of LEFT patella fx. Denies numbness/tingling in the affected extremity. Patient has been minimally ambulating with walker. in the ED she started having bladder pressure with decrease urine output.       Problem/Recommendation - 1:  Problem: Patella fracture. Recommendation: Ortho follow up   S/P OR , ORIF done , ortho follow up   card and med optimization   pain control   PT eval as tolerated, fall precautions.  S/P stress test   S/P cath, no stent placed medical management   enema as per GI , multiple bowel movement , laxatives per GI, repeat abdominal xray   IV hydration   GI eval called for further recs appreciated     # ANemia   noted Since admission  drop in base Hgb level   anemia W/U  keep above 8   active T&S   no gross bleeding, occult may be positive due to multiple enema and suppositories and oral prepps for severe constipation,  hx of SHUKIR< start IV Iron 200 daily while inpatient for total of 3, can switch to oral on discharge   D/C today with outpatient follow up         Problem/Recommendation - 2:  ·  Problem: Urinary retention.  Recommendation: acute onset  resolved, had urine output, no retention   send UA and urine culture  monitor BUN/Cr  bladder US. PRN   ID eval for Urine Cx  elevated Cr resolved  , S/Pgentle hydration , avid nephrotoxic medications   johansen , repeat UA and Blood cx, UA positive, toV failed johansen replaced back   D/C vanco cx results, grew >100K Ecoli , ID follow up for antibiotics recs , oral/IV levaquin,    Problem/Recommendation - 3:  ·  Problem: CAD (coronary artery disease).  Recommendation: S/P stent x 2 2 years ago   cont ASA and lipitor 40 daily   toprol 25 daily   ischemic W/U per cardiology done    Problem/Recommendation - 4:  ·  Problem: HLD (hyperlipidemia).  Recommendation: statin  lipdi panel  diet control.     Problem/Recommendation - 5:  ·  Problem: Nephrolithiasis.     Problem/Recommendation - 6:  Problem: Prophylactic measure. Recommendation: DVT and gI PPX     D/C planing ot rehab with outpatient follow up

## 2021-05-13 NOTE — PROGRESS NOTE ADULT - SUBJECTIVE AND OBJECTIVE BOX
Patient is a 55y old  Female who presents with a chief complaint of L patella fracture, for surgery (13 May 2021 10:18)    **saw patient at 945am today **     INTERVAL HISTORY: feels ok-- going to rehab today   TELEMETRY Personally reviewed:    REVIEW OF SYSTEMS:   CONSTITUTIONAL: No weakness  EYES/ENT: No visual changes; No throat pain  Neck: No pain or stiffness  Respiratory: No cough, wheezing, No shortness of breath  CARDIOVASCULAR: no chest pain or palpitations  GASTROINTESTINAL: No abdominal pain, no nausea, vomiting or hematemesis  GENITOURINARY: No dysuria, frequency or hematuria  NEUROLOGICAL: No stroke like symptoms  SKIN: No rashes    	  MEDICATIONS:        PHYSICAL EXAM:  T(C): 36.5 (05-13-21 @ 05:17), Max: 36.8 (05-12-21 @ 16:02)  HR: 68 (05-13-21 @ 05:17) (68 - 78)  BP: 102/60 (05-13-21 @ 05:17) (102/60 - 127/77)  RR: 18 (05-13-21 @ 05:17) (18 - 18)  SpO2: 96% (05-13-21 @ 05:17) (96% - 98%)  Wt(kg): --  I&O's Summary    12 May 2021 07:01  -  13 May 2021 07:00  --------------------------------------------------------  IN: 1240 mL / OUT: 3200 mL / NET: -1960 mL    13 May 2021 07:01  -  13 May 2021 14:00  --------------------------------------------------------  IN: 440 mL / OUT: 850 mL / NET: -410 mL          Appearance: In no distress	  HEENT:    PERRL, EOMI	  Cardiovascular:  S1 S2, No JVD  Respiratory: Lungs clear to auscultation	  Gastrointestinal:  Soft, Non-tender, + BS	  Vascularature:  No edema of LE  Psychiatric: Appropriate affect   Neuro: no acute focal deficits                           8.7    5.73  )-----------( 308      ( 12 May 2021 07:28 )             26.9     05-12    143  |  108  |  11  ----------------------------<  81  4.3   |  21<L>  |  0.94    Ca    9.1      12 May 2021 07:27    TPro  5.9<L>  /  Alb  3.0<L>  /  TBili  0.4  /  DBili  x   /  AST  27  /  ALT  27  /  AlkPhos  130<H>  05-12    Labs personally reviewed    ASSESSMENT/PLAN: 	  Patient is a 56 Y/O Female with PMhx of CAD S/P PCI, HLD, kidney stone, migraine presents with LEFT comminuted patella fracture s/p mechanical fall on 4/19. Pt seen by Dr. Trejo in office. Instructed to come to [facility] ED for med/cards clearance and plan for ORIF of LEFT patella fx. Denies numbness/tingling in the affected extremity. Patient has been minimally ambulating with walker. in the ED she started having bladder pressure with decrease urine output.       Problem/Recommendation - 1:  Problem: Patella fracture. Recommendation: Ortho follow up   s/p OR, repair  Tolerated surgery well from cv standpoint     Problem/Recommendation - 2:  ·  Problem: Palps/Exertional SOB  Recommendation: NM stress test done t with large areas of infarct with leonor-infarct ischemia   Risks and benefits of cardiac cath discussed with pt.   Cath done with no obstructive CAD, LAD 10% LAD, RCA 30% stenosis    Problem/Recommendation - 3:  ·  Problem: CAD (coronary artery disease).  Recommendation: S/P stent x 2 to LAD about two years ago   cont ASA and lipitor 40 daily   Toprol 25mg daily   Cath with no obstructive CAD, LAD 10% LAD, RCA 30% stenosis   Chest pain 5/4 - non cardiac in nature, reproducible to palpation. Prolonged discussion with pt - reassurance provided   medical management   No recurrence     Problem/Recommendation - 4:  ·  Problem: HLD (hyperlipidemia).  Recommendation: statin for secondary prevention     Problem/Recommendation - 5:  ·  Problem: Urinary retention.  Recommendation: acute onset o admission  Was retaining about 300cc of urine s/p straight cath  Again 5/2 with urinary retention, straight cath with >700cc of urine  Urology consult called, rec johansen placement  ID recs for UTI appreciated. Completed Vanco 3 day course  - straight cath x1 today for retention, johansen removed 5/9  5/10 again with urinary retention, straight cath with a Liter of urine, johansen reinserted   f/u Ucx 5/9 prelim growth with gram negative rods  ( continuing vanco per ID )   5/9 Bcx prelim no growth  Discharge with johansen and outpt follow up with urology     Problem/Recommendation - 6:  Problem: Constipation  Recommendation: jose 2/2 narcotic use Miralax, suppository, multiple enemas with no bowel movement  -  BM 5/4 after tap water enema  - GI recs appreciated - physical disimpaction attempted   - s/p 3 enemas-- patient feels better. Aggressive bowel regimen  - GI following.     Problem/Recommendation - 7:  Problem: BEULAH  Recommendation: repeat Cr improved  - 2/2 prerenal state    Problem/Recommendation - 8:  Problem: Low grade fever <100.0  Recommendation: Blood culture, urine culture, UA and CXR  - ID reconsult   -f/u Ucx 5/9 prelim growth with gram negative rods   5/9 Bcx prelim no growth  ID following discontinue vanco and switch to Levaquin    Problem/Recommendation - 9:  Problem: Anemia   Plan:  Repeat Hb in AM, Hb slowly trending down  - ?dilutional vs from frequent blood sraws  hgb 8.2 today   continue to trend   No sign of blood loss    Discharge planning to  [facility] today         Susan Main DO [facility]  Cardiovascular Medicine  [address] [place] [address], [address]  Office: [phone]  Cell: [phone] Patient is a 55y old  Female who presents with a chief complaint of L patella fracture, for surgery (13 May 2021 10:18)    **saw patient at 945am today **     INTERVAL HISTORY: feels ok-- going to rehab today   TELEMETRY Personally reviewed:    REVIEW OF SYSTEMS:   CONSTITUTIONAL: No weakness  EYES/ENT: No visual changes; No throat pain  Neck: No pain or stiffness  Respiratory: No cough, wheezing, No shortness of breath  CARDIOVASCULAR: no chest pain or palpitations  GASTROINTESTINAL: No abdominal pain, no nausea, vomiting or hematemesis  GENITOURINARY: No dysuria, frequency or hematuria  NEUROLOGICAL: No stroke like symptoms  SKIN: No rashes    	  MEDICATIONS:        PHYSICAL EXAM:  T(C): 36.5 (05-13-21 @ 05:17), Max: 36.8 (05-12-21 @ 16:02)  HR: 68 (05-13-21 @ 05:17) (68 - 78)  BP: 102/60 (05-13-21 @ 05:17) (102/60 - 127/77)  RR: 18 (05-13-21 @ 05:17) (18 - 18)  SpO2: 96% (05-13-21 @ 05:17) (96% - 98%)  Wt(kg): --  I&O's Summary    12 May 2021 07:01  -  13 May 2021 07:00  --------------------------------------------------------  IN: 1240 mL / OUT: 3200 mL / NET: -1960 mL    13 May 2021 07:01  -  13 May 2021 14:00  --------------------------------------------------------  IN: 440 mL / OUT: 850 mL / NET: -410 mL          Appearance: In no distress	  HEENT:    PERRL, EOMI	  Cardiovascular:  S1 S2, No JVD  Respiratory: Lungs clear to auscultation	  Gastrointestinal:  Soft, Non-tender, + BS	  Vascularature:  No edema of LE  Psychiatric: Appropriate affect   Neuro: no acute focal deficits                           8.7    5.73  )-----------( 308      ( 12 May 2021 07:28 )             26.9     05-12    143  |  108  |  11  ----------------------------<  81  4.3   |  21<L>  |  0.94    Ca    9.1      12 May 2021 07:27    TPro  5.9<L>  /  Alb  3.0<L>  /  TBili  0.4  /  DBili  x   /  AST  27  /  ALT  27  /  AlkPhos  130<H>  05-12    Labs personally reviewed    ASSESSMENT/PLAN: 	  Patient is a 56 Y/O Female with PMhx of CAD S/P PCI, HLD, kidney stone, migraine presents with LEFT comminuted patella fracture s/p mechanical fall on 4/19. Pt seen by Dr. Trejo in office. Instructed to come to [facility] ED for med/cards clearance and plan for ORIF of LEFT patella fx. Denies numbness/tingling in the affected extremity. Patient has been minimally ambulating with walker. in the ED she started having bladder pressure with decrease urine output.       Problem/Recommendation - 1:  Problem: Patella fracture. Recommendation: Ortho follow up   s/p OR, repair  Tolerated surgery well from cv standpoint     Problem/Recommendation - 2:  ·  Problem: Palps/Exertional SOB  Recommendation: NM stress test done t with large areas of infarct with leonor-infarct ischemia   Risks and benefits of cardiac cath discussed with pt.   Cath done with no obstructive CAD, LAD 10% LAD, RCA 30% stenosis    Problem/Recommendation - 3:  ·  Problem: CAD (coronary artery disease).  Recommendation: S/P stent x 2 to LAD about two years ago   cont ASA and lipitor 40 daily   Toprol 25mg daily   Cath with no obstructive CAD, LAD 10% LAD, RCA 30% stenosis   Chest pain 5/4 - non cardiac in nature, reproducible to palpation. Prolonged discussion with pt - reassurance provided   medical management   No recurrence     Problem/Recommendation - 4:  ·  Problem: HLD (hyperlipidemia).  Recommendation: statin for secondary prevention     Problem/Recommendation - 5:  ·  Problem: Urinary retention.  Recommendation: acute onset o admission  Was retaining about 300cc of urine s/p straight cath  Again 5/2 with urinary retention, straight cath with >700cc of urine  Urology consult called, rec johansen placement  ID recs for UTI appreciated. Completed Vanco 3 day course  - straight cath x1 today for retention, johansen removed 5/9  5/10 again with urinary retention, straight cath with a Liter of urine, johansen reinserted   f/u Ucx 5/9 prelim growth with gram negative rods  ( continuing vanco per ID )   5/9 Bcx prelim no growth  Discharge with ojhansen and outpt follow up with urology     Problem/Recommendation - 6:  Problem: Constipation  Recommendation: jose 2/2 narcotic use Miralax, suppository, multiple enemas with no bowel movement  -  BM 5/4 after tap water enema  - GI recs appreciated - physical disimpaction attempted   - s/p 3 enemas-- patient feels better. Aggressive bowel regimen  - GI following.     Problem/Recommendation - 7:  Problem: BEULAH  Recommendation: repeat Cr improved  - 2/2 prerenal state    Problem/Recommendation - 8:  Problem: Low grade fever <100.0  Recommendation: Blood culture, urine culture, UA and CXR  - ID reconsult   -f/u Ucx 5/9 prelim growth with gram negative rods   5/9 Bcx prelim no growth  ID following discontinue vanco and switch to Levaquin    Problem/Recommendation - 9:  Problem: Anemia   Plan:  Repeat Hb in AM, Hb slowly trending down  - ?dilutional vs from frequent blood sraws  hgb 8.2 today   continue to trend   No sign of blood loss    Discharge planning to  [facility] today with outpt follow up with Dr Lin    I had a prolonged conversation with the patient regarding hospital course, differential diagnosis and results of diagnostic tests thus far.  Plan of care discussed with patient after the evaluation. Patient expresses clear understanding and satisfaction with the plan of care. Sixty five minutes spent on total encounter, of which more than fifty percent of the encounter was spent on counseling and/or coordinating care by the attending physician.        Susan Main DO [facility]  Cardiovascular Medicine  [address] [place] [address], [address]  Office: [phone]  Cell: [phone]

## 2021-05-13 NOTE — PROGRESS NOTE ADULT - REASON FOR ADMISSION
L patella fracture, for surgery

## 2021-05-13 NOTE — DISCHARGE NOTE NURSING/CASE MANAGEMENT/SOCIAL WORK - NSDCFUADDAPPT_GEN_ALL_CORE_FT
please follow up with your PCP, orthopedic and GI (if your constipation is not resolving) in 1 ~ 2 weeks.   Follow up with your Orthopedic Surgeon: Dr Trejo 1-2 weeks after d/c from rehab: re: wound check at which time the patient may be switched to a hinged brace

## 2021-05-13 NOTE — PROGRESS NOTE ADULT - SUBJECTIVE AND OBJECTIVE BOX
Name of Patient : ASHWIN JACOB  MRN: [id number]  DATE OF SERVICE: 05-13-21 @ 09:47    Subjective: Patient seen and examined. No new events except as noted.   Doing okay   D/C to rehab today after Iron IV 3rd dose     REVIEW OF SYSTEMS:    CONSTITUTIONAL: No weakness, fevers or chills  EYES/ENT: No visual changes;  No vertigo or throat pain   NECK: No pain or stiffness  RESPIRATORY: No cough, wheezing, hemoptysis; No shortness of breath  CARDIOVASCULAR: No chest pain or palpitations  GASTROINTESTINAL: No abdominal or epigastric pain. No nausea, vomiting, or hematemesis; No diarrhea or constipation. No melena or hematochezia.  GENITOURINARY: No dysuria, frequency or hematuria  NEUROLOGICAL: No numbness or weakness  SKIN: No itching, burning, rashes, or lesions   All other review of systems is negative unless indicated above.    MEDICATIONS:  MEDICATIONS  (STANDING):  aspirin enteric coated 325 milliGRAM(s) Oral two times a day  atorvastatin 40 milliGRAM(s) Oral at bedtime  bisacodyl 10 milliGRAM(s) Oral daily  escitalopram 10 milliGRAM(s) Oral at bedtime  gabapentin 100 milliGRAM(s) Oral three times a day  iron sucrose IVPB 200 milliGRAM(s) IV Intermittent once  lidocaine   Patch 1 Patch Transdermal daily  multivitamin 1 Tablet(s) Oral daily  pantoprazole    Tablet 40 milliGRAM(s) Oral before breakfast  polyethylene glycol 3350 17 Gram(s) Oral two times a day  sodium chloride 0.9%. 1000 milliLiter(s) (50 mL/Hr) IV Continuous <Continuous>  topiramate 25 milliGRAM(s) Oral daily      PHYSICAL EXAM:  T(C): 36.5 (05-13-21 @ 05:17), Max: 36.8 (05-12-21 @ 16:02)  HR: 68 (05-13-21 @ 05:17) (68 - 84)  BP: 102/60 (05-13-21 @ 05:17) (102/60 - 127/77)  RR: 18 (05-13-21 @ 05:17) (18 - 18)  SpO2: 96% (05-13-21 @ 05:17) (96% - 98%)  Wt(kg): --  I&O's Summary    12 May 2021 07:01  -  13 May 2021 07:00  --------------------------------------------------------  IN: 1240 mL / OUT: 3200 mL / NET: -1960 mL          Appearance: Normal	  HEENT:  PERRLA   Lymphatic: No lymphadenopathy   Cardiovascular: Normal S1 S2, no JVD  Respiratory: normal effort , clear  Gastrointestinal:  Soft, Non-tender  Skin: No rashes,  warm to touch  Psychiatry:  Mood & affect appropriate  Musculuskeletal: L LE pain,       All labs, Imaging and EKGs personally reviewed       05-12-21 @ 07:01  -  05-13-21 @ 07:00  --------------------------------------------------------  IN: 1240 mL / OUT: 3200 mL / NET: -1960 mL                            8.7    5.73  )-----------( 308      ( 12 May 2021 07:28 )             26.9               05-12    143  |  108  |  11  ----------------------------<  81  4.3   |  21<L>  |  0.94    Ca    9.1      12 May 2021 07:27    TPro  5.9<L>  /  Alb  3.0<L>  /  TBili  0.4  /  DBili  x   /  AST  27  /  ALT  27  /  AlkPhos  130<H>  05-12

## 2021-05-13 NOTE — PROGRESS NOTE ADULT - PROVIDER SPECIALTY LIST ADULT
Cardiology
Gastroenterology
Internal Medicine
Orthopedics
Orthopedics
Cardiology
Gastroenterology
Infectious Disease
Internal Medicine
Orthopedics
Orthopedics
Cardiology
Cardiology
Gastroenterology
Internal Medicine
Orthopedics
Urology
Cardiology
Cardiology
Gastroenterology
Internal Medicine
Internal Medicine
Orthopedics
Orthopedics
Gastroenterology
Gastroenterology
Internal Medicine
Internal Medicine
Orthopedics
Infectious Disease

## 2021-05-13 NOTE — DISCHARGE NOTE NURSING/CASE MANAGEMENT/SOCIAL WORK - PATIENT PORTAL LINK FT
You can access the FollowMyHealth Patient Portal offered by [facility] by registering at the following website: [email or web address]. By joining [facility]’s FollowMyHealth portal, you will also be able to view your health information using other applications (apps) compatible with our system.

## 2021-05-13 NOTE — PROGRESS NOTE ADULT - ASSESSMENT
Patient is a 54 Y/O Female with PMhx of CAD S/P PCI, HLD, kidney stone, migraine presents with LEFT comminuted patella fracture s/p mechanical fall on 4/19. Now s/p Left patella ORIF 5/3. GI consulted for post- operative constipation.  .     Problem/Recommendation - 1:  Problem: Constipation due to pain medication. Recommendation:  now status post BM from enema  -would discharge on miralax BID, PRN tap water enemas     Problem/Recommendation - 2:  ·  Problem: Patella fracture.  Recommendation: - appreciate care per Orthopaedics  - physical therapy  - pain control.      Problem/Recommendation - 3:  ·  Problem: UTI (urinary tract infection).  Recommendation: - s/p 3 days of abx. Now with low grade temperature elevation overnight  - care per ID appreciated, now on levaquin     Problem/Recommendation - 4:  ·  Problem: Urinary retention. Recommendation: s/p johansen cath  follow up urology reccs.     Problem/Recommendation - 5:  ·  Problem: Nephrolithiasis.  Recommendation: no signs of active nephrolithiasis at this time  care per .      Problem/Recommendation - 6:  Problem: HLD (hyperlipidemia). Recommendation: reports good control of cholesterol   on home lipitor dose.     Problem/Recommendation - 7:  Problem: Prophylactic measure. Recommendation: on  BID.     Problem/Recommendation - 8:  Problem: CAD (coronary artery disease). Recommendation: - S/P stent x 2, 2 years ago   - appreciate cardiology input     Problem/Recommendation - 9:  Problem: normocytic anemia, likely due to surgical blood loss. Hgb leveled off at 8.  -trend CBC  -IV iron ordered        I had a prolonged conversation with the patient regarding the hospital course, differential diagnosis, results of diagnostic tests this far, and therapeutic modalities available. Plan of care discussed with the patient after the evaluation. Patient expresses a clear understanding of the plan of care.  Sixty five minutes spent on the total encounter, of which more than fifty percent of the encounter was spent on counseling and/or coordinating care by the attending physician.        Iftikhar Lora M.D.   Gastroenterology and Hepatology  [address] [place], [place]  Office: [phone]  Cell: [phone].

## 2021-05-14 LAB
CULTURE RESULTS: SIGNIFICANT CHANGE UP
CULTURE RESULTS: SIGNIFICANT CHANGE UP
SPECIMEN SOURCE: SIGNIFICANT CHANGE UP
SPECIMEN SOURCE: SIGNIFICANT CHANGE UP

## 2021-05-25 PROBLEM — U07.1 COVID-19: Chronic | Status: ACTIVE | Noted: 2021-04-28

## 2021-06-02 ENCOUNTER — APPOINTMENT (OUTPATIENT)
Dept: ORTHOPEDIC SURGERY | Facility: CLINIC | Age: 56
End: 2021-06-02
Payer: COMMERCIAL

## 2021-06-02 VITALS
HEIGHT: 68 IN | SYSTOLIC BLOOD PRESSURE: 120 MMHG | DIASTOLIC BLOOD PRESSURE: 75 MMHG | WEIGHT: 170 LBS | BODY MASS INDEX: 25.76 KG/M2 | HEART RATE: 84 BPM

## 2021-06-02 PROCEDURE — 73562 X-RAY EXAM OF KNEE 3: CPT | Mod: LT

## 2021-06-02 PROCEDURE — 99024 POSTOP FOLLOW-UP VISIT: CPT

## 2021-06-04 ENCOUNTER — NON-APPOINTMENT (OUTPATIENT)
Age: 56
End: 2021-06-04

## 2021-06-30 ENCOUNTER — APPOINTMENT (OUTPATIENT)
Dept: CARDIOLOGY | Facility: CLINIC | Age: 56
End: 2021-06-30
Payer: COMMERCIAL

## 2021-06-30 ENCOUNTER — LABORATORY RESULT (OUTPATIENT)
Age: 56
End: 2021-06-30

## 2021-06-30 ENCOUNTER — APPOINTMENT (OUTPATIENT)
Dept: ORTHOPEDIC SURGERY | Facility: CLINIC | Age: 56
End: 2021-06-30
Payer: COMMERCIAL

## 2021-06-30 ENCOUNTER — NON-APPOINTMENT (OUTPATIENT)
Age: 56
End: 2021-06-30

## 2021-06-30 VITALS
WEIGHT: 170 LBS | DIASTOLIC BLOOD PRESSURE: 80 MMHG | OXYGEN SATURATION: 99 % | HEART RATE: 71 BPM | BODY MASS INDEX: 25.76 KG/M2 | SYSTOLIC BLOOD PRESSURE: 120 MMHG | TEMPERATURE: 98.3 F | HEIGHT: 68 IN

## 2021-06-30 PROCEDURE — 99213 OFFICE O/P EST LOW 20 MIN: CPT

## 2021-06-30 PROCEDURE — 73620 X-RAY EXAM OF FOOT: CPT | Mod: RT

## 2021-06-30 PROCEDURE — 99072 ADDL SUPL MATRL&STAF TM PHE: CPT

## 2021-06-30 PROCEDURE — 73562 X-RAY EXAM OF KNEE 3: CPT | Mod: LT

## 2021-06-30 PROCEDURE — 93000 ELECTROCARDIOGRAM COMPLETE: CPT

## 2021-06-30 PROCEDURE — 99024 POSTOP FOLLOW-UP VISIT: CPT

## 2021-06-30 NOTE — HISTORY OF PRESENT ILLNESS
[FreeTextEntry1] : pt presents for f/u pt s/p recent left knee patella surgery s/p fall .pt still immobile walks with walker adressing with ortho .pt here from cv prospective .pt with hx of remote ptci .pt s/p recent cath 4/21 patent stent and non obstructive dx .pt feels well from cv prospective denies palpitations on metoprol 25 mg daily pt denies any chest  pain dizziness ,lightheadedness ,nausea vomiting diaphoresis\par

## 2021-06-30 NOTE — PHYSICAL EXAM
[Well Developed] : well developed [Well Nourished] : well nourished [No Acute Distress] : no acute distress [Normal Conjunctiva] : normal conjunctiva [Normal Venous Pressure] : normal venous pressure [No Carotid Bruit] : no carotid bruit [Normal S1, S2] : normal S1, S2 [No Murmur] : no murmur [No Rub] : no rub [No Gallop] : no gallop [Clear Lung Fields] : clear lung fields [Good Air Entry] : good air entry [No Respiratory Distress] : no respiratory distress  [Soft] : abdomen soft [Non Tender] : non-tender [No Masses/organomegaly] : no masses/organomegaly [Normal Bowel Sounds] : normal bowel sounds [Normal Gait] : normal gait [No Edema] : no edema [No Cyanosis] : no cyanosis [No Clubbing] : no clubbing [No Varicosities] : no varicosities [No Rash] : no rash [No Skin Lesions] : no skin lesions [Moves all extremities] : moves all extremities [No Focal Deficits] : no focal deficits [Normal Speech] : normal speech [Alert and Oriented] : alert and oriented [Normal memory] : normal memory [de-identified] : left knee in brace and scar noted on knee area

## 2021-07-01 LAB
25(OH)D3 SERPL-MCNC: 40.9 NG/ML
ALBUMIN SERPL ELPH-MCNC: 4.3 G/DL
ALP BLD-CCNC: 111 U/L
ALT SERPL-CCNC: 31 U/L
ANION GAP SERPL CALC-SCNC: 11 MMOL/L
AST SERPL-CCNC: 25 U/L
BASOPHILS # BLD AUTO: 0.07 K/UL
BASOPHILS NFR BLD AUTO: 1.1 %
BILIRUB SERPL-MCNC: 0.4 MG/DL
BUN SERPL-MCNC: 20 MG/DL
CALCIUM SERPL-MCNC: 9.8 MG/DL
CHLORIDE SERPL-SCNC: 108 MMOL/L
CHOLEST SERPL-MCNC: 149 MG/DL
CK SERPL-CCNC: 48 U/L
CO2 SERPL-SCNC: 22 MMOL/L
CREAT SERPL-MCNC: 0.88 MG/DL
EOSINOPHIL # BLD AUTO: 0.28 K/UL
EOSINOPHIL NFR BLD AUTO: 4.5 %
ESTIMATED AVERAGE GLUCOSE: 94 MG/DL
GLUCOSE SERPL-MCNC: 79 MG/DL
HBA1C MFR BLD HPLC: 4.9 %
HCT VFR BLD CALC: 39.1 %
HDLC SERPL-MCNC: 60 MG/DL
HGB BLD-MCNC: 12.6 G/DL
IMM GRANULOCYTES NFR BLD AUTO: 0.3 %
LDLC SERPL CALC-MCNC: 73 MG/DL
LDLC SERPL DIRECT ASSAY-MCNC: 77 MG/DL
LYMPHOCYTES # BLD AUTO: 1.49 K/UL
LYMPHOCYTES NFR BLD AUTO: 23.8 %
MAN DIFF?: NORMAL
MCHC RBC-ENTMCNC: 32.1 PG
MCHC RBC-ENTMCNC: 32.2 GM/DL
MCV RBC AUTO: 99.7 FL
MONOCYTES # BLD AUTO: 0.4 K/UL
MONOCYTES NFR BLD AUTO: 6.4 %
NEUTROPHILS # BLD AUTO: 4.01 K/UL
NEUTROPHILS NFR BLD AUTO: 63.9 %
NONHDLC SERPL-MCNC: 90 MG/DL
PLATELET # BLD AUTO: 213 K/UL
POTASSIUM SERPL-SCNC: 4 MMOL/L
PROT SERPL-MCNC: 7.2 G/DL
RBC # BLD: 3.92 M/UL
RBC # FLD: 13.2 %
SODIUM SERPL-SCNC: 141 MMOL/L
TRIGL SERPL-MCNC: 81 MG/DL
TSH SERPL-ACNC: 3.45 UIU/ML
WBC # FLD AUTO: 6.27 K/UL

## 2021-08-10 ENCOUNTER — APPOINTMENT (OUTPATIENT)
Dept: ORTHOPEDIC SURGERY | Facility: CLINIC | Age: 56
End: 2021-08-10
Payer: COMMERCIAL

## 2021-08-10 PROCEDURE — 99212 OFFICE O/P EST SF 10 MIN: CPT

## 2021-08-10 PROCEDURE — 73562 X-RAY EXAM OF KNEE 3: CPT | Mod: LT

## 2021-08-27 NOTE — PROGRESS NOTE ADULT - SUBJECTIVE AND OBJECTIVE BOX
[Problem Snoring] : problem snoring [Snoring With Pauses] : snoring with pauses [Throat Pain] : throat pain [Negative] : Heme/Lymph [Hearing Loss] : hearing loss [de-identified] : ear fullness  [de-identified] : sensation of a lump in the throat  ASHWIN JACOB 55y [id number]    Patient is a 55y old  Female who presents with a chief complaint of L patella fracture, for surgery (04 May 2021 06:56)      Follow Up/CC:  ID following for UTI    Interval History/ROS: feels ok, no fever, no complaints with abx, s/p left knee surgery    Allergies    apple (Hives)  Compazine (Unknown)  morphine (Unknown)  PC Pen VK (Unknown)  shellfish (Unknown)  sulfa drugs (Unknown)    Intolerances        ANTIMICROBIALS:  vancomycin  IVPB 1000 every 12 hours      MEDICATIONS  (STANDING):  acetaminophen   Tablet .. 975 milliGRAM(s) Oral every 8 hours  aspirin enteric coated 325 milliGRAM(s) Oral two times a day  atorvastatin 40 milliGRAM(s) Oral at bedtime  escitalopram 10 milliGRAM(s) Oral at bedtime  multivitamin 1 Tablet(s) Oral daily  pantoprazole    Tablet 40 milliGRAM(s) Oral before breakfast  polyethylene glycol 3350 17 Gram(s) Oral daily  senna 2 Tablet(s) Oral at bedtime  sodium chloride 0.9%. 1000 milliLiter(s) (125 mL/Hr) IV Continuous <Continuous>  topiramate 25 milliGRAM(s) Oral daily  vancomycin  IVPB 1000 milliGRAM(s) IV Intermittent every 12 hours    MEDICATIONS  (PRN):  cyclobenzaprine 5 milliGRAM(s) Oral three times a day PRN Muscle Spasm  magnesium hydroxide Suspension 30 milliLiter(s) Oral daily PRN Constipation  ondansetron Injectable 4 milliGRAM(s) IV Push every 6 hours PRN Nausea  oxyCODONE    IR 5 milliGRAM(s) Oral every 4 hours PRN Moderate Pain (4 - 6)  oxyCODONE    IR 10 milliGRAM(s) Oral every 4 hours PRN Severe Pain (7 - 10)        Vital Signs Last 24 Hrs  T(C): 37.3 (04 May 2021 13:26), Max: 37.3 (04 May 2021 13:26)  T(F): 99.1 (04 May 2021 13:26), Max: 99.1 (04 May 2021 13:26)  HR: 77 (04 May 2021 13:26) (70 - 89)  BP: 115/77 (04 May 2021 13:26) (92/58 - 122/61)  BP(mean): 78 (03 May 2021 22:53) (71 - 85)  RR: 18 (04 May 2021 13:26) (14 - 18)  SpO2: 98% (04 May 2021 13:26) (94% - 100%)    CBC Full  -  ( 04 May 2021 07:15 )  WBC Count : 9.05 K/uL  RBC Count : 2.75 M/uL  Hemoglobin : 8.5 g/dL  Hematocrit : 26.3 %  Platelet Count - Automated : 253 K/uL  Mean Cell Volume : 95.6 fl  Mean Cell Hemoglobin : 30.9 pg  Mean Cell Hemoglobin Concentration : 32.3 gm/dL  Auto Neutrophil # : 7.50 K/uL  Auto Lymphocyte # : 0.81 K/uL  Auto Monocyte # : 0.57 K/uL  Auto Eosinophil # : 0.09 K/uL  Auto Basophil # : 0.02 K/uL  Auto Neutrophil % : 82.8 %  Auto Lymphocyte % : 9.0 %  Auto Monocyte % : 6.3 %  Auto Eosinophil % : 1.0 %  Auto Basophil % : 0.2 %    05-04    140  |  108  |  12  ----------------------------<  124<H>  4.6   |  19<L>  |  0.80    Ca    8.9      04 May 2021 07:12        MICROBIOLOGY:  .Urine Catheterized  04-29-21   50,000 - 99,000 CFU/mL Aerococcus species  "Aerococcus spp. are predictably susceptible to penicillin,  ampicillin, tetracycline, and vancomycin.  All isolates are  resistant to sulfonamides"  --  --        Rapid RVP Result: NotDetec (05-02 @ 01:38)      RADIOLOGY    < from: Xray Abdomen 1 View PORTABLE -Urgent (Xray Abdomen 1 View PORTABLE -Urgent .) (05.02.21 @ 19:44) >  Mildly distended bowel loops with moderate stool burden.    < end of copied text >

## 2021-09-05 ENCOUNTER — RX RENEWAL (OUTPATIENT)
Age: 56
End: 2021-09-05

## 2021-09-20 ENCOUNTER — APPOINTMENT (OUTPATIENT)
Dept: ORTHOPEDIC SURGERY | Facility: CLINIC | Age: 56
End: 2021-09-20
Payer: COMMERCIAL

## 2021-09-20 VITALS
HEART RATE: 73 BPM | WEIGHT: 180 LBS | DIASTOLIC BLOOD PRESSURE: 84 MMHG | BODY MASS INDEX: 27.28 KG/M2 | HEIGHT: 68 IN | SYSTOLIC BLOOD PRESSURE: 153 MMHG

## 2021-09-20 PROCEDURE — 73562 X-RAY EXAM OF KNEE 3: CPT | Mod: LT

## 2021-09-20 PROCEDURE — 99212 OFFICE O/P EST SF 10 MIN: CPT

## 2021-09-22 ENCOUNTER — APPOINTMENT (OUTPATIENT)
Dept: PULMONOLOGY | Facility: CLINIC | Age: 56
End: 2021-09-22

## 2021-11-08 ENCOUNTER — APPOINTMENT (OUTPATIENT)
Dept: ORTHOPEDIC SURGERY | Facility: CLINIC | Age: 56
End: 2021-11-08
Payer: COMMERCIAL

## 2021-11-08 VITALS — WEIGHT: 170 LBS | HEIGHT: 68 IN | BODY MASS INDEX: 25.76 KG/M2

## 2021-11-08 PROCEDURE — 99213 OFFICE O/P EST LOW 20 MIN: CPT

## 2021-11-08 PROCEDURE — 73562 X-RAY EXAM OF KNEE 3: CPT | Mod: LT

## 2021-11-12 NOTE — ED PROVIDER NOTE - OBJECTIVE STATEMENT
54yo F recent stent 2 weeks prior here with persistent substernal cp with sob. Found to have abnormal stress test, sent in for cath. Cards aware. No radiation to back, headache, focal neuro deficit complaints. independent

## 2021-11-14 NOTE — ED ADULT NURSE NOTE - HIV OFFER
Pt c/o L dental issue started yesterday, has L cheek swollen. Pt c/o abd pain radiating to the back, vomiting, diarrhea X 1 day. Pt is able to speak in full sentences. No drooling. Opt out

## 2021-11-19 NOTE — ED PROVIDER NOTE - TOBACCO USE
MERY LIRA   is a   65   male who presents for follow up. He underwent CT after last visit due to weight loss, altered bowels and h/o pancreatic insufficiency. CT showed benign hepatic cyst, mild to moderate pancreatic atrophy with no ductal dilation or suspicious lesion.
brFecal elastase was low at 154, normal fecal calprotectin normal CBC AST 45 normal LFTs o/w, normal TSH. 
br
brFecal elastase was low last year as well at 174. He did not take pancreatic enzymes due to price. He has lost weight recently, states he used to weigh around 240, today is 189 (lost 2lbs since OV 2 months ago). BMs are still irregular, diarrhea alternating w/ constipation. Takes miralax or stool softeners prn for constipation then will have diarrhea for several days. He does not have much of an appetite, eating 1 meal per day and snacking sometimes, mainly on sweets (cookies, donuts). Occ nausea w/ eating but no vomiting. No abdominal pain. No dysphagia. 
br He continues to smoke around 1ppd and drinks approx 1 pint of bourbon per day. Not diabetic. br He is also dealing with other health issues at the moment, recently had a couple seizures and is being evaluated by neurology.
br
EGD 10/2017 was normal, as were random biopsies from duodenum.brColonoscopy 10/2017 showed an advanced adenoma in sigmoid colon, diverticulosis, and internal hemorrhoids.brColonoscopy 12/2020 showed 1 adenomatous polyp, diverticulosis, and internal hemorrhoids..brRUQ US 2/2020 showed hepatic steatosis but otherwise was unremarkable.br  
Never smoker

## 2021-12-13 ENCOUNTER — APPOINTMENT (OUTPATIENT)
Dept: ORTHOPEDIC SURGERY | Facility: CLINIC | Age: 56
End: 2021-12-13
Payer: COMMERCIAL

## 2021-12-13 VITALS
HEIGHT: 68 IN | SYSTOLIC BLOOD PRESSURE: 137 MMHG | HEART RATE: 71 BPM | DIASTOLIC BLOOD PRESSURE: 82 MMHG | WEIGHT: 170 LBS | BODY MASS INDEX: 25.76 KG/M2

## 2021-12-13 PROCEDURE — 99213 OFFICE O/P EST LOW 20 MIN: CPT

## 2021-12-13 PROCEDURE — 73562 X-RAY EXAM OF KNEE 3: CPT | Mod: LT

## 2022-02-14 ENCOUNTER — APPOINTMENT (OUTPATIENT)
Dept: ORTHOPEDIC SURGERY | Facility: CLINIC | Age: 57
End: 2022-02-14
Payer: COMMERCIAL

## 2022-02-14 VITALS — HEIGHT: 68 IN | WEIGHT: 170 LBS | BODY MASS INDEX: 25.76 KG/M2

## 2022-02-14 PROCEDURE — 73562 X-RAY EXAM OF KNEE 3: CPT | Mod: LT

## 2022-02-14 PROCEDURE — 99213 OFFICE O/P EST LOW 20 MIN: CPT

## 2022-04-11 ENCOUNTER — APPOINTMENT (OUTPATIENT)
Dept: ORTHOPEDIC SURGERY | Facility: CLINIC | Age: 57
End: 2022-04-11
Payer: COMMERCIAL

## 2022-04-11 VITALS — BODY MASS INDEX: 27.28 KG/M2 | HEIGHT: 68 IN | WEIGHT: 180 LBS

## 2022-04-11 PROCEDURE — 73562 X-RAY EXAM OF KNEE 3: CPT | Mod: LT

## 2022-04-11 PROCEDURE — 99213 OFFICE O/P EST LOW 20 MIN: CPT

## 2022-04-15 NOTE — CHART NOTE - NSCHARTNOTEFT_GEN_A_CORE
Note:    Asked to evaluate incision by medicine prior to d/c -> rehab    LLE:  -- Bulky Frances Dressing removed  -- Aquacel changed  -- incision clean/dry. No erythema, drainage, or rubor noted;  (+) mild leonor-incisional swelling   -- Running suture in place  -- ACE dressing reapplied and knee immobilizer readjusted  [ picture sent to Attending Surgeon]    Plan:  PT: WBAT LLE: NO ROM @ this time  May d/c Aquacel and sutures (if necessary) on 5/16-5/18  Follow up Dr Trejo 1-2 weeks after rehab for wound check and Xrays  {Pt may be changed to a hinged brace @ that time}    **Instructions added to Discharge Note      ***See Above  Ramez HOWE  Orthopedics  B: [id number] Note:    Asked to evaluate incision by medicine prior to d/c -> rehab    LLE:  -- Bulky Frances Dressing removed  -- Aquacel changed  -- incision clean/dry. No erythema, drainage, or rubor noted;  (+) mild leonor-incisional swelling   -- Running suture in place  -- ACE dressing reapplied and knee immobilizer readjusted  [ picture sent to Attending Surgeon]    Plan:  PT: WBAT LLE: NO ROM @ this time  May d/c Aquacel and sutures (if necessary) on 5/16-5/18  Follow up Dr Terjo 1-2 weeks after rehab for wound check and Xrays  {Pt may be changed to a hinged brace @ that time}    **Instructions added to Discharge Note      ***See Above  Ramez HOWE  Orthopedics  B: [id number]    As Above  MADELINE Trejo 0

## 2022-04-21 ENCOUNTER — RX RENEWAL (OUTPATIENT)
Age: 57
End: 2022-04-21

## 2022-05-25 ENCOUNTER — RX RENEWAL (OUTPATIENT)
Age: 57
End: 2022-05-25

## 2022-06-08 NOTE — PROGRESS NOTE ADULT - RESPIRATORY AND THORAX
How Severe Is Your Skin Lesion?: moderate
Have Your Skin Lesions Been Treated?: not been treated
Is This A New Presentation, Or A Follow-Up?: Skin Lesions
details…
None
details…

## 2022-06-16 ENCOUNTER — APPOINTMENT (OUTPATIENT)
Dept: CARDIOLOGY | Facility: CLINIC | Age: 57
End: 2022-06-16
Payer: COMMERCIAL

## 2022-06-16 ENCOUNTER — NON-APPOINTMENT (OUTPATIENT)
Age: 57
End: 2022-06-16

## 2022-06-16 VITALS
WEIGHT: 179 LBS | TEMPERATURE: 98.4 F | SYSTOLIC BLOOD PRESSURE: 122 MMHG | OXYGEN SATURATION: 97 % | HEIGHT: 68 IN | BODY MASS INDEX: 27.13 KG/M2 | DIASTOLIC BLOOD PRESSURE: 78 MMHG | HEART RATE: 69 BPM

## 2022-06-16 DIAGNOSIS — R10.9 UNSPECIFIED ABDOMINAL PAIN: ICD-10-CM

## 2022-06-16 PROCEDURE — 99213 OFFICE O/P EST LOW 20 MIN: CPT

## 2022-06-16 PROCEDURE — 93000 ELECTROCARDIOGRAM COMPLETE: CPT

## 2022-06-16 NOTE — HISTORY OF PRESENT ILLNESS
[FreeTextEntry1] : pt presents for cv eval prior to egd .pt with remote ptci 2019 lad  .pt s/p recent cath 4/21 patent stents no obstructive .pt feels well .pt pending egd for non discript abdominal discomfort .pt adressing labs with pmd re lipids pt denies any chest  pain dizziness ,lightheadedness ,nausea vomiting diaphoresis\par

## 2022-06-16 NOTE — PHYSICAL EXAM
[Well Developed] : well developed [Well Nourished] : well nourished [No Acute Distress] : no acute distress [Normal Conjunctiva] : normal conjunctiva [Normal Venous Pressure] : normal venous pressure [No Carotid Bruit] : no carotid bruit [Normal S1, S2] : normal S1, S2 [No Murmur] : no murmur [No Rub] : no rub [No Gallop] : no gallop [Clear Lung Fields] : clear lung fields [Good Air Entry] : good air entry [No Respiratory Distress] : no respiratory distress  [Soft] : abdomen soft [Non Tender] : non-tender [No Masses/organomegaly] : no masses/organomegaly [Normal Bowel Sounds] : normal bowel sounds [Normal Gait] : normal gait [No Edema] : no edema [No Cyanosis] : no cyanosis [No Clubbing] : no clubbing [No Varicosities] : no varicosities [No Rash] : no rash [No Skin Lesions] : no skin lesions [Moves all extremities] : moves all extremities [No Focal Deficits] : no focal deficits [Normal Speech] : normal speech [Alert and Oriented] : alert and oriented [Normal memory] : normal memory [de-identified] : knee pain  site of left knee surgery intact

## 2022-06-27 NOTE — DISCHARGE NOTE PROVIDER - NSDCCPCAREPLAN_GEN_ALL_CORE_FT
Additional Notes: Patient consent was obtained to proceed with the visit and recommended plan of care after discussion of all risks and benefits, including the risks of COVID-19 exposure. Detail Level: Simple PRINCIPAL DISCHARGE DIAGNOSIS  Diagnosis: Patella fracture  Assessment and Plan of Treatment: S/P ORIF Lt patella fracture.   continue with Pain control.  continue with aspirin 325mg twice a day for 1 month (5/4~6/3/21) and then 81mg daily.   OOB/PT/WBAT.  F/U with Dr Trejo 2 weeks post-op for wound check and removal of staples  [phone]      SECONDARY DISCHARGE DIAGNOSES  Diagnosis: CAD (coronary artery disease)  Assessment and Plan of Treatment: No lopressor due to low BP.   continue with aspirin and liptor.   Coronary artery disease is a condition where the arteries the supply the heart muscle get clogges with fatty deposits & puts you at risk for a heart attack  Call your doctor if you have any new pain, pressure, or discomfort in the center of your chest, pain, tingling or discomfort in arms, back, neck, jaw, or stomach, shortness of breath, nausea, vomiting, burping or heartburn, sweating, cold and clammy skin, racing or abnormal heartbeat for more than 10 minutes or if they keep coming & going.  Call 911 and do not tr to get to hospital by care  You can help yourself with lefestyle changes (quitting smoking if you smoke), eat lots of fruits & vegetables & low fat dairy products, not a lot of meat & fatty foods, walk or some form of physical activity most days of the week, lose weight if you are overweight  Take your cardiac medication as prescribed to lower cholesterol, to lower blood pressure, aspirin to prevent blood clots, and diabetes control  Make sure to keep appointments with doctor for cardiac follow up care      Diagnosis: Urinary retention  Assessment and Plan of Treatment: found UTI. s/p vancomycin x 3 days.   elevated creatine, s/p IVF.   now resolved. voided.    Diagnosis: UTI (urinary tract infection)  Assessment and Plan of Treatment: HOME CARE INSTRUCTIONS  f you were prescribed antibiotics, take them exactly as your caregiver instructs you. Finish the medication even if you feel better after you have only taken some of the medication.  Drink enough water and fluids to keep your urine clear or pale yellow.  Avoid caffeine, tea, and carbonated beverages. They tend to irritate your bladder.  Empty your bladder often. Avoid holding urine for long periods of time.  Empty your bladder before and after sexual intercourse.  After a bowel movement, women should cleanse from front to back. Use each tissue only once.  SEEK MEDICAL CARE IF:  You have back pain.  You develop a fever.  Your symptoms do not begin to resolve within 3 days.  SEEK IMMEDIATE MEDICAL CARE IF:  You have severe back pain or lower abdominal pain.  You develop chills.  You have nausea or vomiting.  You have continued burning or discomfort with urination.      Diagnosis: HLD (hyperlipidemia)  Assessment and Plan of Treatment: Follow up with PCP for treatment goals, continue medication, have liver function testing every 3 months as anti lipid medications can cause liver irritation, eat low fat, low cholesterol meals      Diagnosis: Constipation  Assessment and Plan of Treatment: xray abd showed stool burden. had enema.   c/w laxatives.     PRINCIPAL DISCHARGE DIAGNOSIS  Diagnosis: Patella fracture  Assessment and Plan of Treatment: S/P ORIF Lt patella fracture.   continue with Pain control.  continue with aspirin 325mg twice a day for 1 month (5/4~6/3/21) and then 81mg daily.   OOB/PT/WBAT.  F/U with Dr Trejo 2 weeks post-op for wound check and removal of staples  [phone]      SECONDARY DISCHARGE DIAGNOSES  Diagnosis: CAD (coronary artery disease)  Assessment and Plan of Treatment: No lopressor due to low BP.   continue with aspirin and liptor.   Coronary artery disease is a condition where the arteries the supply the heart muscle get clogges with fatty deposits & puts you at risk for a heart attack  Call your doctor if you have any new pain, pressure, or discomfort in the center of your chest, pain, tingling or discomfort in arms, back, neck, jaw, or stomach, shortness of breath, nausea, vomiting, burping or heartburn, sweating, cold and clammy skin, racing or abnormal heartbeat for more than 10 minutes or if they keep coming & going.  Call 911 and do not tr to get to hospital by care  You can help yourself with lefestyle changes (quitting smoking if you smoke), eat lots of fruits & vegetables & low fat dairy products, not a lot of meat & fatty foods, walk or some form of physical activity most days of the week, lose weight if you are overweight  Take your cardiac medication as prescribed to lower cholesterol, to lower blood pressure, aspirin to prevent blood clots, and diabetes control  Make sure to keep appointments with doctor for cardiac follow up care      Diagnosis: Urinary retention  Assessment and Plan of Treatment: found UTI.  elevated creatine, s/p IVF.   now resolved. voided.    Diagnosis: UTI (urinary tract infection)  Assessment and Plan of Treatment: s/p vancomycin x 3 days and levaquin IV x 2 days.  Last dose levaquin 5/13/21.  HOME CARE INSTRUCTIONS  If you were prescribed antibiotics, take them exactly as your caregiver instructs you. Finish the medication even if you feel better after you have only taken some of the medication.  Drink enough water and fluids to keep your urine clear or pale yellow.  Avoid caffeine, tea, and carbonated beverages. They tend to irritate your bladder.  Empty your bladder often. Avoid holding urine for long periods of time.  Empty your bladder before and after sexual intercourse.  After a bowel movement, women should cleanse from front to back. Use each tissue only once.  SEEK MEDICAL CARE IF:  You have back pain.  You develop a fever.  Your symptoms do not begin to resolve within 3 days.  SEEK IMMEDIATE MEDICAL CARE IF:  You have severe back pain or lower abdominal pain.  You develop chills.  You have nausea or vomiting.  You have continued burning or discomfort with urination.      Diagnosis: HLD (hyperlipidemia)  Assessment and Plan of Treatment: Follow up with PCP for treatment goals, continue medication, have liver function testing every 3 months as anti lipid medications can cause liver irritation, eat low fat, low cholesterol meals      Diagnosis: Constipation  Assessment and Plan of Treatment: xray abd showed stool burden. had enema.   c/w laxatives.     PRINCIPAL DISCHARGE DIAGNOSIS  Diagnosis: Patella fracture  Assessment and Plan of Treatment: S/P ORIF Lt patella fracture.   continue with Pain control.  continue with aspirin 325mg twice a day for 1 month (5/4~6/3/21) and then 81mg daily.   OOB/PT/WBAT.  F/U with Dr Trejo 2 weeks post-op for wound check and removal of staples  [phone]      SECONDARY DISCHARGE DIAGNOSES  Diagnosis: CAD (coronary artery disease)  Assessment and Plan of Treatment: No lopressor due to low BP.   continue with aspirin and liptor.   Coronary artery disease is a condition where the arteries the supply the heart muscle get clogges with fatty deposits & puts you at risk for a heart attack  Call your doctor if you have any new pain, pressure, or discomfort in the center of your chest, pain, tingling or discomfort in arms, back, neck, jaw, or stomach, shortness of breath, nausea, vomiting, burping or heartburn, sweating, cold and clammy skin, racing or abnormal heartbeat for more than 10 minutes or if they keep coming & going.  Call 911 and do not tr to get to hospital by care  You can help yourself with lefestyle changes (quitting smoking if you smoke), eat lots of fruits & vegetables & low fat dairy products, not a lot of meat & fatty foods, walk or some form of physical activity most days of the week, lose weight if you are overweight  Take your cardiac medication as prescribed to lower cholesterol, to lower blood pressure, aspirin to prevent blood clots, and diabetes control  Make sure to keep appointments with doctor for cardiac follow up care      Diagnosis: Urinary retention  Assessment and Plan of Treatment: elevated creatine, s/p IVF.   Required johansen cath.  Failed TOV.  Continue with johansen care.      Diagnosis: UTI (urinary tract infection)  Assessment and Plan of Treatment: s/p vancomycin x 3 days and levaquin IV x 2 days.  Last dose levaquin 5/13/21.  HOME CARE INSTRUCTIONS  If you were prescribed antibiotics, take them exactly as your caregiver instructs you. Finish the medication even if you feel better after you have only taken some of the medication.  Drink enough water and fluids to keep your urine clear or pale yellow.  Avoid caffeine, tea, and carbonated beverages. They tend to irritate your bladder.  Empty your bladder often. Avoid holding urine for long periods of time.  Empty your bladder before and after sexual intercourse.  After a bowel movement, women should cleanse from front to back. Use each tissue only once.  SEEK MEDICAL CARE IF:  You have back pain.  You develop a fever.  Your symptoms do not begin to resolve within 3 days.  SEEK IMMEDIATE MEDICAL CARE IF:  You have severe back pain or lower abdominal pain.  You develop chills.  You have nausea or vomiting.  You have continued burning or discomfort with urination.      Diagnosis: HLD (hyperlipidemia)  Assessment and Plan of Treatment: Follow up with PCP for treatment goals, continue medication, have liver function testing every 3 months as anti lipid medications can cause liver irritation, eat low fat, low cholesterol meals      Diagnosis: Constipation  Assessment and Plan of Treatment: xray abd showed stool burden. had enema.   c/w laxatives.    Diagnosis: Anemia  Assessment and Plan of Treatment: hx of SHUKRI.  Received IV iron inpatient.  Continue with oral Iron. Hgb 8.7 on 5/12/21.     PRINCIPAL DISCHARGE DIAGNOSIS  Diagnosis: Patella fracture  Assessment and Plan of Treatment: S/P ORIF Lt patella fracture.   continue with Pain control.  continue with aspirin 325mg twice a day for 1 month (5/4~6/3/21) and then 81mg daily.   OOB/PT/WBAT.  F/U with Dr Trejo 2 weeks post-op for wound check and removal of staples  [phone]      SECONDARY DISCHARGE DIAGNOSES  Diagnosis: CAD (coronary artery disease)  Assessment and Plan of Treatment: No lopressor due to low BP. May resume outpatient if BP improves.  continue with aspirin and liptor.   Coronary artery disease is a condition where the arteries the supply the heart muscle get clogges with fatty deposits & puts you at risk for a heart attack  Call your doctor if you have any new pain, pressure, or discomfort in the center of your chest, pain, tingling or discomfort in arms, back, neck, jaw, or stomach, shortness of breath, nausea, vomiting, burping or heartburn, sweating, cold and clammy skin, racing or abnormal heartbeat for more than 10 minutes or if they keep coming & going.  Call 911 and do not tr to get to hospital by care  You can help yourself with lefestyle changes (quitting smoking if you smoke), eat lots of fruits & vegetables & low fat dairy products, not a lot of meat & fatty foods, walk or some form of physical activity most days of the week, lose weight if you are overweight  Take your cardiac medication as prescribed to lower cholesterol, to lower blood pressure, aspirin to prevent blood clots, and diabetes control  Make sure to keep appointments with doctor for cardiac follow up care      Diagnosis: Urinary retention  Assessment and Plan of Treatment: elevated creatine, s/p IVF.   Required johansen cath.  Failed TOV.  Continue with johansen care.      Diagnosis: UTI (urinary tract infection)  Assessment and Plan of Treatment: s/p vancomycin x 3 days and levaquin IV x 2 days.  Last dose levaquin 5/13/21.  HOME CARE INSTRUCTIONS  If you were prescribed antibiotics, take them exactly as your caregiver instructs you. Finish the medication even if you feel better after you have only taken some of the medication.  Drink enough water and fluids to keep your urine clear or pale yellow.  Avoid caffeine, tea, and carbonated beverages. They tend to irritate your bladder.  Empty your bladder often. Avoid holding urine for long periods of time.  Empty your bladder before and after sexual intercourse.  After a bowel movement, women should cleanse from front to back. Use each tissue only once.  SEEK MEDICAL CARE IF:  You have back pain.  You develop a fever.  Your symptoms do not begin to resolve within 3 days.  SEEK IMMEDIATE MEDICAL CARE IF:  You have severe back pain or lower abdominal pain.  You develop chills.  You have nausea or vomiting.  You have continued burning or discomfort with urination.      Diagnosis: HLD (hyperlipidemia)  Assessment and Plan of Treatment: Follow up with PCP for treatment goals, continue medication, have liver function testing every 3 months as anti lipid medications can cause liver irritation, eat low fat, low cholesterol meals      Diagnosis: Constipation  Assessment and Plan of Treatment: xray abd showed stool burden. had enema.   c/w laxatives.    Diagnosis: Anemia  Assessment and Plan of Treatment: hx of SHUKRI.  Received IV iron inpatient.  Continue with oral Iron. Hgb 8.7 on 5/12/21.

## 2022-07-11 ENCOUNTER — APPOINTMENT (OUTPATIENT)
Dept: ORTHOPEDIC SURGERY | Facility: CLINIC | Age: 57
End: 2022-07-11

## 2022-08-01 ENCOUNTER — RX RENEWAL (OUTPATIENT)
Age: 57
End: 2022-08-01

## 2022-08-01 NOTE — ED PROVIDER NOTE - ATTENDING CONTRIBUTION TO CARE
How Many Skin Cancers Have You Had?: more than one What Is The Reason For Today's Visit?: Follow Up Non-Melanoma Skin Cancer When Was Your Last Cancer Diagnosed?: 10/2020 I have reviewed this note, the presenting symptoms, and the Chief Complaint and the History of Present Illness as documented, with the other care provider(s) including resident, ACP, and nurses on the patient care team. I have also reviewed this patient's past medical/surgical history and social/family history as reviewed and listed in this electronic medical record.  I agree with the resident/ACP documentation except where noted otherwise in my personal documentation.  See MDM.  --BMM

## 2022-08-15 ENCOUNTER — APPOINTMENT (OUTPATIENT)
Dept: ORTHOPEDIC SURGERY | Facility: CLINIC | Age: 57
End: 2022-08-15

## 2022-08-15 VITALS
HEART RATE: 67 BPM | BODY MASS INDEX: 27.28 KG/M2 | WEIGHT: 180 LBS | SYSTOLIC BLOOD PRESSURE: 131 MMHG | DIASTOLIC BLOOD PRESSURE: 76 MMHG | HEIGHT: 68 IN

## 2022-08-15 PROCEDURE — 73562 X-RAY EXAM OF KNEE 3: CPT | Mod: LT

## 2022-08-15 PROCEDURE — 99213 OFFICE O/P EST LOW 20 MIN: CPT

## 2022-08-16 NOTE — PHYSICAL EXAM
[Antalgic] : antalgic [UE/LE] : Sensory: Intact in bilateral upper & lower extremities [ALL] : dorsalis pedis, posterior tibial, femoral, popliteal, and radial 2+ and symmetric bilaterally

## 2022-08-16 NOTE — PROCEDURE
[Injection] : Injection [Left] : of the left [Knee Joint] : knee joint [Osteoarthritis] : Osteoarthritis [Inflammation] : Inflammation [Joint Pain] : Joint pain [Patient] : patient [Risk] : risk [Benefits] : benefits [Alternatives] : alternatives [Bleeding] : bleeding [Infection] : infection [Allergic Reaction] : allergic reaction [Verbal Consent Obtained] : verbal consent was obtained prior to the procedure [Alcohol] : Alcohol [Betadine] : Betadine [Ethyl Chloride Spray] : ethyl chloride spray was used as a topical anesthetic [20] : a 20-gauge [1% Lidocaine___(mL)] : [unfilled] mL of 1% Lidocaine [Methylpred. 40mg/mL___(mL)] : [unfilled] mL of 40mg/mL methylprednisolone [Bandage Applied] : a bandage [Tolerated Well] : The patient tolerated the procedure well [None] : none [PRN] : as needed

## 2022-08-18 NOTE — HISTORY OF PRESENT ILLNESS
[de-identified] : S/P ORIF L comminuted displaced complex patella fracture 5/3/21 [de-identified] : 56 yo F S/P ORIF L comminuted displaced complex patella fracture 5/3/21.  She is doing well post operatively  She is ambulant with a cane  She presents today with complaints that she is regressing, She has significant inability to bend her knee despite PT and difficulty sitting in the car for long periods of time. She did not get prescribed dynamic brace as insurance did not cover it [de-identified] : physical exam L knee \par Incision CDI healed no erythema drainage or induration \par SILT AX M U R there is mild hyper sensitivity over the lateral and medial aspects of the thigh near the patella which continues to decrease from last visit. \par She is  able to perform a SLR. She has active knee ROM -5-60\par NVID distally 2+ distal pulses \par \par  [de-identified] : 3 views L knee taken today and reviewed by me show healed  L comminuted patella fracture with k-wires in good position no signs of mechanical failure with post traumatic patellofemoral compartment osteoarthritis \par  [de-identified] :  S/P ORIF L comminuted displaced complex patella fracture 5/3/21-  regressed  [de-identified] : S/P ORIF L comminuted displaced complex patella fracture 5/3/21\par - We will hold off on PT for the next 3 months \par - Continue WBAT \par - Cortisone injection to L knee performed under sterile conditions \par - F/U in 3 months

## 2022-08-23 ENCOUNTER — RX RENEWAL (OUTPATIENT)
Age: 57
End: 2022-08-23

## 2022-10-06 ENCOUNTER — RX RENEWAL (OUTPATIENT)
Age: 57
End: 2022-10-06

## 2022-10-27 ENCOUNTER — OUTPATIENT (OUTPATIENT)
Dept: OUTPATIENT SERVICES | Facility: HOSPITAL | Age: 57
LOS: 1 days | End: 2022-10-27
Payer: COMMERCIAL

## 2022-10-27 ENCOUNTER — APPOINTMENT (OUTPATIENT)
Dept: MRI IMAGING | Facility: CLINIC | Age: 57
End: 2022-10-27

## 2022-10-27 DIAGNOSIS — S82.042A DISPLACED COMMINUTED FRACTURE OF LEFT PATELLA, INITIAL ENCOUNTER FOR CLOSED FRACTURE: ICD-10-CM

## 2022-10-27 DIAGNOSIS — Z98.890 OTHER SPECIFIED POSTPROCEDURAL STATES: Chronic | ICD-10-CM

## 2022-10-27 PROCEDURE — 73721 MRI JNT OF LWR EXTRE W/O DYE: CPT

## 2022-10-27 PROCEDURE — 73721 MRI JNT OF LWR EXTRE W/O DYE: CPT | Mod: 26,LT

## 2022-10-28 ENCOUNTER — APPOINTMENT (OUTPATIENT)
Dept: CARDIOLOGY | Facility: CLINIC | Age: 57
End: 2022-10-28

## 2022-10-31 ENCOUNTER — NON-APPOINTMENT (OUTPATIENT)
Age: 57
End: 2022-10-31

## 2022-11-10 ENCOUNTER — RX RENEWAL (OUTPATIENT)
Age: 57
End: 2022-11-10

## 2022-11-18 ENCOUNTER — APPOINTMENT (OUTPATIENT)
Dept: ORTHOPEDIC SURGERY | Facility: CLINIC | Age: 57
End: 2022-11-18

## 2022-11-28 ENCOUNTER — APPOINTMENT (OUTPATIENT)
Dept: ORTHOPEDIC SURGERY | Facility: CLINIC | Age: 57
End: 2022-11-28

## 2022-12-02 ENCOUNTER — APPOINTMENT (OUTPATIENT)
Dept: ORTHOPEDIC SURGERY | Facility: CLINIC | Age: 57
End: 2022-12-02

## 2022-12-02 PROCEDURE — 73562 X-RAY EXAM OF KNEE 3: CPT | Mod: LT

## 2022-12-02 PROCEDURE — 99214 OFFICE O/P EST MOD 30 MIN: CPT

## 2022-12-09 ENCOUNTER — APPOINTMENT (OUTPATIENT)
Dept: CT IMAGING | Facility: CLINIC | Age: 57
End: 2022-12-09

## 2022-12-09 ENCOUNTER — OUTPATIENT (OUTPATIENT)
Dept: OUTPATIENT SERVICES | Facility: HOSPITAL | Age: 57
LOS: 1 days | End: 2022-12-09
Payer: COMMERCIAL

## 2022-12-09 ENCOUNTER — RESULT REVIEW (OUTPATIENT)
Age: 57
End: 2022-12-09

## 2022-12-09 DIAGNOSIS — Z00.8 ENCOUNTER FOR OTHER GENERAL EXAMINATION: ICD-10-CM

## 2022-12-09 DIAGNOSIS — Z98.890 OTHER SPECIFIED POSTPROCEDURAL STATES: Chronic | ICD-10-CM

## 2022-12-09 DIAGNOSIS — S82.042A DISPLACED COMMINUTED FRACTURE OF LEFT PATELLA, INITIAL ENCOUNTER FOR CLOSED FRACTURE: ICD-10-CM

## 2022-12-09 PROCEDURE — 73700 CT LOWER EXTREMITY W/O DYE: CPT | Mod: 26,LT

## 2022-12-09 PROCEDURE — 76376 3D RENDER W/INTRP POSTPROCES: CPT | Mod: 26

## 2022-12-09 PROCEDURE — 73700 CT LOWER EXTREMITY W/O DYE: CPT

## 2022-12-09 PROCEDURE — 76376 3D RENDER W/INTRP POSTPROCES: CPT

## 2022-12-22 RX ORDER — LIDOCAINE 4 G/100G
4 PATCH TOPICAL
Qty: 5 | Refills: 0 | Status: ACTIVE | COMMUNITY
Start: 2022-12-22 | End: 1900-01-01

## 2022-12-29 ENCOUNTER — APPOINTMENT (OUTPATIENT)
Dept: CARDIOLOGY | Facility: CLINIC | Age: 57
End: 2022-12-29

## 2022-12-29 ENCOUNTER — LABORATORY RESULT (OUTPATIENT)
Age: 57
End: 2022-12-29

## 2022-12-29 ENCOUNTER — NON-APPOINTMENT (OUTPATIENT)
Age: 57
End: 2022-12-29

## 2022-12-29 VITALS
BODY MASS INDEX: 27.28 KG/M2 | OXYGEN SATURATION: 96 % | WEIGHT: 180 LBS | HEART RATE: 91 BPM | SYSTOLIC BLOOD PRESSURE: 134 MMHG | TEMPERATURE: 98.3 F | HEIGHT: 68 IN | DIASTOLIC BLOOD PRESSURE: 78 MMHG

## 2022-12-29 DIAGNOSIS — Z91.013 ALLERGY TO SEAFOOD: ICD-10-CM

## 2022-12-29 PROCEDURE — 93306 TTE W/DOPPLER COMPLETE: CPT

## 2022-12-29 PROCEDURE — 93000 ELECTROCARDIOGRAM COMPLETE: CPT

## 2022-12-29 PROCEDURE — 99214 OFFICE O/P EST MOD 30 MIN: CPT | Mod: 25

## 2022-12-29 NOTE — PHYSICAL EXAM
[Well Developed] : well developed [Well Nourished] : well nourished [No Acute Distress] : no acute distress [Normal Conjunctiva] : normal conjunctiva [Normal Venous Pressure] : normal venous pressure [No Carotid Bruit] : no carotid bruit [Normal S1, S2] : normal S1, S2 [No Murmur] : no murmur [No Rub] : no rub [No Gallop] : no gallop [Clear Lung Fields] : clear lung fields [Good Air Entry] : good air entry [No Respiratory Distress] : no respiratory distress  [Soft] : abdomen soft [Non Tender] : non-tender [No Masses/organomegaly] : no masses/organomegaly [Normal Bowel Sounds] : normal bowel sounds [Normal Gait] : normal gait [No Edema] : no edema [No Cyanosis] : no cyanosis [No Clubbing] : no clubbing [No Varicosities] : no varicosities [No Rash] : no rash [No Skin Lesions] : no skin lesions [Moves all extremities] : moves all extremities [No Focal Deficits] : no focal deficits [Normal Speech] : normal speech [Alert and Oriented] : alert and oriented [Normal memory] : normal memory [de-identified] : left knee pain site of left knee not red  or  swollen tender

## 2022-12-29 NOTE — HISTORY OF PRESENT ILLNESS
[FreeTextEntry1] : pt presents for cv evaluation pt with episode of chest discomfort described as pressure 1 week ago lasted 30 minutes with radiation down right arm no sob /occurred randomly .pt s/p left knee surgery with residual pain  and may need recurrent left knee surgery pt denies any   dizziness ,lightheadedness ,nausea vomiting diaphoresis\par

## 2023-01-05 ENCOUNTER — APPOINTMENT (OUTPATIENT)
Dept: CT IMAGING | Facility: CLINIC | Age: 58
End: 2023-01-05
Payer: COMMERCIAL

## 2023-01-05 PROCEDURE — 71250 CT THORAX DX C-: CPT

## 2023-01-06 ENCOUNTER — NON-APPOINTMENT (OUTPATIENT)
Age: 58
End: 2023-01-06

## 2023-01-06 ENCOUNTER — APPOINTMENT (OUTPATIENT)
Dept: CT IMAGING | Facility: CLINIC | Age: 58
End: 2023-01-06

## 2023-01-06 ENCOUNTER — OUTPATIENT (OUTPATIENT)
Dept: OUTPATIENT SERVICES | Facility: HOSPITAL | Age: 58
LOS: 1 days | End: 2023-01-06
Payer: COMMERCIAL

## 2023-01-06 DIAGNOSIS — Z11.52 ENCOUNTER FOR SCREENING FOR COVID-19: ICD-10-CM

## 2023-01-06 DIAGNOSIS — Z98.890 OTHER SPECIFIED POSTPROCEDURAL STATES: Chronic | ICD-10-CM

## 2023-01-06 LAB — SARS-COV-2 RNA SPEC QL NAA+PROBE: SIGNIFICANT CHANGE UP

## 2023-01-06 PROCEDURE — C9803: CPT

## 2023-01-06 PROCEDURE — U0005: CPT

## 2023-01-06 PROCEDURE — U0003: CPT

## 2023-01-09 ENCOUNTER — OUTPATIENT (OUTPATIENT)
Dept: OUTPATIENT SERVICES | Facility: HOSPITAL | Age: 58
LOS: 1 days | End: 2023-01-09
Payer: COMMERCIAL

## 2023-01-09 ENCOUNTER — APPOINTMENT (OUTPATIENT)
Dept: CARDIOLOGY | Facility: CLINIC | Age: 58
End: 2023-01-09

## 2023-01-09 VITALS
SYSTOLIC BLOOD PRESSURE: 145 MMHG | OXYGEN SATURATION: 99 % | RESPIRATION RATE: 16 BRPM | DIASTOLIC BLOOD PRESSURE: 80 MMHG | TEMPERATURE: 98 F | HEART RATE: 99 BPM

## 2023-01-09 DIAGNOSIS — R94.39 ABNORMAL RESULT OF OTHER CARDIOVASCULAR FUNCTION STUDY: ICD-10-CM

## 2023-01-09 DIAGNOSIS — Z98.890 OTHER SPECIFIED POSTPROCEDURAL STATES: Chronic | ICD-10-CM

## 2023-01-09 PROCEDURE — 75574 CT ANGIO HRT W/3D IMAGE: CPT | Mod: 26

## 2023-01-09 PROCEDURE — 75574 CT ANGIO HRT W/3D IMAGE: CPT

## 2023-01-09 NOTE — PROCEDURE NOTE - PLAN
Patient sent to CT for CTA coronary arteries. Catheter will be removed in CT upon completion of study.

## 2023-01-09 NOTE — PROCEDURE NOTE - PROCEDURE FINDINGS AND DETAILS
Limited ultrasound demonstrates patent brachial and basilic veins. U/S guided access with a 21 gauge needle and 5 Fr catheter placed. Flushed with 5 cc normal saline.

## 2023-01-11 ENCOUNTER — APPOINTMENT (OUTPATIENT)
Dept: PULMONOLOGY | Facility: CLINIC | Age: 58
End: 2023-01-11
Payer: COMMERCIAL

## 2023-01-11 VITALS — OXYGEN SATURATION: 97 % | SYSTOLIC BLOOD PRESSURE: 136 MMHG | DIASTOLIC BLOOD PRESSURE: 77 MMHG | HEART RATE: 62 BPM

## 2023-01-11 DIAGNOSIS — R07.9 CHEST PAIN, UNSPECIFIED: ICD-10-CM

## 2023-01-11 DIAGNOSIS — Z95.5 PRESENCE OF CORONARY ANGIOPLASTY IMPLANT AND GRAFT: ICD-10-CM

## 2023-01-11 DIAGNOSIS — R91.1 SOLITARY PULMONARY NODULE: ICD-10-CM

## 2023-01-11 DIAGNOSIS — R91.8 OTHER NONSPECIFIC ABNORMAL FINDING OF LUNG FIELD: ICD-10-CM

## 2023-01-11 PROCEDURE — ZZZZZ: CPT

## 2023-01-11 PROCEDURE — 99214 OFFICE O/P EST MOD 30 MIN: CPT | Mod: 25

## 2023-01-11 PROCEDURE — 95012 NITRIC OXIDE EXP GAS DETER: CPT

## 2023-01-11 PROCEDURE — 99204 OFFICE O/P NEW MOD 45 MIN: CPT | Mod: 25

## 2023-01-11 PROCEDURE — 36415 COLL VENOUS BLD VENIPUNCTURE: CPT

## 2023-01-11 PROCEDURE — 94727 GAS DIL/WSHOT DETER LNG VOL: CPT

## 2023-01-11 PROCEDURE — 94010 BREATHING CAPACITY TEST: CPT

## 2023-01-11 PROCEDURE — 94729 DIFFUSING CAPACITY: CPT

## 2023-01-11 PROCEDURE — 88738 HGB QUANT TRANSCUTANEOUS: CPT

## 2023-01-11 RX ORDER — METHYLPREDNISOLONE 4 MG/1
4 TABLET ORAL
Qty: 1 | Refills: 0 | Status: ACTIVE | COMMUNITY
Start: 2023-01-11 | End: 1900-01-01

## 2023-01-12 ENCOUNTER — NON-APPOINTMENT (OUTPATIENT)
Age: 58
End: 2023-01-12

## 2023-01-12 ENCOUNTER — APPOINTMENT (OUTPATIENT)
Dept: CARDIOLOGY | Facility: CLINIC | Age: 58
End: 2023-01-12
Payer: COMMERCIAL

## 2023-01-12 VITALS
HEART RATE: 69 BPM | RESPIRATION RATE: 16 BRPM | TEMPERATURE: 98.1 F | DIASTOLIC BLOOD PRESSURE: 80 MMHG | WEIGHT: 180 LBS | OXYGEN SATURATION: 97 % | SYSTOLIC BLOOD PRESSURE: 130 MMHG | HEIGHT: 68 IN | BODY MASS INDEX: 27.28 KG/M2

## 2023-01-12 DIAGNOSIS — R89.9 UNSPECIFIED ABNORMAL FINDING IN SPECIMENS FROM OTHER ORGANS, SYSTEMS AND TISSUES: ICD-10-CM

## 2023-01-12 DIAGNOSIS — R07.89 OTHER CHEST PAIN: ICD-10-CM

## 2023-01-12 DIAGNOSIS — N20.0 CALCULUS OF KIDNEY: ICD-10-CM

## 2023-01-12 PROBLEM — R91.8 GROUND GLASS OPACITY PRESENT ON IMAGING OF LUNG: Status: ACTIVE | Noted: 2023-01-12

## 2023-01-12 PROBLEM — R91.1 PULMONARY NODULE: Status: ACTIVE | Noted: 2020-05-01

## 2023-01-12 PROCEDURE — 99214 OFFICE O/P EST MOD 30 MIN: CPT | Mod: 25

## 2023-01-12 PROCEDURE — 93000 ELECTROCARDIOGRAM COMPLETE: CPT

## 2023-01-12 RX ORDER — PEAK FLOW METER
EACH MISCELLANEOUS
Qty: 1 | Refills: 0 | Status: ACTIVE | COMMUNITY
Start: 2023-01-12 | End: 1900-01-01

## 2023-01-12 RX ORDER — NITROGLYCERIN 20 MG/1
0.1 PATCH TRANSDERMAL DAILY
Qty: 90 | Refills: 1 | Status: DISCONTINUED | COMMUNITY
Start: 2019-07-12 | End: 2023-01-12

## 2023-01-12 NOTE — ASSESSMENT
[FreeTextEntry1] : Reviewed chest CT scan done on 01/05/2023 and angio heart CT scan done on 01/09/2023.\par Venipuncture with labs drawn in office.\par Obtained and reviewed pulmonary function test, NIOX results with patient today.\par Start Medrol Mahesh taper for her likely cryptogenic organizing pneumonia symptoms.\par Prescribed Doxycycline for 5 days..\par Advised patient to do cardiac stress test.\par Return for pulmonary follow up in 3 weeks.\par Will repeat noncontrast chest CT scan for follow-up in 1 to 2 months.

## 2023-01-12 NOTE — HISTORY OF PRESENT ILLNESS
[TextBox_4] : ASHWIN JACOB is a 57 year female referred by Dr. Smith Lin with history of angina who presents to the office for a consultation of right-sided shoulder pain. Patient reports of a severe right-sided shoulder pain that radiates down her right arm. She reports taking aspirin for her symptoms with improvements. She reports of having occasional heartburn and uneasiness on her middle chest. Patient denies of any wheezing. Patient reports that she is currently taking baby aspirin and Metoprolol 25mg 2x. Patient states that she contracted COVID-19 on 2020. Patient denies any history asthma. She reports of receiving a stent 3 years ago due to her LED disease. She states of having an injury where she fractured her patella. Patient states that she currently has hardware that she received for her fractured patella. Patient states of taking a COVID-19 test recently which came back negative.\par

## 2023-01-12 NOTE — CONSULT LETTER
[Dear  ___] : Dear  [unfilled], [Courtesy Letter:] : I had the pleasure of seeing your patient, [unfilled], in my office today. [Please see my note below.] : Please see my note below. [Consult Closing:] : Thank you very much for allowing me to participate in the care of this patient.  If you have any questions, please do not hesitate to contact me. [Sincerely,] : Sincerely, [FreeTextEntry3] : Dr. Krupa Hood

## 2023-01-12 NOTE — ADDENDUM
[FreeTextEntry1] : I, Rc Sowmegan, acted solely as a scribe for Dr. Krupa Hood D.O., on this date 01/11/2023. \par \par All medical record entries made by the Scribe were at my, Dr. Krupa Hood D.O., direction and personally dictated by me on 01/11/2023. I have reviewed the chart and agree that the record accurately reflects my personal performance of the history, physical exam, assessment and plan. I have also personally directed, reviewed, and agreed with the chart.

## 2023-01-12 NOTE — PROCEDURE
[FreeTextEntry1] : Pulmonary Function Test obtained in office today which revealed: Spirometry: Suggestive evidence of mild restrictive defect, Lung Volume: Mild restrictive defect, Diffusion: Moderate impairment.\par ___\par \par  Exhaled Nitric Oxide             Final\par \par No Documents Attached\par \par \par   Test   Result   Flag Reference Goal Last Verified \par   Exhaled Nitric Oxide 8      REQUIRED \par \par  Ordered by: HEVER PASTOR       Collected/Examined: 11Jan2023 01:47PM       \par Verification Required       Stage: Final       \par  Performed at: In Office       Performed by: HEVER PASTOR       Resulted: 11Jan2023 01:47PM       Last Updated: 11Jan2023 01:58PM        \par ___\par chest CT on 01/05/202:\par \par   CT Chest No Cont             Final\par \par No Documents Attached\par \par \par \par \par   EXAM: [id number] - CT CHEST  - ORDERED BY: JEFFERY HINOJOSA\par \par \par PROCEDURE DATE:  01/05/2023\par \par \par \par INTERPRETATION:  INDICATION: Lung nodule\par \par TECHNIQUE: Volumetric images of the chest without intravenous contrast. Maximum intensity projection images were generated.\par \par COMPARISON: CT chest 4/28/2021.\par \par FINDINGS:\par \par LUNGS/AIRWAYS/PLEURA: No endobronchial lesion. New mild groundglass in the posterior aspects of both lower lobes. Unchanged small triangular nodule abutting the right oblique fissure and in the apical segment of the right upper lobe, compatible with intrapulmonary lymph nodes. No pleural effusion.\par \par LYMPH NODES/MEDIASTINUM: No lymphadenopathy. 2.8 cm cyst adjacent to the distal esophagus at the level of the hiatus (301-90), prior 2.5 cm.\par \par HEART/VASCULATURE: Normal heart size. Unremarkable pericardium. Normal caliber aorta and pulmonary artery. Coronary artery calcifications.\par \par UPPER ABDOMEN: Cholelithiasis. Nonobstructing right renal calcifications.\par \par BONES/SOFT TISSUES: Unremarkable.\par \par \par IMPRESSION:\par \par New mild groundglass in the dependent lower lobes, likely inflammatory. 3 month follow-up is recommended to assess for change.\par \par Slightly larger cyst adjacent to the distal esophagus which can be reassessed on follow-up.\par \par --- End of Report ---\par \par \par \par \par \par \par RAJAN DALY M.D., ATTENDING RADIOGIST\par This document has been electronically signed. Jan 5 2023 12:12PM\par \par  \par \par  Ordered by: JEFFERY HINOJOSA       Collected/Examined: 05Jan2023 11:17AM       \par Verified by: BEV CROUCH 06Jan2023 03:00PM       \par  Result Communication: No patient communication needed at this time;\par Stage: Final       Resulted: 05Jan2023 11:30AM       Last Updated: 06Jan2023 03:00PM       Accession: [id number]\par ___\par angio heart coronary CT scan on 01/09/2023:\par \par   CT Angio Heart Coronary w/ IV Cont             Final\par \par No Documents Attached\par \par \par \par \par   ACC: [id number]     EXAM:  CT ANGIO HEART CORONARY IC\par \par PROCEDURE DATE:  01/09/2023\par \par \par \par INTERPRETATION:  Indication:  Coronary artery disease\par \par History:  Ms. Rutherford is a 57-year-old woman with coronary artery disease status-post stent placement who reports chest pain.\par \par Scanner:  [employer]\par \par Acquisitions:\par 1.  Non-contrast prospectively-gated 320-multidtector volumetric computed tomography heart\par 2.  Contrast-enhanced prospectively-gated 320-multidetector volumetric computed tomography heart\par \par Pre-medication: Nitroglycerin 0.8 mg sublingual\par \par Contrast:  65 mL Omnipaque 350\par \par Resting heart rate (contrast-enhanced acquisition):  53 beats per minute\par \par Quality:  Good\par \par Post-processing:  Three-dimensional volume-rendered and multiplanar reconstructions generated with Vit[employer] software.\par \par FINDINGS:\par \par Cardiovascular:\par \par Coronary arteries:\par Coronary artery calcium Agatston score:\par Left main (LM) coronary artery:  0\par Left anterior descending (LAD) coronary artery:  129 (proximal to stent)\par Left circumflex (LCX) coronary artery:  23\par Right coronary artery (RCA):  107\par \par Right-dominant coronary circulation.\par \par The LM coronary artery has minimal noncalcified plaque.\par \par The LAD coronary artery has mild (30-49%) noncalcified and calcified plaque in the proximal segment (proximal to the first septal  branch). A stent (extending 30 mm in length) is noted in the mid segment (just distal to the first septal  branch). Assessment of the very proximal stent is indeterminate due to calcification. No overt evidence of in-stent restenosis detected in the visualized segment of the stent (distal to the approximately the first 3 mm which is indeterminate as noted). The LAD distal to the stent has minimal (<30%) noncalcified and calcified plaque. Small diagonal branches are present.\par \par The LCX coronary artery has mild (30-49%) noncalcified and calcified plaque proximally. The obtuse marginal (OM) branch has minimal (<30%) noncalcified plaque. Additional small OM branches are present.\par \par The RCA has minimal (<30%) noncalcified plaque in the proximal segment and moderate (approximately 50%) noncalcified plaque in the mid segment. The right posterior descending artery (PDA) and the right posterolateral (RPL) branch appear angiographically normal.\par \par Aorta:\par No thoracic aortic dissection noted in the visualized thoracic aorta.\par \par Mild noncalcified and calcified plaque is present in the imaged aorta.\par \par Pericardium:\par There is no pericardial effusion.\par \par Chambers:\par The cardiac chambers are qualitatively normal in size.\par \par No filling defect noted in the left atrial appendage.\par \par Non-cardiac:\par Evaluation of the imaged portions of the lungs demonstrate minimal dependent atelectasis within the lower lobes corresponding to the previously seen areas of groundglass on the prior chest CT of January 5, 2023. Degenerative changes of the spine. Previously described nonenhancing small pocket of fluid or cyst adjacent to and to the left of the lower esophagus is unchanged since January 5, 2023.\par \par IMPRESSION:\par Coronary artery disease as reported above.\par \par --- End of Report ---\par \par \par \par \par LARRY MARTIN MD; Attending Cardiologist\par This document has been electronically signed.\par ELVA SENIOR MD; Attending Radiologist\par This document has been electronically signed. Jan 9 2023  6:11PM\par \par  \par \par  Ordered by: JEFFERY HINJOOSA       Collected/Examined: 09Jan2023 02:23PM       \par Verified by: HOLLY TIERNEY 11Jan2023 08:30AM       \par  Result Communication: Discussed results with patient;\par Stage: Final       \par  Performed at: [facility]       Resulted: 09Jan2023 05:30PM       Last Updated: 11Jan2023 08:30AM       Accession: [id number]

## 2023-01-12 NOTE — DISCUSSION/SUMMARY
[FreeTextEntry1] : Ms. ASHWIN JACOB is an 57 year old female, history of angina with right-sided shoulder pain that radiates down to her right arm likely secondary to a pinched nerve from cervical radiculopathy.  Secondly, she is a patient with mild groundglass opacities found on a recent chest CT scan, which is likely secondary to inflammation changes in the lungs.

## 2023-01-12 NOTE — REASON FOR VISIT
[Consultation] : a consultation [Abnormal CXR/ Chest CT] : an abnormal CXR/ chest CT [TextBox_44] : Right-sided shoulder pain that radiates down to her right arm [TextBox_13] : Dr. Smith Lin

## 2023-01-14 PROBLEM — R07.89 CHEST DISCOMFORT: Status: ACTIVE | Noted: 2020-04-17

## 2023-01-14 NOTE — REVIEW OF SYSTEMS
[Chest Discomfort] : chest discomfort [Negative] : Heme/Lymph [SOB] : shortness of breath [Dyspnea on exertion] : dyspnea during exertion [Lower Ext Edema] : no extremity edema [Leg Claudication] : no intermittent leg claudication [Orthopnea] : no orthopnea [Palpitations] : no palpitations [PND] : no PND [Syncope] : no syncope

## 2023-01-14 NOTE — HISTORY OF PRESENT ILLNESS
[FreeTextEntry1] : This is a 58 y/o female with a pmhx of CAD, pulmonary nodule here today due to chest heaviness. She describes  today discomfort as constant, mid sternal chest pain and described as "heaviness". Patient has tried pantoprazole with no relief. She does admit to some dyspnea Patient was last seen in the office on 12/29/22 with chest discomfort. Metoprolol was increased and Patient was sent for CTCA which showed mild plaque in proximal, distal LAD. Stent is noted in the mid segment, assessment is indeterminate  due to calcification, no overt evidence of restenosis.  LCX, OM, minimal plaque, RCA with mild-mod plaque.  pt had cardiac cath april 2021 Mid LAD: There was a diffuse 10 % stenosis at the site of a prior angioplasty with stent placement.\par   .  Patient states she is having abdominal US tomorrow to assess gallstones and kidney stones. CT chest showed groundglass opacities. She saw Dr. Hood who attributed it to infectious and prescribed medrol dose pack and doxycycline\par

## 2023-01-15 LAB
ANION GAP SERPL CALC-SCNC: 11 MMOL/L
BASOPHILS # BLD AUTO: 0.11 K/UL
BASOPHILS NFR BLD AUTO: 1.5 %
BUN SERPL-MCNC: 21 MG/DL
CALCIUM SERPL-MCNC: 9.6 MG/DL
CHLORIDE SERPL-SCNC: 105 MMOL/L
CO2 SERPL-SCNC: 25 MMOL/L
CREAT SERPL-MCNC: 0.94 MG/DL
DEPRECATED D DIMER PPP IA-ACNC: <150 NG/ML DDU
EGFR: 70 ML/MIN/1.73M2
EOSINOPHIL # BLD AUTO: 0.33 K/UL
EOSINOPHIL NFR BLD AUTO: 4.5 %
GLUCOSE SERPL-MCNC: 81 MG/DL
HCT VFR BLD CALC: 39.7 %
HGB BLD-MCNC: 13.2 G/DL
IMM GRANULOCYTES NFR BLD AUTO: 1 %
LYMPHOCYTES # BLD AUTO: 2.6 K/UL
LYMPHOCYTES NFR BLD AUTO: 35.3 %
MAN DIFF?: NORMAL
MCHC RBC-ENTMCNC: 31.8 PG
MCHC RBC-ENTMCNC: 33.2 GM/DL
MCV RBC AUTO: 95.7 FL
MONOCYTES # BLD AUTO: 0.57 K/UL
MONOCYTES NFR BLD AUTO: 7.7 %
NEUTROPHILS # BLD AUTO: 3.68 K/UL
NEUTROPHILS NFR BLD AUTO: 50 %
PLATELET # BLD AUTO: 242 K/UL
POTASSIUM SERPL-SCNC: 4.6 MMOL/L
RBC # BLD: 4.15 M/UL
RBC # FLD: 13.1 %
SODIUM SERPL-SCNC: 141 MMOL/L
WBC # FLD AUTO: 7.36 K/UL

## 2023-01-17 ENCOUNTER — APPOINTMENT (OUTPATIENT)
Dept: CARDIOLOGY | Facility: CLINIC | Age: 58
End: 2023-01-17

## 2023-01-17 ENCOUNTER — NON-APPOINTMENT (OUTPATIENT)
Age: 58
End: 2023-01-17

## 2023-01-17 DIAGNOSIS — Z91.041 RADIOGRAPHIC DYE ALLERGY STATUS: ICD-10-CM

## 2023-01-17 RX ORDER — DIPHENHYDRAMINE HCL 25 MG/1
25 CAPSULE ORAL
Qty: 2 | Refills: 0 | Status: ACTIVE | COMMUNITY
Start: 2023-01-17 | End: 1900-01-01

## 2023-01-18 ENCOUNTER — OUTPATIENT (OUTPATIENT)
Dept: OUTPATIENT SERVICES | Facility: HOSPITAL | Age: 58
LOS: 1 days | End: 2023-01-18
Payer: COMMERCIAL

## 2023-01-18 ENCOUNTER — TRANSCRIPTION ENCOUNTER (OUTPATIENT)
Age: 58
End: 2023-01-18

## 2023-01-18 VITALS
SYSTOLIC BLOOD PRESSURE: 128 MMHG | HEART RATE: 62 BPM | RESPIRATION RATE: 16 BRPM | OXYGEN SATURATION: 97 % | DIASTOLIC BLOOD PRESSURE: 75 MMHG

## 2023-01-18 VITALS
DIASTOLIC BLOOD PRESSURE: 90 MMHG | RESPIRATION RATE: 16 BRPM | SYSTOLIC BLOOD PRESSURE: 160 MMHG | TEMPERATURE: 98 F | OXYGEN SATURATION: 98 % | HEART RATE: 58 BPM

## 2023-01-18 DIAGNOSIS — Z98.890 OTHER SPECIFIED POSTPROCEDURAL STATES: Chronic | ICD-10-CM

## 2023-01-18 DIAGNOSIS — R07.89 OTHER CHEST PAIN: ICD-10-CM

## 2023-01-18 DIAGNOSIS — Z95.5 PRESENCE OF CORONARY ANGIOPLASTY IMPLANT AND GRAFT: Chronic | ICD-10-CM

## 2023-01-18 LAB
ANION GAP SERPL CALC-SCNC: 13 MMOL/L — SIGNIFICANT CHANGE UP (ref 5–17)
BUN SERPL-MCNC: 23 MG/DL — SIGNIFICANT CHANGE UP (ref 7–23)
CALCIUM SERPL-MCNC: 9.6 MG/DL — SIGNIFICANT CHANGE UP (ref 8.4–10.5)
CHLORIDE SERPL-SCNC: 105 MMOL/L — SIGNIFICANT CHANGE UP (ref 96–108)
CO2 SERPL-SCNC: 22 MMOL/L — SIGNIFICANT CHANGE UP (ref 22–31)
CREAT SERPL-MCNC: 0.83 MG/DL — SIGNIFICANT CHANGE UP (ref 0.5–1.3)
EGFR: 82 ML/MIN/1.73M2 — SIGNIFICANT CHANGE UP
GLUCOSE SERPL-MCNC: 137 MG/DL — HIGH (ref 70–99)
HCT VFR BLD CALC: 41 % — SIGNIFICANT CHANGE UP (ref 34.5–45)
HGB BLD-MCNC: 13.6 G/DL — SIGNIFICANT CHANGE UP (ref 11.5–15.5)
MCHC RBC-ENTMCNC: 31.8 PG — SIGNIFICANT CHANGE UP (ref 27–34)
MCHC RBC-ENTMCNC: 33.2 GM/DL — SIGNIFICANT CHANGE UP (ref 32–36)
MCV RBC AUTO: 95.8 FL — SIGNIFICANT CHANGE UP (ref 80–100)
NRBC # BLD: 0 /100 WBCS — SIGNIFICANT CHANGE UP (ref 0–0)
PLATELET # BLD AUTO: 232 K/UL — SIGNIFICANT CHANGE UP (ref 150–400)
POTASSIUM SERPL-MCNC: 4.6 MMOL/L — SIGNIFICANT CHANGE UP (ref 3.5–5.3)
POTASSIUM SERPL-SCNC: 4.6 MMOL/L — SIGNIFICANT CHANGE UP (ref 3.5–5.3)
RBC # BLD: 4.28 M/UL — SIGNIFICANT CHANGE UP (ref 3.8–5.2)
RBC # FLD: 12.7 % — SIGNIFICANT CHANGE UP (ref 10.3–14.5)
SODIUM SERPL-SCNC: 140 MMOL/L — SIGNIFICANT CHANGE UP (ref 135–145)
WBC # BLD: 7.53 K/UL — SIGNIFICANT CHANGE UP (ref 3.8–10.5)
WBC # FLD AUTO: 7.53 K/UL — SIGNIFICANT CHANGE UP (ref 3.8–10.5)

## 2023-01-18 PROCEDURE — 93005 ELECTROCARDIOGRAM TRACING: CPT

## 2023-01-18 PROCEDURE — C1769: CPT

## 2023-01-18 PROCEDURE — 99152 MOD SED SAME PHYS/QHP 5/>YRS: CPT

## 2023-01-18 PROCEDURE — 85027 COMPLETE CBC AUTOMATED: CPT

## 2023-01-18 PROCEDURE — C1894: CPT

## 2023-01-18 PROCEDURE — 80048 BASIC METABOLIC PNL TOTAL CA: CPT

## 2023-01-18 PROCEDURE — 93010 ELECTROCARDIOGRAM REPORT: CPT

## 2023-01-18 PROCEDURE — 93458 L HRT ARTERY/VENTRICLE ANGIO: CPT

## 2023-01-18 PROCEDURE — C1887: CPT

## 2023-01-18 PROCEDURE — 93458 L HRT ARTERY/VENTRICLE ANGIO: CPT | Mod: 26

## 2023-01-18 RX ORDER — SODIUM CHLORIDE 9 MG/ML
1000 INJECTION INTRAMUSCULAR; INTRAVENOUS; SUBCUTANEOUS
Refills: 0 | Status: DISCONTINUED | OUTPATIENT
Start: 2023-01-18 | End: 2023-02-01

## 2023-01-18 RX ORDER — FERROUS SULFATE 325(65) MG
1 TABLET ORAL
Qty: 0 | Refills: 0 | DISCHARGE

## 2023-01-18 RX ORDER — SODIUM CHLORIDE 9 MG/ML
250 INJECTION INTRAMUSCULAR; INTRAVENOUS; SUBCUTANEOUS ONCE
Refills: 0 | Status: COMPLETED | OUTPATIENT
Start: 2023-01-18 | End: 2023-01-18

## 2023-01-18 RX ADMIN — SODIUM CHLORIDE 750 MILLILITER(S): 9 INJECTION INTRAMUSCULAR; INTRAVENOUS; SUBCUTANEOUS at 10:46

## 2023-01-18 RX ADMIN — SODIUM CHLORIDE 75 MILLILITER(S): 9 INJECTION INTRAMUSCULAR; INTRAVENOUS; SUBCUTANEOUS at 11:00

## 2023-01-18 NOTE — H&P CARDIOLOGY - NSICDXPASTSURGICALHX_GEN_ALL_CORE_FT
PAST SURGICAL HISTORY:   delivery delivered     S/P breast lumpectomy Left breast    S/P knee surgery right meniscus    Stented coronary artery

## 2023-01-18 NOTE — ASU PATIENT PROFILE, ADULT - FALL HARM RISK - UNIVERSAL INTERVENTIONS
Bed in lowest position, wheels locked, appropriate side rails in place/Call bell, personal items and telephone in reach/Instruct patient to call for assistance before getting out of bed or chair/Non-slip footwear when patient is out of bed/[place] to call system/Physically safe environment - no spills, clutter or unnecessary equipment/Purposeful Proactive Rounding/Room/bathroom lighting operational, light cord in reach

## 2023-01-18 NOTE — ASU DISCHARGE PLAN (ADULT/PEDIATRIC) - CARE PROVIDER_API CALL
Smith Lin)  Cardiology; Internal Medicine  [address], [address]  [place] [address]  Phone: [phone]  Fax: [phone]  Established Patient  Follow Up Time: 2 weeks

## 2023-01-18 NOTE — ASU DISCHARGE PLAN (ADULT/PEDIATRIC) - NS MD DC FALL RISK RISK
For information on Fall & Injury Prevention, visit: [email or web address] OR  [email or web address] OR  https://www.cdc.gov/steadi/patient.html

## 2023-01-18 NOTE — H&P CARDIOLOGY - HISTORY OF PRESENT ILLNESS
56 y/o female with pmh of HTN, HLD, CAD with prior mLAD stent s/p dx [facility] 4/2021 with ISR 10% mLAD, orthostatic Hypotension, GERD, ground glass with dependent atelectasis on CT 1/5/23  treated for Inflammatory condition by Dr. Hood Pulmonologist (on Medrol dose pack day 3/5) for reports of right chest, back and shoulder discomfort is also followed by Dr. Lin, Cardiologist with [facility] on 1/9/23 revealing Agatston score 229 with mLAD 30-49%; LCX 30-49% noncalcified and calcified plaque proximally; prox RCA 30% and mRCA 50%.  TTE on 12/29/22 reveals LVEF 64% with no significant valvular disease and she presents for [facility] for further evaluation of her CAD.  She states her right sided chest pain in 1/10 and is not worsened by heavy lifting or strenuous activity with no associated symptoms and was started on Imdur 30mg but had no resumed after [facility].  She states its chronically present.     Of Note:  Pt was pretreated with Prednisone 13-hr protocol and held her Medrol dose pack day 3/5.  She will take her Benadryl 50mg PO and her Prednisone dose #3 50mg 1 hour prior to procedure.

## 2023-01-18 NOTE — H&P CARDIOLOGY - NSICDXPASTMEDICALHX_GEN_ALL_CORE_FT
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

## 2023-01-20 ENCOUNTER — APPOINTMENT (OUTPATIENT)
Dept: ORTHOPEDIC SURGERY | Facility: CLINIC | Age: 58
End: 2023-01-20
Payer: COMMERCIAL

## 2023-01-20 DIAGNOSIS — S82.65XA NONDISPLACED FRACTURE OF LATERAL MALLEOLUS OF LEFT FIBULA, INITIAL ENCOUNTER FOR CLOSED FRACTURE: ICD-10-CM

## 2023-01-20 PROCEDURE — 99442: CPT | Mod: 95

## 2023-01-23 PROBLEM — S82.65XA CLOSED NONDISPLACED FRACTURE OF LATERAL MALLEOLUS OF LEFT FIBULA, INITIAL ENCOUNTER: Status: ACTIVE | Noted: 2020-01-21

## 2023-01-30 ENCOUNTER — APPOINTMENT (OUTPATIENT)
Dept: CARDIOLOGY | Facility: CLINIC | Age: 58
End: 2023-01-30

## 2023-01-31 ENCOUNTER — NON-APPOINTMENT (OUTPATIENT)
Age: 58
End: 2023-01-31

## 2023-01-31 PROBLEM — R89.9 ABNORMAL LABORATORY TEST RESULT: Status: ACTIVE | Noted: 2023-01-31

## 2023-02-02 NOTE — CHART NOTE - NSCHARTNOTEFT_GEN_A_CORE
Medicine PA Raza AMAROASHWIN SIMONS  [id number]       Notified by RN for patient with SBP 80s this AM, patient seen and examined at bedside in NAD, lying in bed. Patient stating this AM she felt a little dizzy when she gets up, and feels generally weak this morning, patient denies any CP/SOB/N/V/D/headache/diaphoresis/abdominal pain/numbness/tingling.    Vital Signs Last 24 Hrs  T(C): 36.9 (05-03-21 @ 00:52), Max: 36.9 (05-03-21 @ 00:52)  T(F): 98.5 (05-03-21 @ 00:52), Max: 98.5 (05-03-21 @ 00:52)  HR: 78 (05-03-21 @ 00:52) (78 - 91)  BP: 82/52 (05-03-21 @ 05:54) (82/52 - 101/61)  BP(mean): --  RR: 18 (05-03-21 @ 00:52) (17 - 18)  SpO2: 97% (05-03-21 @ 00:52) (95% - 99%)  Daily     Daily   I&O's Summary    01 May 2021 07:01  -  02 May 2021 07:00  --------------------------------------------------------  IN: 540 mL / OUT: 250 mL / NET: 290 mL    02 May 2021 07:01  -  03 May 2021 06:11  --------------------------------------------------------  IN: 440 mL / OUT: 1600 mL / NET: -1160 mL      CAPILLARY BLOOD GLUCOSE                          9.4    8.34  )-----------( 247      ( 03 May 2021 04:35 )             29.4     05-03    141  |  108  |  14  ----------------------------<  113<H>  4.1   |  23  |  1.01    Ca    9.0      03 May 2021 04:35      PT/INR - ( 03 May 2021 04:35 )   PT: 13.9 sec;   INR: 1.17 ratio         PTT - ( 03 May 2021 04:35 )  PTT:31.1 sec          PHYSICAL EXAM:  GENERAL: NAD,   CHEST/LUNG: Clear to auscultation bilaterally;  HEART: Regular rate and rhythm;   ABDOMEN: Soft, Nontender, Nondistended; Bowel sounds present  PSYCH: AAOx3  NEUROLOGY: non-focal    Assessment/Plan: HPI:  55y Female presents with LEFT comminuted patella fracture s/p mechanical fall on 4/19. Pt seen by Dr. Trejo in office. Instructed to come [facility] ED for med/cards clearance and plan for ORIF of LEFT patella fx. Denies numbness/tingling in the affected extremity. Patient has been minimally ambulating with walker. Sees cardiologist Dr. Lin outpatient. Now with SBP 80s, slight dizziness/weakness       - SBP 80s this AM with slight dizziness / weakness / ? 2/2 dehydration     > rest of VSS   > BP meds held this AM   > patient on 75cc/hr of maintenance fluids   > given additional 500 cc bolus over 1 hour (with instructions of repeat BP afterwards)  > orthostatic BP    > will monitor patient closely   > AM labs reviewed   > ID to be consulted regarding urine culture per attending note   > q 4 hr VS check  > will endorse to AM team   > attending called, awaiting call back             oRss Diaz PA-C,   Department of Medicine Medicine PA Raza AMAROASHWIN SIMONS  [id number]       Notified by RN for patient with SBP 80s this AM, patient seen and examined at bedside in NAD, lying in bed. Patient stating this AM she felt a little dizzy when she gets up, and feels generally weak this morning, patient denies any CP/SOB/N/V/D/headache/diaphoresis/abdominal pain/numbness/tingling.    Vital Signs Last 24 Hrs  T(C): 36.9 (05-03-21 @ 00:52), Max: 36.9 (05-03-21 @ 00:52)  T(F): 98.5 (05-03-21 @ 00:52), Max: 98.5 (05-03-21 @ 00:52)  HR: 78 (05-03-21 @ 00:52) (78 - 91)  BP: 82/52 (05-03-21 @ 05:54) (82/52 - 101/61)  BP(mean): --  RR: 18 (05-03-21 @ 00:52) (17 - 18)  SpO2: 97% (05-03-21 @ 00:52) (95% - 99%)  Daily     Daily   I&O's Summary    01 May 2021 07:01  -  02 May 2021 07:00  --------------------------------------------------------  IN: 540 mL / OUT: 250 mL / NET: 290 mL    02 May 2021 07:01  -  03 May 2021 06:11  --------------------------------------------------------  IN: 440 mL / OUT: 1600 mL / NET: -1160 mL      CAPILLARY BLOOD GLUCOSE                          9.4    8.34  )-----------( 247      ( 03 May 2021 04:35 )             29.4     05-03    141  |  108  |  14  ----------------------------<  113<H>  4.1   |  23  |  1.01    Ca    9.0      03 May 2021 04:35      PT/INR - ( 03 May 2021 04:35 )   PT: 13.9 sec;   INR: 1.17 ratio         PTT - ( 03 May 2021 04:35 )  PTT:31.1 sec          PHYSICAL EXAM:  GENERAL: NAD,   CHEST/LUNG: Clear to auscultation bilaterally;  HEART: Regular rate and rhythm;   ABDOMEN: Soft, Nontender, Nondistended; Bowel sounds present  PSYCH: AAOx3  NEUROLOGY: non-focal    Assessment/Plan: HPI:  55y Female presents with LEFT comminuted patella fracture s/p mechanical fall on 4/19. Pt seen by Dr. Trejo in office. Instructed to come to [facility] ED for med/cards clearance and plan for ORIF of LEFT patella fx. Denies numbness/tingling in the affected extremity. Patient has been minimally ambulating with walker. Sees cardiologist Dr. Lin outpatient. Now with SBP 80s, slight dizziness/weakness       - SBP 80s this AM with slight dizziness / weakness / ? 2/2 dehydration (85/53) electronic / (82/52) manual     > rest of VSS   > BP meds held this AM   > patient on 75cc/hr of maintenance fluids   > given additional 500 cc bolus over 1 hour (with instructions of repeat BP afterwards)  > orthostatic BP    > will monitor patient closely   > AM labs reviewed   > ID to be consulted regarding urine culture per attending note   > q 4 hr VS check  > will endorse to AM team   > attending called, awaiting call back             Ross Diaz PA-C,   Department of Medicine Medicine PA Raza AMAROASHWIN SIMONS  [id number]       Notified by RN for patient with SBP 80s this AM, patient seen and examined at bedside in NAD, lying in bed. Patient stating this AM she felt a little dizzy when she gets up, and feels generally weak this morning, patient denies any CP/SOB/N/V/D/headache/diaphoresis/abdominal pain/numbness/tingling.    Vital Signs Last 24 Hrs  T(C): 36.9 (05-03-21 @ 00:52), Max: 36.9 (05-03-21 @ 00:52)  T(F): 98.5 (05-03-21 @ 00:52), Max: 98.5 (05-03-21 @ 00:52)  HR: 78 (05-03-21 @ 00:52) (78 - 91)  BP: 82/52 (05-03-21 @ 05:54) (82/52 - 101/61)  BP(mean): --  RR: 18 (05-03-21 @ 00:52) (17 - 18)  SpO2: 97% (05-03-21 @ 00:52) (95% - 99%)  Daily     Daily   I&O's Summary    01 May 2021 07:01  -  02 May 2021 07:00  --------------------------------------------------------  IN: 540 mL / OUT: 250 mL / NET: 290 mL    02 May 2021 07:01  -  03 May 2021 06:11  --------------------------------------------------------  IN: 440 mL / OUT: 1600 mL / NET: -1160 mL      CAPILLARY BLOOD GLUCOSE                          9.4    8.34  )-----------( 247      ( 03 May 2021 04:35 )             29.4     05-03    141  |  108  |  14  ----------------------------<  113<H>  4.1   |  23  |  1.01    Ca    9.0      03 May 2021 04:35      PT/INR - ( 03 May 2021 04:35 )   PT: 13.9 sec;   INR: 1.17 ratio         PTT - ( 03 May 2021 04:35 )  PTT:31.1 sec          PHYSICAL EXAM:  GENERAL: NAD,   CHEST/LUNG: Clear to auscultation bilaterally;  HEART: Regular rate and rhythm;   ABDOMEN: Soft, Nontender, Nondistended; Bowel sounds present  PSYCH: AAOx3  NEUROLOGY: non-focal    Assessment/Plan: HPI:  55y Female presents with LEFT comminuted patella fracture s/p mechanical fall on 4/19. Pt seen by Dr. Trejo in office. Instructed to come to [facility] ED for med/cards clearance and plan for ORIF of LEFT patella fx. Denies numbness/tingling in the affected extremity. Patient has been minimally ambulating with walker. Sees cardiologist Dr. Lin outpatient. Now with SBP 80s, slight dizziness/weakness       - SBP 80s this AM with slight dizziness / weakness / ? 2/2 dehydration (85/53) electronic / (82/52) manual     > rest of VSS   > BP meds held this AM   > patient on 75cc/hr of maintenance fluids   > given additional 500 cc bolus over 1 hour (with instructions of repeat BP afterwards)  > orthostatic BP    > will monitor patient closely   > AM labs reviewed   > ID to be consulted regarding urine culture per attending note   > q 4 hr VS check  > Plan D/W RN in regards to repeating BP   > will endorse to AM team   > attending called, awaiting call back             Ross Diaz PA-C,   Department of Medicine Medicine PA Raza AMAROASHWIN SIMONS  [id number]       Notified by RN for patient with SBP 80s this AM, patient seen and examined at bedside in NAD, lying in bed. Patient stating this AM she felt a little dizzy when she gets up, and feels generally weak this morning, patient denies any CP/SOB/N/V/D/headache/diaphoresis/abdominal pain/numbness/tingling/chills/.    Vital Signs Last 24 Hrs  T(C): 36.9 (05-03-21 @ 00:52), Max: 36.9 (05-03-21 @ 00:52)  T(F): 98.5 (05-03-21 @ 00:52), Max: 98.5 (05-03-21 @ 00:52)  HR: 78 (05-03-21 @ 00:52) (78 - 91)  BP: 82/52 (05-03-21 @ 05:54) (82/52 - 101/61)  BP(mean): --  RR: 18 (05-03-21 @ 00:52) (17 - 18)  SpO2: 97% (05-03-21 @ 00:52) (95% - 99%)  Daily     Daily   I&O's Summary    01 May 2021 07:01  -  02 May 2021 07:00  --------------------------------------------------------  IN: 540 mL / OUT: 250 mL / NET: 290 mL    02 May 2021 07:01  -  03 May 2021 06:11  --------------------------------------------------------  IN: 440 mL / OUT: 1600 mL / NET: -1160 mL      CAPILLARY BLOOD GLUCOSE                          9.4    8.34  )-----------( 247      ( 03 May 2021 04:35 )             29.4     05-03    141  |  108  |  14  ----------------------------<  113<H>  4.1   |  23  |  1.01    Ca    9.0      03 May 2021 04:35      PT/INR - ( 03 May 2021 04:35 )   PT: 13.9 sec;   INR: 1.17 ratio         PTT - ( 03 May 2021 04:35 )  PTT:31.1 sec          PHYSICAL EXAM:  GENERAL: NAD,   CHEST/LUNG: Clear to auscultation bilaterally;  HEART: Regular rate and rhythm;   ABDOMEN: Soft, Nontender, Nondistended; Bowel sounds present  PSYCH: AAOx3  NEUROLOGY: non-focal    Assessment/Plan: HPI:  55y Female presents with LEFT comminuted patella fracture s/p mechanical fall on 4/19. Pt seen by Dr. Trejo in office. Instructed to come to [facility] ED for med/cards clearance and plan for ORIF of LEFT patella fx. Denies numbness/tingling in the affected extremity. Patient has been minimally ambulating with walker. Sees cardiologist Dr. Lin outpatient. Now with SBP 80s, slight dizziness/weakness       - SBP 80s this AM with slight dizziness / weakness / ? 2/2 dehydration (85/53) electronic / (82/52) manual     > rest of VSS   > BP meds held this AM   > patient on 75cc/hr of maintenance fluids   > given additional 500 cc bolus over 1 hour (with instructions of repeat BP afterwards)  > orthostatic BP    > will monitor patient closely   > AM labs reviewed   > ID to be consulted regarding urine culture per attending note   > q 4 hr VS check  > Plan D/W RN in regards to repeating BP   > will endorse to AM team   > attending called, awaiting call back             Ross Diaz PA-C,   Department of Medicine Keystone Flap Text: Given the location of the defect and inability to close the wound without undue tension, a keystone flap was deemed most appropriate.  Using a sterile surgical marker, an appropriate keystone flap was outlined extending a line in a curvilinear fashion from the superior and inferior points of the defect.  The flap width was the same as the defect width.  After local anesthesia was obtained the flap was incised through dermis, leaving an intact 1 cm skin bridge at the base of the flap.  The subcutaneous tissues were spread with curved iris scissors and any tethering bands released until the flap could be advanced without undue tension.   Hemostasis was obtained with electrocautery where necessary.  The superior and inferior ends of the flap donor site were closed in a V to Y fashion to reduce tension on the closure.   Key sutures were placed in the dermis with 4-0 undyed Vicryl where the dermis was substantial enough to hold the suture, and the flap inset with interrupted and running 4-0 Nylon suture.

## 2023-02-03 ENCOUNTER — NON-APPOINTMENT (OUTPATIENT)
Age: 58
End: 2023-02-03

## 2023-02-03 ENCOUNTER — APPOINTMENT (OUTPATIENT)
Dept: INTERNAL MEDICINE | Facility: CLINIC | Age: 58
End: 2023-02-03

## 2023-02-03 DIAGNOSIS — R93.89 ABNORMAL FINDINGS ON DIAGNOSTIC IMAGING OF OTHER SPECIFIED BODY STRUCTURES: ICD-10-CM

## 2023-02-06 PROBLEM — R93.89 ABNORMAL FINDING ON IMAGING: Status: ACTIVE | Noted: 2023-02-06

## 2023-02-08 ENCOUNTER — APPOINTMENT (OUTPATIENT)
Dept: PULMONOLOGY | Facility: CLINIC | Age: 58
End: 2023-02-08

## 2023-02-08 NOTE — PHYSICAL EXAM
[General Appearance - Well Developed] : well developed [Normal Appearance] : normal appearance [Well Groomed] : well groomed [General Appearance - Well Nourished] : well nourished [No Deformities] : no deformities [General Appearance - In No Acute Distress] : no acute distress [Normal Conjunctiva] : the conjunctiva exhibited no abnormalities [Eyelids - No Xanthelasma] : the eyelids demonstrated no xanthelasmas [Normal Oral Mucosa] : normal oral mucosa [No Oral Pallor] : no oral pallor [No Oral Cyanosis] : no oral cyanosis [Normal Jugular Venous A Waves Present] : normal jugular venous A waves present [Normal Jugular Venous V Waves Present] : normal jugular venous V waves present [No Jugular Venous Grimm A Waves] : no jugular venous grimm A waves [Respiration, Rhythm And Depth] : normal respiratory rhythm and effort Detail Level: Zone [Exaggerated Use Of Accessory Muscles For Inspiration] : no accessory muscle use [Auscultation Breath Sounds / Voice Sounds] : lungs were clear to auscultation bilaterally [Heart Rate And Rhythm] : heart rate and rhythm were normal [Heart Sounds] : normal S1 and S2 [Murmurs] : no murmurs present [Abdomen Soft] : soft [Abdomen Tenderness] : non-tender [Abdomen Mass (___ Cm)] : no abdominal mass palpated [Abnormal Walk] : normal gait [Gait - Sufficient For Exercise Testing] : the gait was sufficient for exercise testing [Nail Clubbing] : no clubbing of the fingernails [Cyanosis, Localized] : no localized cyanosis [Petechial Hemorrhages (___cm)] : no petechial hemorrhages [Skin Color & Pigmentation] : normal skin color and pigmentation [] : no rash [No Venous Stasis] : no venous stasis [Skin Lesions] : no skin lesions [No Skin Ulcers] : no skin ulcer [No Xanthoma] : no  xanthoma was observed [Oriented To Time, Place, And Person] : oriented to person, place, and time [Affect] : the affect was normal [Mood] : the mood was normal [No Anxiety] : not feeling anxious

## 2023-02-16 ENCOUNTER — RX RENEWAL (OUTPATIENT)
Age: 58
End: 2023-02-16

## 2023-02-16 RX ORDER — ASPIRIN 81 MG/1
81 TABLET, COATED ORAL
Qty: 90 | Refills: 3 | Status: ACTIVE | COMMUNITY
Start: 2022-08-23 | End: 1900-01-01

## 2023-02-17 ENCOUNTER — RX RENEWAL (OUTPATIENT)
Age: 58
End: 2023-02-17

## 2023-03-02 ENCOUNTER — APPOINTMENT (OUTPATIENT)
Dept: CARDIOLOGY | Facility: CLINIC | Age: 58
End: 2023-03-02

## 2023-04-11 ENCOUNTER — APPOINTMENT (OUTPATIENT)
Dept: CARDIOLOGY | Facility: CLINIC | Age: 58
End: 2023-04-11
Payer: COMMERCIAL

## 2023-04-11 ENCOUNTER — NON-APPOINTMENT (OUTPATIENT)
Age: 58
End: 2023-04-11

## 2023-04-11 VITALS
SYSTOLIC BLOOD PRESSURE: 102 MMHG | TEMPERATURE: 98.1 F | DIASTOLIC BLOOD PRESSURE: 62 MMHG | WEIGHT: 178 LBS | HEART RATE: 69 BPM | HEIGHT: 63 IN | BODY MASS INDEX: 31.54 KG/M2 | OXYGEN SATURATION: 98 %

## 2023-04-11 PROCEDURE — 93000 ELECTROCARDIOGRAM COMPLETE: CPT | Mod: NC

## 2023-04-11 PROCEDURE — 99214 OFFICE O/P EST MOD 30 MIN: CPT | Mod: 25

## 2023-04-11 RX ORDER — TOPIRAMATE 50 MG/1
50 TABLET, FILM COATED ORAL DAILY
Qty: 45 | Refills: 3 | Status: DISCONTINUED | COMMUNITY
Start: 2021-09-05 | End: 2023-04-11

## 2023-04-11 NOTE — HISTORY OF PRESENT ILLNESS
[Preoperative Visit] : for a medical evaluation prior to surgery [Scheduled Procedure ___] : a [unfilled] [Date of Surgery ___] : on [unfilled] [Good] : Good [Cardiovascular Disease] : cardiovascular disease [Anti-Platelet Agents] : anti-platelet agents [Frequent Aspirin Use] : frequent aspirin use [Prior Anesthesia] : Prior anesthesia [Electrocardiogram] : ~T an ECG ~C was performed [Fever] : no fever [Chills] : no chills [Fatigue] : no fatigue [Chest Pain] : no chest pain [Cough] : no cough [Dyspnea] : no dyspnea [Dysuria] : no dysuria [Urinary Frequency] : no urinary frequency [Nausea] : no nausea [Vomiting] : no vomiting [Diarrhea] : no diarrhea [Abdominal Pain] : no abdominal pain [Easy Bruising] : no easy bruising [Lower Extremity Swelling] : no lower extremity swelling [Poor Exercise Tolerance] : no poor exercise tolerance [Diabetes] : no diabetes [Alcohol Use] : no  alcohol use [Renal Disease] : no renal disease [GI Disease] : no gastrointestinal disease [Sleep Apnea] : no sleep apnea [Thromboembolic Problems] : no thromboembolic problems [Clotting Disorder] : no clotting disorder [Frequent use of NSAIDs] : no use of NSAIDs [Bleeding Disorder] : no bleeding disorder [Transfusion Reaction] : no transfusion reaction [Impaired Immunity] : no impaired immunity [Steroid Use in Last 6 Months] : no steroid use in the last six months [de-identified] : Dr. Diego [FreeTextEntry1] : This is a 57 year female with a Pmhx of CAD s/p PCI to mLAD, HTN and HLD recent cath on 1/18/23 for chest pain  with non nonobstructive CAD treated medically here for clearance for let knee surgery on 4/30/23 with DR Brandie lorenzoaints had PST done 4/3/23\par \par Pt denies any CP, SOB, heart palpitations, dizziness, abdominal pain N/V/D fever or chills\par

## 2023-04-11 NOTE — PHYSICAL EXAM
[General Appearance - Well Developed] : well developed [Normal Appearance] : normal appearance [Well Groomed] : well groomed [General Appearance - Well Nourished] : well nourished [No Deformities] : no deformities [General Appearance - In No Acute Distress] : no acute distress [Normal Conjunctiva] : the conjunctiva exhibited no abnormalities [Eyelids - No Xanthelasma] : the eyelids demonstrated no xanthelasmas [Normal Oral Mucosa] : normal oral mucosa [No Oral Pallor] : no oral pallor [No Oral Cyanosis] : no oral cyanosis [Normal Jugular Venous A Waves Present] : normal jugular venous A waves present [Normal Jugular Venous V Waves Present] : normal jugular venous V waves present [No Jugular Venous Grimm A Waves] : no jugular venous grimm A waves [Respiration, Rhythm And Depth] : normal respiratory rhythm and effort [Exaggerated Use Of Accessory Muscles For Inspiration] : no accessory muscle use [Auscultation Breath Sounds / Voice Sounds] : lungs were clear to auscultation bilaterally [Heart Rate And Rhythm] : heart rate and rhythm were normal [Heart Sounds] : normal S1 and S2 [Murmurs] : no murmurs present [Abnormal Walk] : normal gait [Gait - Sufficient For Exercise Testing] : the gait was sufficient for exercise testing [Nail Clubbing] : no clubbing of the fingernails [Cyanosis, Localized] : no localized cyanosis [Petechial Hemorrhages (___cm)] : no petechial hemorrhages [Skin Color & Pigmentation] : normal skin color and pigmentation [No Venous Stasis] : no venous stasis [Skin Lesions] : no skin lesions [No Skin Ulcers] : no skin ulcer [No Xanthoma] : no  xanthoma was observed [Oriented To Time, Place, And Person] : oriented to person, place, and time [Affect] : the affect was normal [Mood] : the mood was normal [No Anxiety] : not feeling anxious [Abdomen Soft] : soft [Abdomen Tenderness] : non-tender [] : no hepato-splenomegaly [Abdomen Mass (___ Cm)] : no abdominal mass palpated [FreeTextEntry1] : knee pain left

## 2023-04-14 ENCOUNTER — NON-APPOINTMENT (OUTPATIENT)
Age: 58
End: 2023-04-14

## 2023-04-24 NOTE — PATIENT PROFILE ADULT - NSPROPASSIVESMOKEEXPOSURE_GEN_A_NUR
No  2-calculated by average/Not able to assess (calculate score using [facility] averaging method)

## 2023-05-15 ENCOUNTER — TRANSCRIPTION ENCOUNTER (OUTPATIENT)
Age: 58
End: 2023-05-15

## 2023-05-22 ENCOUNTER — APPOINTMENT (OUTPATIENT)
Dept: ORTHOPEDIC SURGERY | Facility: CLINIC | Age: 58
End: 2023-05-22
Payer: COMMERCIAL

## 2023-05-22 PROCEDURE — 20610 DRAIN/INJ JOINT/BURSA W/O US: CPT | Mod: LT

## 2023-05-22 PROCEDURE — 99214 OFFICE O/P EST MOD 30 MIN: CPT | Mod: 25

## 2023-05-22 PROCEDURE — 73562 X-RAY EXAM OF KNEE 3: CPT | Mod: LT

## 2023-07-17 ENCOUNTER — APPOINTMENT (OUTPATIENT)
Dept: ORTHOPEDIC SURGERY | Facility: CLINIC | Age: 58
End: 2023-07-17
Payer: COMMERCIAL

## 2023-07-17 VITALS
WEIGHT: 175 LBS | BODY MASS INDEX: 26.52 KG/M2 | DIASTOLIC BLOOD PRESSURE: 84 MMHG | SYSTOLIC BLOOD PRESSURE: 134 MMHG | HEART RATE: 60 BPM | HEIGHT: 68 IN

## 2023-07-17 DIAGNOSIS — S82.042A DISPLACED COMMINUTED FRACTURE OF LEFT PATELLA, INITIAL ENCOUNTER FOR CLOSED FRACTURE: ICD-10-CM

## 2023-07-17 PROCEDURE — 73562 X-RAY EXAM OF KNEE 3: CPT | Mod: LT

## 2023-07-17 PROCEDURE — 99213 OFFICE O/P EST LOW 20 MIN: CPT

## 2023-07-21 PROBLEM — S82.042A CLOSED DISPLACED COMMINUTED FRACTURE OF LEFT PATELLA, INITIAL ENCOUNTER: Status: ACTIVE | Noted: 2021-04-30

## 2023-07-24 ENCOUNTER — APPOINTMENT (OUTPATIENT)
Dept: ORTHOPEDIC SURGERY | Facility: CLINIC | Age: 58
End: 2023-07-24
Payer: COMMERCIAL

## 2023-07-24 VITALS — HEIGHT: 68 IN | WEIGHT: 174 LBS | BODY MASS INDEX: 26.37 KG/M2

## 2023-07-24 PROCEDURE — 99214 OFFICE O/P EST MOD 30 MIN: CPT

## 2023-07-25 ENCOUNTER — TRANSCRIPTION ENCOUNTER (OUTPATIENT)
Age: 58
End: 2023-07-25

## 2023-07-26 NOTE — DISCUSSION/SUMMARY
[de-identified] : 57 year old female s/p left ORIF with posttraumatic arthritis of the patellofemoral compartment. MRI results were reviewed in great detail with the patient today. MRI results show status post internal fixation of comminuted patellar fracture and high-grade cartilage loss at the lateral and central patella. There is moderate chondral wear in the trochlea and medial patella. Discussed non-operative and operative treatment options - total knee replacement. I had a lengthy discussion with the patient that she would benefit from attending physical therapy to improve her strength and range of motion during the interim. Patient understands that preoperative range of motion will dictate postoperative range of motion and that there is an increased risk for bacterial infection given previous history of arthroscopy. \par \par She also understands that due to perioperative risks we may not operate for 3 months from last injection on 7/17/2023. \par \par PMHx Cardiac stent. \par \par Patient has failed conservative treatment for at least 6 months. At this point patient has tried all conservative treatment options including weight loss NSAIDs. Patient also has had multiple injections in the knee. This has not helped the patient. Patient is considering surgical treatment options. All the pros and cons of operative versus nonoperative treatment discussed with the patient. All of the potential risks including possible infection possible bleeding possible need for repeat surgery possible medical complications possible blood clots discussed with the patient. Patient completely understands the risks and benefits. \par The risks of re-admission discussed with the patient as well. Also the need for anticoagulation discussed with the patient. Patient prefers to take aspirin 325 twice a day. \par Patient completely understands the risks and benefits and wants to undergo the procedure. \par I reviewed the plan of care as well as used a model of a total knee implant equivalent to the one that will be used for their total knee  joint replacement. The ability to secure the implant utilizing cement or cementless (press-fit) was discussed with the patient. The patient agrees with the plan of care, as well as the use of implants for their total joint replacement. \par The patient is advised to get medical clearance.  \par Patient is also advised to attend a joint replacement education class. \par \par Patient will be scheduled for LEFT total knee replacement robotic assisted for November 2023.

## 2023-07-26 NOTE — HISTORY OF PRESENT ILLNESS
[Worsening] : worsening [6] : a current pain level of 6/10 [5] : a minimum pain level of 5/10 [8] : a maximum pain level of 8/10 [Standing] : standing [Intermit.] : ~He/She~ states the symptoms seem to be intermittent [Bending] : worsened by bending [Lifting] : worsened by lifting [Sitting] : worsened by sitting [Running] : worsened by running [Walking] : worsened by walking [Knee Flexion] : worsened with knee flexion [Knee Extension] : worsened with knee extension [Acetaminophen] : relieved by acetaminophen [NSAIDs] : relieved by nonsteroidal anti-inflammatory drugs [Recumbency] : relieved by recumbency [Rest] : relieved by rest [de-identified] : 58 yo F S/P ORIF L comminuted displaced complex patella fracture 5/3/21. Her fracture went on to uneventful union however she developed severe posttraumatic arthritis of her patellofemoral joint. She was seen for this previously and was indicated for patellofemoral joint arthroplasty. She subsequently sought a second opinion from outside surgeon at [facility] who performed an arthroscopy of her left knee. She states that since that arthroscopy she has had increased pain, swelling, loss of knee flexion and has reverted back to using a walker at all times. She is under the care of a pain management specialist. She has been participating in physical therapy for the last 2 years and most recently saw Dr. Agusto Trejo on 7/17/23 who gave patient a left knee cortisone injection and knee flexion to 90 degrees.  [Physical Therapy] : not relieved by physical therapy

## 2023-07-26 NOTE — ADDENDUM
[FreeTextEntry1] : I, Jewell Keene, acted solely as a scribe for Dr. Mushtaq Brumfield on this date 07/24/2023.\par \par All medical record entries made by the Scribe were at my, Dr. Mushtaq Brumfield, direction and personally dictated by me on 07/24/2023. I have reviewed the chart and agree that the record accurately reflects my personal performance of the history, physical exam, assessment and plan. I have also personally directed, reviewed, and agreed with the chart.

## 2023-07-26 NOTE — PHYSICAL EXAM
[Antalgic] : antalgic [Normal RLE] : Right Lower Extremity: No scars, rashes, lesions, ulcers, skin intact [Normal LLE] : Left Lower Extremity: No scars, rashes, lesions, ulcers, skin intact [Normal Touch] : sensation intact for touch [Normal] : no peripheral adenopathy appreciated [de-identified] : Patient appears stated age in no acute respiratory distress. Patient is alert oriented x3. Patient has normal mood and affect.  \par \par Left knee exam\par There is minimal to moderate effusion. Range of motion is 0-90°. Painful at the extremes of range of motion.  \par There is medial and lateral joint line tenderness.  \par Quadriceps strength is 4/5. There is some muscle loss in the quadriceps.  \par Anteroposterior and mediolateral stability is intact Brijesh test is negative. Alignment of the knee is in 5° of varus.  \par \par Right Knee exam \par Range of motion of the left knee is 0-120°.  \par Skin is normal.  No rash. \par There is no effusion. No medial or lateral joint line tenderness. No swelling, no pitting edema. \par Overall alignment of the knee is then slight varus. Good anterior posterior stability. Firm endpoints on anterior and posterior drawer.  \par Medial lateral stability is intact. Firm endpoint on medial and lateral stress testing . \par Brijesh test is negative. \par Quadriceps strength 5/ 5. There is no loss of muscle volume in the thigh.  \par Good anterior posterior and mediolateral stability. \par Sensation in the extremities intact.  \par Discrimination is intact. Good DP and PT pulses. \par  \par Bilateral Hip exam \par On inspection of the hip shows skin is normal. No evidence of rash.  \par No loss of muscle.  Abductor strength is 5 out of 5. Hip flexor strength is 5. \par Range of motion of the hip at 90° flexion internal rotation is 15° external rotation is 30° pain-free.  \par Hip has good stability in anterior and posterior direction.  \par On lateral decubitus  examination there is no tenderness in the greater trochanter.  \par  \par Lower Extremity Examination \par Bilateral lower extremity skin is normal. There is no rash. There is no edema and lymphadenopathy.  DP and PT pulses intact. Sensation is intact.  [de-identified] : X-rays of the left knee 3 views obtained on 12/02/2022 shows multiple pin hardware in place and posttraumatic patellofemoral arthritis.\par ____________________________________________________________________________\par MRI Left knee 10/2022\par \par IMPRESSION:\par 1. Status post internal fixation of comminuted patellar fracture. High-grade cartilage loss at the lateral and central patella. Moderate chondral wear in the trochlea and medial patella.\par 2. Postsurgical changes at the extensor mechanism which is intact. Thickened patellar tendon. Scarring at the distal quadriceps tendon.\par \par --- End of Report ---\par

## 2023-07-27 NOTE — PROGRESS NOTE ADULT - ALLERGIC/IMMUNOLOGIC
details…
PAST MEDICAL HISTORY:  History of hypokalemia     HLD (hyperlipidemia)     HTN (hypertension)     
details…

## 2023-08-23 ENCOUNTER — TRANSCRIPTION ENCOUNTER (OUTPATIENT)
Age: 58
End: 2023-08-23

## 2023-09-03 ENCOUNTER — RX RENEWAL (OUTPATIENT)
Age: 58
End: 2023-09-03

## 2023-09-06 NOTE — PROCEDURE NOTE - IR ESTIMATED BLOOD LOSS
Mild Zoryve Counseling:  I discussed with the patient that Zoryve is not for use in the eyes, mouth or vagina. The most commonly reported side effects include diarrhea, headache, insomnia, application site pain, upper respiratory tract infections, and urinary tract infections.  All of the patient's questions and concerns were addressed.

## 2023-09-27 ENCOUNTER — APPOINTMENT (OUTPATIENT)
Dept: CARDIOLOGY | Facility: CLINIC | Age: 58
End: 2023-09-27
Payer: COMMERCIAL

## 2023-09-27 VITALS
HEIGHT: 68 IN | BODY MASS INDEX: 27.13 KG/M2 | DIASTOLIC BLOOD PRESSURE: 78 MMHG | WEIGHT: 179 LBS | HEART RATE: 58 BPM | TEMPERATURE: 98.4 F | SYSTOLIC BLOOD PRESSURE: 120 MMHG | OXYGEN SATURATION: 99 %

## 2023-09-27 DIAGNOSIS — K08.89 OTHER SPECIFIED DISORDERS OF TEETH AND SUPPORTING STRUCTURES: ICD-10-CM

## 2023-09-27 DIAGNOSIS — Z01.818 ENCOUNTER FOR OTHER PREPROCEDURAL EXAMINATION: ICD-10-CM

## 2023-09-27 PROCEDURE — 93000 ELECTROCARDIOGRAM COMPLETE: CPT

## 2023-09-27 PROCEDURE — 99214 OFFICE O/P EST MOD 30 MIN: CPT | Mod: 25

## 2023-09-27 RX ORDER — DOXYCYCLINE HYCLATE 100 MG/1
100 CAPSULE ORAL
Qty: 10 | Refills: 0 | Status: DISCONTINUED | COMMUNITY
Start: 2023-01-11 | End: 2023-09-27

## 2023-09-27 RX ORDER — PREDNISONE 50 MG/1
50 TABLET ORAL
Qty: 3 | Refills: 0 | Status: DISCONTINUED | COMMUNITY
Start: 2023-01-17 | End: 2023-09-27

## 2023-09-27 RX ORDER — PREDNISONE 50 MG/1
50 TABLET ORAL
Qty: 4 | Refills: 0 | Status: DISCONTINUED | COMMUNITY
Start: 2022-12-29 | End: 2023-09-27

## 2023-09-27 RX ORDER — METOPROLOL TARTRATE 25 MG/1
25 TABLET, FILM COATED ORAL TWICE DAILY
Qty: 90 | Refills: 1 | Status: ACTIVE | COMMUNITY
Start: 2020-10-28

## 2023-09-27 RX ORDER — METHENAMINE HIPPURATE 1 G/1
1 TABLET ORAL
Refills: 0 | Status: ACTIVE | COMMUNITY
Start: 2023-09-27

## 2023-10-06 ENCOUNTER — RESULT REVIEW (OUTPATIENT)
Age: 58
End: 2023-10-06

## 2023-10-06 ENCOUNTER — APPOINTMENT (OUTPATIENT)
Dept: MRI IMAGING | Facility: CLINIC | Age: 58
End: 2023-10-06
Payer: COMMERCIAL

## 2023-10-06 ENCOUNTER — OUTPATIENT (OUTPATIENT)
Dept: OUTPATIENT SERVICES | Facility: HOSPITAL | Age: 58
LOS: 1 days | End: 2023-10-06
Payer: COMMERCIAL

## 2023-10-06 DIAGNOSIS — Z98.890 OTHER SPECIFIED POSTPROCEDURAL STATES: Chronic | ICD-10-CM

## 2023-10-06 DIAGNOSIS — Z95.5 PRESENCE OF CORONARY ANGIOPLASTY IMPLANT AND GRAFT: Chronic | ICD-10-CM

## 2023-10-06 DIAGNOSIS — R91.8 OTHER NONSPECIFIC ABNORMAL FINDING OF LUNG FIELD: ICD-10-CM

## 2023-10-06 PROCEDURE — 71552 MRI CHEST W/O & W/DYE: CPT | Mod: 26

## 2023-10-06 PROCEDURE — 71552 MRI CHEST W/O & W/DYE: CPT

## 2023-10-08 ENCOUNTER — NON-APPOINTMENT (OUTPATIENT)
Age: 58
End: 2023-10-08

## 2023-10-09 ENCOUNTER — NON-APPOINTMENT (OUTPATIENT)
Age: 58
End: 2023-10-09

## 2023-11-15 ENCOUNTER — NON-APPOINTMENT (OUTPATIENT)
Age: 58
End: 2023-11-15

## 2023-11-22 ENCOUNTER — NON-APPOINTMENT (OUTPATIENT)
Age: 58
End: 2023-11-22

## 2023-11-22 ENCOUNTER — OUTPATIENT (OUTPATIENT)
Dept: OUTPATIENT SERVICES | Facility: HOSPITAL | Age: 58
LOS: 1 days | End: 2023-11-22
Payer: COMMERCIAL

## 2023-11-22 VITALS
HEIGHT: 68 IN | DIASTOLIC BLOOD PRESSURE: 85 MMHG | RESPIRATION RATE: 16 BRPM | SYSTOLIC BLOOD PRESSURE: 124 MMHG | WEIGHT: 173.06 LBS | TEMPERATURE: 98 F | OXYGEN SATURATION: 99 % | HEART RATE: 63 BPM

## 2023-11-22 DIAGNOSIS — Z98.890 OTHER SPECIFIED POSTPROCEDURAL STATES: Chronic | ICD-10-CM

## 2023-11-22 DIAGNOSIS — Z01.818 ENCOUNTER FOR OTHER PREPROCEDURAL EXAMINATION: ICD-10-CM

## 2023-11-22 DIAGNOSIS — M17.12 UNILATERAL PRIMARY OSTEOARTHRITIS, LEFT KNEE: ICD-10-CM

## 2023-11-22 DIAGNOSIS — Z29.9 ENCOUNTER FOR PROPHYLACTIC MEASURES, UNSPECIFIED: ICD-10-CM

## 2023-11-22 DIAGNOSIS — M17.9 OSTEOARTHRITIS OF KNEE, UNSPECIFIED: ICD-10-CM

## 2023-11-22 DIAGNOSIS — Z95.5 PRESENCE OF CORONARY ANGIOPLASTY IMPLANT AND GRAFT: Chronic | ICD-10-CM

## 2023-11-22 DIAGNOSIS — I25.10 ATHEROSCLEROTIC HEART DISEASE OF NATIVE CORONARY ARTERY WITHOUT ANGINA PECTORIS: ICD-10-CM

## 2023-11-22 DIAGNOSIS — I10 ESSENTIAL (PRIMARY) HYPERTENSION: ICD-10-CM

## 2023-11-22 LAB
A1C WITH ESTIMATED AVERAGE GLUCOSE RESULT: 5.1 % — SIGNIFICANT CHANGE UP (ref 4–5.6)
A1C WITH ESTIMATED AVERAGE GLUCOSE RESULT: 5.1 % — SIGNIFICANT CHANGE UP (ref 4–5.6)
ANION GAP SERPL CALC-SCNC: 10 MMOL/L — SIGNIFICANT CHANGE UP (ref 5–17)
ANION GAP SERPL CALC-SCNC: 10 MMOL/L — SIGNIFICANT CHANGE UP (ref 5–17)
BLD GP AB SCN SERPL QL: NEGATIVE — SIGNIFICANT CHANGE UP
BLD GP AB SCN SERPL QL: NEGATIVE — SIGNIFICANT CHANGE UP
BUN SERPL-MCNC: 22 MG/DL — SIGNIFICANT CHANGE UP (ref 7–23)
BUN SERPL-MCNC: 22 MG/DL — SIGNIFICANT CHANGE UP (ref 7–23)
CALCIUM SERPL-MCNC: 9.4 MG/DL — SIGNIFICANT CHANGE UP (ref 8.4–10.5)
CALCIUM SERPL-MCNC: 9.4 MG/DL — SIGNIFICANT CHANGE UP (ref 8.4–10.5)
CHLORIDE SERPL-SCNC: 105 MMOL/L — SIGNIFICANT CHANGE UP (ref 96–108)
CHLORIDE SERPL-SCNC: 105 MMOL/L — SIGNIFICANT CHANGE UP (ref 96–108)
CO2 SERPL-SCNC: 26 MMOL/L — SIGNIFICANT CHANGE UP (ref 22–31)
CO2 SERPL-SCNC: 26 MMOL/L — SIGNIFICANT CHANGE UP (ref 22–31)
CREAT SERPL-MCNC: 0.89 MG/DL — SIGNIFICANT CHANGE UP (ref 0.5–1.3)
CREAT SERPL-MCNC: 0.89 MG/DL — SIGNIFICANT CHANGE UP (ref 0.5–1.3)
EGFR: 75 ML/MIN/1.73M2 — SIGNIFICANT CHANGE UP
EGFR: 75 ML/MIN/1.73M2 — SIGNIFICANT CHANGE UP
ESTIMATED AVERAGE GLUCOSE: 100 MG/DL — SIGNIFICANT CHANGE UP (ref 68–114)
ESTIMATED AVERAGE GLUCOSE: 100 MG/DL — SIGNIFICANT CHANGE UP (ref 68–114)
GLUCOSE SERPL-MCNC: 90 MG/DL — SIGNIFICANT CHANGE UP (ref 70–99)
GLUCOSE SERPL-MCNC: 90 MG/DL — SIGNIFICANT CHANGE UP (ref 70–99)
HCT VFR BLD CALC: 38.5 % — SIGNIFICANT CHANGE UP (ref 34.5–45)
HCT VFR BLD CALC: 38.5 % — SIGNIFICANT CHANGE UP (ref 34.5–45)
HGB BLD-MCNC: 12.7 G/DL — SIGNIFICANT CHANGE UP (ref 11.5–15.5)
HGB BLD-MCNC: 12.7 G/DL — SIGNIFICANT CHANGE UP (ref 11.5–15.5)
MCHC RBC-ENTMCNC: 31.1 PG — SIGNIFICANT CHANGE UP (ref 27–34)
MCHC RBC-ENTMCNC: 31.1 PG — SIGNIFICANT CHANGE UP (ref 27–34)
MCHC RBC-ENTMCNC: 33 GM/DL — SIGNIFICANT CHANGE UP (ref 32–36)
MCHC RBC-ENTMCNC: 33 GM/DL — SIGNIFICANT CHANGE UP (ref 32–36)
MCV RBC AUTO: 94.1 FL — SIGNIFICANT CHANGE UP (ref 80–100)
MCV RBC AUTO: 94.1 FL — SIGNIFICANT CHANGE UP (ref 80–100)
MRSA PCR RESULT.: SIGNIFICANT CHANGE UP
MRSA PCR RESULT.: SIGNIFICANT CHANGE UP
NRBC # BLD: 0 /100 WBCS — SIGNIFICANT CHANGE UP (ref 0–0)
NRBC # BLD: 0 /100 WBCS — SIGNIFICANT CHANGE UP (ref 0–0)
PLATELET # BLD AUTO: 208 K/UL — SIGNIFICANT CHANGE UP (ref 150–400)
PLATELET # BLD AUTO: 208 K/UL — SIGNIFICANT CHANGE UP (ref 150–400)
POTASSIUM SERPL-MCNC: 4 MMOL/L — SIGNIFICANT CHANGE UP (ref 3.5–5.3)
POTASSIUM SERPL-MCNC: 4 MMOL/L — SIGNIFICANT CHANGE UP (ref 3.5–5.3)
POTASSIUM SERPL-SCNC: 4 MMOL/L — SIGNIFICANT CHANGE UP (ref 3.5–5.3)
POTASSIUM SERPL-SCNC: 4 MMOL/L — SIGNIFICANT CHANGE UP (ref 3.5–5.3)
RBC # BLD: 4.09 M/UL — SIGNIFICANT CHANGE UP (ref 3.8–5.2)
RBC # BLD: 4.09 M/UL — SIGNIFICANT CHANGE UP (ref 3.8–5.2)
RBC # FLD: 12.8 % — SIGNIFICANT CHANGE UP (ref 10.3–14.5)
RBC # FLD: 12.8 % — SIGNIFICANT CHANGE UP (ref 10.3–14.5)
RH IG SCN BLD-IMP: POSITIVE — SIGNIFICANT CHANGE UP
RH IG SCN BLD-IMP: POSITIVE — SIGNIFICANT CHANGE UP
S AUREUS DNA NOSE QL NAA+PROBE: SIGNIFICANT CHANGE UP
S AUREUS DNA NOSE QL NAA+PROBE: SIGNIFICANT CHANGE UP
SODIUM SERPL-SCNC: 141 MMOL/L — SIGNIFICANT CHANGE UP (ref 135–145)
SODIUM SERPL-SCNC: 141 MMOL/L — SIGNIFICANT CHANGE UP (ref 135–145)
WBC # BLD: 6.43 K/UL — SIGNIFICANT CHANGE UP (ref 3.8–10.5)
WBC # BLD: 6.43 K/UL — SIGNIFICANT CHANGE UP (ref 3.8–10.5)
WBC # FLD AUTO: 6.43 K/UL — SIGNIFICANT CHANGE UP (ref 3.8–10.5)
WBC # FLD AUTO: 6.43 K/UL — SIGNIFICANT CHANGE UP (ref 3.8–10.5)

## 2023-11-22 PROCEDURE — 83036 HEMOGLOBIN GLYCOSYLATED A1C: CPT

## 2023-11-22 PROCEDURE — 73700 CT LOWER EXTREMITY W/O DYE: CPT | Mod: 26,LT,ME

## 2023-11-22 PROCEDURE — 87641 MR-STAPH DNA AMP PROBE: CPT

## 2023-11-22 PROCEDURE — 86850 RBC ANTIBODY SCREEN: CPT

## 2023-11-22 PROCEDURE — 73700 CT LOWER EXTREMITY W/O DYE: CPT | Mod: ME

## 2023-11-22 PROCEDURE — 86901 BLOOD TYPING SEROLOGIC RH(D): CPT

## 2023-11-22 PROCEDURE — G1004: CPT

## 2023-11-22 PROCEDURE — G0463: CPT

## 2023-11-22 PROCEDURE — 80048 BASIC METABOLIC PNL TOTAL CA: CPT

## 2023-11-22 PROCEDURE — 87640 STAPH A DNA AMP PROBE: CPT

## 2023-11-22 PROCEDURE — 85027 COMPLETE CBC AUTOMATED: CPT

## 2023-11-22 PROCEDURE — 86900 BLOOD TYPING SEROLOGIC ABO: CPT

## 2023-11-22 RX ORDER — CHLORHEXIDINE GLUCONATE 213 G/1000ML
1 SOLUTION TOPICAL ONCE
Refills: 0 | Status: DISCONTINUED | OUTPATIENT
Start: 2023-12-04 | End: 2023-12-04

## 2023-11-22 RX ORDER — ISOSORBIDE MONONITRATE 60 MG/1
1 TABLET, EXTENDED RELEASE ORAL
Qty: 0 | Refills: 0 | DISCHARGE

## 2023-11-22 RX ORDER — LIDOCAINE HCL 20 MG/ML
0.2 VIAL (ML) INJECTION ONCE
Refills: 0 | Status: DISCONTINUED | OUTPATIENT
Start: 2023-12-04 | End: 2023-12-04

## 2023-11-22 RX ORDER — SODIUM CHLORIDE 9 MG/ML
3 INJECTION INTRAMUSCULAR; INTRAVENOUS; SUBCUTANEOUS EVERY 8 HOURS
Refills: 0 | Status: DISCONTINUED | OUTPATIENT
Start: 2023-12-04 | End: 2023-12-04

## 2023-11-22 NOTE — H&P PST ADULT - PROBLEM SELECTOR PLAN 1
Left Total Knee Arthroplasty with FRANCY Robot on 12/4/23  Pre-op instructions, including Chlorhexidine soap and hydration instructions provided - all questions answered  Labs done at [facility] Left Total Knee Arthroplasty with FRANCY Robot on 12/4/23  Pre-op instructions, including Chlorhexidine soap and hydration instructions provided - all questions answered  Labs done at [facility]    Pt. states "allergy to PCN as told by mother" will give Cefazolin DOSDr. Brumfield made aware via teams

## 2023-11-22 NOTE — H&P PST ADULT - NSICDXPASTSURGICALHX_GEN_ALL_CORE_FT
PAST SURGICAL HISTORY:   delivery delivered     S/P breast lumpectomy Left breast    S/P knee surgery right meniscus    Stented coronary artery      PAST SURGICAL HISTORY:   delivery delivered     S/P breast lumpectomy Left breast    S/P knee surgery right meniscus    S/P ORIF (open reduction internal fixation) fracture     Stented coronary artery

## 2023-11-22 NOTE — H&P PST ADULT - HISTORY OF PRESENT ILLNESS
57 yo female, PMH CAD CAD s/p PCI to mLAD, HTN, HLD, cath 1/18/23  trauma, arthroscopic,  [facility], visiting fall that landed on the left knee,   hairline fracture 3 ribs, whiplash, landed on left knee, saw merlyn cal[facility]on [facility] rehab, hardware placed, arthroscopic surgery at [facility] - 5/2023, pt. able to bend at 70 degrees, still doing PT 3 times a week, knee still buckling,   COVID 2/2020 - fever body aches, 104 fever, inflamatory process post covid  5/2/21,   56 y/o female with pmh of HTN, HLD, CAD with prior mLAD stent 3/26/2019 s/p dx LHC 4/2021 with ISR 10% mLAD, orthostatic Hypotension, GERD, ground glass with dependent atelectasis on CT 1/5/23  treated for Inflammatory condition by Dr. Hood Pulmonologist (on Medrol dose pack day 3/5) for reports of right chest, back and shoulder discomfort is also followed by Dr. Lin, Cardiologist with [facility] on 1/9/23 revealing Agatston score 229 with mLAD 30-49%; LCX 30-49% noncalcified and calcified plaque proximally; prox RCA 30% and mRCA 50%.  TTE on 12/29/22 reveals LVEF 64% with no significant valvular disease and she presents for [facility] for further evaluation of her CAD.  She states her right sided chest pain in 1/10 and is not worsened by heavy lifting or strenuous activity with no associated symptoms and was started on Imdur 30mg but had no resumed after [facility].  She states its chronically present.        57 year old female s/p left ORIF with posttraumatic arthritis of the patellofemoral compartment. MRI results were reviewed in great detail with the patient today. MRI results show status post internal fixation of comminuted patellar fracture and high-grade cartilage loss at the lateral and central patella. There is moderate chondral wear in the trochlea and medial patella. Discussed non-operative and operative treatment options - partial knee replacement. I discussed with the patient that we will order a thin-cut CT of the left knee to further assess the patella hardware. Patient will discontinue physical therapy at this time. FU when CT results are available.    Of Note:  Pt was pretreated with Prednisone 13-hr protocol and held her Medrol dose pack day 3/5.  She will take her Benadryl 50mg PO and her Prednisone dose #3 50mg 1 hour prior to procedure.     57 yo female, PMH CAD CAD s/p PCI to mLAD, HTN, HLD, cath 1/18/23  trauma, arthroscopic,  [facility], visiting fall that landed on the left knee,   hairline fracture 3 ribs, whiplash, landed on left knee, saw merlyn calderon [facility] rehab, hardware placed, arthroscopic surgery at [facility] - 5/2023, pt. able to bend at 70 degrees, still doing PT 3 times a week, knee still buckling,   COVID 2/2020 - fever body aches, 104 fever, inflamatory process post covid  5/2/21,   58 y/o female with pmh of HTN, HLD, CAD with prior mLAD stent 3/26/2019 s/p dx LHC 4/2021 with ISR 10% mLAD, orthostatic Hypotension, GERD, ground glass with dependent atelectasis on CT 1/5/23  treated for Inflammatory condition by Dr. Hood Pulmonologist (on Medrol dose pack day 3/5) for reports of right chest, back and shoulder discomfort is also followed by Dr. Lin, Cardiologist with [facility] on 1/9/23 revealing Agatston score 229 with mLAD 30-49%; LCX 30-49% noncalcified and calcified plaque proximally; prox RCA 30% and mRCA 50%.  TTE on 12/29/22 reveals LVEF 64% with no significant valvular disease and she presents for [facility] for further evaluation of her CAD.  She states her right sided chest pain in 1/10 and is not worsened by heavy lifting or strenuous activity with no associated symptoms and was started on Imdur 30mg but had no resumed after [facility].  She states its chronically present.        57 year old female s/p left ORIF with posttraumatic arthritis of the patellofemoral compartment. MRI results were reviewed in great detail with the patient today. MRI results show status post internal fixation of comminuted patellar fracture and high-grade cartilage loss at the lateral and central patella. There is moderate chondral wear in the trochlea and medial patella. Discussed non-operative and operative treatment options - partial knee replacement. I discussed with the patient that we will order a thin-cut CT of the left knee to further assess the patella hardware. Patient will discontinue physical therapy at this time. FU when CT results are available.    Of Note:  Pt was pretreated with Prednisone 13-hr protocol and held her Medrol dose pack day 3/5.  She will take her Benadryl 50mg PO and her Prednisone dose #3 50mg 1 hour prior to procedure.     57 yo female, PMH CAD CAD s/p PCI to mLAD, HTN, HLD, cath 1/18/23  trauma, arthroscopic,  [facility], visiting fall that landed on the left knee,   hairline fracture 3 ribs, whiplash, landed on left knee, saw merlyn calderon [facility] rehab, hardware placed, arthroscopic surgery at [facility] - 5/2023, pt. able to bend at 70 degrees, still doing PT 3 times a week, knee still buckling,   COVID 2/2020 - fever body aches, 104 fever, inflamatory process post covid  5/2/21,   58 y/o female with pmh of HTN, HLD, CAD with prior mLAD stent 3/26/2019 s/p dx LHC 4/2021 with ISR 10% mLAD, orthostatic Hypotension, GERD, ground glass with dependent atelectasis on CT 1/5/23  treated for Inflammatory condition by Dr. Hood Pulmonologist (on Medrol dose pack day 3/5) for reports of right chest, back and shoulder discomfort is also followed by Dr. Lin, Cardiologist with [facility] on 1/9/23 revealing Agatston score 229 with mLAD 30-49%; LCX 30-49% noncalcified and calcified plaque proximally; prox RCA 30% and mRCA 50%.  TTE on 12/29/22 reveals LVEF 64% with no significant valvular disease and she presents for [facility] for further evaluation of her CAD.  She states her right sided chest pain in 1/10 and is not worsened by heavy lifting or strenuous activity with no associated symptoms and was started on Imdur 30mg but had no resumed after [facility].  She states its chronically present.        57 year old female s/p left ORIF with posttraumatic arthritis of the patellofemoral compartment. MRI results were reviewed in great detail with the patient today. MRI results show status post internal fixation of comminuted patellar fracture and high-grade cartilage loss at the lateral and central patella. There is moderate chondral wear in the trochlea and medial patella. Discussed non-operative and operative treatment options - partial knee replacement. I discussed with the patient that we will order a thin-cut CT of the left knee to further assess the patella hardware. Patient will discontinue physical therapy at this time. FU when CT results are available.    Of Note:  Pt was pretreated with Prednisone 13-hr protocol and held her Medrol dose pack day 3/5.  She will take her Benadryl 50mg PO and her Prednisone dose #3 50mg 1 hour prior to procedure.     59 yo female, PMH CAD s/p PCI to mLAD 5/2/21, HTN, HLD, nephrolithiasis (known renal stone without s/s), COVID-19 infection 3/2020 with fevers of 104 for several days - stayed home - on CT chest 1/5/23 revealed ground glass with dependent atelectasis which is ? r/t COVID infection and pt. treated with antibiotics (pt. was having chest pain, which has resolved), had abnormal CT Angio on 1/9/23, cardiac cath on 1/18/23 revealed mild non-obstructive CAD - no intervention. Pt. reports suffering a mechanical fall in April 2021, when she slipped at [facility] leading to complex patella fracture, ORIF Dr. Trejo, progressive pain, s/p left arthroscopic knee surgery at [facility], has been doing PT 3 X's a week, still unable to bend or move knee properly and without pain, despite pain medication, walks inside the home with walker, MRI revealed high-grade cartilage loss at the lateral and central patella and pt. presents to [facility] for scheduled Left Total Knee Arthroplasty with FRANCY Robot on 12/4/23. Denies recent fever, chills, chest pain, SOB, changes in bowel/urinary habits or recent exposure to COVID-19 infection. Pt. denies clotting or bleeding disorders.     Pt. states he has not had CT FRANCY done, pt. had authorization to have CT of knee done today at [facility]. 59 yo female, PMH CAD s/p PCI to mLAD 5/2/21, HTN, HLD, nephrolithiasis (known renal stone without s/s), COVID-19 infection 3/2020 with fevers of 104 for several days - stayed home - on CT chest 1/5/23 revealed ground glass with dependent atelectasis which is ? r/t COVID infection and pt. treated with antibiotics (pt. was having chest pain, which has resolved), had abnormal CT Angio on 1/9/23, cardiac cath on 1/18/23 revealed mild non-obstructive CAD - no intervention. Pt. reports suffering a mechanical fall in April 2021, when she slipped at [facility] leading to complex patella fracture, ORIF Dr. Trejo, progressive pain, s/p left arthroscopic knee surgery at [facility], has been doing PT 3 X's a week, still unable to bend or move knee properly and without pain, despite pain medication, walks inside the home with walker, MRI revealed high-grade cartilage loss at the lateral and central patella and pt. presents to [facility] for scheduled Left Total Knee Arthroplasty with FRANCY Robot on 12/4/23. Denies recent fever, chills, chest pain, SOB, changes in bowel/urinary habits or recent exposure to COVID-19 infection. Pt. denies clotting or bleeding disorders.     Pt. states he has not had CT FRANCY done, pt. had authorization to have CT of knee done today at [facility]. 57 yo female, PMH CAD s/p PCI to mLAD 5/2/21, HTN, HLD, nephrolithiasis (known renal stone without s/s), COVID-19 infection 3/2020 with fevers of 104 for several days - stayed home - on CT chest 1/5/23 revealed ground glass with dependent atelectasis which is ? r/t COVID infection and pt. treated with antibiotics (pt. was having chest pain, which has resolved), had abnormal CT Angio on 1/9/23, cardiac cath on 1/18/23 revealed mild non-obstructive CAD - no intervention. Pt. reports suffering a mechanical fall in April 2021, when she slipped at [facility] leading to complex patella fracture, ORIF Dr. Trejo, progressive pain, s/p left arthroscopic knee surgery at \[facility]\"", has been doing PT 3 X's a week, still unable to bend or move knee properly and without pain, despite pain medication, walks inside the home with walker, MRI revealed high-grade cartilage loss at the lateral and central patella and pt. presents to [facility] for scheduled Left Total Knee Arthroplasty with FRANCY Robot on 12/4/23. Denies recent fever, chills, chest pain, SOB, changes in bowel/urinary habits or recent exposure to COVID-19 infection. Pt. denies clotting or bleeding disorders.     Pt. states he has not had CT FRANCY done, pt. had authorization to have CT of knee done today at [facility]. 57 yo female, PMH CAD s/p PCI to mLAD 5/2/21, HTN, HLD, nephrolithiasis (known renal stone without s/s), chronic microscopic hematuria,  COVID-19 infection 3/2020 with fevers of 104 for several days - stayed home - on CT chest 1/5/23 revealed ground glass with dependent atelectasis which is ? r/t COVID infection and pt. treated with antibiotics (pt. was having chest pain, which has resolved), had abnormal CT Angio on 1/9/23, cardiac cath on 1/18/23 revealed mild non-obstructive CAD - no intervention. Pt. reports suffering a mechanical fall in April 2021, when she slipped at [facility] leading to complex patella fracture, ORIF Dr. Trejo, progressive pain, s/p left arthroscopic knee surgery [facility], has been doing PT 3 X's a week, still unable to bend or move knee properly and without pain, despite pain medication, walks inside the home with walker, MRI revealed high-grade cartilage loss at the lateral and central patella and pt. presents to [facility] for scheduled Left Total Knee Arthroplasty with FRANCY Robot on 12/4/23. Denies recent fever, chills, chest pain, SOB, changes in bowel/urinary habits or recent exposure to COVID-19 infection. Pt. denies clotting or bleeding disorders.     Pt. states he has not had CT FRANCY done, pt. had authorization to have CT of knee done today at [facility]. 57 yo female, PMH CAD s/p PCI to mLAD 5/2/21, HTN, HLD, nephrolithiasis (known renal stone without s/s), chronic microscopic hematuria,  COVID-19 infection 3/2020 with fevers of 104 for several days - stayed home - on CT chest 1/5/23 revealed ground glass with dependent atelectasis which is ? r/t COVID infection and pt. treated with antibiotics (pt. was having chest pain, which has resolved), had abnormal CT Angio on 1/9/23, cardiac cath on 1/18/23 revealed mild non-obstructive CAD - no intervention. Pt. reports suffering a mechanical fall in April 2021, when she slipped at [facility] leading to complex patella fracture, ORIF Dr. Trejo, progressive pain, s/p left arthroscopic knee surgery at [facility], has been doing PT 3 X's a week, still unable to bend or move knee properly and without pain, despite pain medication, walks inside the home with walker, MRI revealed high-grade cartilage loss at the lateral and central patella and pt. presents to [facility] for scheduled Left Total Knee Arthroplasty with FRANCY Robot on 12/4/23. Denies recent fever, chills, chest pain, SOB, changes in bowel/urinary habits or recent exposure to COVID-19 infection. Pt. denies clotting or bleeding disorders.     Pt. states he has not had CT FRANCY done, pt. had authorization to have CT of knee done today at [facility]. 59 yo female, PMH CAD s/p PCI to mLAD 5/2/21, HTN, HLD, nephrolithiasis (known renal stone without s/s), chronic microscopic hematuria,  COVID-19 infection 3/2020 with fevers of 104 for several days - stayed home - on CT chest 1/5/23 revealed ground glass with dependent atelectasis which is ? r/t COVID infection and pt. treated with antibiotics (pt. was having chest pain, which has resolved), had abnormal CT Angio on 1/9/23, cardiac cath on 1/18/23 revealed mild non-obstructive CAD - no intervention. Pt. reports suffering a mechanical fall in April 2021, when she slipped at [facility] leading to complex patella fracture, ORIF Dr. Trejo, progressive pain, s/p left arthroscopic knee surgery at [facility], has been doing PT 3 X's a week, still unable to bend or move knee properly and without pain, despite pain medication, walks inside the home with walker, MRI revealed high-grade cartilage loss at the lateral and central patella and pt. presents to [facility] for scheduled Left Total Knee Arthroplasty with FRANCY Robot on 12/4/23. Denies recent fever, chills, chest pain, SOB, changes in bowel/urinary habits or recent exposure to COVID-19 infection. Pt. denies clotting or bleeding disorders.     Pt. states he has not had CT FRANCY done, pt. had authorization to have CT of knee done today at [facility].

## 2023-11-22 NOTE — H&P PST ADULT - NSICDXPASTMEDICALHX_GEN_ALL_CORE_FT
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

## 2023-11-22 NOTE — H&P PST ADULT - OTHER CARE PROVIDERS
Dr. Smith Lin Dr. Smith Lin, cardiology, [phone] Dr. Smith Lin, cardiology, [phone] Dr. Smith Lin, cardiology, [phone]

## 2023-11-22 NOTE — H&P PST ADULT - ANESTHESIA, PREVIOUS REACTION, PROFILE
urinary retention, has to have Guo placed after epidural anesthesias/delayed awakening urinary retention, has to have Guo placed after epidural anesthesias in the past/delayed awakening

## 2023-11-22 NOTE — H&P PST ADULT - MUSCULOSKELETAL
details… left knee/no calf tenderness/decreased ROM due to pain/strength 5/5 bilateral upper extremities

## 2023-11-22 NOTE — H&P PST ADULT - ASSESSMENT
Activity: Needs help with most ADL's, walks around the house with walker, PT 3 X  s a week,     DASI scale: 4.64    Mallampati: 2    Dentition: Denies loose teeth/dentures Activity: Needs help with most ADL's, walks around the house with walker, PT 3 X  s a week,     DASI scale: 4.64    Mallampati: 2    Dentition: Denies loose teeth/dentures    KRISTIEI VTE 2.0 SCORE [CLOT updated 2019]    AGE RELATED RISK FACTORS                                                       MOBILITY RELATED FACTORS  [ x] Age 41-60 years                                            (1 Point)                    [ ] Bed rest                                                        (1 Point)  [ ] Age: 61-74 years                                           (2 Points)                  [ ] Plaster cast                                                   (2 Points)  [ ] Age= 75 years                                              (3 Points)                    [ ] Bed bound for more than 72 hours                 (2 Points)    DISEASE RELATED RISK FACTORS                                               GENDER SPECIFIC FACTORS  [ x] Edema in the lower extremities                       (1 Point)              [ ] Pregnancy                                                     (1 Point)  [ ] Varicose veins                                               (1 Point)                     [ ] Post-partum < 6 weeks                                   (1 Point)             [ ] BMI > 25 Kg/m2                                            (1 Point)                     [ ] Hormonal therapy  or oral contraception          (1 Point)                 [ ] Sepsis (in the previous month)                        (1 Point)               [ ] History of pregnancy complications                 (1 point)  [ ] Pneumonia or serious lung disease                                               [ ] Unexplained or recurrent                     (1 Point)           (in the previous month)                               (1 Point)  [ ] Abnormal pulmonary function test                     (1 Point)                 SURGERY RELATED RISK FACTORS  [ ] Acute myocardial infarction                              (1 Point)               [ ]  Section                                             (1 Point)  [ ] Congestive heart failure (in the previous month)  (1 Point)      [ ] Minor surgery                                                  (1 Point)   [ ] Inflammatory bowel disease                             (1 Point)               [ ] Arthroscopic surgery                                        (2 Points)  [ ] Central venous access                                      (2 Points)                [ ] General surgery lasting more than 45 minutes (2 points)  [ ] Malignancy- Present or previous                   (2 Points)                [x ] Elective arthroplasty                                         (5 points)    [ ] Stroke (in the previous month)                          (5 Points)                                                                                                                                                           HEMATOLOGY RELATED FACTORS                                                 TRAUMA RELATED RISK FACTORS  [ ] Prior episodes of VTE                                     (3 Points)                [ ] Fracture of the hip, pelvis, or leg                       (5 Points)  [ ] Positive family history for VTE                         (3 Points)             [ ] Acute spinal cord injury (in the previous month)  (5 Points)  [ ] Prothrombin 73752 A                                     (3 Points)               [ ] Paralysis  (less than 1 month)                             (5 Points)  [ ] Factor V Leiden                                             (3 Points)                  [ ] Multiple Trauma within 1 month                        (5 Points)  [ ] Lupus anticoagulants                                     (3 Points)                                                           [ ] Anticardiolipin antibodies                               (3 Points)                                                       [ ] High homocysteine in the blood                      (3 Points)                                             [ ] Other congenital or acquired thrombophilia      (3 Points)                                                [ ] Heparin induced thrombocytopenia                  (3 Points)                                     Total Score [   7       ] Activity: Needs help with most ADL's, walks around the house with walker, PT 3 X  s a week,     DASI scale: 4.64    Mallampati: 2    Dentition: Denies loose teeth/dentures    KRISTIEI VTE 2.0 SCORE [CLOT updated 2019]    AGE RELATED RISK FACTORS                                                       MOBILITY RELATED FACTORS  [ x] Age 41-60 years                                            (1 Point)                    [ ] Bed rest                                                        (1 Point)  [ ] Age: 61-74 years                                           (2 Points)                  [ ] Plaster cast                                                   (2 Points)  [ ] Age= 75 years                                              (3 Points)                    [ ] Bed bound for more than 72 hours                 (2 Points)    DISEASE RELATED RISK FACTORS                                               GENDER SPECIFIC FACTORS  [ x] Edema in the lower extremities                       (1 Point)              [ ] Pregnancy                                                     (1 Point)  [ ] Varicose veins                                               (1 Point)                     [ ] Post-partum < 6 weeks                                   (1 Point)             [ ] BMI > 25 Kg/m2                                            (1 Point)                     [ ] Hormonal therapy  or oral contraception          (1 Point)                 [ ] Sepsis (in the previous month)                        (1 Point)               [ ] History of pregnancy complications                 (1 point)  [ ] Pneumonia or serious lung disease                                               [ ] Unexplained or recurrent                     (1 Point)           (in the previous month)                               (1 Point)  [ ] Abnormal pulmonary function test                     (1 Point)                 SURGERY RELATED RISK FACTORS  [ ] Acute myocardial infarction                              (1 Point)               [ ]  Section                                             (1 Point)  [ ] Congestive heart failure (in the previous month)  (1 Point)      [ ] Minor surgery                                                  (1 Point)   [ ] Inflammatory bowel disease                             (1 Point)               [ ] Arthroscopic surgery                                        (2 Points)  [ ] Central venous access                                      (2 Points)                [ ] General surgery lasting more than 45 minutes (2 points)  [ ] Malignancy- Present or previous                   (2 Points)                [x ] Elective arthroplasty                                         (5 points)    [ ] Stroke (in the previous month)                          (5 Points)                                                                                                                                                           HEMATOLOGY RELATED FACTORS                                                 TRAUMA RELATED RISK FACTORS  [ ] Prior episodes of VTE                                     (3 Points)                [ ] Fracture of the hip, pelvis, or leg                       (5 Points)  [ ] Positive family history for VTE                         (3 Points)             [ ] Acute spinal cord injury (in the previous month)  (5 Points)  [ ] Prothrombin 45226 A                                     (3 Points)               [ ] Paralysis  (less than 1 month)                             (5 Points)  [ ] Factor V Leiden                                             (3 Points)                  [ ] Multiple Trauma within 1 month                        (5 Points)  [ ] Lupus anticoagulants                                     (3 Points)                                                           [ ] Anticardiolipin antibodies                               (3 Points)                                                       [ ] High homocysteine in the blood                      (3 Points)                                             [ ] Other congenital or acquired thrombophilia      (3 Points)                                                [ ] Heparin induced thrombocytopenia                  (3 Points)                                     Total Score [   7       ] Activity: Needs help with most ADL's, walks around the house with walker, PT 3 X  s a week,     DASI scale: 4.64    Mallampati: 2    Dentition: Denies loose teeth/dentures    KRISTIEI VTE 2.0 SCORE [CLOT updated 2019]    AGE RELATED RISK FACTORS                                                       MOBILITY RELATED FACTORS  [ x] Age 41-60 years                                            (1 Point)                    [ ] Bed rest                                                        (1 Point)  [ ] Age: 61-74 years                                           (2 Points)                  [ ] Plaster cast                                                   (2 Points)  [ ] Age= 75 years                                              (3 Points)                    [ ] Bed bound for more than 72 hours                 (2 Points)    DISEASE RELATED RISK FACTORS                                               GENDER SPECIFIC FACTORS  [ x] Edema in the lower extremities                       (1 Point)              [ ] Pregnancy                                                     (1 Point)  [ ] Varicose veins                                               (1 Point)                     [ ] Post-partum < 6 weeks                                   (1 Point)             [ ] BMI > 25 Kg/m2                                            (1 Point)                     [ ] Hormonal therapy  or oral contraception          (1 Point)                 [ ] Sepsis (in the previous month)                        (1 Point)               [ ] History of pregnancy complications                 (1 point)  [ ] Pneumonia or serious lung disease                                               [ ] Unexplained or recurrent                     (1 Point)           (in the previous month)                               (1 Point)  [ ] Abnormal pulmonary function test                     (1 Point)                 SURGERY RELATED RISK FACTORS  [ ] Acute myocardial infarction                              (1 Point)               [ ]  Section                                             (1 Point)  [ ] Congestive heart failure (in the previous month)  (1 Point)      [ ] Minor surgery                                                  (1 Point)   [ ] Inflammatory bowel disease                             (1 Point)               [ ] Arthroscopic surgery                                        (2 Points)  [ ] Central venous access                                      (2 Points)                [ ] General surgery lasting more than 45 minutes (2 points)  [ ] Malignancy- Present or previous                   (2 Points)                [x ] Elective arthroplasty                                         (5 points)    [ ] Stroke (in the previous month)                          (5 Points)                                                                                                                                                           HEMATOLOGY RELATED FACTORS                                                 TRAUMA RELATED RISK FACTORS  [ ] Prior episodes of VTE                                     (3 Points)                [ ] Fracture of the hip, pelvis, or leg                       (5 Points)  [ ] Positive family history for VTE                         (3 Points)             [ ] Acute spinal cord injury (in the previous month)  (5 Points)  [ ] Prothrombin 03823 A                                     (3 Points)               [ ] Paralysis  (less than 1 month)                             (5 Points)  [ ] Factor V Leiden                                             (3 Points)                  [ ] Multiple Trauma within 1 month                        (5 Points)  [ ] Lupus anticoagulants                                     (3 Points)                                                           [ ] Anticardiolipin antibodies                               (3 Points)                                                       [ ] High homocysteine in the blood                      (3 Points)                                             [ ] Other congenital or acquired thrombophilia      (3 Points)                                                [ ] Heparin induced thrombocytopenia                  (3 Points)                                     Total Score [   7       ]

## 2023-11-24 PROBLEM — S82.092A OTHER FRACTURE OF LEFT PATELLA, INITIAL ENCOUNTER FOR CLOSED FRACTURE: Chronic | Status: ACTIVE | Noted: 2023-11-22

## 2023-11-27 ENCOUNTER — APPOINTMENT (OUTPATIENT)
Dept: CARDIOLOGY | Facility: CLINIC | Age: 58
End: 2023-11-27
Payer: COMMERCIAL

## 2023-11-27 VITALS
DIASTOLIC BLOOD PRESSURE: 74 MMHG | WEIGHT: 177 LBS | HEART RATE: 79 BPM | OXYGEN SATURATION: 95 % | TEMPERATURE: 98 F | HEIGHT: 68 IN | BODY MASS INDEX: 26.83 KG/M2 | SYSTOLIC BLOOD PRESSURE: 125 MMHG

## 2023-11-27 DIAGNOSIS — Z01.818 ENCOUNTER FOR OTHER PREPROCEDURAL EXAMINATION: ICD-10-CM

## 2023-11-27 DIAGNOSIS — M17.12 UNILATERAL PRIMARY OSTEOARTHRITIS, LEFT KNEE: ICD-10-CM

## 2023-11-27 DIAGNOSIS — R11.0 NAUSEA: ICD-10-CM

## 2023-11-27 PROCEDURE — 99214 OFFICE O/P EST MOD 30 MIN: CPT | Mod: 25

## 2023-11-27 PROCEDURE — 93000 ELECTROCARDIOGRAM COMPLETE: CPT | Mod: NC

## 2023-11-27 RX ORDER — ONDANSETRON 8 MG/1
8 TABLET, ORALLY DISINTEGRATING ORAL
Qty: 6 | Refills: 0 | Status: ACTIVE | COMMUNITY
Start: 2023-11-27 | End: 1900-01-01

## 2023-11-28 ENCOUNTER — APPOINTMENT (OUTPATIENT)
Dept: THORACIC SURGERY | Facility: CLINIC | Age: 58
End: 2023-11-28
Payer: COMMERCIAL

## 2023-11-28 VITALS
WEIGHT: 177 LBS | HEART RATE: 54 BPM | SYSTOLIC BLOOD PRESSURE: 138 MMHG | HEIGHT: 68 IN | DIASTOLIC BLOOD PRESSURE: 83 MMHG | OXYGEN SATURATION: 99 % | RESPIRATION RATE: 16 BRPM | BODY MASS INDEX: 26.83 KG/M2

## 2023-11-28 PROCEDURE — 99205 OFFICE O/P NEW HI 60 MIN: CPT

## 2023-11-28 RX ORDER — ASPIRIN ENTERIC COATED TABLETS 81 MG 81 MG/1
81 TABLET, DELAYED RELEASE ORAL
Qty: 100 | Refills: 4 | Status: COMPLETED | COMMUNITY
End: 2023-11-28

## 2023-11-28 RX ORDER — PREGABALIN 25 MG/1
25 CAPSULE ORAL
Qty: 60 | Refills: 0 | Status: ACTIVE | COMMUNITY
Start: 2023-10-13

## 2023-11-28 RX ORDER — GABAPENTIN 300 MG/1
300 CAPSULE ORAL 3 TIMES DAILY
Qty: 90 | Refills: 1 | Status: COMPLETED | COMMUNITY
Start: 2021-06-30 | End: 2023-11-28

## 2023-11-30 ENCOUNTER — NON-APPOINTMENT (OUTPATIENT)
Age: 58
End: 2023-11-30

## 2023-12-03 ENCOUNTER — TRANSCRIPTION ENCOUNTER (OUTPATIENT)
Age: 58
End: 2023-12-03

## 2023-12-04 ENCOUNTER — TRANSCRIPTION ENCOUNTER (OUTPATIENT)
Age: 58
End: 2023-12-04

## 2023-12-04 ENCOUNTER — INPATIENT (INPATIENT)
Facility: HOSPITAL | Age: 58
LOS: 2 days | Discharge: NURSING, REHAB OR LONG-TERM CARE | DRG: 470 | End: 2023-12-07
Attending: ORTHOPAEDIC SURGERY | Admitting: ORTHOPAEDIC SURGERY
Payer: COMMERCIAL

## 2023-12-04 ENCOUNTER — APPOINTMENT (OUTPATIENT)
Dept: ORTHOPEDIC SURGERY | Facility: HOSPITAL | Age: 58
End: 2023-12-04

## 2023-12-04 VITALS
HEIGHT: 68 IN | DIASTOLIC BLOOD PRESSURE: 86 MMHG | HEART RATE: 64 BPM | TEMPERATURE: 98 F | RESPIRATION RATE: 16 BRPM | SYSTOLIC BLOOD PRESSURE: 152 MMHG | WEIGHT: 173.06 LBS | OXYGEN SATURATION: 99 %

## 2023-12-04 DIAGNOSIS — Z95.5 PRESENCE OF CORONARY ANGIOPLASTY IMPLANT AND GRAFT: Chronic | ICD-10-CM

## 2023-12-04 DIAGNOSIS — M17.12 UNILATERAL PRIMARY OSTEOARTHRITIS, LEFT KNEE: ICD-10-CM

## 2023-12-04 DIAGNOSIS — Z98.890 OTHER SPECIFIED POSTPROCEDURAL STATES: Chronic | ICD-10-CM

## 2023-12-04 LAB
GLUCOSE BLDC GLUCOMTR-MCNC: 86 MG/DL — SIGNIFICANT CHANGE UP (ref 70–99)
GLUCOSE BLDC GLUCOMTR-MCNC: 86 MG/DL — SIGNIFICANT CHANGE UP (ref 70–99)

## 2023-12-04 PROCEDURE — 0055T BONE SRGRY CMPTR CT/MRI IMAG: CPT

## 2023-12-04 PROCEDURE — 27447 TOTAL KNEE ARTHROPLASTY: CPT | Mod: LT

## 2023-12-04 PROCEDURE — S2900 ROBOTIC SURGICAL SYSTEM: CPT | Mod: NC

## 2023-12-04 PROCEDURE — 20985 CPTR-ASST DIR MS PX: CPT

## 2023-12-04 PROCEDURE — 73560 X-RAY EXAM OF KNEE 1 OR 2: CPT | Mod: 26,LT

## 2023-12-04 DEVICE — MAKO BONE PIN 4MM X 110MM: Type: IMPLANTABLE DEVICE | Site: LEFT | Status: FUNCTIONAL

## 2023-12-04 DEVICE — CEMENT SIMPLEX HV: Type: IMPLANTABLE DEVICE | Site: LEFT | Status: FUNCTIONAL

## 2023-12-04 DEVICE — INSERT TIB BEARING CS X3 SZ 4 9MM: Type: IMPLANTABLE DEVICE | Site: LEFT | Status: FUNCTIONAL

## 2023-12-04 DEVICE — COMP FEM CR CMNTLSS BEADED W/ PA SZ 4 LT: Type: IMPLANTABLE DEVICE | Site: LEFT | Status: FUNCTIONAL

## 2023-12-04 DEVICE — COMP PATELLA ASYMMETRIC X3 32X10MM: Type: IMPLANTABLE DEVICE | Site: LEFT | Status: FUNCTIONAL

## 2023-12-04 DEVICE — BASEPLATE TIB TRIATHLON TRITAN SZ 4: Type: IMPLANTABLE DEVICE | Site: LEFT | Status: FUNCTIONAL

## 2023-12-04 DEVICE — MAKO BONE PIN 4MM X 140MM: Type: IMPLANTABLE DEVICE | Site: LEFT | Status: FUNCTIONAL

## 2023-12-04 RX ORDER — MAGNESIUM HYDROXIDE 400 MG/1
30 TABLET, CHEWABLE ORAL DAILY
Refills: 0 | Status: DISCONTINUED | OUTPATIENT
Start: 2023-12-04 | End: 2023-12-07

## 2023-12-04 RX ORDER — METOPROLOL TARTRATE 50 MG
0.5 TABLET ORAL
Refills: 0 | DISCHARGE

## 2023-12-04 RX ORDER — TRAMADOL HYDROCHLORIDE 50 MG/1
50 TABLET ORAL ONCE
Refills: 0 | Status: DISCONTINUED | OUTPATIENT
Start: 2023-12-04 | End: 2023-12-04

## 2023-12-04 RX ORDER — SODIUM CHLORIDE 9 MG/ML
1000 INJECTION, SOLUTION INTRAVENOUS
Refills: 0 | Status: DISCONTINUED | OUTPATIENT
Start: 2023-12-04 | End: 2023-12-04

## 2023-12-04 RX ORDER — PANTOPRAZOLE SODIUM 20 MG/1
40 TABLET, DELAYED RELEASE ORAL ONCE
Refills: 0 | Status: COMPLETED | OUTPATIENT
Start: 2023-12-04 | End: 2023-12-04

## 2023-12-04 RX ORDER — SODIUM CHLORIDE 9 MG/ML
500 INJECTION, SOLUTION INTRAVENOUS ONCE
Refills: 0 | Status: COMPLETED | OUTPATIENT
Start: 2023-12-04 | End: 2023-12-05

## 2023-12-04 RX ORDER — ACETAMINOPHEN 500 MG
1000 TABLET ORAL ONCE
Refills: 0 | Status: COMPLETED | OUTPATIENT
Start: 2023-12-05 | End: 2023-12-05

## 2023-12-04 RX ORDER — DEXAMETHASONE 0.5 MG/5ML
8 ELIXIR ORAL ONCE
Refills: 0 | Status: COMPLETED | OUTPATIENT
Start: 2023-12-05 | End: 2023-12-05

## 2023-12-04 RX ORDER — ACETAMINOPHEN 500 MG
975 TABLET ORAL EVERY 8 HOURS
Refills: 0 | Status: DISCONTINUED | OUTPATIENT
Start: 2023-12-05 | End: 2023-12-07

## 2023-12-04 RX ORDER — PANTOPRAZOLE SODIUM 20 MG/1
40 TABLET, DELAYED RELEASE ORAL
Refills: 0 | Status: DISCONTINUED | OUTPATIENT
Start: 2023-12-04 | End: 2023-12-07

## 2023-12-04 RX ORDER — POLYETHYLENE GLYCOL 3350 17 G/17G
17 POWDER, FOR SOLUTION ORAL AT BEDTIME
Refills: 0 | Status: DISCONTINUED | OUTPATIENT
Start: 2023-12-04 | End: 2023-12-06

## 2023-12-04 RX ORDER — TRAMADOL HYDROCHLORIDE 50 MG/1
50 TABLET ORAL EVERY 6 HOURS
Refills: 0 | Status: DISCONTINUED | OUTPATIENT
Start: 2023-12-04 | End: 2023-12-07

## 2023-12-04 RX ORDER — ACETAMINOPHEN 500 MG
1000 TABLET ORAL ONCE
Refills: 0 | Status: COMPLETED | OUTPATIENT
Start: 2023-12-04 | End: 2023-12-04

## 2023-12-04 RX ORDER — METOPROLOL TARTRATE 50 MG
25 TABLET ORAL
Refills: 0 | Status: DISCONTINUED | OUTPATIENT
Start: 2023-12-04 | End: 2023-12-04

## 2023-12-04 RX ORDER — ONDANSETRON 8 MG/1
1 TABLET, FILM COATED ORAL
Refills: 0 | DISCHARGE

## 2023-12-04 RX ORDER — CEFAZOLIN SODIUM 1 G
2000 VIAL (EA) INJECTION EVERY 8 HOURS
Refills: 0 | Status: COMPLETED | OUTPATIENT
Start: 2023-12-04 | End: 2023-12-05

## 2023-12-04 RX ORDER — METHOCARBAMOL 500 MG/1
500 TABLET, FILM COATED ORAL EVERY 8 HOURS
Refills: 0 | Status: COMPLETED | OUTPATIENT
Start: 2023-12-04 | End: 2023-12-05

## 2023-12-04 RX ORDER — FAMOTIDINE 10 MG/ML
20 INJECTION INTRAVENOUS ONCE
Refills: 0 | Status: COMPLETED | OUTPATIENT
Start: 2023-12-04 | End: 2023-12-04

## 2023-12-04 RX ORDER — LIDOCAINE 4 G/100G
1 CREAM TOPICAL
Refills: 0 | DISCHARGE

## 2023-12-04 RX ORDER — SENNA PLUS 8.6 MG/1
2 TABLET ORAL AT BEDTIME
Refills: 0 | Status: DISCONTINUED | OUTPATIENT
Start: 2023-12-04 | End: 2023-12-07

## 2023-12-04 RX ORDER — ESCITALOPRAM OXALATE 10 MG/1
10 TABLET, FILM COATED ORAL DAILY
Refills: 0 | Status: DISCONTINUED | OUTPATIENT
Start: 2023-12-04 | End: 2023-12-06

## 2023-12-04 RX ORDER — ATORVASTATIN CALCIUM 80 MG/1
1 TABLET, FILM COATED ORAL
Qty: 0 | Refills: 0 | DISCHARGE

## 2023-12-04 RX ORDER — ESCITALOPRAM OXALATE 10 MG/1
1 TABLET, FILM COATED ORAL
Qty: 0 | Refills: 0 | DISCHARGE

## 2023-12-04 RX ORDER — CEFAZOLIN SODIUM 1 G
2000 VIAL (EA) INJECTION ONCE
Refills: 0 | Status: COMPLETED | OUTPATIENT
Start: 2023-12-04 | End: 2023-12-04

## 2023-12-04 RX ORDER — METHENAMINE MANDELATE 1 G
1 TABLET ORAL
Refills: 0 | DISCHARGE

## 2023-12-04 RX ORDER — ONDANSETRON 8 MG/1
4 TABLET, FILM COATED ORAL EVERY 6 HOURS
Refills: 0 | Status: DISCONTINUED | OUTPATIENT
Start: 2023-12-04 | End: 2023-12-07

## 2023-12-04 RX ORDER — OXYCODONE HYDROCHLORIDE 5 MG/1
5 TABLET ORAL EVERY 4 HOURS
Refills: 0 | Status: DISCONTINUED | OUTPATIENT
Start: 2023-12-04 | End: 2023-12-06

## 2023-12-04 RX ORDER — SODIUM CHLORIDE 9 MG/ML
500 INJECTION, SOLUTION INTRAVENOUS ONCE
Refills: 0 | Status: COMPLETED | OUTPATIENT
Start: 2023-12-04 | End: 2023-12-04

## 2023-12-04 RX ORDER — OXYCODONE HYDROCHLORIDE 5 MG/1
10 TABLET ORAL EVERY 4 HOURS
Refills: 0 | Status: DISCONTINUED | OUTPATIENT
Start: 2023-12-04 | End: 2023-12-06

## 2023-12-04 RX ORDER — DIAZEPAM 5 MG
5 TABLET ORAL ONCE
Refills: 0 | Status: DISCONTINUED | OUTPATIENT
Start: 2023-12-04 | End: 2023-12-04

## 2023-12-04 RX ORDER — ONDANSETRON 8 MG/1
4 TABLET, FILM COATED ORAL ONCE
Refills: 0 | Status: COMPLETED | OUTPATIENT
Start: 2023-12-04 | End: 2023-12-05

## 2023-12-04 RX ORDER — ASPIRIN/CALCIUM CARB/MAGNESIUM 324 MG
325 TABLET ORAL
Refills: 0 | Status: DISCONTINUED | OUTPATIENT
Start: 2023-12-04 | End: 2023-12-07

## 2023-12-04 RX ORDER — ATORVASTATIN CALCIUM 80 MG/1
40 TABLET, FILM COATED ORAL AT BEDTIME
Refills: 0 | Status: DISCONTINUED | OUTPATIENT
Start: 2023-12-04 | End: 2023-12-07

## 2023-12-04 RX ORDER — HYDROMORPHONE HYDROCHLORIDE 2 MG/ML
0.5 INJECTION INTRAMUSCULAR; INTRAVENOUS; SUBCUTANEOUS
Refills: 0 | Status: DISCONTINUED | OUTPATIENT
Start: 2023-12-04 | End: 2023-12-05

## 2023-12-04 RX ORDER — OXYCODONE HYDROCHLORIDE 5 MG/1
10 TABLET ORAL
Refills: 0 | Status: DISCONTINUED | OUTPATIENT
Start: 2023-12-04 | End: 2023-12-04

## 2023-12-04 RX ORDER — METOPROLOL TARTRATE 50 MG
12.5 TABLET ORAL
Refills: 0 | Status: DISCONTINUED | OUTPATIENT
Start: 2023-12-04 | End: 2023-12-07

## 2023-12-04 RX ORDER — KETOROLAC TROMETHAMINE 30 MG/ML
15 SYRINGE (ML) INJECTION EVERY 6 HOURS
Refills: 0 | Status: DISCONTINUED | OUTPATIENT
Start: 2023-12-04 | End: 2023-12-04

## 2023-12-04 RX ORDER — METHENAMINE MANDELATE 1 G
1 TABLET ORAL
Refills: 0 | Status: DISCONTINUED | OUTPATIENT
Start: 2023-12-04 | End: 2023-12-07

## 2023-12-04 RX ADMIN — SODIUM CHLORIDE 500 MILLILITER(S): 9 INJECTION, SOLUTION INTRAVENOUS at 16:53

## 2023-12-04 RX ADMIN — Medication 5 MILLIGRAM(S): at 20:30

## 2023-12-04 RX ADMIN — METHOCARBAMOL 500 MILLIGRAM(S): 500 TABLET, FILM COATED ORAL at 23:00

## 2023-12-04 RX ADMIN — OXYCODONE HYDROCHLORIDE 10 MILLIGRAM(S): 5 TABLET ORAL at 22:24

## 2023-12-04 RX ADMIN — HYDROMORPHONE HYDROCHLORIDE 0.5 MILLIGRAM(S): 2 INJECTION INTRAMUSCULAR; INTRAVENOUS; SUBCUTANEOUS at 20:02

## 2023-12-04 RX ADMIN — TRAMADOL HYDROCHLORIDE 50 MILLIGRAM(S): 50 TABLET ORAL at 11:49

## 2023-12-04 RX ADMIN — FAMOTIDINE 20 MILLIGRAM(S): 10 INJECTION INTRAVENOUS at 21:08

## 2023-12-04 RX ADMIN — HYDROMORPHONE HYDROCHLORIDE 0.5 MILLIGRAM(S): 2 INJECTION INTRAMUSCULAR; INTRAVENOUS; SUBCUTANEOUS at 19:21

## 2023-12-04 RX ADMIN — Medication 12.5 MILLIGRAM(S): at 21:29

## 2023-12-04 RX ADMIN — Medication 100 MILLIGRAM(S): at 21:35

## 2023-12-04 RX ADMIN — ONDANSETRON 4 MILLIGRAM(S): 8 TABLET, FILM COATED ORAL at 16:50

## 2023-12-04 RX ADMIN — Medication 325 MILLIGRAM(S): at 21:41

## 2023-12-04 RX ADMIN — Medication 1000 MILLIGRAM(S): at 22:05

## 2023-12-04 RX ADMIN — PANTOPRAZOLE SODIUM 40 MILLIGRAM(S): 20 TABLET, DELAYED RELEASE ORAL at 11:49

## 2023-12-04 RX ADMIN — Medication 400 MILLIGRAM(S): at 21:34

## 2023-12-04 RX ADMIN — Medication 1 GRAM(S): at 21:29

## 2023-12-04 RX ADMIN — OXYCODONE HYDROCHLORIDE 10 MILLIGRAM(S): 5 TABLET ORAL at 22:32

## 2023-12-04 RX ADMIN — ATORVASTATIN CALCIUM 40 MILLIGRAM(S): 80 TABLET, FILM COATED ORAL at 21:27

## 2023-12-04 RX ADMIN — ESCITALOPRAM OXALATE 10 MILLIGRAM(S): 10 TABLET, FILM COATED ORAL at 21:46

## 2023-12-04 RX ADMIN — SODIUM CHLORIDE 500 MILLILITER(S): 9 INJECTION, SOLUTION INTRAVENOUS at 21:47

## 2023-12-04 NOTE — DISCHARGE NOTE PROVIDER - NSDCFUADDINST_GEN_ALL_CORE_FT
Please call to schedule your post-operative follow up appointment with Dr. Brumfield for 2 weeks after hospital discharge.   Please follow instructions on discharge pamphlet provided.   Keep dressing clean, dry, and intact. Have doctor remove bandage at your post-operative follow up appointment.   Shower: with assistance beginning on POD #5. keep the dressing away from the stream of water. Pat the dressing dry when coming out of the shower. no tub baths.   Continue to ambulate as tolerated to the operative leg with a rolling walker.   Continue to take Ecotrin (Aspirin) 325 mg twice daily x 6 weeks to help prevent blood clots. Please continue taking Protonix 40mg daily while taking aspirin for gastrointestinal protection.   Recommended to follow up with your primary care provider within 1-2 months of hospital discharge to discuss your recent surgery and any change to your medications.

## 2023-12-04 NOTE — PHYSICAL THERAPY INITIAL EVALUATION ADULT - ACTIVE RANGE OF MOTION EXAMINATION, REHAB EVAL
decreased BLE 2/2 to spinal block still in effect/bilateral upper extremity Active ROM was WFL (within functional limits)/deficits as listed below

## 2023-12-04 NOTE — PROGRESS NOTE ADULT - SUBJECTIVE AND OBJECTIVE BOX
Orthopedics Post Op check    58 year old female POD0 s/p LTKA. Patient is stable. She admits to having a headache, requests pain medication. Nerve block is still intact. Patient requesting Kosher diet. Patient denies chest pain, SOB, nausea/vomiting.     Vital Signs Last 24 Hrs  T(C): 36.8 (04 Dec 2023 16:15), Max: 36.8 (04 Dec 2023 11:17)  T(F): 98.2 (04 Dec 2023 16:15), Max: 98.2 (04 Dec 2023 11:17)  HR: 59 (04 Dec 2023 18:00) (56 - 64)  BP: 131/68 (04 Dec 2023 18:00) (125/70 - 152/86)  BP(mean): 93 (04 Dec 2023 18:00) (89 - 94)  RR: 16 (04 Dec 2023 18:00) (16 - 16)  SpO2: 95% (04 Dec 2023 18:00) (95% - 100%)      Physical Exam:  General: A&Ox3, no acute distress  cardio: RRR, S1&S2  pulm: CTAB anterior lung fields  abdomen: normoactive BS in all 4 quadrants, soft, nontender, nondistended  MSK:   LLE: aquacel dressing C/D/I  motor and sensory not intact secondary to nerve block      Xray impression:  Unconstrained left knee prosthesis implanted. Retained K wire fragments   in otherwise anatomically aligned appearing patella.  Intact and aligned hardware and no periprosthetic fractures.  Postoperative soft tissue changes.  Correlate with intraoperative findings.   Orthopedics Post Op check    58 year old female POD0 s/p LTKA. Patient is stable. She admits to having a headache, requests pain medication. Nerve block is still intact. Patient denies chest pain, SOB, nausea/vomiting.     Vitals:  T(C): 36.8 (04 Dec 2023 16:15), Max: 36.8 (04 Dec 2023 11:17)  T(F): 98.2 (04 Dec 2023 16:15), Max: 98.2 (04 Dec 2023 11:17)  HR: 59 (04 Dec 2023 18:00) (56 - 64)  BP: 131/68 (04 Dec 2023 18:00) (125/70 - 152/86)  BP(mean): 93 (04 Dec 2023 18:00) (89 - 94)  RR: 16 (04 Dec 2023 18:00) (16 - 16)  SpO2: 95% (04 Dec 2023 18:00) (95% - 100%)    Physical Exam:  General: A&Ox3, no acute distress  cardio: RRR, S1&S2  pulm: CTAB anterior lung fields  abdomen: normoactive BS in all 4 quadrants, soft, nontender, nondistended  MSK:   LLE: aquacel dressing C/D/I  motor and sensory not intact secondary to nerve block    Xray impression:  Unconstrained left knee prosthesis implanted. Retained K wire fragments   in otherwise anatomically aligned appearing patella.  Intact and aligned hardware and no periprosthetic fractures.  Postoperative soft tissue changes.  Correlate with intraoperative findings.

## 2023-12-04 NOTE — PATIENT PROFILE ADULT - HAVE YOU EXPERIENCED VIOLENCE OR A TRAUMATIC EVENT?
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         no

## 2023-12-04 NOTE — PROGRESS NOTE ADULT - ASSESSMENT
58 year old POD0 s/p LTKA. Patient is stable, but has not regained motor and sensation secondary to nerve block.     Plan  1. PT/OT eval WBAT LLE  2. DVT prophylaxis- Ecotrin 325 mg BID, SCDs  3. GI prophylaxis- pantoprazole 40 mg PO before breakfast  4. Pain control- Ofirmev 1000 mg IV 8 hours after preop dose, oxycodone 5 mg PO q 4 hours for moderate pain, oxycodone 10 mg PO q 5 hours for severe pain, tramadol 50 mg PO q6 hours for mild pain   6 .Ice and elevation  7.encourage incentive spirometery  8. Reassess motor and sensation when nerve block wears off  9. Discharge: PACU to floor, pending PT/OT eval  10. F/U AM labs  11. Continue post op abx x 24 hours    Jessica IGLESIASS  orthopedics #[id number] 58 year old POD0 s/p LTKA. Patient is stable, but has not regained motor and sensation secondary to nerve block.     Plan  1. PT/OT eval WBAT LLE  2. DVT prophylaxis- Ecotrin 325 mg BID, SCDs  3. GI prophylaxis- pantoprazole 40 mg PO before breakfast  4. Pain control- Ofirmev 1000 mg IV 8 hours after preop dose, oxycodone 5 mg PO q 4 hours for moderate pain, oxycodone 10 mg PO q 5 hours for severe pain, tramadol 50 mg PO q6 hours for mild pain   6 .Ice and elevation  7.encourage incentive spirometery  8. Reassess motor and sensation when nerve block wears off  9. Discharge: PACU to floor, pending PT/OT eval  10. F/U AM labs  11. Continue post op abx x 24 hours    Jessica IGLESIASS  orthopedics #[id number] 58 year old POD0 s/p LTKA. Patient is stable, but has not regained motor and sensation secondary to nerve block.     Plan:  -PT/OT: WBAT LLE  -DVT prophylaxis: Ecotrin 325 mg BID, SCDs  -GI prophylaxis: pantoprazole 40 mg PO before breakfast  -Pain control: Ofirmev 1000 mg IV 8 hours after preop dose x24hrs, oxycodone 5-10mg q4hrs for mod-severe pain, tramadol 50 mg PO q6 hours for mild pain   -Ice and elevation  -encourage incentive spirometery  -Reassess motor and sensation when nerve block wears off  -F/U AM labs  -F/U hospitalist c/s  -Continue post op abx x 24 hours  -Discharge planning: PACU to floor, pending PT/OT alesha HOWE-S  orthopedics #[id number] 58 year old POD0 s/p LTKA. Patient is stable, but has not regained motor and sensation secondary to nerve block.     Plan:  -PT/OT: WBAT LLE  -DVT prophylaxis: Ecotrin 325 mg BID, SCDs  -GI prophylaxis: pantoprazole 40 mg PO before breakfast  -Pain control: Ofirmev 1000 mg IV 8 hours after preop dose x24hrs, oxycodone 5-10mg q4hrs for mod-severe pain, tramadol 50 mg PO q6 hours for mild pain   -Ice and elevation  -encourage incentive spirometery  -Reassess motor and sensation when nerve block wears off  -F/U AM labs  -F/U hospitalist c/s  -Continue post op abx x 24 hours  -Discharge planning: PACU to floor, pending PT/OT alesha HOWE-S  orthopedics #[id number]

## 2023-12-04 NOTE — DISCHARGE NOTE PROVIDER - NSDCMRMEDTOKEN_GEN_ALL_CORE_FT
atorvastatin 40 mg oral tablet: 1 tab(s) orally once a day  Ecotrin Adult Low Strength 81 mg oral delayed release tablet: 1 tab(s) orally once a day  Nisha/Lido cream: compound, topical 3 times a day to left knee down the shin, behind leg and thigh  Ketamine sam: 1 sam SL once a day as needed for pain  Lexapro 10 mg oral tablet: 1 tab(s) orally once a day  methenamine hippurate 1 g oral tablet: 1 tab(s) orally 2 times a day  Metoprolol Tartrate 25 mg oral tablet: 0.5 tab(s) orally 2 times a day  Protonix 40 mg oral delayed release tablet: 1 tab(s) orally once a day as needed for  heartburn  Zofran 4 mg oral tablet: 1 tab(s) orally once a day as needed for  nausea  ZTlido 1.8% topical film: Apply topically to affected area once a day 12 hours on, 12 hours off   acetaminophen 325 mg oral tablet: 3 tab(s) orally every 8 hours  atorvastatin 40 mg oral tablet: 1 tab(s) orally once a day  Ecotrin 325 mg oral delayed release tablet: 1 tab(s) orally 2 times a day x 6 weeks post op for DVT ppx, then return to home dosing of [id number] daily.  Nisha/Lido cream: compound, topical 3 times a day to left knee down the shin, behind leg and thigh  Lexapro 10 mg oral tablet: 1 tab(s) orally once a day  methenamine hippurate 1 g oral tablet: 1 tab(s) orally 2 times a day  methocarbamol 500 mg oral tablet: 1 tab(s) orally every 8 hours  Metoprolol Tartrate 25 mg oral tablet: 0.5 tab(s) orally 2 times a day  oxyCODONE 10 mg oral tablet: 1 tab(s) orally every 4 hours As needed Severe Pain (7 - 10)  oxyCODONE 5 mg oral tablet: 1 tab(s) orally every 4 hours As needed Moderate Pain (4 - 6)  polyethylene glycol 3350 oral powder for reconstitution: 17 gram(s) orally once a day (at bedtime)  Protonix 40 mg oral delayed release tablet: 1 tab(s) orally once a day x 6 weeks standing while on ecotrin 325 BID, then to dosing as per primary care physician  senna leaf extract oral tablet: 2 tab(s) orally once a day (at bedtime)  traMADol 50 mg oral tablet: 1 tab(s) orally every 6 hours As needed Mild Pain (1 - 3)  Zofran 4 mg oral tablet: 1 tab(s) orally once a day as needed for  nausea  ZTlido 1.8% topical film: Apply topically to affected area once a day 12 hours on, 12 hours off   acetaminophen 325 mg oral tablet: 3 tab(s) orally every 8 hours  atorvastatin 40 mg oral tablet: 1 tab(s) orally once a day  Ecotrin 325 mg oral delayed release tablet: 1 tab(s) orally 2 times a day x 6 weeks post op for DVT ppx, then return to home dosing of [id number] daily.  Nisha/Lido cream: compound, topical 3 times a day to left knee down the shin, behind leg and thigh  Lexapro 10 mg oral tablet: 1 tab(s) orally once a day  methenamine hippurate 1 g oral tablet: 1 tab(s) orally 2 times a day  methocarbamol 500 mg oral tablet: 1 tab(s) orally every 8 hours  Metoprolol Tartrate 25 mg oral tablet: 0.5 tab(s) orally 2 times a day  oxyCODONE 10 mg oral tablet: 1 tab(s) orally every 4 hours As needed Severe Pain (7 - 10)  oxyCODONE 5 mg oral tablet: 1 tab(s) orally every 4 hours As needed Moderate Pain (4 - 6)  polyethylene glycol 3350 oral powder for reconstitution: 17 gram(s) orally once a day (at bedtime)  Protonix 40 mg oral delayed release tablet: 1 tab(s) orally once a day x 6 weeks standing while on ecotrin 325 BID, then to dosing as per primary care physician  senna leaf extract oral tablet: 2 tab(s) orally once a day (at bedtime)  traMADol 50 mg oral tablet: 1 tab(s) orally every 6 hours As needed Mild Pain (1 - 3)  Zofran 4 mg oral tablet: 1 tab(s) orally once a day as needed for  nausea  ZTlido 1.8% topical film: Apply topically to affected area once a day 12 hours on, 12 hours off   acetaminophen 325 mg oral tablet: 3 tab(s) orally every 8 hours  atorvastatin 40 mg oral tablet: 1 tab(s) orally once a day  Ecotrin 325 mg oral delayed release tablet: 1 tab(s) orally 2 times a day x 6 weeks post op for DVT ppx, then return to home dosing of [id number] daily.  HYDROmorphone 2 mg oral tablet: 1 tab(s) orally every 4 hours As needed Moderate Pain (4 - 6)  HYDROmorphone 4 mg oral tablet: 1 tab(s) orally every 4 hours As needed Severe Pain (7 - 10)  Nisha/Lido cream: compound, topical 3 times a day to left knee down the shin, behind leg and thigh  Lexapro 10 mg oral tablet: 1 tab(s) orally once a day  methenamine hippurate 1 g oral tablet: 1 tab(s) orally 2 times a day  methocarbamol 500 mg oral tablet: 1 tab(s) orally every 8 hours  Metoprolol Tartrate 25 mg oral tablet: 0.5 tab(s) orally 2 times a day  polyethylene glycol 3350 oral powder for reconstitution: 17 gram(s) orally once a day (at bedtime)  Protonix 40 mg oral delayed release tablet: 1 tab(s) orally once a day x 6 weeks standing while on ecotrin 325 BID, then to dosing as per primary care physician  senna leaf extract oral tablet: 2 tab(s) orally once a day (at bedtime)  traMADol 50 mg oral tablet: 1 tab(s) orally every 6 hours As needed Mild Pain (1 - 3)  Zofran 4 mg oral tablet: 1 tab(s) orally once a day as needed for  nausea  ZTlido 1.8% topical film: Apply topically to affected area once a day 12 hours on, 12 hours off   acetaminophen 325 mg oral tablet: 3 tab(s) orally every 8 hours  atorvastatin 40 mg oral tablet: 1 tab(s) orally once a day  Ecotrin 325 mg oral delayed release tablet: 1 tab(s) orally 2 times a day x 6 weeks post op for DVT ppx, then return to home dosing of [id number] daily.  HYDROmorphone 2 mg oral tablet: 1 tab(s) orally every 4 hours As needed Moderate Pain (4 - 6)  HYDROmorphone 4 mg oral tablet: 1 tab(s) orally every 4 hours As needed Severe Pain (7 - 10)  Nisha/Lido cream: compound, topical 3 times a day to left knee down the shin, behind leg and thigh  Lexapro 10 mg oral tablet: 1 tab(s) orally once a day  methenamine hippurate 1 g oral tablet: 1 tab(s) orally 2 times a day  methocarbamol 500 mg oral tablet: 1 tab(s) orally every 8 hours  Metoprolol Tartrate 25 mg oral tablet: 0.5 tab(s) orally 2 times a day  polyethylene glycol 3350 oral powder for reconstitution: 17 gram(s) orally once a day (at bedtime)  Protonix 40 mg oral delayed release tablet: 1 tab(s) orally once a day x 6 weeks standing while on ecotrin 325 BID, then to dosing as per primary care physician  senna leaf extract oral tablet: 2 tab(s) orally once a day (at bedtime)  traMADol 50 mg oral tablet: 1 tab(s) orally every 6 hours As needed Mild Pain (1 - 3)  Zofran 4 mg oral tablet: 1 tab(s) orally once a day as needed for  nausea  ZTlido 1.8% topical film: Apply topically to affected area once a day 12 hours on, 12 hours off

## 2023-12-04 NOTE — DISCHARGE NOTE PROVIDER - NSDCFUSCHEDAPPT_GEN_ALL_CORE_FT
Mushtaq Brumfield  [facility]  ORTHOSURG [address]  Scheduled Appointment: 12/04/2023     Mushtaq Brumfield  [facility]  ORTHOSURG [address]  Scheduled Appointment: 12/04/2023

## 2023-12-04 NOTE — PATIENT PROFILE ADULT - FALL HARM RISK - RISK INTERVENTIONS
Assistance OOB with selected safe patient handling equipment/Assistance with ambulation/Communicate Fall Risk and Risk Factors to all staff, patient, and family/Discuss with provider need for PT consult/Monitor gait and stability/Provide patient with walking aids - walker, cane, crutches/Reinforce activity limits and safety measures with patient and family/Visual Cue: Yellow wristband/Bed in lowest position, wheels locked, appropriate side rails in place/Call bell, personal items and telephone in reach/Instruct patient to call for assistance before getting out of bed or chair/Non-slip footwear when patient is out of bed/[place] to call system/Physically safe environment - no spills, clutter or unnecessary equipment/Purposeful Proactive Rounding/Room/bathroom lighting operational, light cord in reach Assistance OOB with selected safe patient handling equipment/Assistance with ambulation/Communicate Fall Risk and Risk Factors to all staff, patient, and family/Discuss with provider need for PT consult/Monitor gait and stability/Provide patient with walking aids - walker, cane, crutches/Reinforce activity limits and safety measures with patient and family/Visual Cue: Yellow wristband/Bed in lowest position, wheels locked, appropriate side rails in place/Call bell, personal items and telephone in reach/Instruct patient to call for assistance before getting out of bed or chair/Non-slip footwear when patient is out of bed/[place] to call system/Physically safe environment - no spills, clutter or unnecessary equipment/Purposeful Proactive Rounding/Room/bathroom lighting operational, light cord in reach

## 2023-12-04 NOTE — DISCHARGE NOTE PROVIDER - CARE PROVIDERS DIRECT ADDRESSES
,[email or web address]

## 2023-12-04 NOTE — DISCHARGE NOTE PROVIDER - PROVIDER TOKENS
PROVIDER:[TOKEN:[[id number]],FOLLOWUP:[2 weeks],ESTABLISHEDPATIENT:[T]] PROVIDER:[TOKEN:[[id number]],FOLLOWUP:[2 weeks],ESTABLISHEDPATIENT:[T]]

## 2023-12-04 NOTE — BRIEF OPERATIVE NOTE - NSICDXBRIEFPROCEDURE_GEN_ALL_CORE_FT
PROCEDURES:  Arthroplasty, knee, total, robot-assisted, using Paul system 04-Dec-2023 16:17:50 left Devora Childress

## 2023-12-04 NOTE — DISCHARGE NOTE PROVIDER - HOSPITAL COURSE
History of Present Illness:  57 yo female, PMH CAD s/p PCI to mLAD 21, HTN, HLD, nephrolithiasis (known renal stone without s/s), chronic microscopic hematuria,  COVID-19 infection 3/2020 with fevers of 104 for several days - stayed home - on CT chest 23 revealed ground glass with dependent atelectasis which is ? r/t COVID infection and pt. treated with antibiotics (pt. was having chest pain, which has resolved), had abnormal CT Angio on 23, cardiac cath on 23 revealed mild non-obstructive CAD - no intervention. Pt. reports suffering a mechanical fall in 2021, when she slipped at [facility] leading to complex patella fracture, ORIF Dr. Trejo, progressive pain, s/p left arthroscopic knee surgery at [facility], has been doing PT 3 X's a week, still unable to bend or move knee properly and without pain, despite pain medication, walks inside the home with walker, MRI revealed high-grade cartilage loss at the lateral and central patella and pt. presents to [facility] for scheduled Left Total Knee Arthroplasty with [employer]ot on 23. Denies recent fever, chills, chest pain, SOB, changes in bowel/urinary habits or recent exposure to COVID-19 infection. Pt. denies clotting or bleeding disorders.     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     PAST SURGICAL HISTORY:   delivery delivered   S/P breast lumpectomy Left breast  S/P knee surgery right meniscus  S/P ORIF (open reduction internal fixation) fracture   Stented coronary artery.     Hospital Course:  After admission on 23 and receiving pre-operative parenteral prophylactic antibiotics, the patient underwent an uncomplicated Left total knee replacement with FRANCY robotic assist with Dr. Brumfield. Patient tolerated the procedure well and was transferred to the recovery room in stable condition, with a stable neuro / vascular exam of the operated extremity.    Patient was placed on (   ) for DVT ppx, and was placed on Protonix for GI protection.   Patient was made weight bearing as tolerated with the operative leg.     Patient evaluated by PT/OT and recommended for disposition for XXXX    Discharge and Orthopedic Care instructions were delineated in the Discharge Plan and reviewed with the patient. All medications were delineated in the medication reconciliation tool and key points were reviewed with the patient. They were deemed stable from an Orthopedic & medical standpoint for discharge *** History of Present Illness:  57 yo female, PMH CAD s/p PCI to mLAD 21, HTN, HLD, nephrolithiasis (known renal stone without s/s), chronic microscopic hematuria,  COVID-19 infection 3/2020 with fevers of 104 for several days - stayed home - on CT chest 23 revealed ground glass with dependent atelectasis which is ? r/t COVID infection and pt. treated with antibiotics (pt. was having chest pain, which has resolved), had abnormal CT Angio on 23, cardiac cath on 23 revealed mild non-obstructive CAD - no intervention. Pt. reports suffering a mechanical fall in 2021, when she slipped at [facility] leading to complex patella fracture, ORIF Dr. Trejo, progressive pain, s/p left arthroscopic knee surgery at [facility], has been doing PT 3 X's a week, still unable to bend or move knee properly and without pain, despite pain medication, walks inside the home with walker, MRI revealed high-grade cartilage loss at the lateral and central patella and pt. presents to [facility] for scheduled Left Total Knee Arthroplasty with [employer] Robot on 23. Denies recent fever, chills, chest pain, SOB, changes in bowel/urinary habits or recent exposure to COVID-19 infection. Pt. denies clotting or bleeding disorders.     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     PAST SURGICAL HISTORY:   delivery delivered   S/P breast lumpectomy Left breast  S/P knee surgery right meniscus  S/P ORIF (open reduction internal fixation) fracture   Stented coronary artery.     Hospital Course:  After admission on 23 and receiving pre-operative parenteral prophylactic antibiotics, the patient underwent an uncomplicated Left total knee replacement with FRANCY robotic assist with Dr. Brumfield. Patient tolerated the procedure well and was transferred to the recovery room in stable condition, with a stable neuro / vascular exam of the operated extremity.    Patient was placed on (   ) for DVT ppx, and was placed on Protonix for GI protection.   Patient was made weight bearing as tolerated with the operative leg.     Patient evaluated by PT/OT and recommended for disposition for XXXX    Discharge and Orthopedic Care instructions were delineated in the Discharge Plan and reviewed with the patient. All medications were delineated in the medication reconciliation tool and key points were reviewed with the patient. They were deemed stable from an Orthopedic & medical standpoint for discharge *** History of Present Illness:  59 yo female, PMH CAD s/p PCI to mLAD 21, HTN, HLD, nephrolithiasis (known renal stone without s/s), chronic microscopic hematuria,  COVID-19 infection 3/2020 with fevers of 104 for several days - stayed home - on CT chest 23 revealed ground glass with dependent atelectasis which is ? r/t COVID infection and pt. treated with antibiotics (pt. was having chest pain, which has resolved), had abnormal CT Angio on 23, cardiac cath on 23 revealed mild non-obstructive CAD - no intervention. Pt. reports suffering a mechanical fall in 2021, when she slipped at [facility] leading to complex patella fracture, ORIF Dr. Trejo, progressive pain, s/p left arthroscopic knee surgery at [facility], has been doing PT 3 X's a week, still unable to bend or move knee properly and without pain, despite pain medication, walks inside the home with walker, MRI revealed high-grade cartilage loss at the lateral and central patella and pt. presents to [facility] for scheduled Left Total Knee Arthroplasty with [employer]ot on 23. Denies recent fever, chills, chest pain, SOB, changes in bowel/urinary habits or recent exposure to COVID-19 infection. Pt. denies clotting or bleeding disorders.     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     PAST SURGICAL HISTORY:   delivery delivered   S/P breast lumpectomy Left breast  S/P knee surgery right meniscus  S/P ORIF (open reduction internal fixation) fracture   Stented coronary artery.     Hospital Course:  After admission on 23 and receiving pre-operative parenteral prophylactic antibiotics, the patient underwent an uncomplicated Left total knee replacement with FRANCY robotic assist with Dr. Brumfield. Patient tolerated the procedure well and was transferred to the recovery room in stable condition, with a stable neuro / vascular exam of the operated extremity.    Patient was placed on ecotrin 325 BID x 6 weeks for DVT ppx, and was placed on Protonix for GI protection.   Patient was made weight bearing as tolerated with the operative leg.     Medicine consulted for comanagement of comorbidities on 23 - recommendations followed. No changes made to medications at this time. XXXXXX    Patient evaluated by PT/OT and recommended for disposition for subacute rehab.     Discharge and Orthopedic Care instructions were delineated in the Discharge Plan and reviewed with the patient. All medications were delineated in the medication reconciliation tool and key points were reviewed with the patient. They were deemed stable from an Orthopedic & medical standpoint for discharge on XXXX. History of Present Illness:  57 yo female, PMH CAD s/p PCI to mLAD 21, HTN, HLD, nephrolithiasis (known renal stone without s/s), chronic microscopic hematuria,  COVID-19 infection 3/2020 with fevers of 104 for several days - stayed home - on CT chest 23 revealed ground glass with dependent atelectasis which is ? r/t COVID infection and pt. treated with antibiotics (pt. was having chest pain, which has resolved), had abnormal CT Angio on 23, cardiac cath on 23 revealed mild non-obstructive CAD - no intervention. Pt. reports suffering a mechanical fall in 2021, when she slipped at [facility] leading to complex patella fracture, ORIF Dr. Trejo, progressive pain, s/p left arthroscopic knee surgery at [facility], has been doing PT 3 X's a week, still unable to bend or move knee properly and without pain, despite pain medication, walks inside the home with walker, MRI revealed high-grade cartilage loss at the lateral and central patella and pt. presents to [facility] for scheduled Left Total Knee Arthroplasty with [employer] Robot on 23. Denies recent fever, chills, chest pain, SOB, changes in bowel/urinary habits or recent exposure to COVID-19 infection. Pt. denies clotting or bleeding disorders.     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     PAST SURGICAL HISTORY:   delivery delivered   S/P breast lumpectomy Left breast  S/P knee surgery right meniscus  S/P ORIF (open reduction internal fixation) fracture   Stented coronary artery.     Hospital Course:  After admission on 23 and receiving pre-operative parenteral prophylactic antibiotics, the patient underwent an uncomplicated Left total knee replacement with FRANCY robotic assist with Dr. Brumfield. Patient tolerated the procedure well and was transferred to the recovery room in stable condition, with a stable neuro / vascular exam of the operated extremity.    Patient was placed on ecotrin 325 BID x 6 weeks for DVT ppx, and was placed on Protonix for GI protection.   Patient was made weight bearing as tolerated with the operative leg.     Medicine consulted for comanagement of comorbidities on 23 - recommendations followed. No changes made to medications at this time. XXXXXX    Patient evaluated by PT/OT and recommended for disposition for subacute rehab.     Discharge and Orthopedic Care instructions were delineated in the Discharge Plan and reviewed with the patient. All medications were delineated in the medication reconciliation tool and key points were reviewed with the patient. They were deemed stable from an Orthopedic & medical standpoint for discharge on XXXX. History of Present Illness:  59 yo female, PMH CAD s/p PCI to mLAD 21, HTN, HLD, nephrolithiasis (known renal stone without s/s), chronic microscopic hematuria,  COVID-19 infection 3/2020 with fevers of 104 for several days - stayed home - on CT chest 23 revealed ground glass with dependent atelectasis which is ? r/t COVID infection and pt. treated with antibiotics (pt. was having chest pain, which has resolved), had abnormal CT Angio on 23, cardiac cath on 23 revealed mild non-obstructive CAD - no intervention. Pt. reports suffering a mechanical fall in 2021, when she slipped at [facility] leading to complex patella fracture, ORIF Dr. Trejo, progressive pain, s/p left arthroscopic knee surgery at [facility], has been doing PT 3 X's a week, still unable to bend or move knee properly and without pain, despite pain medication, walks inside the home with walker, MRI revealed high-grade cartilage loss at the lateral and central patella and pt. presents to [facility] for scheduled Left Total Knee Arthroplasty with [employer]ot on 23. Denies recent fever, chills, chest pain, SOB, changes in bowel/urinary habits or recent exposure to COVID-19 infection. Pt. denies clotting or bleeding disorders.     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     PAST SURGICAL HISTORY:   delivery delivered   S/P breast lumpectomy Left breast  S/P knee surgery right meniscus  S/P ORIF (open reduction internal fixation) fracture   Stented coronary artery.     Hospital Course:  After admission on 23 and receiving pre-operative parenteral prophylactic antibiotics, the patient underwent an uncomplicated Left total knee replacement with FRANCY robotic assist with Dr. Brumfield. Patient tolerated the procedure well and was transferred to the recovery room in stable condition, with a stable neuro / vascular exam of the operated extremity.    Patient was placed on ecotrin 325 BID x 6 weeks for DVT ppx, and was placed on Protonix for GI protection.   Patient was made weight bearing as tolerated with the operative leg.     Medicine consulted for comanagement of comorbidities on 23 - recommendations followed. No changes made to medications at this time.    Patient evaluated by PT/OT and recommended for disposition for subacute rehab.     Discharge and Orthopedic Care instructions were delineated in the Discharge Plan and reviewed with the patient. All medications were delineated in the medication reconciliation tool and key points were reviewed with the patient. They were deemed stable from an Orthopedic & medical standpoint for discharge. History of Present Illness:  57 yo female, PMH CAD s/p PCI to mLAD 21, HTN, HLD, nephrolithiasis (known renal stone without s/s), chronic microscopic hematuria,  COVID-19 infection 3/2020 with fevers of 104 for several days - stayed home - on CT chest 23 revealed ground glass with dependent atelectasis which is ? r/t COVID infection and pt. treated with antibiotics (pt. was having chest pain, which has resolved), had abnormal CT Angio on 23, cardiac cath on 23 revealed mild non-obstructive CAD - no intervention. Pt. reports suffering a mechanical fall in 2021, when she slipped at [facility] leading to complex patella fracture, ORIF Dr. Trejo, progressive pain, s/p left arthroscopic knee surgery at [facility], has been doing PT 3 X's a week, still unable to bend or move knee properly and without pain, despite pain medication, walks inside the home with walker, MRI revealed high-grade cartilage loss at the lateral and central patella and pt. presents to [facility] for scheduled Left Total Knee Arthroplasty with [employer]ot on 23. Denies recent fever, chills, chest pain, SOB, changes in bowel/urinary habits or recent exposure to COVID-19 infection. Pt. denies clotting or bleeding disorders.     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     PAST SURGICAL HISTORY:   delivery delivered   S/P breast lumpectomy Left breast  S/P knee surgery right meniscus  S/P ORIF (open reduction internal fixation) fracture   Stented coronary artery.     Hospital Course:  After admission on 23 and receiving pre-operative parenteral prophylactic antibiotics, the patient underwent an uncomplicated Left total knee replacement with FRANCY robotic assist with Dr. Brumfield. Patient tolerated the procedure well and was transferred to the recovery room in stable condition, with a stable neuro / vascular exam of the operated extremity.    Patient was placed on ecotrin 325 BID x 6 weeks for DVT ppx, and was placed on Protonix for GI protection.   Patient was made weight bearing as tolerated with the operative leg.     Medicine consulted for comanagement of comorbidities on 23 - recommendations followed. No changes made to medications at this time.    Patient evaluated by PT/OT and recommended for disposition for subacute rehab.     Discharge and Orthopedic Care instructions were delineated in the Discharge Plan and reviewed with the patient. All medications were delineated in the medication reconciliation tool and key points were reviewed with the patient. They were deemed stable from an Orthopedic & medical standpoint for discharge.

## 2023-12-04 NOTE — PHYSICAL THERAPY INITIAL EVALUATION ADULT - DIAGNOSIS, PT EVAL
Infectious Diseases progress note:    Subjective:  Pt went for L hip aspiration, 0.25 cc aspiration, sent for culture only.      ROS:  CONSTITUTIONAL:  No fever, chills, rigors  CARDIOVASCULAR:  No chest pain or palpitations  RESPIRATORY:   No SOB, cough, dyspnea on exertion.  No wheezing  GASTROINTESTINAL:  No abd pain, N/V, diarrhea/constipation  EXTREMITIES:  No swelling or joint pain  GENITOURINARY:  No burning on urination, increased frequency or urgency.  No flank pain  NEUROLOGIC:  No HA, visual disturbances  SKIN: No rashes    Allergies    No Known Allergies    Intolerances        ANTIBIOTICS/RELEVANT:  antimicrobials  cefTRIAXone   IVPB 1000 milliGRAM(s) IV Intermittent every 24 hours  rifAMPin 300 milliGRAM(s) Oral two times a day  vancomycin  IVPB      vancomycin  IVPB 750 milliGRAM(s) IV Intermittent every 24 hours    immunologic:    OTHER:  acetaminophen   Tablet .. 650 milliGRAM(s) Oral every 8 hours PRN  amLODIPine   Tablet 5 milliGRAM(s) Oral daily  atorvastatin 10 milliGRAM(s) Oral at bedtime  melatonin 3 milliGRAM(s) Oral at bedtime  oxycodone    5 mG/acetaminophen 325 mG 1 Tablet(s) Oral every 8 hours PRN  pantoprazole    Tablet 40 milliGRAM(s) Oral before breakfast  senna 2 Tablet(s) Oral at bedtime PRN      Objective:  Vital Signs Last 24 Hrs  T(C): 36.3 (30 Apr 2020 12:26), Max: 36.7 (29 Apr 2020 18:07)  T(F): 97.3 (30 Apr 2020 12:26), Max: 98 (29 Apr 2020 18:07)  HR: 79 (30 Apr 2020 12:26) (79 - 93)  BP: 126/61 (30 Apr 2020 12:26) (124/60 - 137/75)  BP(mean): --  RR: 18 (30 Apr 2020 12:26) (17 - 18)  SpO2: 96% (30 Apr 2020 12:26) (96% - 99%)    PHYSICAL EXAM:  Constitutional:NAD  Eyes:BINTA, EOMI  Ear/Nose/Throat: no thrush, mucositis.  Moist mucous membranes	  Neck:no JVD, no lymphadenopathy, supple  Respiratory: CTA sandra  Cardiovascular: S1S2 RRR, no murmurs  Gastrointestinal:soft, nontender,  nondistended (+) BS  Extremities:  LLE bucks traction  Skin:  no rashes, open wounds or ulcerations        LABS:                        8.4    8.15  )-----------( 613      ( 30 Apr 2020 04:12 )             26.7     04-30    137  |  103  |  19  ----------------------------<  85  4.0   |  22  |  0.83    Ca    8.9      30 Apr 2020 04:12  Phos  3.2     04-29  Mg     2.1     04-29      PT/INR - ( 30 Apr 2020 04:12 )   PT: 16.4 SEC;   INR: 1.42          PTT - ( 30 Apr 2020 04:12 )  PTT:30.5 SEC      Procalcitonin, Serum: 0.08 (04-30 @ 04:12)  Procalcitonin, Serum: 0.12 (04-28 @ 10:00)  Procalcitonin, Serum: 0.12 (04-26 @ 06:09)  Procalcitonin, Serum: 0.13 (04-24 @ 07:08)    Vancomycin Level, Random:  ug/mL (04-27 @ 06:15)  Vancomycin Level, Random:  ug/mL (04-26 @ 04:42)      Vancomycin Level, Trough: 19.6 ug/mL (04-29 @ 09:59)              MICROBIOLOGY:    Culture - Blood (04.24.20 @ 12:00)    Specimen Source: .Blood Blood-Venous    Culture Results:   No Growth Final    Culture - Blood (04.24.20 @ 12:00)    Specimen Source: .Blood Blood-Peripheral    Culture Results:   No Growth Final    Culture - Blood (04.23.20 @ 00:56)    -  Clindamycin: R >4    -  Erythromycin: R >4    -  Cefazolin: R <=4    Gram Stain:   Growth in aerobic bottle: Gram Positive Cocci in Clusters  Growth in anaerobic bottle: Gram Positive Cocci in Clusters    -  Ampicillin/Sulbactam: R <=8/4    -  Oxacillin: R >2    -  Penicillin: R >8    -  Trimethoprim/Sulfamethoxazole: R >2/38    -  Vancomycin: S 2    -  Gentamicin: R >8 Should not be used as monotherapy    -  RIF- Rifampin: S <=1 Should not be used as monotherapy    -  Tetra/Doxy: R >8    Specimen Source: .Blood Blood-Peripheral    Organism: Gram Positive Cocci in Clusters    Culture Results:   Growth in aerobic and anaerobic bottles: Staphylococcus epidermidis    Organism Identification: Gram Positive Cocci in Clusters    Method Type: [place]    Culture - Blood (04.22.20 @ 18:35)    Gram Stain:   Growth in aerobic bottle: Gram Positive Cocci in Clusters  Growth in anaerobic bottle: Gram Positive Cocci in Clusters    -  Coagulase negative Staphylococcus: Detec    Specimen Source: .Blood Blood-Venous    Organism: Blood Culture PCR    Culture Results:   Growth in aerobic and anaerobic bottles: Staphylococcus epidermidis  "Susceptibilities not performed"  Growth in aerobic bottle: Staphylococcus hominis  Coag Negative Staphylococcus  Single set isolate, possible contaminant. Contact  Microbiology if susceptibility testing clinically  indicated.  "Due to technical problems, Proteus sp. will Not be reported as part of  the BCID panel until further notice"  ***Blood Panel PCR results on this specimen are available  approximately 3 hours after the Gram stain result.***  Gram stain, PCR, and/or culture results may not always  correspond due to difference in methodologies.  ************************************************************  This PCR assay was performed using [employer].  The following targets are tested for: Enterococcus,  vancomycin resistant enterococci, Listeria monocytogenes,  coagulase negative staphylococci, S. aureus,  methicillin resistant S. aureus, Streptococcus agalactiae  (Group B), S. pneumoniae, S. pyogenes (Group A),  Acinetobacter baumannii, Enterobacter cloacae, E. coli,  Klebsiella oxytoca, K. pneumoniae, Proteus sp.,  Serratia marcescens, Haemophilus influenzae,  Neisseria meningitidis, Pseudomonas aeruginosa, Candida  albicans, C. glabrata, C krusei, C parapsilosis,  C. tropicalis and the KPC resistance gene.    Organism Identification: Blood Culture PCR    Method Type: PCR          RADIOLOGY & ADDITIONAL STUDIES: Decreased functional mobility/capacity secondary to impairments listed below

## 2023-12-04 NOTE — PRE-ANESTHESIA EVALUATION ADULT - NSANTHPMHFT_GEN_ALL_CORE
59 yo female, PMH CAD s/p PCI to mLAD 5/2/21, HTN, HLD, nephrolithiasis (known renal stone without s/s)

## 2023-12-04 NOTE — PHYSICAL THERAPY INITIAL EVALUATION ADULT - ADDITIONAL COMMENTS
Pt lives in a private home with family there are 6 steps to enter, 1 flight of stairs to bedroom. Pt performed ADL/IADLs independently. Ambulates with rolling walker. Owns DME: rolling walker, commode, raised toilet seat

## 2023-12-04 NOTE — PHYSICAL THERAPY INITIAL EVALUATION ADULT - PERTINENT HX OF CURRENT PROBLEM, REHAB EVAL
57 yo female, PMH CAD s/p PCI to mLAD 5/2/21, HTN, HLD, nephrolithiasis (known renal stone without s/s), chronic microscopic hematuria,  COVID-19 infection 3/2020 with fevers of 104 for several days - stayed home - on CT chest 1/5/23 revealed ground glass with dependent atelectasis which is ? r/t COVID infection and pt. treated with antibiotics (pt. was having chest pain, which has resolved), had abnormal CT Angio on 1/9/23, cardiac cath on 1/18/23 revealed mild non-obstructive CAD - no intervention. Pt. reports suffering a mechanical fall in April 2021, when she slipped at [facility] leading to complex patella fracture, ORIF Dr. Trejo, progressive pain, s/p left arthroscopic knee surgery at [facility], has been doing PT 3 X's a week, still unable to bend or move knee properly and without pain, despite pain medication, walks inside the home with walker, MRI revealed high-grade cartilage loss at the lateral and central patella and pt. presents to PST for scheduled Left Total Knee Arthroplasty with FRANCY Robot on 12/4/23. Denies recent fever, chills, chest pain, SOB, changes in bowel/urinary habits or recent exposure to COVID-19 infection. Pt. denies clotting or bleeding disorders. 59 yo female, PMH CAD s/p PCI to mLAD 5/2/21, HTN, HLD, nephrolithiasis (known renal stone without s/s), chronic microscopic hematuria,  COVID-19 infection 3/2020 with fevers of 104 for several days - stayed home - on CT chest 1/5/23 revealed ground glass with dependent atelectasis which is ? r/t COVID infection and pt. treated with antibiotics (pt. was having chest pain, which has resolved), had abnormal CT Angio on 1/9/23, cardiac cath on 1/18/23 revealed mild non-obstructive CAD - no intervention. Pt. reports suffering a mechanical fall in April 2021, when she slipped at [facility] leading to complex patella fracture, ORIF Dr. Trejo, progressive pain, s/p left arthroscopic knee surgery at [facility], has been doing PT 3 X's a week, still unable to bend or move knee properly and without pain, despite pain medication, walks inside the home with walker, MRI revealed high-grade cartilage loss at the lateral and central patella and pt. presents to PST for scheduled Left Total Knee Arthroplasty with FRANCY Robot on 12/4/23. Denies recent fever, chills, chest pain, SOB, changes in bowel/urinary habits or recent exposure to COVID-19 infection. Pt. denies clotting or bleeding disorders.

## 2023-12-04 NOTE — DISCHARGE NOTE PROVIDER - NSDCCPTREATMENT_GEN_ALL_CORE_FT
PRINCIPAL PROCEDURE  Procedure: Arthroplasty, knee, robot-assisted, using Paul system  Findings and Treatment: Left TKA

## 2023-12-04 NOTE — DISCHARGE NOTE PROVIDER - NSDCACTIVITY_GEN_ALL_CORE
Return to Work/School allowed/Do not drive or operate machinery/Do not make important decisions/Stairs allowed/Walking - Indoors allowed/Walking - Outdoors allowed/Follow Instructions Provided by your Surgical Team

## 2023-12-04 NOTE — DISCHARGE NOTE PROVIDER - CARE PROVIDER_API CALL
Mushtaq Brumfield  Orthopaedic Surgery  [address], [address]  [place] [address]  Phone: [phone]  Fax: [phone]  Established Patient  Follow Up Time: 2 weeks   Mushtaq Brumfield  Orthopaedic Surgery  [address], [address]  [place] [address]  Phone: [phone]  Fax: [phone]  Established Patient  Follow Up Time: 2 weeks

## 2023-12-04 NOTE — PACU DISCHARGE NOTE - THE ANESTHESIA ORDERS USED IN THE PACU ORDER SET WILL BE DISCONTINUED UPON TRANSFER OF THIS PATIENT
Additional Notes: None per pt.  No interest in vaccine Statement Selected Quality 111:Pneumonia Vaccination Status For Older Adults: Pneumococcal Vaccination not Administered or Previously Received, Reason not Otherwise Specified Detail Level: Detailed Quality 130: Documentation Of Current Medications In The Medical Record: Eligible clinician attests to documenting in the medical record the patient is not eligible for a current list of medications being obtained, updated, or reviewed by the eligible clinician

## 2023-12-04 NOTE — PRE-ANESTHESIA EVALUATION ADULT - RESPIRATORY RATE (BREATHS/MIN)
Patient Instructions by Garrison Granger CMA at 02/12/18 03:27 PM     Author:  Garrison Granger CMA Service:  (none) Author Type:  Certified Medical Assistant     Filed:  02/12/18 03:53 PM Encounter Date:  2/12/2018 Status:  Addendum     :  Garrison Granger CMA (Certified Medical Assistant)            Insulin regimen as of 2/12/2018    Let us know if you want us to order the Freestyle Robbie glucose monitoring system. Find out where we should send the prescription to.    (1) Humalog (fast-acting) - take this no more than 15 minutes before each meal        50 units before breakfast        50-60 units before lunch        50-70 units before dinner    Do not take any Humalog if you don't eat.    (2) Increase the Lantus (long-acting) doses to 54 units every morning and 54 units every night (60 units if your blood sugar is over 200 at bedtime).       -Take this around the same time every day no matter what (even if you haven't eaten)    (3) Check your blood sugars 4 times a day       -Before each meal (breakfast, lunch, and dinner)       -At bedtime    (4) Call Dr. Vines's office ([phone]) if your blood sugar goes below 70 on more than one occasion or if it is above 300 and not coming down.    (5) Bring in your blood sugar readings or glucose meter to every appointment with Dr. Vines    (6) Go to the lab for fasting blood and urine tests just before your next appointment with Dr. Vines.    Next visit with JEREMÍAS VINES is on 08/15/2018 at 1:10PM      Revision History        User Key Date/Time User Provider Type Action    > [N/A] 02/12/18 03:53 PM Garrison Granger CMA Certified Medical Assistant Addend     [N/A] 02/12/18 03:44 PM Jeremías Vines MD Physician Addend     [N/A] 02/12/18 03:27 PM Jeremías Vines MD Physician Sign            
16

## 2023-12-05 LAB
ANION GAP SERPL CALC-SCNC: 10 MMOL/L — SIGNIFICANT CHANGE UP (ref 5–17)
ANION GAP SERPL CALC-SCNC: 10 MMOL/L — SIGNIFICANT CHANGE UP (ref 5–17)
BUN SERPL-MCNC: 14 MG/DL — SIGNIFICANT CHANGE UP (ref 7–23)
BUN SERPL-MCNC: 14 MG/DL — SIGNIFICANT CHANGE UP (ref 7–23)
CALCIUM SERPL-MCNC: 9.4 MG/DL — SIGNIFICANT CHANGE UP (ref 8.4–10.5)
CALCIUM SERPL-MCNC: 9.4 MG/DL — SIGNIFICANT CHANGE UP (ref 8.4–10.5)
CHLORIDE SERPL-SCNC: 106 MMOL/L — SIGNIFICANT CHANGE UP (ref 96–108)
CHLORIDE SERPL-SCNC: 106 MMOL/L — SIGNIFICANT CHANGE UP (ref 96–108)
CO2 SERPL-SCNC: 26 MMOL/L — SIGNIFICANT CHANGE UP (ref 22–31)
CO2 SERPL-SCNC: 26 MMOL/L — SIGNIFICANT CHANGE UP (ref 22–31)
CREAT SERPL-MCNC: 0.89 MG/DL — SIGNIFICANT CHANGE UP (ref 0.5–1.3)
CREAT SERPL-MCNC: 0.89 MG/DL — SIGNIFICANT CHANGE UP (ref 0.5–1.3)
EGFR: 75 ML/MIN/1.73M2 — SIGNIFICANT CHANGE UP
EGFR: 75 ML/MIN/1.73M2 — SIGNIFICANT CHANGE UP
GLUCOSE SERPL-MCNC: 162 MG/DL — HIGH (ref 70–99)
GLUCOSE SERPL-MCNC: 162 MG/DL — HIGH (ref 70–99)
HCT VFR BLD CALC: 34.7 % — SIGNIFICANT CHANGE UP (ref 34.5–45)
HCT VFR BLD CALC: 34.7 % — SIGNIFICANT CHANGE UP (ref 34.5–45)
HGB BLD-MCNC: 11.6 G/DL — SIGNIFICANT CHANGE UP (ref 11.5–15.5)
HGB BLD-MCNC: 11.6 G/DL — SIGNIFICANT CHANGE UP (ref 11.5–15.5)
MCHC RBC-ENTMCNC: 31.2 PG — SIGNIFICANT CHANGE UP (ref 27–34)
MCHC RBC-ENTMCNC: 31.2 PG — SIGNIFICANT CHANGE UP (ref 27–34)
MCHC RBC-ENTMCNC: 33.4 GM/DL — SIGNIFICANT CHANGE UP (ref 32–36)
MCHC RBC-ENTMCNC: 33.4 GM/DL — SIGNIFICANT CHANGE UP (ref 32–36)
MCV RBC AUTO: 93.3 FL — SIGNIFICANT CHANGE UP (ref 80–100)
MCV RBC AUTO: 93.3 FL — SIGNIFICANT CHANGE UP (ref 80–100)
NRBC # BLD: 0 /100 WBCS — SIGNIFICANT CHANGE UP (ref 0–0)
NRBC # BLD: 0 /100 WBCS — SIGNIFICANT CHANGE UP (ref 0–0)
PLATELET # BLD AUTO: 210 K/UL — SIGNIFICANT CHANGE UP (ref 150–400)
PLATELET # BLD AUTO: 210 K/UL — SIGNIFICANT CHANGE UP (ref 150–400)
POTASSIUM SERPL-MCNC: 4.8 MMOL/L — SIGNIFICANT CHANGE UP (ref 3.5–5.3)
POTASSIUM SERPL-MCNC: 4.8 MMOL/L — SIGNIFICANT CHANGE UP (ref 3.5–5.3)
POTASSIUM SERPL-SCNC: 4.8 MMOL/L — SIGNIFICANT CHANGE UP (ref 3.5–5.3)
POTASSIUM SERPL-SCNC: 4.8 MMOL/L — SIGNIFICANT CHANGE UP (ref 3.5–5.3)
RBC # BLD: 3.72 M/UL — LOW (ref 3.8–5.2)
RBC # BLD: 3.72 M/UL — LOW (ref 3.8–5.2)
RBC # FLD: 12.5 % — SIGNIFICANT CHANGE UP (ref 10.3–14.5)
RBC # FLD: 12.5 % — SIGNIFICANT CHANGE UP (ref 10.3–14.5)
SODIUM SERPL-SCNC: 142 MMOL/L — SIGNIFICANT CHANGE UP (ref 135–145)
SODIUM SERPL-SCNC: 142 MMOL/L — SIGNIFICANT CHANGE UP (ref 135–145)
WBC # BLD: 16.42 K/UL — HIGH (ref 3.8–10.5)
WBC # BLD: 16.42 K/UL — HIGH (ref 3.8–10.5)
WBC # FLD AUTO: 16.42 K/UL — HIGH (ref 3.8–10.5)
WBC # FLD AUTO: 16.42 K/UL — HIGH (ref 3.8–10.5)

## 2023-12-05 PROCEDURE — 99223 1ST HOSP IP/OBS HIGH 75: CPT

## 2023-12-05 RX ORDER — HYDROMORPHONE HYDROCHLORIDE 2 MG/ML
0.5 INJECTION INTRAMUSCULAR; INTRAVENOUS; SUBCUTANEOUS ONCE
Refills: 0 | Status: DISCONTINUED | OUTPATIENT
Start: 2023-12-05 | End: 2023-12-05

## 2023-12-05 RX ORDER — METHOCARBAMOL 500 MG/1
1 TABLET, FILM COATED ORAL
Qty: 0 | Refills: 0 | DISCHARGE
Start: 2023-12-05

## 2023-12-05 RX ORDER — ASPIRIN/CALCIUM CARB/MAGNESIUM 324 MG
1 TABLET ORAL
Qty: 0 | Refills: 0 | DISCHARGE

## 2023-12-05 RX ORDER — ACETAMINOPHEN 500 MG
3 TABLET ORAL
Qty: 0 | Refills: 0 | DISCHARGE
Start: 2023-12-05

## 2023-12-05 RX ORDER — PANTOPRAZOLE SODIUM 20 MG/1
1 TABLET, DELAYED RELEASE ORAL
Qty: 0 | Refills: 0 | DISCHARGE

## 2023-12-05 RX ORDER — TRAMADOL HYDROCHLORIDE 50 MG/1
1 TABLET ORAL
Qty: 0 | Refills: 0 | DISCHARGE
Start: 2023-12-05

## 2023-12-05 RX ORDER — SENNA PLUS 8.6 MG/1
2 TABLET ORAL
Qty: 0 | Refills: 0 | DISCHARGE
Start: 2023-12-05

## 2023-12-05 RX ORDER — OXYCODONE HYDROCHLORIDE 5 MG/1
1 TABLET ORAL
Qty: 0 | Refills: 0 | DISCHARGE
Start: 2023-12-05

## 2023-12-05 RX ORDER — POLYETHYLENE GLYCOL 3350 17 G/17G
17 POWDER, FOR SOLUTION ORAL
Qty: 0 | Refills: 0 | DISCHARGE
Start: 2023-12-05

## 2023-12-05 RX ADMIN — Medication 1000 MILLIGRAM(S): at 14:45

## 2023-12-05 RX ADMIN — ESCITALOPRAM OXALATE 10 MILLIGRAM(S): 10 TABLET, FILM COATED ORAL at 21:44

## 2023-12-05 RX ADMIN — Medication 325 MILLIGRAM(S): at 11:08

## 2023-12-05 RX ADMIN — Medication 100 MILLIGRAM(S): at 05:27

## 2023-12-05 RX ADMIN — Medication 400 MILLIGRAM(S): at 14:29

## 2023-12-05 RX ADMIN — ATORVASTATIN CALCIUM 40 MILLIGRAM(S): 80 TABLET, FILM COATED ORAL at 21:44

## 2023-12-05 RX ADMIN — Medication 101.6 MILLIGRAM(S): at 05:08

## 2023-12-05 RX ADMIN — Medication 1 GRAM(S): at 21:48

## 2023-12-05 RX ADMIN — PANTOPRAZOLE SODIUM 40 MILLIGRAM(S): 20 TABLET, DELAYED RELEASE ORAL at 05:26

## 2023-12-05 RX ADMIN — ONDANSETRON 4 MILLIGRAM(S): 8 TABLET, FILM COATED ORAL at 11:01

## 2023-12-05 RX ADMIN — TRAMADOL HYDROCHLORIDE 50 MILLIGRAM(S): 50 TABLET ORAL at 00:41

## 2023-12-05 RX ADMIN — ONDANSETRON 4 MILLIGRAM(S): 8 TABLET, FILM COATED ORAL at 04:50

## 2023-12-05 RX ADMIN — Medication 1000 MILLIGRAM(S): at 05:10

## 2023-12-05 RX ADMIN — OXYCODONE HYDROCHLORIDE 10 MILLIGRAM(S): 5 TABLET ORAL at 12:50

## 2023-12-05 RX ADMIN — Medication 325 MILLIGRAM(S): at 18:24

## 2023-12-05 RX ADMIN — METHOCARBAMOL 500 MILLIGRAM(S): 500 TABLET, FILM COATED ORAL at 07:17

## 2023-12-05 RX ADMIN — SENNA PLUS 2 TABLET(S): 8.6 TABLET ORAL at 21:45

## 2023-12-05 RX ADMIN — SENNA PLUS 2 TABLET(S): 8.6 TABLET ORAL at 05:26

## 2023-12-05 RX ADMIN — SODIUM CHLORIDE 500 MILLILITER(S): 9 INJECTION, SOLUTION INTRAVENOUS at 05:29

## 2023-12-05 RX ADMIN — Medication 1 GRAM(S): at 11:01

## 2023-12-05 RX ADMIN — OXYCODONE HYDROCHLORIDE 10 MILLIGRAM(S): 5 TABLET ORAL at 13:20

## 2023-12-05 RX ADMIN — TRAMADOL HYDROCHLORIDE 50 MILLIGRAM(S): 50 TABLET ORAL at 22:30

## 2023-12-05 RX ADMIN — TRAMADOL HYDROCHLORIDE 50 MILLIGRAM(S): 50 TABLET ORAL at 21:44

## 2023-12-05 RX ADMIN — POLYETHYLENE GLYCOL 3350 17 GRAM(S): 17 POWDER, FOR SOLUTION ORAL at 21:44

## 2023-12-05 RX ADMIN — HYDROMORPHONE HYDROCHLORIDE 0.5 MILLIGRAM(S): 2 INJECTION INTRAMUSCULAR; INTRAVENOUS; SUBCUTANEOUS at 01:39

## 2023-12-05 RX ADMIN — HYDROMORPHONE HYDROCHLORIDE 0.5 MILLIGRAM(S): 2 INJECTION INTRAMUSCULAR; INTRAVENOUS; SUBCUTANEOUS at 01:11

## 2023-12-05 RX ADMIN — Medication 400 MILLIGRAM(S): at 05:26

## 2023-12-05 RX ADMIN — OXYCODONE HYDROCHLORIDE 10 MILLIGRAM(S): 5 TABLET ORAL at 05:05

## 2023-12-05 RX ADMIN — METHOCARBAMOL 500 MILLIGRAM(S): 500 TABLET, FILM COATED ORAL at 15:45

## 2023-12-05 RX ADMIN — OXYCODONE HYDROCHLORIDE 10 MILLIGRAM(S): 5 TABLET ORAL at 04:42

## 2023-12-05 RX ADMIN — TRAMADOL HYDROCHLORIDE 50 MILLIGRAM(S): 50 TABLET ORAL at 01:07

## 2023-12-05 NOTE — OCCUPATIONAL THERAPY INITIAL EVALUATION ADULT - LIVES WITH, PROFILE
Pt lives in a house with her family +6STE +HR +1 flight to bedroom +HR. Pt required assist with showering and dressing, and ambulated with a walker./children/spouse

## 2023-12-05 NOTE — PROGRESS NOTE ADULT - ASSESSMENT
A/P:  Pt is a 58 yr old female s/p left total knee replacement, POD #1    - Pain control/ Analgesia  - DVT ppx  BID x 6 weeks, SCDs  - PT/OT - wbat/oob    - Incentive Spirometer  - GI prophylaxis: Protonix  - notify Ortho for questions   - Dispo pending PT evaluation.     Linda Harris PA-C  Orthopedic Surgery  Team Pager #[phone] A/P:  Pt is a 58 yr old female s/p left total knee replacement, POD #1    - Pain control/ Analgesia  - DVT ppx  BID x 6 weeks, SCDs  - PT/OT - wbat/oob    - Incentive Spirometer  - GI prophylaxis: Protonix  - notify Ortho for questions   - Dispo pending PT evaluation.     Linda Harris PA-C  Orthopedic Surgery  Team Pager #[phone] A/P:  Pt is a 58 yr old female s/p left total knee replacement, POD #1    - Pain control/ Analgesia  - DVT ppx  BID x 6 weeks, SCDs  - PT/OT - wbat/oob    - Incentive Spirometer  - GI prophylaxis: Protonix  - Pending Hospitalist consult for comanagement.   - notify Ortho for questions   - Dispo pending PT evaluation.     Linda Harris PA-C  Orthopedic Surgery  Team Pager #[phone] A/P:  Pt is a 58 yr old female s/p left total knee replacement, POD #1    - Pain control/ Analgesia  - DVT ppx  BID x 6 weeks, SCDs  - PT/OT - wbat/oob    - Incentive Spirometer  - GI prophylaxis: Protonix  - Pending Hospitalist consult for comanagement.   - notify Ortho for questions   - Dispo pending PT evaluation.     Linda Harris PA-C  Orthopedic Surgery  Team Pager #[phone]

## 2023-12-05 NOTE — CONSULT NOTE ADULT - SUBJECTIVE AND OBJECTIVE BOX
HPI:  58F with CAD s/p PCI to mLAD 21, HTN, HLD, nephrolithiasis, chronic microscopic hematuria, h/o traumatic complex patella fracture s/p left arthroscopic knee surgery. Continues to have pain w/walking. MRI revealed high-grade cartilage loss at the lateral and central patella. Pt presented for elective Left Total Knee Arthroplasty with FRANCY Robot on 23. Procedure was well tolerated.  She states that pain is well controlled. She denies  fever/chills, chest pain, SOB, changes in bowel/urinary habits.    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      HTN (hypertension)  started 4 months ago      HLD (hyperlipidemia)      History of coronary artery stent placement  2019 novel coronavirus disease (COVID-19)  2020      Closed patellar sleeve fracture of left knee       delivery delivered      S/P breast lumpectomy  Left breast      S/P knee surgery  right meniscus      Stented coronary artery      S/P ORIF (open reduction internal fixation) fracture          Review of Systems:   CONSTITUTIONAL: No fever, weight loss, or fatigue  RESPIRATORY: No cough, wheezing, chills or hemoptysis; No shortness of breath  CARDIOVASCULAR: No chest pain, palpitations, dizziness, or leg swelling  GASTROINTESTINAL: No abdominal or epigastric pain. No nausea, vomiting, or hematemesis; No diarrhea or constipation. No melena or hematochezia.  GENITOURINARY: No dysuria, frequency, hematuria, or incontinence  NEUROLOGICAL: No headaches, memory loss, loss of strength, numbness, or tremors  SKIN: No itching, burning, rashes, or lesions   ENDOCRINE: No heat or cold intolerance; No hair loss  MUSCULOSKELETAL: No joint pain or swelling; No muscle, back, or extremity pain  PSYCHIATRIC: No depression, anxiety, mood swings, or difficulty sleeping    Allergies    sulfa drugs (Short breath; Rash)  shellfish (Short breath; Rash)  Compazine (Anaphylaxis)  apple (Hives)  PC Pen VK (Unknown)    Intolerances    morphine (Vomiting)      Social History:     FAMILY HISTORY:  Family history of early CAD (Father, Mother)        MEDICATIONS  (STANDING):  acetaminophen     Tablet .. 975 milliGRAM(s) Oral every 8 hours  aspirin enteric coated 325 milliGRAM(s) Oral two times a day  atorvastatin 40 milliGRAM(s) Oral at bedtime  escitalopram 10 milliGRAM(s) Oral daily  methenamine hippurate 1 Gram(s) Oral two times a day  methocarbamol 500 milliGRAM(s) Oral every 8 hours  metoprolol tartrate 12.5 milliGRAM(s) Oral two times a day  pantoprazole    Tablet 40 milliGRAM(s) Oral before breakfast  polyethylene glycol 3350 17 Gram(s) Oral at bedtime  senna 2 Tablet(s) Oral at bedtime    MEDICATIONS  (PRN):  HYDROmorphone  Injectable 0.5 milliGRAM(s) IV Push every 10 minutes PRN Moderate Pain (4 - 6)  magnesium hydroxide Suspension 30 milliLiter(s) Oral daily PRN Constipation  ondansetron Injectable 4 milliGRAM(s) IV Push every 6 hours PRN Nausea and/or Vomiting  oxyCODONE    IR 10 milliGRAM(s) Oral every 4 hours PRN Severe Pain (7 - 10)  oxyCODONE    IR 5 milliGRAM(s) Oral every 4 hours PRN Moderate Pain (4 - 6)  traMADol 50 milliGRAM(s) Oral every 6 hours PRN Mild Pain (1 - 3)      Vital Signs Last 24 Hrs  T(C): 36.1 (05 Dec 2023 08:00), Max: 36.8 (04 Dec 2023 16:15)  T(F): 97 (05 Dec 2023 08:00), Max: 98.2 (04 Dec 2023 16:15)  HR: 63 (05 Dec 2023 14:22) (55 - 82)  BP: 98/56 (05 Dec 2023 14:22) (94/55 - 164/74)  BP(mean): 79 (05 Dec 2023 14:20) (70 - 108)  RR: 17 (05 Dec 2023 14:20) (16 - 18)  SpO2: 99% (05 Dec 2023 14:22) (93% - 100%)    Parameters below as of 05 Dec 2023 14:22  Patient On (Oxygen Delivery Method): room air      CAPILLARY BLOOD GLUCOSE        I&O's Summary    04 Dec 2023 07:01  -  05 Dec 2023 07:00  --------------------------------------------------------  IN: 2600 mL / OUT: 3050 mL / NET: -450 mL    05 Dec 2023 07:01  -  05 Dec 2023 14:37  --------------------------------------------------------  IN: 360 mL / OUT: 400 mL / NET: -40 mL        PHYSICAL EXAM:  GENERAL: NAD, well-groomed  HEAD:  Atraumatic, Normocephalic  EYES: conjunctiva and sclera clear  NECK: Supple, No JVD  CHEST/LUNG: Clear to auscultation bilaterally; No wheeze  HEART: Regular rate and rhythm; No murmurs, rubs, or gallops  ABDOMEN: Soft, Nontender, Nondistended; Bowel sounds present  EXTREMITIES:  left knee dressing CDI  PSYCH: AAOx3  NEUROLOGY: non-focal  SKIN: No rashes or lesions    LABS:                        11.6   16.42 )-----------( 210      ( 05 Dec 2023 05:39 )             34.7     12-    142  |  106  |  14  ----------------------------<  162<H>  4.8   |  26  |  0.89    Ca    9.4      05 Dec 2023 05:39            Urinalysis Basic - ( 05 Dec 2023 05:39 )    Color: x / Appearance: x / SG: x / pH: x  Gluc: 162 mg/dL / Ketone: x  / Bili: x / Urobili: x   Blood: x / Protein: x / Nitrite: x   Leuk Esterase: x / RBC: x / WBC x   Sq Epi: x / Non Sq Epi: x / Bacteria: x

## 2023-12-05 NOTE — CONSULT NOTE ADULT - ASSESSMENT
58F with CAD s/p PCI to mLAD 5/2/21, HTN, HLD, h/o traumatic complex patella fracture s/p left arthroscopic knee surgery; persistent pain w/walking. MRI w/high-grade cartilage loss at the lateral and central patella. Pt presented for elective Left Total Knee Arthroplasty with FRANCY Robot on 12/4/23.

## 2023-12-05 NOTE — PROGRESS NOTE ADULT - SUBJECTIVE AND OBJECTIVE BOX
ORTHO PROGRESS NOTE     Patient is status post left total knee replacement, POD #1  Pt seen and examined at bedside, denies SOB, CP, Dizziness. N/V/D/HA.    Reports significant pain and muscle spasm overnight, notes slightly improved this AM.    Of note: patient reports being diagnosed with a left foot drop preop about 7-8 months ago. Denies use of AFO.     Vital Signs Last 24 Hrs  T(C): 36.2 (05 Dec 2023 06:00), Max: 36.8 (04 Dec 2023 11:17)  T(F): 97.2 (05 Dec 2023 06:00), Max: 98.2 (04 Dec 2023 11:17)  HR: 58 (05 Dec 2023 06:00) (56 - 82)  BP: 105/57 (05 Dec 2023 06:00) (105/57 - 164/74)  BP(mean): 76 (05 Dec 2023 06:00) (76 - 108)  RR: 16 (05 Dec 2023 06:00) (16 - 18)  SpO2: 98% (05 Dec 2023 06:00) (93% - 100%)    Parameters below as of 05 Dec 2023 06:00  Patient On (Oxygen Delivery Method): room air        Exam:   Gen: No apparent distress  LLE: Aquacel clean dry and intact to the left knee.   BLE: motor intact EHL/FHL/TA/GS to the RLE. LLE: 3/5 EHL/TA, 5/5 FHL/GS  Sensation is grossly intact to light touch in the distal extremities.    Compartments are soft bilaterally.  Extremities are warm .  DP 2+ BLE     Labs:  CBC Full  -  ( 05 Dec 2023 05:39 )  WBC Count : 16.42 K/uL  RBC Count : 3.72 M/uL  Hemoglobin : 11.6 g/dL  Hematocrit : 34.7 %  Platelet Count - Automated : 210 K/uL  Mean Cell Volume : 93.3 fl  Mean Cell Hemoglobin : 31.2 pg  Mean Cell Hemoglobin Concentration : 33.4 gm/dL    12-05    142  |  106  |  14  ----------------------------<  162<H>  4.8   |  26  |  0.89    Ca    9.4      05 Dec 2023 05:39

## 2023-12-05 NOTE — OCCUPATIONAL THERAPY INITIAL EVALUATION ADULT - PERTINENT HX OF CURRENT PROBLEM, REHAB EVAL
59 yo female, PMH CAD s/p PCI to mLAD 5/2/21, HTN, HLD, nephrolithiasis (known renal stone without s/s), chronic microscopic hematuria,  COVID-19 infection 3/2020 with fevers of 104 for several days - stayed home - on CT chest 1/5/23 revealed ground glass with dependent atelectasis which is ? r/t COVID infection and pt. treated with antibiotics (pt. was having chest pain, which has resolved), had abnormal CT Angio on 1/9/23, cardiac cath on 1/18/23 revealed mild non-obstructive CAD - no intervention. Pt. reports suffering a mechanical fall in April 2021, when she slipped at [facility] leading to complex patella fracture, ORIF Dr. Trejo, progressive pain, s/p left arthroscopic knee surgery at [facility], has been doing PT 3 X's a week, still unable to bend or move knee properly and without pain, despite pain medication, walks inside the home with walker, MRI revealed high-grade cartilage loss at the lateral and central patella and pt. presents to PST for scheduled Left Total Knee Arthroplasty with FRANCY Robot on 12/4/23. now S/P LTKA  X Ray Knee: Unconstrained left knee prosthesis implanted. Retained K wire fragments in otherwise anatomically aligned appearing patella. Intact and aligned hardware and no periprosthetic fractures.  Postoperative soft tissue changes. Correlate with intraoperative findings. 59 yo female, PMH CAD s/p PCI to mLAD 5/2/21, HTN, HLD, nephrolithiasis (known renal stone without s/s), chronic microscopic hematuria,  COVID-19 infection 3/2020 with fevers of 104 for several days - stayed home - on CT chest 1/5/23 revealed ground glass with dependent atelectasis which is ? r/t COVID infection and pt. treated with antibiotics (pt. was having chest pain, which has resolved), had abnormal CT Angio on 1/9/23, cardiac cath on 1/18/23 revealed mild non-obstructive CAD - no intervention. Pt. reports suffering a mechanical fall in April 2021, when she slipped at [facility] leading to complex patella fracture, ORIF Dr. Trejo, progressive pain, s/p left arthroscopic knee surgery at [facility], has been doing PT 3 X's a week, still unable to bend or move knee properly and without pain, despite pain medication, walks inside the home with walker, MRI revealed high-grade cartilage loss at the lateral and central patella and pt. presents to PST for scheduled Left Total Knee Arthroplasty with FRANCY Robot on 12/4/23. now S/P LTKA  X Ray Knee: Unconstrained left knee prosthesis implanted. Retained K wire fragments in otherwise anatomically aligned appearing patella. Intact and aligned hardware and no periprosthetic fractures.  Postoperative soft tissue changes. Correlate with intraoperative findings.

## 2023-12-05 NOTE — OCCUPATIONAL THERAPY INITIAL EVALUATION ADULT - RANGE OF MOTION EXAMINATION, LOWER EXTREMITY
L hip PROM WFL, L knee not tested due to pain, L ankle AROM WFL/Right LE Active ROM was WFL   (within functional limits)

## 2023-12-05 NOTE — CONSULT NOTE ADULT - PROBLEM SELECTOR RECOMMENDATION 3
- c/w home med Lopressor 12.5mg po bid ; with hold parameters  - noted to be orthostatic yesterday ; received IV hydration; total of 2,500cc  - orthostatic negative this AM

## 2023-12-05 NOTE — CONSULT NOTE ADULT - PROBLEM SELECTOR RECOMMENDATION 9
- h/o traumatic complex patella fracture s/p left arthroscopic knee surgery; persistent pain w/walking  - MRI w/high-grade cartilage loss at the lateral and central patella  - presented for elective Left Total Knee Arthroplasty with FRANCY Robot on 12/4/23 ; well tolerated  - pain regimen per primary team  - PT/OT

## 2023-12-06 ENCOUNTER — TRANSCRIPTION ENCOUNTER (OUTPATIENT)
Age: 58
End: 2023-12-06

## 2023-12-06 PROCEDURE — 99232 SBSQ HOSP IP/OBS MODERATE 35: CPT

## 2023-12-06 RX ORDER — ESCITALOPRAM OXALATE 10 MG/1
10 TABLET, FILM COATED ORAL
Refills: 0 | Status: DISCONTINUED | OUTPATIENT
Start: 2023-12-06 | End: 2023-12-07

## 2023-12-06 RX ORDER — OXYCODONE HYDROCHLORIDE 5 MG/1
2.5 TABLET ORAL EVERY 4 HOURS
Refills: 0 | Status: DISCONTINUED | OUTPATIENT
Start: 2023-12-06 | End: 2023-12-07

## 2023-12-06 RX ORDER — POLYETHYLENE GLYCOL 3350 17 G/17G
17 POWDER, FOR SOLUTION ORAL
Refills: 0 | Status: DISCONTINUED | OUTPATIENT
Start: 2023-12-06 | End: 2023-12-07

## 2023-12-06 RX ORDER — OXYCODONE HYDROCHLORIDE 5 MG/1
5 TABLET ORAL EVERY 4 HOURS
Refills: 0 | Status: DISCONTINUED | OUTPATIENT
Start: 2023-12-06 | End: 2023-12-07

## 2023-12-06 RX ADMIN — ATORVASTATIN CALCIUM 40 MILLIGRAM(S): 80 TABLET, FILM COATED ORAL at 21:49

## 2023-12-06 RX ADMIN — POLYETHYLENE GLYCOL 3350 17 GRAM(S): 17 POWDER, FOR SOLUTION ORAL at 12:31

## 2023-12-06 RX ADMIN — TRAMADOL HYDROCHLORIDE 50 MILLIGRAM(S): 50 TABLET ORAL at 21:48

## 2023-12-06 RX ADMIN — Medication 975 MILLIGRAM(S): at 05:50

## 2023-12-06 RX ADMIN — TRAMADOL HYDROCHLORIDE 50 MILLIGRAM(S): 50 TABLET ORAL at 22:48

## 2023-12-06 RX ADMIN — Medication 975 MILLIGRAM(S): at 22:49

## 2023-12-06 RX ADMIN — ONDANSETRON 4 MILLIGRAM(S): 8 TABLET, FILM COATED ORAL at 11:21

## 2023-12-06 RX ADMIN — Medication 975 MILLIGRAM(S): at 05:01

## 2023-12-06 RX ADMIN — OXYCODONE HYDROCHLORIDE 5 MILLIGRAM(S): 5 TABLET ORAL at 09:44

## 2023-12-06 RX ADMIN — Medication 325 MILLIGRAM(S): at 05:02

## 2023-12-06 RX ADMIN — TRAMADOL HYDROCHLORIDE 50 MILLIGRAM(S): 50 TABLET ORAL at 12:52

## 2023-12-06 RX ADMIN — TRAMADOL HYDROCHLORIDE 50 MILLIGRAM(S): 50 TABLET ORAL at 13:52

## 2023-12-06 RX ADMIN — Medication 975 MILLIGRAM(S): at 12:53

## 2023-12-06 RX ADMIN — Medication 1 GRAM(S): at 12:47

## 2023-12-06 RX ADMIN — ESCITALOPRAM OXALATE 10 MILLIGRAM(S): 10 TABLET, FILM COATED ORAL at 20:12

## 2023-12-06 RX ADMIN — ONDANSETRON 4 MILLIGRAM(S): 8 TABLET, FILM COATED ORAL at 21:52

## 2023-12-06 RX ADMIN — Medication 1 GRAM(S): at 20:12

## 2023-12-06 RX ADMIN — OXYCODONE HYDROCHLORIDE 5 MILLIGRAM(S): 5 TABLET ORAL at 05:50

## 2023-12-06 RX ADMIN — OXYCODONE HYDROCHLORIDE 5 MILLIGRAM(S): 5 TABLET ORAL at 10:44

## 2023-12-06 RX ADMIN — SENNA PLUS 2 TABLET(S): 8.6 TABLET ORAL at 21:48

## 2023-12-06 RX ADMIN — PANTOPRAZOLE SODIUM 40 MILLIGRAM(S): 20 TABLET, DELAYED RELEASE ORAL at 05:03

## 2023-12-06 RX ADMIN — OXYCODONE HYDROCHLORIDE 5 MILLIGRAM(S): 5 TABLET ORAL at 05:01

## 2023-12-06 RX ADMIN — Medication 975 MILLIGRAM(S): at 21:49

## 2023-12-06 RX ADMIN — Medication 325 MILLIGRAM(S): at 17:23

## 2023-12-06 RX ADMIN — OXYCODONE HYDROCHLORIDE 5 MILLIGRAM(S): 5 TABLET ORAL at 18:40

## 2023-12-06 RX ADMIN — OXYCODONE HYDROCHLORIDE 5 MILLIGRAM(S): 5 TABLET ORAL at 17:40

## 2023-12-06 NOTE — PROGRESS NOTE ADULT - SUBJECTIVE AND OBJECTIVE BOX
Patient is a 58y old  Female who presents with a chief complaint of Left total knee pain, arthritis  Patient s/p left total knee arthroplasty (FRANCY)  Patient comfortable  No complaints    T(C): 36.5 (12-06-23 @ 04:18), Max: 36.8 (12-05-23 @ 15:00)  HR: 66 (12-06-23 @ 04:18) (55 - 68)  BP: 138/76 (12-06-23 @ 04:18) (94/55 - 138/76)  RR: 18 (12-06-23 @ 04:18) (16 - 18)  SpO2: 98% (12-06-23 @ 04:18) (96% - 100%)    PHYSICAL EXAM:  NAD, Alert  [Left ] Knee: Aquacel Dressing C/D/I; sensation grossly intact (+) DF/PF right >Left; (+) Distal Pulses; No Calf tenderness B/L, PAS     LABS:                    11.6   16.42 )-----------( 210      ( 05 Dec 2023 05:39 )             34.7     12-05  142  |  106  |  14  ----------------------------<  162<H>  4.8   |  26  |  0.89  Ca    9.4      05 Dec 2023 05:39

## 2023-12-06 NOTE — PROGRESS NOTE ADULT - ASSESSMENT
59 y/o FM s/p right total knee arthroplasty POD#2 d/c to [facility], when bed available  Lexy Nowak PA-C  Orthopaedic Surgery  Team pager [phone]  [facility] [phone]  [phone]   57 y/o FM s/p right total knee arthroplasty POD#2 d/c to [facility], when bed available  Lexy Nowak PA-C  Orthopaedic Surgery  Team pager [phone]  [facility] [phone]  [phone]

## 2023-12-06 NOTE — PROGRESS NOTE ADULT - SUBJECTIVE AND OBJECTIVE BOX
Patient is a 58y old  Female who presents with a chief complaint of Left knee pain, arthritis (06 Dec 2023 07:14)      SUBJECTIVE / OVERNIGHT EVENTS:  Pt seen and examined. No acute events overnight. She c/o dizziness this AM. Also c/o left LE pain.    MEDICATIONS  (STANDING):  acetaminophen     Tablet .. 975 milliGRAM(s) Oral every 8 hours  aspirin enteric coated 325 milliGRAM(s) Oral two times a day  atorvastatin 40 milliGRAM(s) Oral at bedtime  escitalopram 10 milliGRAM(s) Oral <User Schedule>  methenamine hippurate 1 Gram(s) Oral two times a day  metoprolol tartrate 12.5 milliGRAM(s) Oral two times a day  pantoprazole    Tablet 40 milliGRAM(s) Oral before breakfast  polyethylene glycol 3350 17 Gram(s) Oral <User Schedule>  senna 2 Tablet(s) Oral at bedtime    MEDICATIONS  (PRN):  bisacodyl Suppository 10 milliGRAM(s) Rectal once PRN Constipation  magnesium hydroxide Suspension 30 milliLiter(s) Oral daily PRN Constipation  ondansetron Injectable 4 milliGRAM(s) IV Push every 6 hours PRN Nausea and/or Vomiting  oxyCODONE    IR 5 milliGRAM(s) Oral every 4 hours PRN Severe Pain (7 - 10)  oxyCODONE    IR 2.5 milliGRAM(s) Oral every 4 hours PRN Moderate Pain (4 - 6)  traMADol 50 milliGRAM(s) Oral every 6 hours PRN Mild Pain (1 - 3)      Vital Signs Last 24 Hrs  T(C): 36.4 (06 Dec 2023 12:47), Max: 36.8 (05 Dec 2023 15:00)  T(F): 97.5 (06 Dec 2023 12:47), Max: 98.3 (05 Dec 2023 15:00)  HR: 61 (06 Dec 2023 12:47) (55 - 68)  BP: 124/79 (06 Dec 2023 12:47) (98/56 - 138/76)  BP(mean): 79 (05 Dec 2023 14:20) (79 - 79)  RR: 18 (06 Dec 2023 12:47) (16 - 18)  SpO2: 100% (06 Dec 2023 12:47) (97% - 100%)    Parameters below as of 06 Dec 2023 12:47  Patient On (Oxygen Delivery Method): room air      CAPILLARY BLOOD GLUCOSE        I&O's Summary    05 Dec 2023 07:01  -  06 Dec 2023 07:00  --------------------------------------------------------  IN: 2165 mL / OUT: 1200 mL / NET: 965 mL    06 Dec 2023 07:01  -  06 Dec 2023 14:00  --------------------------------------------------------  IN: 240 mL / OUT: 400 mL / NET: -160 mL        PHYSICAL EXAM:  GENERAL: NAD, well-groomed  HEAD:  Atraumatic, Normocephalic  EYES: , conjunctiva and sclera clear  NECK: Supple, No JVD  CHEST/LUNG: Clear to auscultation bilaterally; No wheeze  HEART: Regular rate and rhythm; No murmurs, rubs, or gallops  ABDOMEN: Soft, Nontender, Nondistended; Bowel sounds present  EXTREMITIES: right knee dressing CDI, no swelling noted,  2+ Peripheral Pulses, No clubbing, cyanosis, or edema  PSYCH: AAOx3  NEUROLOGY: non-focal  SKIN: No rashes or lesions    LABS:                        11.6   16.42 )-----------( 210      ( 05 Dec 2023 05:39 )             34.7     12-05    142  |  106  |  14  ----------------------------<  162<H>  4.8   |  26  |  0.89    Ca    9.4      05 Dec 2023 05:39            Urinalysis Basic - ( 05 Dec 2023 05:39 )    Color: x / Appearance: x / SG: x / pH: x  Gluc: 162 mg/dL / Ketone: x  / Bili: x / Urobili: x   Blood: x / Protein: x / Nitrite: x   Leuk Esterase: x / RBC: x / WBC x   Sq Epi: x / Non Sq Epi: x / Bacteria: x

## 2023-12-06 NOTE — DISCHARGE NOTE NURSING/CASE MANAGEMENT/SOCIAL WORK - PATIENT PORTAL LINK FT
You can access the FollowMyHealth Patient Portal offered by [facility] by registering at the following website: [email or web address]. By joining [facility]’s FollowMyHealth portal, you will also be able to view your health information using other applications (apps) compatible with our system. You can access the FollowMyHealth Patient Portal offered by [facility] by registering at the following website: [email or web address]. By joining [facility]’s FollowMyHealth portal, you will also be able to view your health information using other applications (apps) compatible with our system.

## 2023-12-06 NOTE — DISCHARGE NOTE NURSING/CASE MANAGEMENT/SOCIAL WORK - NSDCPEFALRISK_GEN_ALL_CORE
For information on Fall & Injury Prevention, visit: [email or web address] OR  [email or web address] OR  https://www.cdc.gov/steadi/patient.html For information on Fall & Injury Prevention, visit: [email or web address] OR  [email or web address]-[email or web address] OR  https://www.cdc.gov/steadi/patient.html

## 2023-12-07 VITALS
OXYGEN SATURATION: 99 % | RESPIRATION RATE: 18 BRPM | DIASTOLIC BLOOD PRESSURE: 75 MMHG | SYSTOLIC BLOOD PRESSURE: 113 MMHG | TEMPERATURE: 98 F | HEART RATE: 73 BPM

## 2023-12-07 PROCEDURE — 99232 SBSQ HOSP IP/OBS MODERATE 35: CPT

## 2023-12-07 PROCEDURE — 97530 THERAPEUTIC ACTIVITIES: CPT

## 2023-12-07 PROCEDURE — 85027 COMPLETE CBC AUTOMATED: CPT

## 2023-12-07 PROCEDURE — 97162 PT EVAL MOD COMPLEX 30 MIN: CPT

## 2023-12-07 PROCEDURE — 73560 X-RAY EXAM OF KNEE 1 OR 2: CPT

## 2023-12-07 PROCEDURE — S2900: CPT

## 2023-12-07 PROCEDURE — 97116 GAIT TRAINING THERAPY: CPT

## 2023-12-07 PROCEDURE — 97165 OT EVAL LOW COMPLEX 30 MIN: CPT

## 2023-12-07 PROCEDURE — 80048 BASIC METABOLIC PNL TOTAL CA: CPT

## 2023-12-07 PROCEDURE — C1776: CPT

## 2023-12-07 PROCEDURE — 97110 THERAPEUTIC EXERCISES: CPT

## 2023-12-07 PROCEDURE — 82962 GLUCOSE BLOOD TEST: CPT

## 2023-12-07 PROCEDURE — C1713: CPT

## 2023-12-07 RX ORDER — HYDROMORPHONE HYDROCHLORIDE 2 MG/ML
4 INJECTION INTRAMUSCULAR; INTRAVENOUS; SUBCUTANEOUS EVERY 4 HOURS
Refills: 0 | Status: DISCONTINUED | OUTPATIENT
Start: 2023-12-07 | End: 2023-12-07

## 2023-12-07 RX ORDER — HYDROMORPHONE HYDROCHLORIDE 2 MG/ML
1 INJECTION INTRAMUSCULAR; INTRAVENOUS; SUBCUTANEOUS
Qty: 0 | Refills: 0 | DISCHARGE
Start: 2023-12-07

## 2023-12-07 RX ORDER — HYDROMORPHONE HYDROCHLORIDE 2 MG/ML
2 INJECTION INTRAMUSCULAR; INTRAVENOUS; SUBCUTANEOUS EVERY 4 HOURS
Refills: 0 | Status: DISCONTINUED | OUTPATIENT
Start: 2023-12-07 | End: 2023-12-07

## 2023-12-07 RX ADMIN — Medication 325 MILLIGRAM(S): at 17:22

## 2023-12-07 RX ADMIN — ONDANSETRON 4 MILLIGRAM(S): 8 TABLET, FILM COATED ORAL at 10:25

## 2023-12-07 RX ADMIN — TRAMADOL HYDROCHLORIDE 50 MILLIGRAM(S): 50 TABLET ORAL at 17:21

## 2023-12-07 RX ADMIN — TRAMADOL HYDROCHLORIDE 50 MILLIGRAM(S): 50 TABLET ORAL at 18:21

## 2023-12-07 RX ADMIN — POLYETHYLENE GLYCOL 3350 17 GRAM(S): 17 POWDER, FOR SOLUTION ORAL at 12:21

## 2023-12-07 RX ADMIN — Medication 12.5 MILLIGRAM(S): at 05:38

## 2023-12-07 RX ADMIN — Medication 1 GRAM(S): at 17:21

## 2023-12-07 RX ADMIN — HYDROMORPHONE HYDROCHLORIDE 2 MILLIGRAM(S): 2 INJECTION INTRAMUSCULAR; INTRAVENOUS; SUBCUTANEOUS at 11:44

## 2023-12-07 RX ADMIN — Medication 325 MILLIGRAM(S): at 05:37

## 2023-12-07 RX ADMIN — Medication 975 MILLIGRAM(S): at 13:26

## 2023-12-07 RX ADMIN — OXYCODONE HYDROCHLORIDE 5 MILLIGRAM(S): 5 TABLET ORAL at 01:54

## 2023-12-07 RX ADMIN — HYDROMORPHONE HYDROCHLORIDE 2 MILLIGRAM(S): 2 INJECTION INTRAMUSCULAR; INTRAVENOUS; SUBCUTANEOUS at 10:44

## 2023-12-07 RX ADMIN — OXYCODONE HYDROCHLORIDE 5 MILLIGRAM(S): 5 TABLET ORAL at 02:54

## 2023-12-07 RX ADMIN — Medication 975 MILLIGRAM(S): at 05:37

## 2023-12-07 RX ADMIN — Medication 12.5 MILLIGRAM(S): at 17:22

## 2023-12-07 RX ADMIN — PANTOPRAZOLE SODIUM 40 MILLIGRAM(S): 20 TABLET, DELAYED RELEASE ORAL at 05:37

## 2023-12-07 RX ADMIN — Medication 1 GRAM(S): at 05:38

## 2023-12-07 RX ADMIN — ONDANSETRON 4 MILLIGRAM(S): 8 TABLET, FILM COATED ORAL at 18:12

## 2023-12-07 NOTE — PROGRESS NOTE ADULT - SUBJECTIVE AND OBJECTIVE BOX
Patient is a 58y old  Female who presents with a chief complaint of Left knee pain, arthritis (07 Dec 2023 07:07)      SUBJECTIVE / OVERNIGHT EVENTS:  Pt seen and examined. No acute events overnight. c/o severe left knee pain.    MEDICATIONS  (STANDING):  acetaminophen     Tablet .. 975 milliGRAM(s) Oral every 8 hours  aspirin enteric coated 325 milliGRAM(s) Oral two times a day  atorvastatin 40 milliGRAM(s) Oral at bedtime  escitalopram 10 milliGRAM(s) Oral <User Schedule>  methenamine hippurate 1 Gram(s) Oral two times a day  metoprolol tartrate 12.5 milliGRAM(s) Oral two times a day  pantoprazole    Tablet 40 milliGRAM(s) Oral before breakfast  polyethylene glycol 3350 17 Gram(s) Oral <User Schedule>  senna 2 Tablet(s) Oral at bedtime    MEDICATIONS  (PRN):  bisacodyl Suppository 10 milliGRAM(s) Rectal once PRN Constipation  HYDROmorphone   Tablet 4 milliGRAM(s) Oral every 4 hours PRN Severe Pain (7 - 10)  HYDROmorphone   Tablet 2 milliGRAM(s) Oral every 4 hours PRN Moderate Pain (4 - 6)  magnesium hydroxide Suspension 30 milliLiter(s) Oral daily PRN Constipation  ondansetron Injectable 4 milliGRAM(s) IV Push every 6 hours PRN Nausea and/or Vomiting  traMADol 50 milliGRAM(s) Oral every 6 hours PRN Mild Pain (1 - 3)      Vital Signs Last 24 Hrs  T(C): 36.7 (07 Dec 2023 05:30), Max: 36.9 (07 Dec 2023 00:11)  T(F): 98.1 (07 Dec 2023 05:30), Max: 98.4 (07 Dec 2023 00:11)  HR: 70 (07 Dec 2023 08:45) (61 - 70)  BP: 141/82 (07 Dec 2023 08:45) (98/61 - 141/82)  BP(mean): --  RR: 18 (07 Dec 2023 05:30) (18 - 18)  SpO2: 98% (07 Dec 2023 05:30) (98% - 100%)    Parameters below as of 07 Dec 2023 05:30  Patient On (Oxygen Delivery Method): room air      CAPILLARY BLOOD GLUCOSE        I&O's Summary    06 Dec 2023 07:01  -  07 Dec 2023 07:00  --------------------------------------------------------  IN: 680 mL / OUT: 2000 mL / NET: -1320 mL        PHYSICAL EXAM:  GENERAL: NAD, well-groomed, appears comfortable  HEAD:  Atraumatic, Normocephalic  EYES: , conjunctiva and sclera clear  NECK: Supple, No JVD  CHEST/LUNG: Clear to auscultation bilaterally; No wheeze  HEART: Regular rate and rhythm; No murmurs, rubs, or gallops  ABDOMEN: Soft, Nontender, Nondistended; Bowel sounds present  EXTREMITIES: right knee dressing CDI, no swelling noted,  2+ Peripheral Pulses, No clubbing, cyanosis, or edema  PSYCH: somnolent but arousable , oriented x3  NEUROLOGY: non-focal  SKIN: No rashes or lesions

## 2023-12-07 NOTE — PROGRESS NOTE ADULT - SUBJECTIVE AND OBJECTIVE BOX
Patient is a 58y old  Female who presents with a chief complaint of Left knee pain, arthritis   Patient s/p Left total knee arthroplasty POD#3  Patient appears comfortable, c/o pain '14 out of 10'    T(C): 36.7 (12-07-23 @ 05:30), Max: 36.9 (12-07-23 @ 00:11)  HR: 68 (12-07-23 @ 05:30) (61 - 70)  BP: 121/77 (12-07-23 @ 05:30) (98/61 - 126/76)  RR: 18 (12-07-23 @ 05:30) (16 - 18)  SpO2: 98% (12-07-23 @ 05:30) (98% - 100%)    PHYSICAL EXAM:  NAD, Alert  [Left ] Knee: Aquacel Dressing C/D/I; sensation grossly intact  (+) DF/PF; (+) Distal Pulses; No Calf tenderness B/L, PAS

## 2023-12-07 NOTE — PROGRESS NOTE ADULT - NSPROGADDITIONALINFOA_GEN_ALL_CORE
time spent reviewing prior charts, meds, discussing plan with patient= 35 minutes    d/w Ortho PA
time spent reviewing prior charts, meds, discussing plan with patient= 40 minutes    d/w Ortho PA

## 2023-12-07 NOTE — PROGRESS NOTE ADULT - PROBLEM SELECTOR PLAN 3
c/w home med Lopressor 12.5mg po bid ; with hold parameters  - initially noted to be orthostatic  ; received IV hydration; total of 2,500cc  - orthostatic negative 12/5
c/w home med Lopressor 12.5mg po bid ; with hold parameters  - initially noted to be orthostatic  ; received IV hydration; total of 2,500cc  - now orthostatic negative

## 2023-12-07 NOTE — PROGRESS NOTE ADULT - ASSESSMENT
69 y/o FM s/p left total knee arthroplasty POD#3, pt states she sees chronic pain doctor in the city, may need to see one here, states Dilaudid effective in past, PT/OT, d/c planning  Lexy Nowak PA-C  Orthopaedic Surgery  Team pager [phone]  [facility] [phone]  [phone]   69 y/o FM s/p left total knee arthroplasty POD#3, pt states she sees chronic pain doctor in the city, may need to see one here, states Dilaudid effective in past, PT/OT, d/c planning  Lexy Nowak PA-C  Orthopaedic Surgery  Team pager [phone]  [facility] [phone]  [phone]

## 2023-12-07 NOTE — PROGRESS NOTE ADULT - PROBLEM SELECTOR PLAN 1
- h/o traumatic complex patella fracture s/p left arthroscopic knee surgery; persistent pain w/walking  - MRI w/high-grade cartilage loss at the lateral and central patella  - presented for elective Left Total Knee Arthroplasty with FRANCY Robot on 12/4/23 ; well tolerated  - pain regimen per primary team  - PT/OT.
- h/o traumatic complex patella fracture s/p left arthroscopic knee surgery; persistent pain w/walking  - MRI w/high-grade cartilage loss at the lateral and central patella  - presented for elective Left Total Knee Arthroplasty with FRANCY Robot on 12/4/23 ; well tolerated  - reported uncontrolled pain on PO Oxycodone ; now switched to PO Diluadid 4mg po q 4 prn for severe pain and 2mg po q 4 for mod pain  - PT reccs JACQUELYN

## 2023-12-10 ENCOUNTER — NON-APPOINTMENT (OUTPATIENT)
Age: 58
End: 2023-12-10

## 2023-12-11 ENCOUNTER — NON-APPOINTMENT (OUTPATIENT)
Age: 58
End: 2023-12-11

## 2023-12-13 ENCOUNTER — RESULT REVIEW (OUTPATIENT)
Age: 58
End: 2023-12-13

## 2023-12-14 ENCOUNTER — RESULT REVIEW (OUTPATIENT)
Age: 58
End: 2023-12-14

## 2023-12-14 ENCOUNTER — NON-APPOINTMENT (OUTPATIENT)
Age: 58
End: 2023-12-14

## 2023-12-21 ENCOUNTER — APPOINTMENT (OUTPATIENT)
Dept: ORTHOPEDIC SURGERY | Facility: CLINIC | Age: 58
End: 2023-12-21

## 2023-12-22 ENCOUNTER — APPOINTMENT (OUTPATIENT)
Dept: ORTHOPEDIC SURGERY | Facility: CLINIC | Age: 58
End: 2023-12-22
Payer: COMMERCIAL

## 2023-12-22 VITALS — WEIGHT: 175 LBS | BODY MASS INDEX: 26.52 KG/M2 | HEIGHT: 68 IN

## 2023-12-22 PROCEDURE — 99024 POSTOP FOLLOW-UP VISIT: CPT

## 2023-12-22 PROCEDURE — 73610 X-RAY EXAM OF ANKLE: CPT | Mod: LT

## 2023-12-22 PROCEDURE — 73562 X-RAY EXAM OF KNEE 3: CPT | Mod: LT

## 2024-01-16 ENCOUNTER — NON-APPOINTMENT (OUTPATIENT)
Age: 59
End: 2024-01-16

## 2024-01-23 NOTE — REASON FOR VISIT
Detail Level: Simple
Detail Level: Detailed
Prescription Strength Graduated Compression Stockings Recommendations: The patient was counseled that prescription strength graduated compression stockings should be worn for all waking hours. They will follow up with a venous specialist to monitor graduated compression stocking usage and their symptoms.
[FreeTextEntry1] : f/u cv issues

## 2024-03-17 ENCOUNTER — RX RENEWAL (OUTPATIENT)
Age: 59
End: 2024-03-17

## 2024-03-17 RX ORDER — ATORVASTATIN CALCIUM 40 MG/1
40 TABLET, FILM COATED ORAL
Qty: 90 | Refills: 3 | Status: ACTIVE | COMMUNITY
Start: 2022-05-25 | End: 1900-01-01

## 2024-03-25 ENCOUNTER — APPOINTMENT (OUTPATIENT)
Dept: ORTHOPEDIC SURGERY | Facility: CLINIC | Age: 59
End: 2024-03-25
Payer: COMMERCIAL

## 2024-03-25 VITALS — BODY MASS INDEX: 26.52 KG/M2 | HEIGHT: 68 IN | WEIGHT: 175 LBS

## 2024-03-25 PROCEDURE — 99214 OFFICE O/P EST MOD 30 MIN: CPT

## 2024-03-25 PROCEDURE — 73562 X-RAY EXAM OF KNEE 3: CPT | Mod: LT

## 2024-03-25 RX ORDER — PANTOPRAZOLE 40 MG/1
40 TABLET, DELAYED RELEASE ORAL
Qty: 30 | Refills: 1 | Status: ACTIVE | COMMUNITY
Start: 2019-04-10 | End: 1900-01-01

## 2024-04-11 ENCOUNTER — NON-APPOINTMENT (OUTPATIENT)
Age: 59
End: 2024-04-11

## 2024-04-15 ENCOUNTER — APPOINTMENT (OUTPATIENT)
Dept: CARDIOLOGY | Facility: CLINIC | Age: 59
End: 2024-04-15
Payer: COMMERCIAL

## 2024-04-15 VITALS
DIASTOLIC BLOOD PRESSURE: 70 MMHG | BODY MASS INDEX: 27.74 KG/M2 | OXYGEN SATURATION: 98 % | HEART RATE: 70 BPM | TEMPERATURE: 98.2 F | SYSTOLIC BLOOD PRESSURE: 126 MMHG | RESPIRATION RATE: 16 BRPM | HEIGHT: 68 IN | WEIGHT: 183 LBS

## 2024-04-15 DIAGNOSIS — Z96.652 PRESENCE OF LEFT ARTIFICIAL KNEE JOINT: ICD-10-CM

## 2024-04-15 DIAGNOSIS — R82.90 UNSPECIFIED ABNORMAL FINDINGS IN URINE: ICD-10-CM

## 2024-04-15 DIAGNOSIS — R74.8 ABNORMAL LEVELS OF OTHER SERUM ENZYMES: ICD-10-CM

## 2024-04-15 PROCEDURE — G2211 COMPLEX E/M VISIT ADD ON: CPT

## 2024-04-15 PROCEDURE — 93000 ELECTROCARDIOGRAM COMPLETE: CPT

## 2024-04-15 PROCEDURE — 99214 OFFICE O/P EST MOD 30 MIN: CPT

## 2024-04-15 NOTE — HISTORY OF PRESENT ILLNESS
[FreeTextEntry1] : This is a 58 year female with a Pmhx of CAD s/p PCI to mLAD, HTN and HLD s/p left knee replacement on 12/4/2023 with Dr. Brumfield. Pt reports she feels well today.  pt reports 2 episodes of fleeting heart palpitations . Pt denies any CP, SOB,, dizziness, abdominal pain N/V/D fever or chills pt concerned about weight gain

## 2024-04-15 NOTE — PHYSICAL EXAM
[Well Developed] : well developed [Well Nourished] : well nourished [No Acute Distress] : no acute distress [Normal Conjunctiva] : normal conjunctiva [Normal Venous Pressure] : normal venous pressure [No Carotid Bruit] : no carotid bruit [Normal S1, S2] : normal S1, S2 [No Murmur] : no murmur [No Rub] : no rub [No Gallop] : no gallop [Clear Lung Fields] : clear lung fields [Good Air Entry] : good air entry [No Respiratory Distress] : no respiratory distress  [Soft] : abdomen soft [Non Tender] : non-tender [No Masses/organomegaly] : no masses/organomegaly [Normal Bowel Sounds] : normal bowel sounds [Normal Gait] : normal gait [No Edema] : no edema [No Cyanosis] : no cyanosis [No Clubbing] : no clubbing [No Varicosities] : no varicosities [No Rash] : no rash [No Skin Lesions] : no skin lesions [Moves all extremities] : moves all extremities [No Focal Deficits] : no focal deficits [Normal Speech] : normal speech [Alert and Oriented] : alert and oriented [Normal memory] : normal memory [de-identified] : site of surgery left knee intact

## 2024-04-18 PROBLEM — R74.8 ELEVATED ALKALINE PHOSPHATASE LEVEL: Status: ACTIVE | Noted: 2024-04-18

## 2024-04-18 PROBLEM — R82.90 ABNORMAL URINALYSIS: Status: ACTIVE | Noted: 2024-04-18

## 2024-04-18 LAB
25(OH)D3 SERPL-MCNC: 29.9 NG/ML
ALBUMIN SERPL ELPH-MCNC: 4.7 G/DL
ALP BLD-CCNC: 140 U/L
ALT SERPL-CCNC: 16 U/L
ANION GAP SERPL CALC-SCNC: 16 MMOL/L
APPEARANCE: CLEAR
AST SERPL-CCNC: 18 U/L
BACTERIA: NEGATIVE /HPF
BASOPHILS # BLD AUTO: 0.08 K/UL
BASOPHILS NFR BLD AUTO: 1.1 %
BILIRUB SERPL-MCNC: 0.8 MG/DL
BILIRUBIN URINE: NEGATIVE
BLOOD URINE: ABNORMAL
BUN SERPL-MCNC: 19 MG/DL
CALCIUM SERPL-MCNC: 9.6 MG/DL
CAST: 0 /LPF
CHLORIDE SERPL-SCNC: 105 MMOL/L
CHOLEST SERPL-MCNC: 134 MG/DL
CK SERPL-CCNC: 56 U/L
CO2 SERPL-SCNC: 21 MMOL/L
COLOR: YELLOW
CREAT SERPL-MCNC: 0.91 MG/DL
EGFR: 73 ML/MIN/1.73M2
EOSINOPHIL # BLD AUTO: 0.32 K/UL
EOSINOPHIL NFR BLD AUTO: 4.6 %
EPITHELIAL CELLS: 5 /HPF
ESTIMATED AVERAGE GLUCOSE: 103 MG/DL
FERRITIN SERPL-MCNC: 119 NG/ML
FOLATE SERPL-MCNC: 11 NG/ML
GLUCOSE QUALITATIVE U: NEGATIVE MG/DL
GLUCOSE SERPL-MCNC: 86 MG/DL
HBA1C MFR BLD HPLC: 5.2 %
HCT VFR BLD CALC: 39.8 %
HDLC SERPL-MCNC: 58 MG/DL
HGB BLD-MCNC: 13 G/DL
IMM GRANULOCYTES NFR BLD AUTO: 0.1 %
IRON SATN MFR SERPL: 30 %
IRON SERPL-MCNC: 86 UG/DL
KETONES URINE: NEGATIVE MG/DL
LDLC SERPL CALC-MCNC: 59 MG/DL
LEUKOCYTE ESTERASE URINE: ABNORMAL
LYMPHOCYTES # BLD AUTO: 2.22 K/UL
LYMPHOCYTES NFR BLD AUTO: 31.6 %
MAN DIFF?: NORMAL
MCHC RBC-ENTMCNC: 30.9 PG
MCHC RBC-ENTMCNC: 32.7 GM/DL
MCV RBC AUTO: 94.5 FL
MICROSCOPIC-UA: NORMAL
MONOCYTES # BLD AUTO: 0.55 K/UL
MONOCYTES NFR BLD AUTO: 7.8 %
NEUTROPHILS # BLD AUTO: 3.85 K/UL
NEUTROPHILS NFR BLD AUTO: 54.8 %
NITRITE URINE: NEGATIVE
NONHDLC SERPL-MCNC: 76 MG/DL
PH URINE: 5.5
PLATELET # BLD AUTO: 238 K/UL
POTASSIUM SERPL-SCNC: 4.4 MMOL/L
PROT SERPL-MCNC: 7.1 G/DL
PROTEIN URINE: NEGATIVE MG/DL
RBC # BLD: 4.21 M/UL
RBC # FLD: 14.3 %
RED BLOOD CELLS URINE: 2 /HPF
REVIEW: NORMAL
SODIUM SERPL-SCNC: 142 MMOL/L
SPECIFIC GRAVITY URINE: 1.02
T4 FREE SERPL-MCNC: 1 NG/DL
TIBC SERPL-MCNC: 282 UG/DL
TRANSFERRIN SERPL-MCNC: 232 MG/DL
TRIGL SERPL-MCNC: 91 MG/DL
TSH SERPL-ACNC: 2.05 UIU/ML
UIBC SERPL-MCNC: 196 UG/DL
UROBILINOGEN URINE: 0.2 MG/DL
VIT B12 SERPL-MCNC: 387 PG/ML
WBC # FLD AUTO: 7.03 K/UL
WHITE BLOOD CELLS URINE: 12 /HPF

## 2024-04-18 RX ORDER — MULTIVIT-MIN/FOLIC/VIT K/LYCOP 400-300MCG
50 MCG TABLET ORAL
Refills: 0 | Status: ACTIVE | COMMUNITY
Start: 2024-04-18

## 2024-04-19 ENCOUNTER — NON-APPOINTMENT (OUTPATIENT)
Age: 59
End: 2024-04-19

## 2024-05-05 LAB
25(OH)D3 SERPL-MCNC: 23.1 NG/ML
25(OH)D3 SERPL-MCNC: 26.8 NG/ML
25(OH)D3 SERPL-MCNC: 38 NG/ML
ALBUMIN SERPL ELPH-MCNC: 4.2 G/DL
ALBUMIN SERPL ELPH-MCNC: 4.2 G/DL
ALBUMIN SERPL ELPH-MCNC: 4.4 G/DL
ALBUMIN SERPL ELPH-MCNC: 4.5 G/DL
ALBUMIN SERPL ELPH-MCNC: 4.5 G/DL
ALP BLD-CCNC: 122 U/L
ALP BLD-CCNC: 133 U/L
ALP BLD-CCNC: 138 U/L
ALP BLD-CCNC: 95 U/L
ALP BLD-CCNC: 98 U/L
ALT SERPL-CCNC: 13 U/L
ALT SERPL-CCNC: 17 U/L
ALT SERPL-CCNC: 21 U/L
ALT SERPL-CCNC: 21 U/L
ALT SERPL-CCNC: 24 U/L
ANION GAP SERPL CALC-SCNC: 12 MMOL/L
ANION GAP SERPL CALC-SCNC: 14 MMOL/L
APPEARANCE: CLEAR
APPEARANCE: CLEAR
AST SERPL-CCNC: 18 U/L
AST SERPL-CCNC: 19 U/L
AST SERPL-CCNC: 20 U/L
AST SERPL-CCNC: 22 U/L
AST SERPL-CCNC: 23 U/L
BACTERIA UR CULT: NORMAL
BACTERIA: NEGATIVE /HPF
BACTERIA: NEGATIVE /HPF
BASOPHILS # BLD AUTO: 0.05 K/UL
BASOPHILS # BLD AUTO: 0.05 K/UL
BASOPHILS # BLD AUTO: 0.08 K/UL
BASOPHILS NFR BLD AUTO: 0.8 %
BASOPHILS NFR BLD AUTO: 0.8 %
BASOPHILS NFR BLD AUTO: 1.2 %
BILIRUB DIRECT SERPL-MCNC: 0.1 MG/DL
BILIRUB DIRECT SERPL-MCNC: 0.2 MG/DL
BILIRUB INDIRECT SERPL-MCNC: 0.2 MG/DL
BILIRUB INDIRECT SERPL-MCNC: 0.4 MG/DL
BILIRUB SERPL-MCNC: 0.3 MG/DL
BILIRUB SERPL-MCNC: 0.5 MG/DL
BILIRUB SERPL-MCNC: 0.6 MG/DL
BILIRUBIN URINE: NEGATIVE
BILIRUBIN URINE: NEGATIVE
BLOOD URINE: ABNORMAL
BLOOD URINE: ABNORMAL
BUN SERPL-MCNC: 13 MG/DL
BUN SERPL-MCNC: 16 MG/DL
BUN SERPL-MCNC: 19 MG/DL
BUN SERPL-MCNC: 22 MG/DL
CALCIUM SERPL-MCNC: 9.4 MG/DL
CALCIUM SERPL-MCNC: 9.5 MG/DL
CALCIUM SERPL-MCNC: 9.7 MG/DL
CALCIUM SERPL-MCNC: 9.7 MG/DL
CAST: 0 /LPF
CAST: 0 /LPF
CHLORIDE SERPL-SCNC: 105 MMOL/L
CHLORIDE SERPL-SCNC: 106 MMOL/L
CHLORIDE SERPL-SCNC: 106 MMOL/L
CHLORIDE SERPL-SCNC: 107 MMOL/L
CHOLEST SERPL-MCNC: 108 MG/DL
CHOLEST SERPL-MCNC: 131 MG/DL
CHOLEST SERPL-MCNC: 132 MG/DL
CK SERPL-CCNC: 61 U/L
CK SERPL-CCNC: 82 U/L
CO2 SERPL-SCNC: 23 MMOL/L
CO2 SERPL-SCNC: 24 MMOL/L
CO2 SERPL-SCNC: 25 MMOL/L
CO2 SERPL-SCNC: 26 MMOL/L
COLOR: YELLOW
COLOR: YELLOW
CREAT SERPL-MCNC: 0.88 MG/DL
CREAT SERPL-MCNC: 0.91 MG/DL
CREAT SERPL-MCNC: 0.93 MG/DL
CREAT SERPL-MCNC: 1.05 MG/DL
EGFR: 62 ML/MIN/1.73M2
EGFR: 71 ML/MIN/1.73M2
EGFR: 73 ML/MIN/1.73M2
EGFR: 76 ML/MIN/1.73M2
EOSINOPHIL # BLD AUTO: 0.27 K/UL
EOSINOPHIL # BLD AUTO: 0.29 K/UL
EOSINOPHIL # BLD AUTO: 0.35 K/UL
EOSINOPHIL NFR BLD AUTO: 4.2 %
EOSINOPHIL NFR BLD AUTO: 4.3 %
EOSINOPHIL NFR BLD AUTO: 5.3 %
EPITHELIAL CELLS: 5 /HPF
EPITHELIAL CELLS: 8 /HPF
ESTIMATED AVERAGE GLUCOSE: 100 MG/DL
ESTIMATED AVERAGE GLUCOSE: 100 MG/DL
FERRITIN SERPL-MCNC: 181 NG/ML
FOLATE SERPL-MCNC: 16 NG/ML
GGT SERPL-CCNC: 10 U/L
GGT SERPL-CCNC: 10 U/L
GLUCOSE QUALITATIVE U: NEGATIVE MG/DL
GLUCOSE QUALITATIVE U: NEGATIVE MG/DL
GLUCOSE SERPL-MCNC: 128 MG/DL
GLUCOSE SERPL-MCNC: 90 MG/DL
GLUCOSE SERPL-MCNC: 93 MG/DL
GLUCOSE SERPL-MCNC: 93 MG/DL
HAPTOGLOB SERPL-MCNC: 94 MG/DL
HBA1C MFR BLD HPLC: 5.1 %
HBA1C MFR BLD HPLC: 5.1 %
HCT VFR BLD CALC: 37 %
HCT VFR BLD CALC: 37.4 %
HCT VFR BLD CALC: 37.8 %
HCT VFR BLD CALC: 40 %
HDLC SERPL-MCNC: 44 MG/DL
HDLC SERPL-MCNC: 54 MG/DL
HDLC SERPL-MCNC: 65 MG/DL
HGB BLD-MCNC: 12.2 G/DL
HGB BLD-MCNC: 12.3 G/DL
HGB BLD-MCNC: 12.4 G/DL
HGB BLD-MCNC: 13.1 G/DL
IMM GRANULOCYTES NFR BLD AUTO: 0.1 %
IMM GRANULOCYTES NFR BLD AUTO: 0.3 %
IMM GRANULOCYTES NFR BLD AUTO: 0.3 %
IRON SERPL-MCNC: 59 UG/DL
KETONES URINE: NEGATIVE MG/DL
KETONES URINE: NEGATIVE MG/DL
LDH SERPL-CCNC: 182 U/L
LDLC SERPL CALC-MCNC: 48 MG/DL
LDLC SERPL CALC-MCNC: 53 MG/DL
LDLC SERPL CALC-MCNC: 62 MG/DL
LDLC SERPL DIRECT ASSAY-MCNC: 61 MG/DL
LEUKOCYTE ESTERASE URINE: ABNORMAL
LEUKOCYTE ESTERASE URINE: ABNORMAL
LYMPHOCYTES # BLD AUTO: 1.63 K/UL
LYMPHOCYTES # BLD AUTO: 2.09 K/UL
LYMPHOCYTES # BLD AUTO: 2.4 K/UL
LYMPHOCYTES NFR BLD AUTO: 25.4 %
LYMPHOCYTES NFR BLD AUTO: 31.8 %
LYMPHOCYTES NFR BLD AUTO: 35.7 %
MAN DIFF?: NORMAL
MCHC RBC-ENTMCNC: 31.9 PG
MCHC RBC-ENTMCNC: 32 PG
MCHC RBC-ENTMCNC: 32 PG
MCHC RBC-ENTMCNC: 32.1 PG
MCHC RBC-ENTMCNC: 32.8 GM/DL
MCHC RBC-ENTMCNC: 32.8 GM/DL
MCHC RBC-ENTMCNC: 32.9 GM/DL
MCHC RBC-ENTMCNC: 33 GM/DL
MCV RBC AUTO: 97.1 FL
MCV RBC AUTO: 97.3 FL
MCV RBC AUTO: 97.4 FL
MCV RBC AUTO: 97.7 FL
MICROSCOPIC-UA: NORMAL
MICROSCOPIC-UA: NORMAL
MONOCYTES # BLD AUTO: 0.42 K/UL
MONOCYTES # BLD AUTO: 0.48 K/UL
MONOCYTES # BLD AUTO: 0.54 K/UL
MONOCYTES NFR BLD AUTO: 6.5 %
MONOCYTES NFR BLD AUTO: 7.1 %
MONOCYTES NFR BLD AUTO: 8.2 %
NEUTROPHILS # BLD AUTO: 3.46 K/UL
NEUTROPHILS # BLD AUTO: 3.53 K/UL
NEUTROPHILS # BLD AUTO: 4.03 K/UL
NEUTROPHILS NFR BLD AUTO: 51.6 %
NEUTROPHILS NFR BLD AUTO: 53.6 %
NEUTROPHILS NFR BLD AUTO: 62.8 %
NITRITE URINE: NEGATIVE
NITRITE URINE: NEGATIVE
NONHDLC SERPL-MCNC: 64 MG/DL
NONHDLC SERPL-MCNC: 68 MG/DL
NONHDLC SERPL-MCNC: 77 MG/DL
PH URINE: 5.5
PH URINE: 6
PLATELET # BLD AUTO: 191 K/UL
PLATELET # BLD AUTO: 210 K/UL
PLATELET # BLD AUTO: 215 K/UL
PLATELET # BLD AUTO: 223 K/UL
POTASSIUM SERPL-SCNC: 4 MMOL/L
POTASSIUM SERPL-SCNC: 4.2 MMOL/L
POTASSIUM SERPL-SCNC: 4.2 MMOL/L
POTASSIUM SERPL-SCNC: 4.3 MMOL/L
PROT SERPL-MCNC: 6.3 G/DL
PROT SERPL-MCNC: 6.5 G/DL
PROT SERPL-MCNC: 6.6 G/DL
PROT SERPL-MCNC: 7.3 G/DL
PROT SERPL-MCNC: 7.3 G/DL
PROTEIN URINE: NEGATIVE MG/DL
PROTEIN URINE: NEGATIVE MG/DL
RBC # BLD: 3.81 M/UL
RBC # BLD: 3.83 M/UL
RBC # BLD: 3.88 M/UL
RBC # BLD: 4.11 M/UL
RBC # FLD: 12.8 %
RBC # FLD: 13 %
RBC # FLD: 13.2 %
RBC # FLD: 13.2 %
RED BLOOD CELLS URINE: 10 /HPF
RED BLOOD CELLS URINE: 4 /HPF
SARS-COV-2 N GENE NPH QL NAA+PROBE: NOT DETECTED
SODIUM SERPL-SCNC: 141 MMOL/L
SODIUM SERPL-SCNC: 143 MMOL/L
SODIUM SERPL-SCNC: 144 MMOL/L
SODIUM SERPL-SCNC: 145 MMOL/L
SPECIFIC GRAVITY URINE: 1.02
SPECIFIC GRAVITY URINE: 1.03
T3FREE SERPL-MCNC: 2.84 PG/ML
T4 FREE SERPL-MCNC: 1 NG/DL
TRANSFERRIN SERPL-MCNC: 212 MG/DL
TRIGL SERPL-MCNC: 73 MG/DL
TRIGL SERPL-MCNC: 74 MG/DL
TRIGL SERPL-MCNC: 81 MG/DL
TSH SERPL-ACNC: 1.8 UIU/ML
UROBILINOGEN URINE: 0.2 MG/DL
UROBILINOGEN URINE: 0.2 MG/DL
VIT B12 SERPL-MCNC: 504 PG/ML
WBC # FLD AUTO: 6.42 K/UL
WBC # FLD AUTO: 6.45 K/UL
WBC # FLD AUTO: 6.58 K/UL
WBC # FLD AUTO: 6.72 K/UL
WHITE BLOOD CELLS URINE: 13 /HPF
WHITE BLOOD CELLS URINE: 4 /HPF

## 2024-05-09 ENCOUNTER — OUTPATIENT (OUTPATIENT)
Dept: OUTPATIENT SERVICES | Facility: HOSPITAL | Age: 59
LOS: 1 days | End: 2024-05-09
Payer: COMMERCIAL

## 2024-05-09 ENCOUNTER — APPOINTMENT (OUTPATIENT)
Dept: CT IMAGING | Facility: IMAGING CENTER | Age: 59
End: 2024-05-09
Payer: COMMERCIAL

## 2024-05-09 ENCOUNTER — APPOINTMENT (OUTPATIENT)
Dept: PULMONOLOGY | Facility: CLINIC | Age: 59
End: 2024-05-09
Payer: COMMERCIAL

## 2024-05-09 DIAGNOSIS — Z95.5 PRESENCE OF CORONARY ANGIOPLASTY IMPLANT AND GRAFT: Chronic | ICD-10-CM

## 2024-05-09 DIAGNOSIS — Q34.1 CONGENITAL CYST OF MEDIASTINUM: ICD-10-CM

## 2024-05-09 DIAGNOSIS — Z98.890 OTHER SPECIFIED POSTPROCEDURAL STATES: Chronic | ICD-10-CM

## 2024-05-09 PROCEDURE — 94726 PLETHYSMOGRAPHY LUNG VOLUMES: CPT

## 2024-05-09 PROCEDURE — 94729 DIFFUSING CAPACITY: CPT

## 2024-05-09 PROCEDURE — 94010 BREATHING CAPACITY TEST: CPT

## 2024-05-09 PROCEDURE — 71250 CT THORAX DX C-: CPT | Mod: 26

## 2024-05-09 PROCEDURE — 71250 CT THORAX DX C-: CPT

## 2024-05-14 ENCOUNTER — APPOINTMENT (OUTPATIENT)
Dept: THORACIC SURGERY | Facility: CLINIC | Age: 59
End: 2024-05-14
Payer: COMMERCIAL

## 2024-05-14 PROCEDURE — 99443: CPT

## 2024-05-14 NOTE — ASSESSMENT
[FreeTextEntry1] : Ms. ASHWIN JACOB, 58 year old female, never smoker, w/ hx of HTN, HLD, CAD s/p PCI (x1) to mLAD in ~2019, Angio Cath on 1/18/23 for chest pain with non nonobstructive CAD, treated medically. Referred by cardiologist Dr. Lin for further evaluation regarding a mediastinal cyst found incidentally prior to Angio cath.   Patient presents today, via tele, with follow up diagnostic testing and to discuss further plan of care.   I have reviewed the patient's medical records and diagnostic images at time of this office consultation and have made the following recommendation: 1. CT scan reviewed, mediastinal cyst is stable, not an urgency to schedule for rxn, patient will call back to schedule surgery once she is available, will then decide whether she needs a repeat CT scan or not. 2. Risks and benefits and alternatives explained to patient, all questions answered.   I, Dr. SIMS [facility], personally performed the evaluation and management (E/M) services for this established patient who presents today with (a) new problem(s)/exacerbation of (an) existing condition(s). That E/M includes conducting the examination, assessing all new/exacerbated conditions, and establishing a new plan of care. Today, my ACP, Marely Aranda NP was here to observe my evaluation and management services for this new problem/exacerbated condition to be followed going forward.

## 2024-05-14 NOTE — REASON FOR VISIT
[Follow-Up: _____] : a [unfilled] follow-up visit [Home] : at home, [unfilled] , at the time of the visit. [Medical Office: ([facility])___] : at the medical office located in  [Patient] : the patient [Self] : self [This encounter was initiated by telehealth (audio with video) and converted to telephone (audio only) due to technical difficulties.] : This encounter was initiated by telehealth (audio with video) and converted to telephone (audio only) due to technical difficulties.

## 2024-05-14 NOTE — CONSULT LETTER
[Consult Letter:] : I had the pleasure of evaluating your patient, [unfilled]. [( Thank you for referring [unfilled] for consultation for _____ )] : Thank you for referring [unfilled] for consultation for [unfilled] [Please see my note below.] : Please see my note below. [Consult Closing:] : Thank you very much for allowing me to participate in the care of this patient.  If you have any questions, please do not hesitate to contact me. [Sincerely,] : Sincerely, [FreeTextEntry2] : Dr. Smith Lin (Card/Ref) [FreeTextEntry3] : Mart Gaming MD, FACS  Chief, Division of Thoracic Surgery  Director, Minimally Invasive Thoracic Surgery  Department of Cardiovascular and Thoracic Surgery  [facility]  , Cardiovascular and Thoracic Surgery

## 2024-05-14 NOTE — HISTORY OF PRESENT ILLNESS
[FreeTextEntry1] : Ms. ASHWIN JACOB, 58 year old female, never smoker, w/ hx of HTN, HLD, CAD s/p PCI (x1) to mLAD in ~2019, Angio Cath on 1/18/23 for chest pain with non nonobstructive CAD, treated medically. Referred by cardiologist Dr. Lin for further evaluation regarding a mediastinal cyst found incidentally prior to Angio cath.   CT Chest on 1/5/2023:  - New mild groundglass in the posterior aspects of both lower lobes - Unchanged small triangular nodule abutting the right oblique fissure and in the apical segment of the RUL compatible with intrapulmonary lymph nodes - 2.8 cm cyst adjacent to the distal esophagus at the level of the hiatus (301-90), prior 2.5 cm. - Cholelithiasis; Nonobstructing right renal calcifications.  PFTs on 1/11/23: FVC 3.64L (76%), FEV1 2.85L (76%), DLCO 62%.  MR Chest w/wo IV Contrast on 10/6/2023:  - Ovoid T1 hypointense, T2 hyperintense nonenhancing unilocular benign left paraesophageal/posterior mediastinal cyst at left cardiophrenic angle measures approximately 2.8 x 3.6 cm in maximal short and long axes. No solid mural nodular enhancing component.   OF NOTE: Patient fell and fractured her Lt knee, scheduled for left total knee replacement on 12/4/23.   CT Chest w/p Contrast on 5/9/2024:  - Unchanged (2021) sub-4 mm solid nodules in RUL (2-27) and abutting the right oblique fissure (2-69) - Unchanged faint peripheral groundglass in the RLL, and resolved in the LLL - Unchanged cyst adjacent to the distal esophagus - Coronary artery calcifications - Punctate left nephrolithiasis.  PFTs on 5/9/2024: FVC: 2.81 (73%); FEV1: 2.2 (74%); DLCO: 13.7 (59%)  OF NOTE: Patient was a new consult in November, 2023.  MRI reviewed, but I recommended patient to proceed with Lt knee surgery first. 2. She will call back once she is recovered from knee surgery, will then schedule a Bronch Lt VATS, excision of posterior mediastinal cyst. She will need CT Chest w/o contrast, PFTs, and cardiac clearance at that time.  Patient presents to office for follow up.  Patient has recovered well from knee surgery, going to PT 2-3 times/wk, using a cane to ambulate. However, she does not feel like she is ready for surgery yet due to personal/family planning, will prob be around Sept.

## 2024-05-29 NOTE — ED PROVIDER NOTE - CPE EDP PSYCH NORM
Get xray now    Take Mobic (Meloxicam) daily as needed for pain.  Take this medication with food.  Take only as needed.  Prolonged use of this medication can cause gastric ulcer and kidney problems.  Do not take ibuprofen or aleve while taking this medication as it is in the same drug class.    Do quad and hamstring stretching twice a day    If no improvement in one week, notify me for PT referral   normal...

## 2024-06-07 ENCOUNTER — APPOINTMENT (OUTPATIENT)
Dept: CARDIOLOGY | Facility: CLINIC | Age: 59
End: 2024-06-07
Payer: COMMERCIAL

## 2024-06-07 VITALS
TEMPERATURE: 97.8 F | BODY MASS INDEX: 27.58 KG/M2 | HEART RATE: 66 BPM | WEIGHT: 182 LBS | DIASTOLIC BLOOD PRESSURE: 80 MMHG | HEIGHT: 68 IN | OXYGEN SATURATION: 99 % | SYSTOLIC BLOOD PRESSURE: 130 MMHG

## 2024-06-07 DIAGNOSIS — R00.2 PALPITATIONS: ICD-10-CM

## 2024-06-07 DIAGNOSIS — K21.9 GASTRO-ESOPHAGEAL REFLUX DISEASE W/OUT ESOPHAGITIS: ICD-10-CM

## 2024-06-07 DIAGNOSIS — R63.5 ABNORMAL WEIGHT GAIN: ICD-10-CM

## 2024-06-07 DIAGNOSIS — E55.9 VITAMIN D DEFICIENCY, UNSPECIFIED: ICD-10-CM

## 2024-06-07 DIAGNOSIS — R91.8 OTHER NONSPECIFIC ABNORMAL FINDING OF LUNG FIELD: ICD-10-CM

## 2024-06-07 DIAGNOSIS — I49.3 VENTRICULAR PREMATURE DEPOLARIZATION: ICD-10-CM

## 2024-06-07 DIAGNOSIS — I25.10 ATHEROSCLEROTIC HEART DISEASE OF NATIVE CORONARY ARTERY W/OUT ANGINA PECTORIS: ICD-10-CM

## 2024-06-07 DIAGNOSIS — D64.9 ANEMIA, UNSPECIFIED: ICD-10-CM

## 2024-06-07 DIAGNOSIS — Q34.1 CONGENITAL CYST OF MEDIASTINUM: ICD-10-CM

## 2024-06-07 DIAGNOSIS — Z13.228 ENCOUNTER FOR SCREENING FOR OTHER METABOLIC DISORDERS: ICD-10-CM

## 2024-06-07 DIAGNOSIS — M81.0 AGE-RELATED OSTEOPOROSIS W/OUT CURRENT PATHOLOGICAL FRACTURE: ICD-10-CM

## 2024-06-07 DIAGNOSIS — R82.90 UNSPECIFIED ABNORMAL FINDINGS IN URINE: ICD-10-CM

## 2024-06-07 PROCEDURE — 93000 ELECTROCARDIOGRAM COMPLETE: CPT

## 2024-06-07 PROCEDURE — 99214 OFFICE O/P EST MOD 30 MIN: CPT

## 2024-06-07 PROCEDURE — G2211 COMPLEX E/M VISIT ADD ON: CPT

## 2024-06-07 PROCEDURE — 93306 TTE W/DOPPLER COMPLETE: CPT

## 2024-06-07 NOTE — HISTORY OF PRESENT ILLNESS
[FreeTextEntry1] : This is a 58 year female with a Pmhx of CAD s/p PCI to mLAD, HTN and HLD s/p left knee replacement on 12/4/2023 with Dr. Brumfield.   s/p CT chest in 05/2024: Unchanged cyst adjacent to the distal esophagus. Unchanged mild groundglass in the right lower lobe, and resolved in the left lower lobe. Patient has been seeing Dr. Gaming, pending  eventual mediastinal cyst removal.   s/p TTE today. She is under stress as her mother has dementia.  Patient also reports of right inner groin pain and pulsating feeling last night which self resolved. She also follows with Dr Fall due to osteoporosis, considering reclast infusion.   Pt denies any CP, SOB,, dizziness, abdominal pain N/V/D fever or chills pt concerned about weight gain

## 2024-06-16 ENCOUNTER — NON-APPOINTMENT (OUTPATIENT)
Age: 59
End: 2024-06-16

## 2024-08-20 ENCOUNTER — RX RENEWAL (OUTPATIENT)
Age: 59
End: 2024-08-20

## 2024-08-22 ENCOUNTER — APPOINTMENT (OUTPATIENT)
Dept: ORTHOPEDIC SURGERY | Facility: CLINIC | Age: 59
End: 2024-08-22

## 2024-08-26 ENCOUNTER — APPOINTMENT (OUTPATIENT)
Dept: PULMONOLOGY | Facility: CLINIC | Age: 59
End: 2024-08-26

## 2024-10-22 ENCOUNTER — NON-APPOINTMENT (OUTPATIENT)
Age: 59
End: 2024-10-22

## 2024-10-28 ENCOUNTER — APPOINTMENT (OUTPATIENT)
Dept: CARDIOLOGY | Facility: CLINIC | Age: 59
End: 2024-10-28

## 2024-10-31 ENCOUNTER — APPOINTMENT (OUTPATIENT)
Dept: ORTHOPEDIC SURGERY | Facility: CLINIC | Age: 59
End: 2024-10-31
Payer: COMMERCIAL

## 2024-10-31 VITALS — BODY MASS INDEX: 27.28 KG/M2 | HEIGHT: 68 IN | WEIGHT: 180 LBS

## 2024-10-31 DIAGNOSIS — Z96.652 PRESENCE OF LEFT ARTIFICIAL KNEE JOINT: ICD-10-CM

## 2024-10-31 PROCEDURE — 99214 OFFICE O/P EST MOD 30 MIN: CPT

## 2024-10-31 PROCEDURE — 73562 X-RAY EXAM OF KNEE 3: CPT | Mod: 50

## 2024-10-31 RX ORDER — DICLOFENAC SODIUM 75 MG/1
75 TABLET, DELAYED RELEASE ORAL
Qty: 28 | Refills: 0 | Status: ACTIVE | COMMUNITY
Start: 2024-10-31 | End: 1900-01-01

## 2024-12-05 ENCOUNTER — NON-APPOINTMENT (OUTPATIENT)
Age: 59
End: 2024-12-05

## 2024-12-19 ENCOUNTER — APPOINTMENT (OUTPATIENT)
Dept: ORTHOPEDIC SURGERY | Facility: CLINIC | Age: 59
End: 2024-12-19

## 2025-02-24 ENCOUNTER — APPOINTMENT (OUTPATIENT)
Dept: CARDIOLOGY | Facility: CLINIC | Age: 60
End: 2025-02-24

## 2025-02-24 DIAGNOSIS — Z00.00 ENCOUNTER FOR GENERAL ADULT MEDICAL EXAMINATION W/OUT ABNORMAL FINDINGS: ICD-10-CM

## 2025-02-24 DIAGNOSIS — I25.10 ATHEROSCLEROTIC HEART DISEASE OF NATIVE CORONARY ARTERY W/OUT ANGINA PECTORIS: ICD-10-CM

## 2025-02-24 DIAGNOSIS — J45.909 UNSPECIFIED ASTHMA, UNCOMPLICATED: ICD-10-CM

## 2025-04-17 ENCOUNTER — NON-APPOINTMENT (OUTPATIENT)
Age: 60
End: 2025-04-17

## 2025-04-24 ENCOUNTER — APPOINTMENT (OUTPATIENT)
Dept: ORTHOPEDIC SURGERY | Facility: CLINIC | Age: 60
End: 2025-04-24
Payer: COMMERCIAL

## 2025-04-24 VITALS — WEIGHT: 180 LBS | BODY MASS INDEX: 27.28 KG/M2 | HEIGHT: 68 IN

## 2025-04-24 DIAGNOSIS — M17.12 UNILATERAL PRIMARY OSTEOARTHRITIS, LEFT KNEE: ICD-10-CM

## 2025-04-24 PROCEDURE — 73562 X-RAY EXAM OF KNEE 3: CPT | Mod: LT

## 2025-04-24 PROCEDURE — 99214 OFFICE O/P EST MOD 30 MIN: CPT

## 2025-06-05 ENCOUNTER — APPOINTMENT (OUTPATIENT)
Dept: INTERNAL MEDICINE | Facility: CLINIC | Age: 60
End: 2025-06-05
Payer: COMMERCIAL

## 2025-06-05 ENCOUNTER — APPOINTMENT (OUTPATIENT)
Dept: CARDIOLOGY | Facility: CLINIC | Age: 60
End: 2025-06-05
Payer: COMMERCIAL

## 2025-06-05 ENCOUNTER — NON-APPOINTMENT (OUTPATIENT)
Age: 60
End: 2025-06-05

## 2025-06-05 VITALS
OXYGEN SATURATION: 97 % | SYSTOLIC BLOOD PRESSURE: 110 MMHG | HEIGHT: 68 IN | DIASTOLIC BLOOD PRESSURE: 82 MMHG | WEIGHT: 174 LBS | BODY MASS INDEX: 26.37 KG/M2 | TEMPERATURE: 97.7 F | HEART RATE: 68 BPM

## 2025-06-05 PROBLEM — M35.00 SJOGREN'S DISEASE: Status: ACTIVE | Noted: 2025-06-05

## 2025-06-05 PROCEDURE — 93000 ELECTROCARDIOGRAM COMPLETE: CPT

## 2025-06-05 PROCEDURE — G2211 COMPLEX E/M VISIT ADD ON: CPT | Mod: NC

## 2025-06-05 PROCEDURE — 93306 TTE W/DOPPLER COMPLETE: CPT

## 2025-06-05 PROCEDURE — 99214 OFFICE O/P EST MOD 30 MIN: CPT

## 2025-06-06 ENCOUNTER — APPOINTMENT (OUTPATIENT)
Dept: CARDIOLOGY | Facility: CLINIC | Age: 60
End: 2025-06-06
Payer: COMMERCIAL

## 2025-06-06 LAB
ALBUMIN SERPL ELPH-MCNC: 4.6 G/DL
ALP BLD-CCNC: 79 U/L
ALT SERPL-CCNC: 21 U/L
ANION GAP SERPL CALC-SCNC: 15 MMOL/L
AST SERPL-CCNC: 22 U/L
BASOPHILS # BLD AUTO: 0.04 K/UL
BASOPHILS NFR BLD AUTO: 0.7 %
BILIRUB SERPL-MCNC: 0.7 MG/DL
BUN SERPL-MCNC: 21 MG/DL
CALCIUM SERPL-MCNC: 9.5 MG/DL
CHLORIDE SERPL-SCNC: 106 MMOL/L
CHOLEST SERPL-MCNC: 115 MG/DL
CK SERPL-CCNC: 73 U/L
CO2 SERPL-SCNC: 23 MMOL/L
CREAT SERPL-MCNC: 1.07 MG/DL
CRP SERPL-MCNC: <3 MG/L
EGFRCR SERPLBLD CKD-EPI 2021: 59 ML/MIN/1.73M2
EOSINOPHIL # BLD AUTO: 0.25 K/UL
EOSINOPHIL NFR BLD AUTO: 4.4 %
ESTIMATED AVERAGE GLUCOSE: 100 MG/DL
GLUCOSE SERPL-MCNC: 87 MG/DL
HBA1C MFR BLD HPLC: 5.1 %
HCT VFR BLD CALC: 38.5 %
HDLC SERPL-MCNC: 52 MG/DL
HGB BLD-MCNC: 12.4 G/DL
IMM GRANULOCYTES NFR BLD AUTO: 0.2 %
LDLC SERPL-MCNC: 50 MG/DL
LYMPHOCYTES # BLD AUTO: 1.89 K/UL
LYMPHOCYTES NFR BLD AUTO: 33.3 %
MAN DIFF?: NORMAL
MCHC RBC-ENTMCNC: 31.4 PG
MCHC RBC-ENTMCNC: 32.2 G/DL
MCV RBC AUTO: 97.5 FL
MONOCYTES # BLD AUTO: 0.41 K/UL
MONOCYTES NFR BLD AUTO: 7.2 %
NEUTROPHILS # BLD AUTO: 3.08 K/UL
NEUTROPHILS NFR BLD AUTO: 54.2 %
NONHDLC SERPL-MCNC: 63 MG/DL
PLATELET # BLD AUTO: 213 K/UL
POTASSIUM SERPL-SCNC: 4.5 MMOL/L
PROT SERPL-MCNC: 6.9 G/DL
RBC # BLD: 3.95 M/UL
RBC # FLD: 13.5 %
SODIUM SERPL-SCNC: 145 MMOL/L
T4 FREE SERPL-MCNC: 1 NG/DL
TRIGL SERPL-MCNC: 59 MG/DL
TSH SERPL-ACNC: 1.85 UIU/ML
WBC # FLD AUTO: 5.68 K/UL

## 2025-06-06 PROCEDURE — 78452 HT MUSCLE IMAGE SPECT MULT: CPT

## 2025-06-06 PROCEDURE — 93015 CV STRESS TEST SUPVJ I&R: CPT

## 2025-06-06 PROCEDURE — A9500: CPT

## 2025-09-17 ENCOUNTER — NON-APPOINTMENT (OUTPATIENT)
Age: 60
End: 2025-09-17

## 2025-09-19 ENCOUNTER — APPOINTMENT (OUTPATIENT)
Dept: CARDIOLOGY | Facility: CLINIC | Age: 60
End: 2025-09-19
Payer: COMMERCIAL

## 2025-09-19 VITALS — DIASTOLIC BLOOD PRESSURE: 70 MMHG | SYSTOLIC BLOOD PRESSURE: 110 MMHG

## 2025-09-19 VITALS
BODY MASS INDEX: 25.54 KG/M2 | RESPIRATION RATE: 16 BRPM | SYSTOLIC BLOOD PRESSURE: 110 MMHG | HEART RATE: 66 BPM | OXYGEN SATURATION: 98 % | WEIGHT: 168 LBS | DIASTOLIC BLOOD PRESSURE: 74 MMHG | TEMPERATURE: 97.7 F

## 2025-09-19 DIAGNOSIS — K21.9 GASTRO-ESOPHAGEAL REFLUX DISEASE W/OUT ESOPHAGITIS: ICD-10-CM

## 2025-09-19 DIAGNOSIS — I25.10 ATHEROSCLEROTIC HEART DISEASE OF NATIVE CORONARY ARTERY W/OUT ANGINA PECTORIS: ICD-10-CM

## 2025-09-19 DIAGNOSIS — D64.9 ANEMIA, UNSPECIFIED: ICD-10-CM

## 2025-09-19 DIAGNOSIS — Z13.228 ENCOUNTER FOR SCREENING FOR OTHER METABOLIC DISORDERS: ICD-10-CM

## 2025-09-19 DIAGNOSIS — R42 DIZZINESS AND GIDDINESS: ICD-10-CM

## 2025-09-19 DIAGNOSIS — R63.5 ABNORMAL WEIGHT GAIN: ICD-10-CM

## 2025-09-19 DIAGNOSIS — R07.89 OTHER CHEST PAIN: ICD-10-CM

## 2025-09-19 DIAGNOSIS — Q34.1 CONGENITAL CYST OF MEDIASTINUM: ICD-10-CM

## 2025-09-19 DIAGNOSIS — M35.00 SICCA SYNDROME, UNSPECIFIED: ICD-10-CM

## 2025-09-19 DIAGNOSIS — R00.2 PALPITATIONS: ICD-10-CM

## 2025-09-19 DIAGNOSIS — J45.909 UNSPECIFIED ASTHMA, UNCOMPLICATED: ICD-10-CM

## 2025-09-19 DIAGNOSIS — M81.0 AGE-RELATED OSTEOPOROSIS W/OUT CURRENT PATHOLOGICAL FRACTURE: ICD-10-CM

## 2025-09-19 DIAGNOSIS — I49.3 VENTRICULAR PREMATURE DEPOLARIZATION: ICD-10-CM

## 2025-09-19 PROCEDURE — 99214 OFFICE O/P EST MOD 30 MIN: CPT

## 2025-09-19 PROCEDURE — G2211 COMPLEX E/M VISIT ADD ON: CPT | Mod: NC

## 2025-09-19 PROCEDURE — 93000 ELECTROCARDIOGRAM COMPLETE: CPT

## 2025-09-19 RX ORDER — HYDROXYCHLOROQUINE SULFATE 200 MG/1
200 TABLET ORAL
Refills: 0 | Status: ACTIVE | COMMUNITY
Start: 2025-09-19

## (undated) DEVICE — SAW BLADE STRYKER SAGITTAL DUAL CUT 64X35X.89MM

## (undated) DEVICE — MAKO DRAPE KIT

## (undated) DEVICE — HOOD FLYTE STRYKER HELMET SHIELD

## (undated) DEVICE — GLV 8 PROTEXIS (WHITE)

## (undated) DEVICE — VENODYNE/SCD SLEEVE CALF LARGE

## (undated) DEVICE — GLV 7.5 PROTEXIS (WHITE)

## (undated) DEVICE — SUT PDO 2 1/2 CIRCLE 40MM NDL 45CM

## (undated) DEVICE — SUT POLYSORB 2-0 30" GS-21 UNDYED

## (undated) DEVICE — DRAPE 3/4 SHEET W REINFORCEMENT 56X77"

## (undated) DEVICE — PACK TOTAL KNEE (2 PACKS)

## (undated) DEVICE — WOUND IRR IRRISEPT W 0.5 CHG

## (undated) DEVICE — MAKO BLADE STANDARD

## (undated) DEVICE — SPECIMEN CONTAINER 100ML

## (undated) DEVICE — SUT QUILL MONODERM 2-0 3/8 CIRCLE 45CM

## (undated) DEVICE — SYR LUER LOK 20CC

## (undated) DEVICE — SOL IRR POUR NS 0.9% 500ML

## (undated) DEVICE — STRYKER MIXEVAC 3 BONE CEMENT MIXER

## (undated) DEVICE — SUT POLYSORB 1 36" GS-21 UNDYED

## (undated) DEVICE — GLV 8.5 PROTEXIS (WHITE)

## (undated) DEVICE — DRSG DERMABOND 0.7ML

## (undated) DEVICE — POSITIONER FOAM EGG CRATE ULNAR 2PCS (PINK)

## (undated) DEVICE — SOL IRR POUR H2O 1000ML

## (undated) DEVICE — NDL HYPO SAFE 22G X 1.5" (BLACK)

## (undated) DEVICE — WARMING BLANKET UPPER ADULT

## (undated) DEVICE — DRSG AQUACEL 3.5 X 10"

## (undated) DEVICE — TOURNIQUET CUFF 34" DUAL PORT W PLC

## (undated) DEVICE — ELCTR PLASMA BLADE X 3.0S WIDE TIP

## (undated) DEVICE — POSITIONER CARDIAC BUMP

## (undated) DEVICE — MIDAS REX MR8 BALL FLUTED LG BORE 3MM X 9CM

## (undated) DEVICE — MAKO VIZADISC KNEE TRACKING KIT

## (undated) DEVICE — TUBING SUCTION 20FT